# Patient Record
Sex: MALE | Race: WHITE | Employment: OTHER | URBAN - METROPOLITAN AREA
[De-identification: names, ages, dates, MRNs, and addresses within clinical notes are randomized per-mention and may not be internally consistent; named-entity substitution may affect disease eponyms.]

---

## 2017-01-03 ENCOUNTER — TELEPHONE (OUTPATIENT)
Dept: FAMILY MEDICINE CLINIC | Age: 82
End: 2017-01-03

## 2017-01-16 ENCOUNTER — OFFICE VISIT (OUTPATIENT)
Dept: FAMILY MEDICINE CLINIC | Age: 82
End: 2017-01-16

## 2017-01-16 VITALS
HEIGHT: 69 IN | SYSTOLIC BLOOD PRESSURE: 124 MMHG | HEART RATE: 100 BPM | TEMPERATURE: 97.7 F | RESPIRATION RATE: 16 BRPM | DIASTOLIC BLOOD PRESSURE: 80 MMHG | OXYGEN SATURATION: 97 %

## 2017-01-16 DIAGNOSIS — M79.89 SWELLING OF BOTH HANDS: ICD-10-CM

## 2017-01-16 DIAGNOSIS — R60.0 BILATERAL EDEMA OF LOWER EXTREMITY: Primary | ICD-10-CM

## 2017-01-16 PROCEDURE — 99213 OFFICE O/P EST LOW 20 MIN: CPT | Performed by: FAMILY MEDICINE

## 2017-01-16 RX ORDER — POTASSIUM CHLORIDE 750 MG/1
10 CAPSULE, EXTENDED RELEASE ORAL DAILY
Qty: 30 CAPSULE | Refills: 5 | Status: SHIPPED | OUTPATIENT
Start: 2017-01-16 | End: 2017-01-30 | Stop reason: SDUPTHER

## 2017-01-16 RX ORDER — FUROSEMIDE 20 MG/1
20 TABLET ORAL DAILY
Qty: 30 TABLET | Refills: 5 | Status: SHIPPED | OUTPATIENT
Start: 2017-01-16 | End: 2017-01-30 | Stop reason: SDUPTHER

## 2017-01-16 ASSESSMENT — ENCOUNTER SYMPTOMS
BACK PAIN: 0
EYES NEGATIVE: 1
RESPIRATORY NEGATIVE: 1

## 2017-01-16 ASSESSMENT — PATIENT HEALTH QUESTIONNAIRE - PHQ9
SUM OF ALL RESPONSES TO PHQ9 QUESTIONS 1 & 2: 0
1. LITTLE INTEREST OR PLEASURE IN DOING THINGS: 0
2. FEELING DOWN, DEPRESSED OR HOPELESS: 0
SUM OF ALL RESPONSES TO PHQ QUESTIONS 1-9: 0

## 2017-01-17 ENCOUNTER — TELEPHONE (OUTPATIENT)
Dept: FAMILY MEDICINE CLINIC | Age: 82
End: 2017-01-17

## 2017-01-30 ENCOUNTER — OFFICE VISIT (OUTPATIENT)
Dept: FAMILY MEDICINE CLINIC | Age: 82
End: 2017-01-30

## 2017-01-30 VITALS
SYSTOLIC BLOOD PRESSURE: 130 MMHG | OXYGEN SATURATION: 98 % | RESPIRATION RATE: 16 BRPM | HEIGHT: 69 IN | DIASTOLIC BLOOD PRESSURE: 80 MMHG | BODY MASS INDEX: 28.29 KG/M2 | WEIGHT: 191 LBS | HEART RATE: 92 BPM | TEMPERATURE: 98.2 F

## 2017-01-30 DIAGNOSIS — M79.89 SWELLING OF BOTH HANDS: Primary | ICD-10-CM

## 2017-01-30 DIAGNOSIS — R60.0 BILATERAL EDEMA OF LOWER EXTREMITY: ICD-10-CM

## 2017-01-30 LAB
ANION GAP SERPL CALCULATED.3IONS-SCNC: 18 MMOL/L (ref 3–16)
BUN BLDV-MCNC: 15 MG/DL (ref 7–20)
C-REACTIVE PROTEIN: 36.2 MG/L (ref 0–5.1)
CALCIUM SERPL-MCNC: 9 MG/DL (ref 8.3–10.6)
CHLORIDE BLD-SCNC: 100 MMOL/L (ref 99–110)
CO2: 24 MMOL/L (ref 21–32)
CREAT SERPL-MCNC: 0.9 MG/DL (ref 0.8–1.3)
GFR AFRICAN AMERICAN: >60
GFR NON-AFRICAN AMERICAN: >60
GLUCOSE BLD-MCNC: 89 MG/DL (ref 70–99)
POTASSIUM SERPL-SCNC: 4.9 MMOL/L (ref 3.5–5.1)
RHEUMATOID FACTOR: <10 IU/ML
SODIUM BLD-SCNC: 142 MMOL/L (ref 136–145)
URIC ACID, SERUM: 5.8 MG/DL (ref 3.5–7.2)

## 2017-01-30 PROCEDURE — 99213 OFFICE O/P EST LOW 20 MIN: CPT | Performed by: FAMILY MEDICINE

## 2017-01-30 RX ORDER — FUROSEMIDE 20 MG/1
20 TABLET ORAL DAILY
Qty: 30 TABLET | Refills: 5 | Status: SHIPPED | OUTPATIENT
Start: 2017-01-30 | End: 2017-02-09 | Stop reason: SDUPTHER

## 2017-01-30 RX ORDER — POTASSIUM CHLORIDE 750 MG/1
10 CAPSULE, EXTENDED RELEASE ORAL DAILY
Qty: 30 CAPSULE | Refills: 5 | Status: SHIPPED | OUTPATIENT
Start: 2017-01-30 | End: 2017-02-09 | Stop reason: SDUPTHER

## 2017-01-30 ASSESSMENT — ENCOUNTER SYMPTOMS
RESPIRATORY NEGATIVE: 1
BACK PAIN: 0

## 2017-01-31 LAB
ANA INTERPRETATION: NORMAL
ANTI-NUCLEAR ANTIBODY (ANA): NEGATIVE

## 2017-02-01 ENCOUNTER — TELEPHONE (OUTPATIENT)
Dept: FAMILY MEDICINE CLINIC | Age: 82
End: 2017-02-01

## 2017-02-09 ENCOUNTER — OFFICE VISIT (OUTPATIENT)
Dept: FAMILY MEDICINE CLINIC | Age: 82
End: 2017-02-09

## 2017-02-09 VITALS
BODY MASS INDEX: 28.44 KG/M2 | TEMPERATURE: 98 F | OXYGEN SATURATION: 98 % | RESPIRATION RATE: 16 BRPM | WEIGHT: 192 LBS | SYSTOLIC BLOOD PRESSURE: 120 MMHG | DIASTOLIC BLOOD PRESSURE: 80 MMHG | HEIGHT: 69 IN | HEART RATE: 60 BPM

## 2017-02-09 DIAGNOSIS — M19.041 PRIMARY OSTEOARTHRITIS OF BOTH HANDS: ICD-10-CM

## 2017-02-09 DIAGNOSIS — M19.042 PRIMARY OSTEOARTHRITIS OF BOTH HANDS: ICD-10-CM

## 2017-02-09 DIAGNOSIS — M79.89 SWELLING OF BOTH HANDS: ICD-10-CM

## 2017-02-09 DIAGNOSIS — R60.0 BILATERAL LEG EDEMA: Primary | ICD-10-CM

## 2017-02-09 PROCEDURE — 99213 OFFICE O/P EST LOW 20 MIN: CPT | Performed by: FAMILY MEDICINE

## 2017-02-09 RX ORDER — FUROSEMIDE 20 MG/1
20 TABLET ORAL 2 TIMES DAILY
Qty: 60 TABLET | Refills: 2 | Status: SHIPPED | OUTPATIENT
Start: 2017-02-09 | End: 2017-09-21 | Stop reason: SDUPTHER

## 2017-02-09 RX ORDER — POTASSIUM CHLORIDE 750 MG/1
10 TABLET, EXTENDED RELEASE ORAL 2 TIMES DAILY
Qty: 60 TABLET | Refills: 2 | Status: SHIPPED | OUTPATIENT
Start: 2017-02-09 | End: 2017-09-21 | Stop reason: SDUPTHER

## 2017-02-09 ASSESSMENT — ENCOUNTER SYMPTOMS
BACK PAIN: 0
RESPIRATORY NEGATIVE: 1

## 2017-03-18 DIAGNOSIS — T14.8XXA BRUISING: ICD-10-CM

## 2017-03-18 DIAGNOSIS — M15.3 OTHER SECONDARY OSTEOARTHRITIS OF MULTIPLE SITES: ICD-10-CM

## 2017-03-20 RX ORDER — MELOXICAM 15 MG/1
TABLET ORAL
Qty: 30 TABLET | Refills: 5 | Status: SHIPPED | OUTPATIENT
Start: 2017-03-20 | End: 2017-09-21 | Stop reason: SDUPTHER

## 2017-08-16 ENCOUNTER — OFFICE VISIT (OUTPATIENT)
Dept: FAMILY MEDICINE CLINIC | Age: 82
End: 2017-08-16

## 2017-08-16 VITALS
DIASTOLIC BLOOD PRESSURE: 78 MMHG | RESPIRATION RATE: 16 BRPM | BODY MASS INDEX: 29.18 KG/M2 | OXYGEN SATURATION: 99 % | HEART RATE: 84 BPM | HEIGHT: 69 IN | TEMPERATURE: 97.8 F | WEIGHT: 197 LBS | SYSTOLIC BLOOD PRESSURE: 130 MMHG

## 2017-08-16 DIAGNOSIS — Z23 IMMUNIZATION DUE: ICD-10-CM

## 2017-08-16 DIAGNOSIS — R60.0 BILATERAL EDEMA OF LOWER EXTREMITY: ICD-10-CM

## 2017-08-16 DIAGNOSIS — E55.9 VITAMIN D DEFICIENCY: ICD-10-CM

## 2017-08-16 DIAGNOSIS — Z00.00 PE (PHYSICAL EXAM), ANNUAL: Primary | ICD-10-CM

## 2017-08-16 LAB
A/G RATIO: 1.8 (ref 1.1–2.2)
ALBUMIN SERPL-MCNC: 4.3 G/DL (ref 3.4–5)
ALP BLD-CCNC: 102 U/L (ref 40–129)
ALT SERPL-CCNC: 18 U/L (ref 10–40)
ANION GAP SERPL CALCULATED.3IONS-SCNC: 17 MMOL/L (ref 3–16)
AST SERPL-CCNC: 20 U/L (ref 15–37)
BILIRUB SERPL-MCNC: 0.6 MG/DL (ref 0–1)
BILIRUBIN, POC: NORMAL
BLOOD URINE, POC: NORMAL
BUN BLDV-MCNC: 14 MG/DL (ref 7–20)
CALCIUM SERPL-MCNC: 9.3 MG/DL (ref 8.3–10.6)
CHLORIDE BLD-SCNC: 105 MMOL/L (ref 99–110)
CHOLESTEROL, TOTAL: 195 MG/DL (ref 0–199)
CLARITY, POC: CLEAR
CO2: 23 MMOL/L (ref 21–32)
COLOR, POC: YELLOW
CREAT SERPL-MCNC: 0.8 MG/DL (ref 0.8–1.3)
GFR AFRICAN AMERICAN: >60
GFR NON-AFRICAN AMERICAN: >60
GLOBULIN: 2.4 G/DL
GLUCOSE BLD-MCNC: 75 MG/DL (ref 70–99)
GLUCOSE URINE, POC: NORMAL
HCT VFR BLD CALC: 47.3 % (ref 40.5–52.5)
HDLC SERPL-MCNC: 45 MG/DL (ref 40–60)
HEMOGLOBIN: 15.4 G/DL (ref 13.5–17.5)
KETONES, POC: NORMAL
LDL CHOLESTEROL CALCULATED: 126 MG/DL
LEUKOCYTE EST, POC: NORMAL
MCH RBC QN AUTO: 26.7 PG (ref 26–34)
MCHC RBC AUTO-ENTMCNC: 32.5 G/DL (ref 31–36)
MCV RBC AUTO: 82.1 FL (ref 80–100)
NITRITE, POC: NORMAL
PDW BLD-RTO: 16 % (ref 12.4–15.4)
PH, POC: 7
PLATELET # BLD: 276 K/UL (ref 135–450)
PMV BLD AUTO: 8.5 FL (ref 5–10.5)
POTASSIUM SERPL-SCNC: 4.4 MMOL/L (ref 3.5–5.1)
PROSTATE SPECIFIC ANTIGEN: 3.81 NG/ML (ref 0–4)
PROTEIN, POC: NORMAL
RBC # BLD: 5.76 M/UL (ref 4.2–5.9)
SODIUM BLD-SCNC: 145 MMOL/L (ref 136–145)
SPECIFIC GRAVITY, POC: 1.01
T3 FREE: 3 PG/ML (ref 2.3–4.2)
T4 FREE: 0.9 NG/DL (ref 0.9–1.8)
TOTAL PROTEIN: 6.7 G/DL (ref 6.4–8.2)
TRIGL SERPL-MCNC: 122 MG/DL (ref 0–150)
TSH SERPL DL<=0.05 MIU/L-ACNC: 3.24 UIU/ML (ref 0.27–4.2)
UROBILINOGEN, POC: 0.2
VITAMIN D 25-HYDROXY: 31.8 NG/ML
VLDLC SERPL CALC-MCNC: 24 MG/DL
WBC # BLD: 10.9 K/UL (ref 4–11)

## 2017-08-16 PROCEDURE — 36415 COLL VENOUS BLD VENIPUNCTURE: CPT | Performed by: FAMILY MEDICINE

## 2017-08-16 PROCEDURE — 90471 IMMUNIZATION ADMIN: CPT | Performed by: FAMILY MEDICINE

## 2017-08-16 PROCEDURE — 81002 URINALYSIS NONAUTO W/O SCOPE: CPT | Performed by: FAMILY MEDICINE

## 2017-08-16 PROCEDURE — 90670 PCV13 VACCINE IM: CPT | Performed by: FAMILY MEDICINE

## 2017-08-16 PROCEDURE — G0009 ADMIN PNEUMOCOCCAL VACCINE: HCPCS | Performed by: FAMILY MEDICINE

## 2017-08-16 PROCEDURE — 93000 ELECTROCARDIOGRAM COMPLETE: CPT | Performed by: FAMILY MEDICINE

## 2017-08-16 PROCEDURE — 99397 PER PM REEVAL EST PAT 65+ YR: CPT | Performed by: FAMILY MEDICINE

## 2017-08-16 PROCEDURE — 90715 TDAP VACCINE 7 YRS/> IM: CPT | Performed by: FAMILY MEDICINE

## 2017-09-21 ENCOUNTER — OFFICE VISIT (OUTPATIENT)
Dept: FAMILY MEDICINE CLINIC | Age: 82
End: 2017-09-21

## 2017-09-21 VITALS
HEART RATE: 88 BPM | WEIGHT: 198 LBS | BODY MASS INDEX: 30.01 KG/M2 | HEIGHT: 68 IN | DIASTOLIC BLOOD PRESSURE: 68 MMHG | OXYGEN SATURATION: 97 % | SYSTOLIC BLOOD PRESSURE: 124 MMHG

## 2017-09-21 DIAGNOSIS — M19.041 PRIMARY OSTEOARTHRITIS OF BOTH HANDS: ICD-10-CM

## 2017-09-21 DIAGNOSIS — M19.042 PRIMARY OSTEOARTHRITIS OF BOTH HANDS: ICD-10-CM

## 2017-09-21 DIAGNOSIS — E78.5 HYPERLIPIDEMIA, UNSPECIFIED HYPERLIPIDEMIA TYPE: ICD-10-CM

## 2017-09-21 DIAGNOSIS — R60.0 BILATERAL EDEMA OF LOWER EXTREMITY: ICD-10-CM

## 2017-09-21 PROCEDURE — 99213 OFFICE O/P EST LOW 20 MIN: CPT | Performed by: NURSE PRACTITIONER

## 2017-09-21 RX ORDER — POTASSIUM CHLORIDE 750 MG/1
10 TABLET, EXTENDED RELEASE ORAL 2 TIMES DAILY
Qty: 60 TABLET | Refills: 2 | Status: SHIPPED | OUTPATIENT
Start: 2017-09-21 | End: 2018-04-19 | Stop reason: SDUPTHER

## 2017-09-21 RX ORDER — MELOXICAM 15 MG/1
TABLET ORAL
Qty: 30 TABLET | Refills: 5 | Status: ON HOLD | OUTPATIENT
Start: 2017-09-21 | End: 2017-12-31 | Stop reason: HOSPADM

## 2017-09-21 RX ORDER — FUROSEMIDE 20 MG/1
20 TABLET ORAL 2 TIMES DAILY
Qty: 60 TABLET | Refills: 2 | Status: SHIPPED | OUTPATIENT
Start: 2017-09-21 | End: 2018-04-19 | Stop reason: SDUPTHER

## 2017-09-21 ASSESSMENT — ENCOUNTER SYMPTOMS
BLOOD IN STOOL: 0
COUGH: 0
SHORTNESS OF BREATH: 0
ABDOMINAL PAIN: 0
ORTHOPNEA: 0
DIARRHEA: 0
CONSTIPATION: 0

## 2017-12-15 ENCOUNTER — OFFICE VISIT (OUTPATIENT)
Dept: FAMILY MEDICINE CLINIC | Age: 82
End: 2017-12-15

## 2017-12-15 VITALS
OXYGEN SATURATION: 96 % | HEART RATE: 94 BPM | DIASTOLIC BLOOD PRESSURE: 76 MMHG | WEIGHT: 202 LBS | SYSTOLIC BLOOD PRESSURE: 134 MMHG | BODY MASS INDEX: 31.17 KG/M2

## 2017-12-15 DIAGNOSIS — B96.89 ACUTE BACTERIAL SINUSITIS: Primary | ICD-10-CM

## 2017-12-15 DIAGNOSIS — J01.90 ACUTE BACTERIAL SINUSITIS: Primary | ICD-10-CM

## 2017-12-15 PROCEDURE — 99213 OFFICE O/P EST LOW 20 MIN: CPT | Performed by: INTERNAL MEDICINE

## 2017-12-15 RX ORDER — AZITHROMYCIN 250 MG/1
TABLET, FILM COATED ORAL
Qty: 1 PACKET | Refills: 0 | Status: ON HOLD | OUTPATIENT
Start: 2017-12-15 | End: 2017-12-31 | Stop reason: HOSPADM

## 2017-12-15 NOTE — PROGRESS NOTES
Subjective:       Natalia Alva is a [de-identified] y.o. male who presents for evaluation of sinus pain. Symptoms include: congestion, post nasal drip and chest pain with cough with phlegm. Onset of symptoms was 1 week ago. Symptoms have been unchanged since that time. Past history is significant for no risk factors. Patient is a non-smoker. Patient's medications, allergies, past medical, surgical, social and family histories were reviewed and updated as appropriate. Review of Systems  Constitutional: negative for fatigue and fevers  Ears, nose, mouth, throat, and face: negative for earaches and hoarseness  Respiratory: negative for pleurisy/chest pain     Objective:      /76 (Site: Left Arm, Position: Sitting, Cuff Size: Large Adult)   Pulse 94   Wt 202 lb (91.6 kg)   SpO2 96%   BMI 31.17 kg/m²   General appearance: alert, appears stated age and cooperative  Throat: lips, mucosa, and tongue normal; teeth and gums normal  Lungs: clear to auscultation bilaterally  Chest wall: no tenderness  Extremities: extremities normal, atraumatic, no cyanosis or edema      Assessment:      Acute bacterial sinusitis. Plan:      Zithromax per medication orders.

## 2017-12-24 PROBLEM — I47.1 SVT (SUPRAVENTRICULAR TACHYCARDIA) (HCC): Status: ACTIVE | Noted: 2017-12-24

## 2017-12-24 PROBLEM — I47.10 SVT (SUPRAVENTRICULAR TACHYCARDIA): Status: ACTIVE | Noted: 2017-12-24

## 2017-12-31 ENCOUNTER — TELEPHONE (OUTPATIENT)
Dept: PULMONOLOGY | Age: 82
End: 2017-12-31

## 2017-12-31 DIAGNOSIS — R93.89 ABNORMAL CT OF THE CHEST: Primary | ICD-10-CM

## 2018-01-04 NOTE — TELEPHONE ENCOUNTER
Spoke with patient Ct chest w/o [facility] 3/2/18 @ 10:30am with same day f/u with Dr Gramajo. Order pended.   Ct chest w/o

## 2018-01-05 ENCOUNTER — TELEPHONE (OUTPATIENT)
Dept: FAMILY MEDICINE CLINIC | Age: 83
End: 2018-01-05

## 2018-01-05 NOTE — TELEPHONE ENCOUNTER
Pt referred for home care after d/c from hospital.  Seen by home care on 01/04/2018, asking if you will sign home care orders. Has appt on 01/08/2018. BP still a little elevated for nursing yesterday, 160/70 right arm sitting, 160/80 standing, had diltiazem and metoprolol added in hospital.      Asking if you feel any PT would be beneficial for therapy. Nurse says he walks kind of \"funny\", but pt states he has always walked this way.

## 2018-01-08 ENCOUNTER — OFFICE VISIT (OUTPATIENT)
Dept: FAMILY MEDICINE CLINIC | Age: 83
End: 2018-01-08

## 2018-01-08 VITALS
BODY MASS INDEX: 28.12 KG/M2 | SYSTOLIC BLOOD PRESSURE: 136 MMHG | OXYGEN SATURATION: 95 % | HEART RATE: 94 BPM | RESPIRATION RATE: 16 BRPM | WEIGHT: 196 LBS | DIASTOLIC BLOOD PRESSURE: 72 MMHG

## 2018-01-08 DIAGNOSIS — Z87.09 HISTORY OF INFLUENZA: ICD-10-CM

## 2018-01-08 DIAGNOSIS — R53.1 GENERALIZED WEAKNESS: ICD-10-CM

## 2018-01-08 DIAGNOSIS — Z09 HOSPITAL DISCHARGE FOLLOW-UP: ICD-10-CM

## 2018-01-08 DIAGNOSIS — R60.0 BILATERAL LOWER EXTREMITY EDEMA: ICD-10-CM

## 2018-01-08 DIAGNOSIS — J40 BRONCHITIS: ICD-10-CM

## 2018-01-08 DIAGNOSIS — I10 ESSENTIAL HYPERTENSION: ICD-10-CM

## 2018-01-08 PROCEDURE — 99214 OFFICE O/P EST MOD 30 MIN: CPT | Performed by: NURSE PRACTITIONER

## 2018-01-08 RX ORDER — M-VIT,TX,IRON,MINS/CALC/FOLIC 27MG-0.4MG
1 TABLET ORAL DAILY
COMMUNITY
End: 2018-08-26

## 2018-01-10 ASSESSMENT — ENCOUNTER SYMPTOMS
WHEEZING: 0
SHORTNESS OF BREATH: 0
COUGH: 1
SPUTUM PRODUCTION: 0
ABDOMINAL PAIN: 0
ORTHOPNEA: 0
HEMOPTYSIS: 0
NAUSEA: 0
VOMITING: 0
DIARRHEA: 0

## 2018-01-10 NOTE — PROGRESS NOTES
Veena Prom [de-identified] y.o. male    Chief Complaint   Patient presents with    Shortness of Breath     f/u from [facility] 12/24/17 for bronchitis, influenza       HPI     Hospital discharge follow-up for influenza, bronchitis, hypoxic resp failure. Was feeling bad, fell at home, CT scan of head ok. SVT, was on diltiazem drip, cardio was consulted, discharged on metoprolol, diltiazem. Pt restarted furosemide. Cervical LAD and mediastinal LAD, repeat CT scan of chest scheduled in [facility]. ECHO 12/27/17 Summary   Hyperdynamic LV systolic function with an estimated EF > 65%.   Normal left ventricular diastolic filling pressure.   Mild mitral annular calcification.   Trace-mild tricuspid regurgitation.   Systolic pulmonary artery pressure (SPAP) is estimated at 48mmHg consistent   with mild pulmonary hypertension (RA pressure 8mmHg). CTA pulm 12/24/2017:  1. No evidence of pulmonary embolism when accounting for breathing motion  2. Bronchial wall thickening compatible with bronchitis, with multifocal  discoid atelectasis  3. Mildly enlarged bilateral hilar and sub- carinal lymph nodes which are  most likely reactive.  Follow-up chest CT when the patient is clinically  baseline is recommended    Patient is here to day with his wife, has home health, will do PT to regain strength. Completed antibiotics and prednisone. Feeling better, SOB improved, still has mild cough. Edema in legs decreased. Past medical, surgical, family and social history were reviewed and updated with the patient.       Current Outpatient Prescriptions:     vitamin D 1000 units CAPS, Take by mouth, Disp: , Rfl:     Multiple Vitamins-Minerals (THERAPEUTIC MULTIVITAMIN-MINERALS) tablet, Take 1 tablet by mouth daily, Disp: , Rfl:     metoprolol succinate (TOPROL XL) 25 MG extended release tablet, Take 1 tablet by mouth daily, Disp: 30 tablet, Rfl: 0    diltiazem (CARDIZEM CD) 120 MG extended release capsule, Take 1 capsule by mouth daily, Disp: 30 note and vitals reviewed. Vitals:    01/08/18 1437   BP: 136/72   Pulse: 94   Resp: 16   SpO2: 95%       Assessment    1. Bronchitis    2. History of influenza    3. Generalized weakness    4. Essential hypertension    5. Bilateral lower extremity edema    6. Hospital discharge follow-up        Plan    Adolfo Olea was seen today for shortness of breath. Diagnoses and all orders for this visit:    Bronchitis  History of influenza        -     Significantly better, restart mucinex, fluids  Generalized weakness        -     Pt will start home PT   Essential hypertension        -     Controlled, continue current   -     BASIC METABOLIC PANEL; Future  -     CBC Auto Differential; Future  Bilateral lower extremity edema        -     Stable, continue furosemide   -     BASIC METABOLIC PANEL; Future  -     CBC Auto Differential; Future  Hospital discharge follow-up      Return to office for worsening symptoms.   To emergency room for severe symptoms

## 2018-01-16 ENCOUNTER — TELEPHONE (OUTPATIENT)
Dept: FAMILY MEDICINE CLINIC | Age: 83
End: 2018-01-16

## 2018-01-29 RX ORDER — DILTIAZEM HYDROCHLORIDE 120 MG/1
120 CAPSULE, COATED, EXTENDED RELEASE ORAL DAILY
Qty: 30 CAPSULE | Refills: 5 | Status: SHIPPED | OUTPATIENT
Start: 2018-01-29 | End: 2018-05-22 | Stop reason: SDUPTHER

## 2018-01-29 RX ORDER — METOPROLOL SUCCINATE 25 MG/1
25 TABLET, EXTENDED RELEASE ORAL DAILY
Qty: 30 TABLET | Refills: 5 | Status: SHIPPED | OUTPATIENT
Start: 2018-01-29 | End: 2018-05-22 | Stop reason: SDUPTHER

## 2018-02-12 ENCOUNTER — OFFICE VISIT (OUTPATIENT)
Dept: FAMILY MEDICINE CLINIC | Age: 83
End: 2018-02-12

## 2018-02-12 VITALS
SYSTOLIC BLOOD PRESSURE: 125 MMHG | OXYGEN SATURATION: 96 % | HEART RATE: 84 BPM | DIASTOLIC BLOOD PRESSURE: 78 MMHG | HEIGHT: 70 IN | BODY MASS INDEX: 28.35 KG/M2 | WEIGHT: 198 LBS

## 2018-02-12 DIAGNOSIS — R60.0 BILATERAL EDEMA OF LOWER EXTREMITY: ICD-10-CM

## 2018-02-12 DIAGNOSIS — I10 ESSENTIAL HYPERTENSION: ICD-10-CM

## 2018-02-12 PROCEDURE — 99213 OFFICE O/P EST LOW 20 MIN: CPT | Performed by: NURSE PRACTITIONER

## 2018-02-12 ASSESSMENT — PATIENT HEALTH QUESTIONNAIRE - PHQ9
1. LITTLE INTEREST OR PLEASURE IN DOING THINGS: 0
2. FEELING DOWN, DEPRESSED OR HOPELESS: 0
SUM OF ALL RESPONSES TO PHQ9 QUESTIONS 1 & 2: 0
SUM OF ALL RESPONSES TO PHQ QUESTIONS 1-9: 0

## 2018-02-18 ASSESSMENT — ENCOUNTER SYMPTOMS
RESPIRATORY NEGATIVE: 1
ORTHOPNEA: 0
GASTROINTESTINAL NEGATIVE: 1

## 2018-02-18 NOTE — PROGRESS NOTES
[facility] [de-identified] y.o. male    Chief Complaint   Patient presents with    1 Month Follow-Up    Hypertension       HPI     HTN follow-up. Had influenza, bronchitis, hypoxic resp failure- was hospitalized on 12/24/2017, episode of SVT, was on diltiazem drip, cardio was consulted, discharged on metoprolol, diltiazem. Pt restarted furosemide for BLE edema. Pt feels very well today, no edema in legs, taking furosemide 20mg BID, also continues Cardizem, metoprolol. Tolerating well, no dizziness. Lab Results   Component Value Date     01/12/2018    K 4.4 01/12/2018     01/12/2018    CO2 27 01/12/2018    BUN 13 01/12/2018    CREATININE 0.92 01/12/2018    GLUCOSE 88 01/12/2018    CALCIUM 8.9 01/12/2018      Lab Results   Component Value Date    TSH 1.09 12/25/2017       Echo:  12/26/2017   Hyperdynamic LV systolic function with an estimated EF > 65%.   Normal left ventricular diastolic filling pressure.   Mild mitral annular calcification.   Trace-mild tricuspid regurgitation.   Systolic pulmonary artery pressure (SPAP) is estimated at 48mmHg consistent   with mild pulmonary hypertension (RA pressure 8mmHg).    Past medical, surgical, family and social history were reviewed and updated with the patient.       Current Outpatient Prescriptions:     Calcium-Magnesium-Vitamin D (CALCIUM 1200+D3 PO), Take 1,200 mg by mouth daily, Disp: , Rfl:     diltiazem (CARDIZEM CD) 120 MG extended release capsule, Take 1 capsule by mouth daily, Disp: 30 capsule, Rfl: 5    metoprolol succinate (TOPROL XL) 25 MG extended release tablet, Take 1 tablet by mouth daily, Disp: 30 tablet, Rfl: 5    vitamin D 1000 units CAPS, Take by mouth, Disp: , Rfl:     Multiple Vitamins-Minerals (THERAPEUTIC MULTIVITAMIN-MINERALS) tablet, Take 1 tablet by mouth daily, Disp: , Rfl:     furosemide (LASIX) 20 MG tablet, Take 1 tablet by mouth 2 times daily, Disp: 60 tablet, Rfl: 2    potassium chloride (KLOR-CON M) 10 MEQ extended release tablet, Take 1 tablet by mouth 2 times daily (Patient taking differently: Take 10 mEq by mouth daily ), Disp: 60 tablet, Rfl: 2    Elastic Bandages & Supports (MEDICAL COMPRESSION STOCKINGS) MISC, 1 each by Does not apply route daily as needed (BLE), Disp: 1 each, Rfl: 0    Compression Stockings MISC, by Does not apply route, Disp: 1 each, Rfl: 0    Review of Systems   Constitutional: Negative for malaise/fatigue. Respiratory: Negative. Cardiovascular: Negative for chest pain, palpitations, orthopnea, claudication, leg swelling and PND. Gastrointestinal: Negative. Neurological: Negative for dizziness, weakness and headaches. Physical Exam   Constitutional: He is oriented to person, place, and time. He appears well-developed and well-nourished. No distress. Neck: Neck supple. No JVD present. Carotid bruit is not present. No thyromegaly present. Cardiovascular: Normal rate, regular rhythm, normal heart sounds and intact distal pulses. No murmur heard. Trace BLE edema   Pulmonary/Chest: Effort normal and breath sounds normal.   Abdominal: He exhibits no distension. Lymphadenopathy:     He has no cervical adenopathy. Neurological: He is alert and oriented to person, place, and time. Skin: No erythema. Nursing note and vitals reviewed. Vitals:    02/12/18 1115   BP: 125/78   Pulse:    SpO2:        Assessment    1. Essential hypertension    2. Bilateral edema of lower extremity        Ely Mckinney was seen today for 1 month follow-up and hypertension. Diagnoses and all orders for this visit:    Essential hypertension  Bilateral edema of lower extremity    - well controlled, continue current, cut down to 1 tab of furosemide, may take BID if edema worsen.

## 2018-03-02 ENCOUNTER — HOSPITAL ENCOUNTER (OUTPATIENT)
Dept: CT IMAGING | Age: 83
Discharge: HOME | End: 2018-03-02
Attending: INTERNAL MEDICINE | Admitting: INTERNAL MEDICINE

## 2018-03-02 ENCOUNTER — TELEPHONE (OUTPATIENT)
Dept: FAMILY MEDICINE CLINIC | Age: 83
End: 2018-03-02

## 2018-03-02 ENCOUNTER — OFFICE VISIT (OUTPATIENT)
Dept: PULMONOLOGY | Age: 83
End: 2018-03-02

## 2018-03-02 VITALS
HEART RATE: 74 BPM | WEIGHT: 202 LBS | RESPIRATION RATE: 16 BRPM | SYSTOLIC BLOOD PRESSURE: 132 MMHG | BODY MASS INDEX: 28.92 KG/M2 | HEIGHT: 70 IN | TEMPERATURE: 97.4 F | DIASTOLIC BLOOD PRESSURE: 80 MMHG | OXYGEN SATURATION: 97 %

## 2018-03-02 DIAGNOSIS — J10.1 INFLUENZA A: ICD-10-CM

## 2018-03-02 DIAGNOSIS — R59.0 MEDIASTINAL ADENOPATHY: ICD-10-CM

## 2018-03-02 DIAGNOSIS — R93.89 ABNORMAL CT OF THE CHEST: Primary | ICD-10-CM

## 2018-03-02 DIAGNOSIS — R93.89 ABNORMAL FINDINGS ON DIAGNOSTIC IMAGING OF OTHER SPECIFIED BODY STRUCTURES: ICD-10-CM

## 2018-03-02 DIAGNOSIS — R93.89 ABNORMAL CT OF THE CHEST: ICD-10-CM

## 2018-03-02 PROCEDURE — 99214 OFFICE O/P EST MOD 30 MIN: CPT | Performed by: INTERNAL MEDICINE

## 2018-03-06 ENCOUNTER — TELEPHONE (OUTPATIENT)
Dept: FAMILY MEDICINE CLINIC | Age: 83
End: 2018-03-06

## 2018-03-28 ENCOUNTER — OFFICE VISIT (OUTPATIENT)
Dept: FAMILY MEDICINE CLINIC | Age: 83
End: 2018-03-28

## 2018-03-28 VITALS
OXYGEN SATURATION: 96 % | BODY MASS INDEX: 28.55 KG/M2 | HEART RATE: 86 BPM | DIASTOLIC BLOOD PRESSURE: 76 MMHG | WEIGHT: 199 LBS | SYSTOLIC BLOOD PRESSURE: 122 MMHG

## 2018-03-28 DIAGNOSIS — L03.116 CELLULITIS OF BOTH FEET: Primary | ICD-10-CM

## 2018-03-28 DIAGNOSIS — L03.115 CELLULITIS OF BOTH FEET: Primary | ICD-10-CM

## 2018-03-28 PROCEDURE — 99213 OFFICE O/P EST LOW 20 MIN: CPT | Performed by: FAMILY MEDICINE

## 2018-03-28 RX ORDER — SULFAMETHOXAZOLE AND TRIMETHOPRIM 800; 160 MG/1; MG/1
1 TABLET ORAL 2 TIMES DAILY
Qty: 20 TABLET | Refills: 0 | Status: SHIPPED | OUTPATIENT
Start: 2018-03-28 | End: 2018-04-06 | Stop reason: SDUPTHER

## 2018-04-06 ENCOUNTER — OFFICE VISIT (OUTPATIENT)
Dept: FAMILY MEDICINE CLINIC | Age: 83
End: 2018-04-06

## 2018-04-06 VITALS
WEIGHT: 201 LBS | OXYGEN SATURATION: 97 % | DIASTOLIC BLOOD PRESSURE: 78 MMHG | BODY MASS INDEX: 28.84 KG/M2 | HEART RATE: 74 BPM | SYSTOLIC BLOOD PRESSURE: 138 MMHG

## 2018-04-06 DIAGNOSIS — L03.115 CELLULITIS OF BOTH FEET: Primary | ICD-10-CM

## 2018-04-06 DIAGNOSIS — L03.116 CELLULITIS OF BOTH FEET: Primary | ICD-10-CM

## 2018-04-06 PROCEDURE — 99213 OFFICE O/P EST LOW 20 MIN: CPT | Performed by: FAMILY MEDICINE

## 2018-04-06 RX ORDER — SULFAMETHOXAZOLE AND TRIMETHOPRIM 800; 160 MG/1; MG/1
1 TABLET ORAL 2 TIMES DAILY
Qty: 10 TABLET | Refills: 0 | Status: SHIPPED | OUTPATIENT
Start: 2018-04-06 | End: 2018-04-11

## 2018-04-27 ENCOUNTER — NURSE ONLY (OUTPATIENT)
Dept: URGENT CARE | Age: 83
End: 2018-04-27

## 2018-04-27 ENCOUNTER — OFFICE VISIT (OUTPATIENT)
Dept: FAMILY MEDICINE CLINIC | Age: 83
End: 2018-04-27

## 2018-04-27 VITALS
BODY MASS INDEX: 28.55 KG/M2 | WEIGHT: 199 LBS | HEART RATE: 64 BPM | OXYGEN SATURATION: 98 % | RESPIRATION RATE: 16 BRPM | SYSTOLIC BLOOD PRESSURE: 132 MMHG | DIASTOLIC BLOOD PRESSURE: 74 MMHG

## 2018-04-27 DIAGNOSIS — R05.9 COUGH: ICD-10-CM

## 2018-04-27 DIAGNOSIS — Z87.09 HX OF PULMONARY EDEMA: Primary | ICD-10-CM

## 2018-04-27 DIAGNOSIS — R60.0 BILATERAL EDEMA OF LOWER EXTREMITY: ICD-10-CM

## 2018-04-27 DIAGNOSIS — Z87.09 HX OF PULMONARY EDEMA: ICD-10-CM

## 2018-04-27 DIAGNOSIS — R09.89 DECREASED PULSES IN FEET: ICD-10-CM

## 2018-04-27 LAB
ANION GAP SERPL CALCULATED.3IONS-SCNC: 16 MMOL/L (ref 3–16)
BUN BLDV-MCNC: 13 MG/DL (ref 7–20)
CALCIUM SERPL-MCNC: 9.3 MG/DL (ref 8.3–10.6)
CHLORIDE BLD-SCNC: 100 MMOL/L (ref 99–110)
CO2: 27 MMOL/L (ref 21–32)
CREAT SERPL-MCNC: 0.9 MG/DL (ref 0.8–1.3)
GFR AFRICAN AMERICAN: >60
GFR NON-AFRICAN AMERICAN: >60
GLUCOSE BLD-MCNC: 95 MG/DL (ref 70–99)
POTASSIUM SERPL-SCNC: 3.9 MMOL/L (ref 3.5–5.1)
SODIUM BLD-SCNC: 143 MMOL/L (ref 136–145)

## 2018-04-27 PROCEDURE — 99214 OFFICE O/P EST MOD 30 MIN: CPT | Performed by: NURSE PRACTITIONER

## 2018-04-27 RX ORDER — POTASSIUM CHLORIDE 750 MG/1
TABLET, EXTENDED RELEASE ORAL
Qty: 60 TABLET | Refills: 1 | Status: SHIPPED | OUTPATIENT
Start: 2018-04-27 | End: 2018-05-22 | Stop reason: SDUPTHER

## 2018-04-27 RX ORDER — FUROSEMIDE 40 MG/1
TABLET ORAL
Qty: 60 TABLET | Refills: 1 | Status: SHIPPED | OUTPATIENT
Start: 2018-04-27 | End: 2018-05-22 | Stop reason: SDUPTHER

## 2018-05-03 ASSESSMENT — ENCOUNTER SYMPTOMS
HEMOPTYSIS: 0
ABDOMINAL PAIN: 0
SPUTUM PRODUCTION: 0
SHORTNESS OF BREATH: 0
COUGH: 1
ORTHOPNEA: 0
WHEEZING: 0

## 2018-05-04 ENCOUNTER — HOSPITAL ENCOUNTER (OUTPATIENT)
Dept: VASCULAR LAB | Age: 83
Discharge: HOME | End: 2018-05-04
Attending: NURSE PRACTITIONER | Admitting: NURSE PRACTITIONER

## 2018-05-04 DIAGNOSIS — R60.0 LOCALIZED EDEMA: ICD-10-CM

## 2018-05-11 ENCOUNTER — HOSPITAL ENCOUNTER (OUTPATIENT)
Dept: VASCULAR LAB | Age: 83
Discharge: HOME | End: 2018-05-11
Attending: NURSE PRACTITIONER | Admitting: NURSE PRACTITIONER

## 2018-05-11 DIAGNOSIS — R60.0 LOCALIZED EDEMA: ICD-10-CM

## 2018-05-22 ENCOUNTER — OFFICE VISIT (OUTPATIENT)
Dept: FAMILY MEDICINE CLINIC | Age: 83
End: 2018-05-22

## 2018-05-22 VITALS
RESPIRATION RATE: 18 BRPM | SYSTOLIC BLOOD PRESSURE: 134 MMHG | BODY MASS INDEX: 28.41 KG/M2 | WEIGHT: 198 LBS | DIASTOLIC BLOOD PRESSURE: 68 MMHG | HEART RATE: 64 BPM | OXYGEN SATURATION: 96 %

## 2018-05-22 DIAGNOSIS — I87.2 VENOUS INSUFFICIENCY: ICD-10-CM

## 2018-05-22 DIAGNOSIS — I10 ESSENTIAL HYPERTENSION: ICD-10-CM

## 2018-05-22 DIAGNOSIS — R60.0 BILATERAL EDEMA OF LOWER EXTREMITY: ICD-10-CM

## 2018-05-22 PROCEDURE — 99213 OFFICE O/P EST LOW 20 MIN: CPT | Performed by: NURSE PRACTITIONER

## 2018-05-22 RX ORDER — FUROSEMIDE 40 MG/1
TABLET ORAL
Qty: 60 TABLET | Refills: 5 | Status: SHIPPED | OUTPATIENT
Start: 2018-05-22 | End: 2019-02-15

## 2018-05-22 RX ORDER — DILTIAZEM HYDROCHLORIDE 120 MG/1
120 CAPSULE, COATED, EXTENDED RELEASE ORAL DAILY
Qty: 30 CAPSULE | Refills: 5 | Status: SHIPPED | OUTPATIENT
Start: 2018-05-22 | End: 2018-11-20

## 2018-05-22 RX ORDER — POTASSIUM CHLORIDE 750 MG/1
TABLET, EXTENDED RELEASE ORAL
Qty: 60 TABLET | Refills: 5 | Status: SHIPPED | OUTPATIENT
Start: 2018-05-22 | End: 2019-05-13

## 2018-05-22 RX ORDER — METOPROLOL SUCCINATE 25 MG/1
25 TABLET, EXTENDED RELEASE ORAL DAILY
Qty: 30 TABLET | Refills: 5 | Status: SHIPPED | OUTPATIENT
Start: 2018-05-22 | End: 2018-08-30 | Stop reason: CLARIF

## 2018-05-30 ASSESSMENT — ENCOUNTER SYMPTOMS
ORTHOPNEA: 0
ABDOMINAL PAIN: 0
COUGH: 0
SHORTNESS OF BREATH: 0

## 2018-07-16 ENCOUNTER — OFFICE VISIT (OUTPATIENT)
Dept: FAMILY MEDICINE CLINIC | Age: 83
End: 2018-07-16

## 2018-07-16 VITALS
SYSTOLIC BLOOD PRESSURE: 130 MMHG | DIASTOLIC BLOOD PRESSURE: 62 MMHG | HEART RATE: 98 BPM | WEIGHT: 200 LBS | BODY MASS INDEX: 28.63 KG/M2 | HEIGHT: 70 IN | OXYGEN SATURATION: 97 %

## 2018-07-16 DIAGNOSIS — H11.32 SUBCONJUNCTIVAL HEMORRHAGE OF LEFT EYE: Primary | ICD-10-CM

## 2018-07-16 PROCEDURE — 99213 OFFICE O/P EST LOW 20 MIN: CPT | Performed by: NURSE PRACTITIONER

## 2018-07-16 ASSESSMENT — ENCOUNTER SYMPTOMS
WHEEZING: 0
EYE REDNESS: 1
EYE ITCHING: 0
STRIDOR: 0
SHORTNESS OF BREATH: 0
EYE PAIN: 0
PHOTOPHOBIA: 0
COUGH: 0
EYE DISCHARGE: 0

## 2018-07-16 NOTE — PROGRESS NOTES
pain relief for another problem. · Do not take two or more pain medicines at the same time unless the doctor told you to. Many pain medicines have acetaminophen, which is Tylenol. Too much acetaminophen (Tylenol) can be harmful. When should you call for help? Call your doctor now or seek immediate medical care if:    · You have signs of an eye infection, such as:  ¨ Pus or thick discharge coming from the eye. ¨ Redness or swelling around the eye. ¨ A fever.     · You see blood over the black part of your eye (pupil).     · You have any changes or problems in your vision.     · You have any pain in your eye.    Watch closely for changes in your health, and be sure to contact your doctor if:    · You do not get better as expected. Where can you learn more? Go to [email or web address]. org and sign in to your [employer] account. Enter Y020 in the My Best Interest box to learn more about \"Subconjunctival Hemorrhage: Care Instructions. \"     If you do not have an account, please click on the \"Sign Up Now\" link. Current as of: December 3, 2017  Content Version: 11.6  © 3573-3464 [employer]. Care instructions adapted under license by [facility] ([facility]). If you have questions about a medical condition or this instruction, always ask your healthcare professional. [address] any warranty or liability for your use of this information.            Outpatient Encounter Prescriptions as of 7/16/2018   Medication Sig Dispense Refill    diltiazem (CARDIZEM CD) 120 MG extended release capsule Take 1 capsule by mouth daily 30 capsule 5    metoprolol succinate (TOPROL XL) 25 MG extended release tablet Take 1 tablet by mouth daily 30 tablet 5    furosemide (LASIX) 40 MG tablet TAKE ONE TABLET BY MOUTH TWICE A DAY 60 tablet 5    potassium chloride (KLOR-CON M) 10 MEQ extended release tablet TAKE ONE TABLET BY MOUTH TWICE A DAY 60 tablet 5    Calcium-Magnesium-Vitamin D (CALCIUM 1200+D3 PO) Take 1,200 mg by mouth daily      Multiple Vitamins-Minerals (THERAPEUTIC MULTIVITAMIN-MINERALS) tablet Take 1 tablet by mouth daily       No facility-administered encounter medications on file as of 7/16/2018.       Jimmy Nieto, CNP

## 2018-08-26 ENCOUNTER — APPOINTMENT (OUTPATIENT)
Dept: GENERAL RADIOLOGY | Age: 83
End: 2018-08-26
Payer: MEDICARE

## 2018-08-26 ENCOUNTER — HOSPITAL ENCOUNTER (EMERGENCY)
Age: 83
Discharge: HOME | End: 2018-08-26
Attending: EMERGENCY MEDICINE
Payer: MEDICARE

## 2018-08-26 ENCOUNTER — APPOINTMENT (OUTPATIENT)
Dept: CT IMAGING | Age: 83
End: 2018-08-26
Payer: MEDICARE

## 2018-08-26 VITALS
HEIGHT: 69 IN | BODY MASS INDEX: 28.14 KG/M2 | WEIGHT: 190 LBS | OXYGEN SATURATION: 95 % | RESPIRATION RATE: 16 BRPM | DIASTOLIC BLOOD PRESSURE: 71 MMHG | SYSTOLIC BLOOD PRESSURE: 127 MMHG | HEART RATE: 84 BPM | TEMPERATURE: 97.2 F

## 2018-08-26 DIAGNOSIS — M25.512 ACUTE PAIN OF LEFT SHOULDER: ICD-10-CM
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
## 2018-08-26 DIAGNOSIS — S09.90XA CLOSED HEAD INJURY, INITIAL ENCOUNTER: Primary | ICD-10-CM

## 2018-08-26 PROCEDURE — 71046 X-RAY EXAM CHEST 2 VIEWS: CPT

## 2018-08-26 PROCEDURE — 70450 CT HEAD/BRAIN W/O DYE: CPT

## 2018-08-26 PROCEDURE — 93005 ELECTROCARDIOGRAM TRACING: CPT | Performed by: NURSE PRACTITIONER

## 2018-08-26 PROCEDURE — 99284 EMERGENCY DEPT VISIT MOD MDM: CPT

## 2018-08-26 PROCEDURE — 73030 X-RAY EXAM OF SHOULDER: CPT

## 2018-08-26 ASSESSMENT — PAIN SCALES - GENERAL: PAINLEVEL_OUTOF10: 5

## 2018-08-26 ASSESSMENT — ENCOUNTER SYMPTOMS
GASTROINTESTINAL NEGATIVE: 1
COUGH: 0
SHORTNESS OF BREATH: 0
BACK PAIN: 0

## 2018-08-26 ASSESSMENT — PAIN DESCRIPTION - PAIN TYPE: TYPE: ACUTE PAIN

## 2018-08-26 ASSESSMENT — PAIN DESCRIPTION - ORIENTATION: ORIENTATION: RIGHT

## 2018-08-26 ASSESSMENT — PAIN DESCRIPTION - LOCATION: LOCATION: SHOULDER

## 2018-08-27 LAB
EKG ATRIAL RATE: 70 BPM
EKG DIAGNOSIS: NORMAL
EKG P AXIS: 66 DEGREES
EKG P-R INTERVAL: 206 MS
EKG Q-T INTERVAL: 390 MS
EKG QRS DURATION: 88 MS
EKG QTC CALCULATION (BAZETT): 421 MS
EKG R AXIS: 3 DEGREES
EKG T AXIS: 44 DEGREES
EKG VENTRICULAR RATE: 70 BPM

## 2018-08-27 PROCEDURE — 93010 ELECTROCARDIOGRAM REPORT: CPT | Performed by: INTERNAL MEDICINE

## 2018-08-27 NOTE — ED PROVIDER NOTES
[facility] ED  eMERGENCY dEPARTMENT eNCOUnter        Pt Name: Mony Bird  MRN: [id number]  [place] 1935  Date of evaluation: 8/26/2018  Provider: TAHIR Vick CNP  PCP: TAHIR Nguyễn CNP  ED Attending: No att. providers found    [address] [place] [address]       Chief Complaint   Patient presents with    Fall     fall yest, tripped in parking lot over a timber. c/o r shoulder pain, mult abrasions, L knee pain       HISTORY OF PRESENT ILLNESS   (Location/Symptom, Timing/Onset, Context/Setting, Quality, Duration, Modifying Factors, Severity)  Note limiting factors. Mony Bird is a [de-identified] y.o. male  who presents to the emergency department with complaints of a fall. Patient reports that yesterday he was at his grandchild's soccer game and was stepping over a timber in the parking lot and tripped and fell to the ground. He did strike his head but did not have any loss of consciousness. He does not take any blood thinners. He is having no neck pain. He is having no dizziness or blurred vision. No nausea or vomiting. Patient did sustain multiple abrasions. Today she did develop some left shoulder pain that was worse with movement. He denies any pain with ambulation. He denies any numbness or tingling. He denies any shortness of breath. Nursing Notes were all reviewed and agreed with or any disagreements were addressed  in the HPI. REVIEW OF SYSTEMS    (2-9 systems for level 4, 10 or more for level 5)     Review of Systems   Constitutional: Negative for chills, diaphoresis and fever. Respiratory: Negative for cough and shortness of breath. Cardiovascular: Positive for leg swelling. Negative for chest pain. Gastrointestinal: Negative. Genitourinary: Negative. Musculoskeletal: Positive for arthralgias. Negative for back pain and joint swelling. Skin: Positive for wound. Neurological: Negative.   Negative for dizziness, syncope, weakness, asthma, bronchitis or smoking. XR SHOULDER LEFT (MIN 2 VIEWS)   Final Result   No acute osseous abnormality of the left shoulder. AC and glenohumeral   degenerative changes. No results found. PROCEDURES   Unless otherwise noted below, none     Procedures    CRITICAL CARE TIME   N/A    CONSULTS:  None      EMERGENCY DEPARTMENT COURSE and DIFFERENTIAL DIAGNOSIS/MDM:   Vitals:    Vitals:    08/26/18 2109   BP: 127/71   Pulse: 84   Resp: 16   Temp: 97.2 °F (36.2 °C)   SpO2: 95%   Weight: 190 lb (86.2 kg)   Height: 5' 9\" (1.753 m)       Patient was given the following medications:  Medications - No data to display    Patient presented to the emergency department following a fall. Physical exam did reveal some tenderness to his left shoulder as well as scattered abrasions. He did strike his head but had no loss of consciousness. EKG was obtained given left shoulder pain and questionable chest pain. EKG was interpreted by Dr. Daniel Marcelino, see her note for interpretation. CT head showed right posterior superior scalp injury. There is no CT abnormality identified in the brain. After the left shoulder showed no acute osseous abnormality. Chest x-ray showed no acute cardiopulmonary findings. There is no displaced rib fracture or any other acute osseous abnormality. She was instructed to use Tylenol for his pain. He is to return to the emergency department with any worsening symptoms. He is follow-up with his primary care provider in the next 2 days. Discussed treatment plan with patient, patient is agreeable and denies questions at this time. The patient tolerated their visit well. They were seen and evaluated by the attending physician, No att. providers found who agreed with the assessment and plan. The patient and / or the family were informed of the results of any tests, a time was given to answer questions, a plan was proposed and they agreed with plan.         FINAL IMPRESSION      1. Closed head injury, initial encounter    2. Fall, initial encounter    3.  Acute pain of left shoulder          DISPOSITION/PLAN   DISPOSITION Decision To Discharge 08/26/2018 11:05:50 PM      PATIENT REFERRED TO:  TAHIR Stephenson CNP  [address] [facility] [address]  [phone]    Schedule an appointment as soon as possible for a visit in 2 days  For follow up care    [facility]  [address] [facility] [address]  Go to   As needed, If symptoms worsen      DISCHARGE MEDICATIONS:  New Prescriptions    No medications on file       DISCONTINUED MEDICATIONS:  Discontinued Medications    CALCIUM-MAGNESIUM-VITAMIN D (CALCIUM 1200+D3 PO)    Take 1,200 mg by mouth daily    MULTIPLE VITAMINS-MINERALS (THERAPEUTIC MULTIVITAMIN-MINERALS) TABLET    Take 1 tablet by mouth daily              (Please note that portions of this note were completed with a voice recognition program.  Efforts were made to edit the dictations but occasionally words are mis-transcribed.)    TAHIR Nolasco CNP (electronically signed)           TAHIR Nolasco CNP  08/26/18 7058

## 2018-08-30 ENCOUNTER — NURSE ONLY (OUTPATIENT)
Dept: URGENT CARE | Age: 83
End: 2018-08-30

## 2018-08-30 ENCOUNTER — OFFICE VISIT (OUTPATIENT)
Dept: FAMILY MEDICINE CLINIC | Age: 83
End: 2018-08-30

## 2018-08-30 VITALS
DIASTOLIC BLOOD PRESSURE: 74 MMHG | SYSTOLIC BLOOD PRESSURE: 124 MMHG | BODY MASS INDEX: 29.92 KG/M2 | OXYGEN SATURATION: 98 % | HEIGHT: 69 IN | HEART RATE: 78 BPM | RESPIRATION RATE: 16 BRPM | WEIGHT: 202 LBS

## 2018-08-30 DIAGNOSIS — R07.89 CHEST WALL PAIN: ICD-10-CM

## 2018-08-30 DIAGNOSIS — M79.675 PAIN AND SWELLING OF TOE OF LEFT FOOT: ICD-10-CM

## 2018-08-30 DIAGNOSIS — I10 ESSENTIAL HYPERTENSION: ICD-10-CM

## 2018-08-30 DIAGNOSIS — T14.8XXA MULTIPLE SKIN TEARS: ICD-10-CM

## 2018-08-30 DIAGNOSIS — M79.89 PAIN AND SWELLING OF TOE OF LEFT FOOT: ICD-10-CM

## 2018-08-30 DIAGNOSIS — S09.90XA CLOSED HEAD INJURY, INITIAL ENCOUNTER: ICD-10-CM

## 2018-08-30 DIAGNOSIS — S05.12XA EYE CONTUSION, LEFT, INITIAL ENCOUNTER: ICD-10-CM

## 2018-08-30 DIAGNOSIS — R60.0 BILATERAL LOWER EXTREMITY EDEMA: ICD-10-CM

## 2018-08-30 PROCEDURE — 99214 OFFICE O/P EST MOD 30 MIN: CPT | Performed by: NURSE PRACTITIONER

## 2018-08-30 NOTE — PATIENT INSTRUCTIONS
Please take tylenol 500mg 1 to 2 tabs 2 times a day  Cut down metoprolol in half for 1 week and stop, check in 2 weeks blood pressure, call me if BP>140/90 and/or Heart rate >100

## 2018-08-30 NOTE — PROGRESS NOTES
[place]jah [place] [de-identified] y.o. male    Chief Complaint   Patient presents with    Hypertension    Chest Pain     f/u from [facility] 08/26/18- had a fall the day before       HPI     Tripped in parking lot on gravel during some sports event, hit head, left arm, no syncope- went to ED. CT scan of head ok, Xray of shoulder - no acute findings. CXR without acute findings. Feeling better, headache improving, today when getting ready, felt sharp pain anterior part of chest, tender to touch. HTN, BLE edema- controlled with current medications. Wife concerned that Sheela Archibald has been sleeping more and wondering if can change/decrease his metoprolol. C/o left toe redness and swelling, pt is poor historian how long it has been red or painful. Some time ago, horse hit his toe, cannot remember when. Past medical, surgical, family and social history were reviewed and updated with the patient. Current Outpatient Prescriptions:     diltiazem (CARDIZEM CD) 120 MG extended release capsule, Take 1 capsule by mouth daily, Disp: 30 capsule, Rfl: 5    metoprolol succinate (TOPROL XL) 25 MG extended release tablet, Take 1 tablet by mouth daily, Disp: 30 tablet, Rfl: 5    furosemide (LASIX) 40 MG tablet, TAKE ONE TABLET BY MOUTH TWICE A DAY, Disp: 60 tablet, Rfl: 5    potassium chloride (KLOR-CON M) 10 MEQ extended release tablet, TAKE ONE TABLET BY MOUTH TWICE A DAY, Disp: 60 tablet, Rfl: 5    Review of Systems   Constitutional: Negative for chills and fever. Eyes: Negative for blurred vision. Respiratory: Negative for cough and shortness of breath. Cardiovascular: Positive for leg swelling. Negative for chest pain, palpitations, orthopnea, claudication and PND. Gastrointestinal: Negative for abdominal pain. Musculoskeletal: Positive for joint pain. Neurological: Positive for headaches. Negative for dizziness. Physical Exam   Constitutional: He is oriented to person, place, and time.  He appears well-developed and

## 2018-08-31 LAB
ANION GAP SERPL CALCULATED.3IONS-SCNC: 14 MMOL/L (ref 3–16)
BUN BLDV-MCNC: 15 MG/DL (ref 7–20)
CALCIUM SERPL-MCNC: 9.2 MG/DL (ref 8.3–10.6)
CHLORIDE BLD-SCNC: 100 MMOL/L (ref 99–110)
CO2: 26 MMOL/L (ref 21–32)
CREAT SERPL-MCNC: 1 MG/DL (ref 0.8–1.3)
GFR AFRICAN AMERICAN: >60
GFR NON-AFRICAN AMERICAN: >60
GLUCOSE BLD-MCNC: 96 MG/DL (ref 70–99)
POTASSIUM SERPL-SCNC: 4.1 MMOL/L (ref 3.5–5.1)
SEDIMENTATION RATE, ERYTHROCYTE: 8 MM/HR (ref 0–20)
SODIUM BLD-SCNC: 140 MMOL/L (ref 136–145)
URIC ACID, SERUM: 8.2 MG/DL (ref 3.5–7.2)

## 2018-09-05 ASSESSMENT — ENCOUNTER SYMPTOMS
COUGH: 0
ORTHOPNEA: 0
SHORTNESS OF BREATH: 0
ABDOMINAL PAIN: 0
BLURRED VISION: 0

## 2018-09-11 ENCOUNTER — OFFICE VISIT (OUTPATIENT)
Dept: FAMILY MEDICINE CLINIC | Age: 83
End: 2018-09-11

## 2018-09-11 ENCOUNTER — HOSPITAL ENCOUNTER (OUTPATIENT)
Age: 83
Discharge: HOME | End: 2018-09-11
Payer: MEDICARE

## 2018-09-11 ENCOUNTER — HOSPITAL ENCOUNTER (OUTPATIENT)
Dept: GENERAL RADIOLOGY | Age: 83
Discharge: HOME | End: 2018-09-11
Payer: MEDICARE

## 2018-09-11 VITALS
DIASTOLIC BLOOD PRESSURE: 84 MMHG | WEIGHT: 197 LBS | TEMPERATURE: 97.9 F | HEART RATE: 74 BPM | BODY MASS INDEX: 29.09 KG/M2 | SYSTOLIC BLOOD PRESSURE: 130 MMHG | OXYGEN SATURATION: 96 %

## 2018-09-11 DIAGNOSIS — R09.89 RHONCHI AT RIGHT LUNG BASE: ICD-10-CM

## 2018-09-11 DIAGNOSIS — J20.9 ACUTE BRONCHITIS, UNSPECIFIED ORGANISM: Primary | ICD-10-CM

## 2018-09-11 PROCEDURE — 71046 X-RAY EXAM CHEST 2 VIEWS: CPT

## 2018-09-11 PROCEDURE — 99214 OFFICE O/P EST MOD 30 MIN: CPT | Performed by: PHYSICIAN ASSISTANT

## 2018-09-11 RX ORDER — DOXYCYCLINE HYCLATE 100 MG/1
100 CAPSULE ORAL 2 TIMES DAILY
Qty: 14 CAPSULE | Refills: 0 | Status: SHIPPED | OUTPATIENT
Start: 2018-09-11 | End: 2018-09-18

## 2018-09-11 ASSESSMENT — ENCOUNTER SYMPTOMS
SORE THROAT: 0
WHEEZING: 1
COUGH: 0
EYE DISCHARGE: 0
CHEST TIGHTNESS: 1
EYE ITCHING: 0
VOICE CHANGE: 0
RHINORRHEA: 1
SHORTNESS OF BREATH: 0
TROUBLE SWALLOWING: 0

## 2018-09-11 NOTE — PROGRESS NOTES
2018  Venida Barlow (: 1935)  [de-identified] y.o. HPI  The patient is here for evaluation of congestion and cough. Symptoms started 2-3 days ago. Cough is productive, with associated wheeze. Notices pressure in his ears, nasal drainage and some chest tightness. Denies shortness of breath, chest pain, nausea, diarrhea, headache or sinus pressure. Has not tried anything to help with the symptoms. Hx of acute bronchitis. Review of Systems   Constitutional: Negative for chills and fever. HENT: Positive for congestion, postnasal drip and rhinorrhea. Negative for sore throat, trouble swallowing and voice change. Eyes: Negative for discharge and itching. Respiratory: Positive for chest tightness and wheezing. Negative for cough and shortness of breath. Cardiovascular: Negative for chest pain. Skin: Negative for rash. Hematological: Negative for adenopathy. Past Medical History:   Diagnosis Date    Arthritis     Hyperlipidemia     Influenza A 2017    Right-sided low back pain with right-sided sciatica      Past Surgical History:   Procedure Laterality Date    CATARACT REMOVAL      INGUINAL HERNIA REPAIR Bilateral 11    MuaRazkti-ZPRXM-UnlgsHussain Deleon MD    TONSILLECTOMY      TOOTH EXTRACTION       No family history on file.   Social History     Social History    Marital status:      Spouse name: Stefany Tsai Number of children: 3    Years of education: 15     Occupational History    retired      Social History Main Topics    Smoking status: Never Smoker    Smokeless tobacco: Never Used    Alcohol use No    Drug use: No    Sexual activity: Yes     Partners: Female     Other Topics Concern    Not on file     Social History Narrative    No narrative on file     Allergies   Allergen Reactions    Zocor [Simvastatin]      GI       Current Outpatient Prescriptions   Medication Sig Dispense Refill    doxycycline hyclate (VIBRAMYCIN) 100 MG capsule Take 1 capsule by mouth 2 times daily for 7 days 14 capsule 0    diltiazem (CARDIZEM CD) 120 MG extended release capsule Take 1 capsule by mouth daily 30 capsule 5    furosemide (LASIX) 40 MG tablet TAKE ONE TABLET BY MOUTH TWICE A DAY 60 tablet 5    potassium chloride (KLOR-CON M) 10 MEQ extended release tablet TAKE ONE TABLET BY MOUTH TWICE A DAY 60 tablet 5     No current facility-administered medications for this visit. Vitals:    09/11/18 1932   BP: 130/84   Site: Left Upper Arm   Position: Sitting   Cuff Size: Large Adult   Pulse: 74   Temp: 97.9 °F (36.6 °C)   TempSrc: Oral   SpO2: 96%   Weight: 197 lb (89.4 kg)     Estimated body mass index is 29.09 kg/m² as calculated from the following:    Height as of 8/30/18: 5' 9\" (1.753 m). Weight as of this encounter: 197 lb (89.4 kg). Physical Exam   Constitutional: He appears well-developed and well-nourished. HENT:   Head: Normocephalic and atraumatic. Right Ear: Tympanic membrane, external ear and ear canal normal.   Left Ear: Tympanic membrane, external ear and ear canal normal.   Nose: Rhinorrhea present. Right sinus exhibits no maxillary sinus tenderness and no frontal sinus tenderness. Left sinus exhibits no maxillary sinus tenderness and no frontal sinus tenderness. Mouth/Throat: Uvula is midline and mucous membranes are normal. No oropharyngeal exudate, posterior oropharyngeal edema or posterior oropharyngeal erythema. Eyes: Pupils are equal, round, and reactive to light. Conjunctivae and EOM are normal.   Cardiovascular: Normal rate, regular rhythm and normal heart sounds. Pulmonary/Chest: Effort normal. He has wheezes in the right upper field and the left upper field. He has rhonchi in the right lower field. He has no rales. Abdominal: Soft. Bowel sounds are normal.   Skin: Skin is warm and dry. Vitals reviewed. ASSESSMENT and PLAN:  Cliff Rosario was seen today for cough, pharyngitis and chest congestion.     Diagnoses and all orders for this

## 2018-09-12 ENCOUNTER — TELEPHONE (OUTPATIENT)
Dept: FAMILY MEDICINE CLINIC | Age: 83
End: 2018-09-12

## 2018-09-12 DIAGNOSIS — J20.9 ACUTE BRONCHITIS, UNSPECIFIED ORGANISM: Primary | ICD-10-CM

## 2018-09-12 RX ORDER — PREDNISONE 20 MG/1
40 TABLET ORAL DAILY
Qty: 10 TABLET | Refills: 0 | Status: SHIPPED | OUTPATIENT
Start: 2018-09-12 | End: 2018-09-17

## 2018-09-12 RX ORDER — METHYLPREDNISOLONE 4 MG/1
TABLET ORAL
Qty: 1 KIT | Refills: 0 | Status: SHIPPED | OUTPATIENT
Start: 2018-09-12 | End: 2018-09-18

## 2018-09-12 RX ORDER — AZITHROMYCIN 250 MG/1
TABLET, FILM COATED ORAL
Qty: 1 PACKET | Refills: 0 | Status: SHIPPED | OUTPATIENT
Start: 2018-09-12 | End: 2018-09-16

## 2018-09-12 NOTE — TELEPHONE ENCOUNTER
Okay for pt to take z-pack instead of doxycycline if cheaper. The bronchitis is likely caused by viral illness, not allergies, although allergies certainly could make symptoms appear worse.

## 2018-09-12 NOTE — TELEPHONE ENCOUNTER
Patient's wife asking if his exposure to hay and animals could have caused the allergies that lead to him possibly having bronchitis. X-rays done, waiting for results. Said they picked up the antibiotic but it was a little pricey. Asking if needs more if there is something less expensive that will work just as well.

## 2018-09-12 NOTE — TELEPHONE ENCOUNTER
Wife is calling to see if there is any other steroids that are cheaper than a Medrol Dose Pack.   She says Medrol Dose Pack is $17.00

## 2018-10-01 ENCOUNTER — NURSE ONLY (OUTPATIENT)
Dept: FAMILY MEDICINE CLINIC | Age: 83
End: 2018-10-01
Payer: MEDICARE

## 2018-10-01 VITALS — SYSTOLIC BLOOD PRESSURE: 128 MMHG | DIASTOLIC BLOOD PRESSURE: 58 MMHG

## 2018-10-01 DIAGNOSIS — Z23 NEED FOR INFLUENZA VACCINATION: Primary | ICD-10-CM

## 2018-10-01 PROCEDURE — G0008 ADMIN INFLUENZA VIRUS VAC: HCPCS | Performed by: NURSE PRACTITIONER

## 2018-10-01 PROCEDURE — 90662 IIV NO PRSV INCREASED AG IM: CPT | Performed by: NURSE PRACTITIONER

## 2018-11-20 ENCOUNTER — OFFICE VISIT (OUTPATIENT)
Dept: FAMILY MEDICINE CLINIC | Age: 83
End: 2018-11-20
Payer: MEDICARE

## 2018-11-20 ENCOUNTER — TELEPHONE (OUTPATIENT)
Dept: FAMILY MEDICINE CLINIC | Age: 83
End: 2018-11-20

## 2018-11-20 VITALS
WEIGHT: 194 LBS | DIASTOLIC BLOOD PRESSURE: 74 MMHG | OXYGEN SATURATION: 97 % | RESPIRATION RATE: 18 BRPM | BODY MASS INDEX: 28.65 KG/M2 | HEART RATE: 84 BPM | SYSTOLIC BLOOD PRESSURE: 134 MMHG | TEMPERATURE: 97.5 F

## 2018-11-20 DIAGNOSIS — J06.9 VIRAL URI WITH COUGH: ICD-10-CM

## 2018-11-20 DIAGNOSIS — I10 ESSENTIAL HYPERTENSION: ICD-10-CM

## 2018-11-20 DIAGNOSIS — R60.0 BILATERAL EDEMA OF LOWER EXTREMITY: ICD-10-CM

## 2018-11-20 PROCEDURE — 99213 OFFICE O/P EST LOW 20 MIN: CPT | Performed by: NURSE PRACTITIONER

## 2018-11-20 RX ORDER — M-VIT,TX,IRON,MINS/CALC/FOLIC 27MG-0.4MG
1 TABLET ORAL DAILY
COMMUNITY

## 2018-11-20 RX ORDER — DILTIAZEM HYDROCHLORIDE 120 MG/1
120 CAPSULE, COATED, EXTENDED RELEASE ORAL DAILY
Qty: 30 CAPSULE | Refills: 5 | Status: SHIPPED | OUTPATIENT
Start: 2018-11-20 | End: 2019-03-29 | Stop reason: SDUPTHER

## 2018-11-20 ASSESSMENT — ENCOUNTER SYMPTOMS
WHEEZING: 0
SHORTNESS OF BREATH: 0
SORE THROAT: 0
STRIDOR: 0
COUGH: 1
CHEST TIGHTNESS: 0

## 2019-01-21 ENCOUNTER — TELEPHONE (OUTPATIENT)
Dept: FAMILY MEDICINE CLINIC | Age: 84
End: 2019-01-21

## 2019-01-22 DIAGNOSIS — M25.50 ARTHRALGIA, UNSPECIFIED JOINT: Primary | ICD-10-CM

## 2019-02-14 DIAGNOSIS — R60.0 BILATERAL EDEMA OF LOWER EXTREMITY: ICD-10-CM

## 2019-02-15 RX ORDER — FUROSEMIDE 40 MG/1
TABLET ORAL
Qty: 60 TABLET | Refills: 5 | Status: SHIPPED | OUTPATIENT
Start: 2019-02-15 | End: 2019-09-10 | Stop reason: SDUPTHER

## 2019-03-29 RX ORDER — DILTIAZEM HYDROCHLORIDE 120 MG/1
120 CAPSULE, COATED, EXTENDED RELEASE ORAL DAILY
Qty: 30 CAPSULE | Refills: 5 | Status: SHIPPED | OUTPATIENT
Start: 2019-03-29 | End: 2019-04-18 | Stop reason: SDUPTHER

## 2019-04-18 RX ORDER — DILTIAZEM HYDROCHLORIDE 120 MG/1
120 CAPSULE, COATED, EXTENDED RELEASE ORAL DAILY
Qty: 30 CAPSULE | Refills: 5 | Status: CANCELLED | OUTPATIENT
Start: 2019-04-18

## 2019-04-18 RX ORDER — DILTIAZEM HYDROCHLORIDE 120 MG/1
120 CAPSULE, COATED, EXTENDED RELEASE ORAL DAILY
Qty: 30 CAPSULE | Refills: 5 | Status: SHIPPED | OUTPATIENT
Start: 2019-04-18 | End: 2019-05-09

## 2019-04-18 NOTE — TELEPHONE ENCOUNTER
Pharm asking for an RX for diltiazem that does not have GUNJAN written on it. Insurance won't cover the [address] [place] [address].

## 2019-05-06 ENCOUNTER — TELEPHONE (OUTPATIENT)
Dept: FAMILY MEDICINE CLINIC | Age: 84
End: 2019-05-06

## 2019-05-06 NOTE — TELEPHONE ENCOUNTER
If -120 at home, ok not to give metoprolol. Restart furosemide and see me this week.  Due for blood work

## 2019-05-06 NOTE — TELEPHONE ENCOUNTER
Pt's wife called, stating he wasn't sleeping well, so he cut his furosemide to one a day. After about 3 weeks she started noticing his feet were swollen more than normal. Took to urgent care due to us moving. Urgent care doctor said BP was up so put him metoprolole 25mg. Already taking Diltiazem (Cartia). BP was 160/? at visit. Advised he get an echo due to swelling. His wife wants to know if that is necessary since they were swollen due to him not taking furosemide correctly. BP at home is normal 118to 120/? Not very active now. Not sure if it is the medication. Patient's wife is off today, Thurs and Fri if have any questions. Would like to speak with Belinda Fontanez.  Home [phone] cell [phone]

## 2019-05-08 NOTE — TELEPHONE ENCOUNTER
[phone] (home)   I show pt has an appointment tomorrow, but I left a message because I wanted to make sure they received the information from [facility]

## 2019-05-09 ENCOUNTER — OFFICE VISIT (OUTPATIENT)
Dept: FAMILY MEDICINE CLINIC | Age: 84
End: 2019-05-09
Payer: MEDICARE

## 2019-05-09 VITALS
HEART RATE: 70 BPM | DIASTOLIC BLOOD PRESSURE: 76 MMHG | BODY MASS INDEX: 29.53 KG/M2 | WEIGHT: 200 LBS | OXYGEN SATURATION: 97 % | SYSTOLIC BLOOD PRESSURE: 110 MMHG | RESPIRATION RATE: 18 BRPM

## 2019-05-09 DIAGNOSIS — I10 ESSENTIAL HYPERTENSION: ICD-10-CM

## 2019-05-09 DIAGNOSIS — R60.0 BILATERAL EDEMA OF LOWER EXTREMITY: ICD-10-CM

## 2019-05-09 LAB
A/G RATIO: 1.4 (ref 1.1–2.2)
ALBUMIN SERPL-MCNC: 4.3 G/DL (ref 3.4–5)
ALP BLD-CCNC: 110 U/L (ref 40–129)
ALT SERPL-CCNC: 24 U/L (ref 10–40)
ANION GAP SERPL CALCULATED.3IONS-SCNC: 11 MMOL/L (ref 3–16)
AST SERPL-CCNC: 21 U/L (ref 15–37)
BASOPHILS ABSOLUTE: 0.1 K/UL (ref 0–0.2)
BASOPHILS RELATIVE PERCENT: 0.6 %
BILIRUB SERPL-MCNC: 0.6 MG/DL (ref 0–1)
BUN BLDV-MCNC: 14 MG/DL (ref 7–20)
CALCIUM SERPL-MCNC: 9.5 MG/DL (ref 8.3–10.6)
CHLORIDE BLD-SCNC: 103 MMOL/L (ref 99–110)
CO2: 29 MMOL/L (ref 21–32)
CREAT SERPL-MCNC: 1.1 MG/DL (ref 0.8–1.3)
EOSINOPHILS ABSOLUTE: 0.2 K/UL (ref 0–0.6)
EOSINOPHILS RELATIVE PERCENT: 2.1 %
GFR AFRICAN AMERICAN: >60
GFR NON-AFRICAN AMERICAN: >60
GLOBULIN: 3 G/DL
GLUCOSE BLD-MCNC: 100 MG/DL (ref 70–99)
HCT VFR BLD CALC: 50.1 % (ref 40.5–52.5)
HEMOGLOBIN: 16.7 G/DL (ref 13.5–17.5)
LYMPHOCYTES ABSOLUTE: 1.7 K/UL (ref 1–5.1)
LYMPHOCYTES RELATIVE PERCENT: 18.6 %
MCH RBC QN AUTO: 28.9 PG (ref 26–34)
MCHC RBC AUTO-ENTMCNC: 33.4 G/DL (ref 31–36)
MCV RBC AUTO: 86.5 FL (ref 80–100)
MONOCYTES ABSOLUTE: 1.2 K/UL (ref 0–1.3)
MONOCYTES RELATIVE PERCENT: 12.7 %
NEUTROPHILS ABSOLUTE: 6 K/UL (ref 1.7–7.7)
NEUTROPHILS RELATIVE PERCENT: 66 %
PDW BLD-RTO: 14.5 % (ref 12.4–15.4)
PLATELET # BLD: 275 K/UL (ref 135–450)
PMV BLD AUTO: 8.7 FL (ref 5–10.5)
POTASSIUM SERPL-SCNC: 4.3 MMOL/L (ref 3.5–5.1)
PRO-BNP: 35 PG/ML (ref 0–449)
RBC # BLD: 5.79 M/UL (ref 4.2–5.9)
SODIUM BLD-SCNC: 143 MMOL/L (ref 136–145)
TOTAL PROTEIN: 7.3 G/DL (ref 6.4–8.2)
TSH SERPL DL<=0.05 MIU/L-ACNC: 3.2 UIU/ML (ref 0.27–4.2)
WBC # BLD: 9.1 K/UL (ref 4–11)

## 2019-05-09 PROCEDURE — 99214 OFFICE O/P EST MOD 30 MIN: CPT | Performed by: NURSE PRACTITIONER

## 2019-05-09 RX ORDER — METOPROLOL SUCCINATE 25 MG/1
25 TABLET, EXTENDED RELEASE ORAL DAILY
COMMUNITY
End: 2019-06-04

## 2019-05-09 ASSESSMENT — PATIENT HEALTH QUESTIONNAIRE - PHQ9
SUM OF ALL RESPONSES TO PHQ QUESTIONS 1-9: 0
2. FEELING DOWN, DEPRESSED OR HOPELESS: 0
SUM OF ALL RESPONSES TO PHQ QUESTIONS 1-9: 0
SUM OF ALL RESPONSES TO PHQ9 QUESTIONS 1 & 2: 0
1. LITTLE INTEREST OR PLEASURE IN DOING THINGS: 0

## 2019-05-09 NOTE — PROGRESS NOTES
Annie Davies 82 y.o. male    Chief Complaint   Patient presents with    Hypertension    Edema     f/u from urgent care       HPI     HTN, BLE edema follow-up. No hx of CHF. Had cut down furosemide to one tab for a few weeks, edema worsened, went to Urgent care. Denies SOB, orthopnea/pnd, weight about the same. Pt restarted furosemide BID, edema in legs down. Had echo 12/26/2017, venous dopplers and LUCIA in 2018- reviewed. Pastmedical, surgical, family and social history were reviewed and updated with the patient. Current Outpatient Medications:     diltiazem (CARDIZEM CD) 120 MG extended release capsule, Take 1 capsule by mouth daily, Disp: 30 capsule, Rfl: 5    furosemide (LASIX) 40 MG tablet, TAKE ONE TABLET BY MOUTH TWICE A DAY, Disp: 60 tablet, Rfl: 5    UNABLE TO FIND, Medical massage up to 12 visits, Disp: 12 each, Rfl: 0    Calcium Carbonate-Vit D-Min (CALCIUM 1200 PO), Take by mouth, Disp: , Rfl:     Multiple Vitamins-Minerals (THERAPEUTIC MULTIVITAMIN-MINERALS) tablet, Take 1 tablet by mouth daily, Disp: , Rfl:     potassium chloride (KLOR-CON M) 10 MEQ extended release tablet, TAKE ONE TABLET BY MOUTH TWICE A DAY, Disp: 60 tablet, Rfl: 5    Review of Systems   Constitutional: Negative for appetite change, chills, fever and unexpected weight change. Respiratory: Negative for cough, chest tightness, shortness of breath and wheezing. Cardiovascular: Positive for leg swelling. Negative for chest pain and palpitations. Gastrointestinal: Negative. Skin: Negative for color change and rash. Neurological: Negative for dizziness and headaches. Physical Exam   Constitutional: He is oriented to person, place, and time. He appears well-developed and well-nourished. Eyes: No scleral icterus. Neck: Neck supple. No JVD present. No thyromegaly present. Cardiovascular: Normal rate, regular rhythm, normal heart sounds and intact distal pulses.  Exam reveals no gallop and no friction rub. No murmur heard. BLE edema 2+   Pulmonary/Chest: Effort normal and breath sounds normal.   Abdominal: He exhibits no distension. Lymphadenopathy:     He has no cervical adenopathy. Neurological: He is alert and oriented to person, place, and time. Skin: No erythema. Nursing note and vitals reviewed. Vitals:    05/09/19 1002   BP: 110/76   Pulse: 70   Resp:    SpO2:        Assessment    1. Bilateral edema of lower extremity    2. Essential hypertension        Plan    Ren Sanchez was seen today for hypertension and edema. Diagnoses and all orders for this visit:    Bilateral edema of lower extremity  -     COMPREHENSIVE METABOLIC PANEL  -     TSH without Reflex  -     Brain Natriuretic Peptide    Essential hypertension  -     COMPREHENSIVE METABOLIC PANEL  -     CBC Auto Differential  -     TSH without Reflex    Continue furosemide twice day, potassium. Stop cardizem, restart metoprolol 25mg XR  Monitor BP and hear rate.   Office visit in 3 weeks

## 2019-05-09 NOTE — PATIENT INSTRUCTIONS
Demetris Moshe was seen today for hypertension and edema. Diagnoses and all orders for this visit:    Bilateral edema of lower extremity  Essential hypertension  -     COMPREHENSIVE METABOLIC PANEL  -     CBC Auto Differential  -     TSH without Reflex        -     Pro-BNP    Continue furosemide, potassium.   Stop cardizem, restart metoprolol   Monitor BP and hear rate (normal )  Office visit in 3 weeks

## 2019-05-13 DIAGNOSIS — R60.0 BILATERAL EDEMA OF LOWER EXTREMITY: ICD-10-CM

## 2019-05-13 RX ORDER — POTASSIUM CHLORIDE 750 MG/1
TABLET, EXTENDED RELEASE ORAL
Qty: 60 TABLET | Refills: 4 | Status: SHIPPED | OUTPATIENT
Start: 2019-05-13 | End: 2019-10-09 | Stop reason: SDUPTHER

## 2019-05-13 NOTE — TELEPHONE ENCOUNTER
.  Last office visit 9/11/2018     Last written 5-22-18 60 with 5      Next office visit scheduled 5-20-19    Requested Prescriptions     Pending Prescriptions Disp Refills    potassium chloride (KLOR-CON M) 10 MEQ extended release tablet [Pharmacy Med Name: POTASSIUM CHLORIDE ER M-10 MEQ TAB] 60 tablet 4     Sig: TAKE ONE TABLET BY MOUTH TWICE A DAY

## 2019-05-16 ASSESSMENT — ENCOUNTER SYMPTOMS
CHEST TIGHTNESS: 0
SHORTNESS OF BREATH: 0
COUGH: 0
COLOR CHANGE: 0
WHEEZING: 0
GASTROINTESTINAL NEGATIVE: 1

## 2019-05-24 ENCOUNTER — TELEPHONE (OUTPATIENT)
Dept: FAMILY MEDICINE CLINIC | Age: 84
End: 2019-05-24

## 2019-05-24 NOTE — TELEPHONE ENCOUNTER
Patient's wife called to schedule a follow up appt. Missed physical. I believe they thought it had been cancelled since the blood work had been done at the last visit. Physical listed HLD which was not checked with recent labs. Rescheduled him for 5/31 for physical/follow up.

## 2019-05-31 ENCOUNTER — OFFICE VISIT (OUTPATIENT)
Dept: FAMILY MEDICINE CLINIC | Age: 84
End: 2019-05-31
Payer: MEDICARE

## 2019-05-31 VITALS
HEIGHT: 69 IN | OXYGEN SATURATION: 98 % | BODY MASS INDEX: 29.62 KG/M2 | SYSTOLIC BLOOD PRESSURE: 128 MMHG | HEART RATE: 87 BPM | DIASTOLIC BLOOD PRESSURE: 78 MMHG | WEIGHT: 200 LBS | TEMPERATURE: 97.6 F

## 2019-05-31 DIAGNOSIS — I10 ESSENTIAL HYPERTENSION: ICD-10-CM

## 2019-05-31 DIAGNOSIS — R60.0 LOWER EXTREMITY EDEMA: ICD-10-CM

## 2019-05-31 DIAGNOSIS — M48.061 SPINAL STENOSIS OF LUMBAR REGION, UNSPECIFIED WHETHER NEUROGENIC CLAUDICATION PRESENT: ICD-10-CM

## 2019-05-31 PROCEDURE — 99213 OFFICE O/P EST LOW 20 MIN: CPT | Performed by: NURSE PRACTITIONER

## 2019-05-31 NOTE — PROGRESS NOTES
Tl Diallo 82 y.o. male    Chief Complaint   Patient presents with    Swelling     bilateral foot swelling       HPI     Follow-up on BLE edema. Last visit cardizem was stopped, metoprolol and furosemide continued. Swelling in legs improved, wife checked BP and HR at home- reports normal.     Wife worried his back worsening. Has spinal stenosis, and at times  feels affecting left leg, difficult to walk. Denies back pain now, no knee pains, no numbness or loss of bowel or bladder control. MRI lumbar 10/22/2015:  Scattered levels of degenerative disease and facet arthropathy as  above, greatest at L3-L4, L4-L5, and L5-S1   At L3-L4 there is mild central canal stenosis, bilateral foraminal  narrowing, and abutment of the exiting L3 nerve roots.    At L4-L5 there is moderate to severe bilateral facet arthropathy,  and right-sided foraminal narrowing. There is abutment of the  exiting right L4 nerve root.   At L5-S1 there is bilateral facet arthropathy. There is abutment  of the exiting right L5 nerve root    Pastmedical, surgical, family and social history were reviewed and updated with the patient. Current Outpatient Medications:     potassium chloride (KLOR-CON M) 10 MEQ extended release tablet, TAKE ONE TABLET BY MOUTH TWICE A DAY, Disp: 60 tablet, Rfl: 4    metoprolol succinate (TOPROL XL) 25 MG extended release tablet, Take 25 mg by mouth daily, Disp: , Rfl:     furosemide (LASIX) 40 MG tablet, TAKE ONE TABLET BY MOUTH TWICE A DAY, Disp: 60 tablet, Rfl: 5    UNABLE TO FIND, Medical massage up to 12 visits, Disp: 12 each, Rfl: 0    Calcium Carbonate-Vit D-Min (CALCIUM 1200 PO), Take by mouth, Disp: , Rfl:     Multiple Vitamins-Minerals (THERAPEUTIC MULTIVITAMIN-MINERALS) tablet, Take 1 tablet by mouth daily, Disp: , Rfl:     Review of Systems   Constitutional: Negative. Respiratory: Negative for cough and shortness of breath. Cardiovascular: Positive for leg swelling.  Negative for

## 2019-06-04 DIAGNOSIS — I10 ESSENTIAL HYPERTENSION: ICD-10-CM

## 2019-06-04 RX ORDER — METOPROLOL SUCCINATE 25 MG/1
25 TABLET, EXTENDED RELEASE ORAL DAILY
Qty: 30 TABLET | Refills: 5 | Status: SHIPPED | OUTPATIENT
Start: 2019-06-04 | End: 2019-11-27 | Stop reason: SDUPTHER

## 2019-06-09 ASSESSMENT — ENCOUNTER SYMPTOMS
BACK PAIN: 1
COUGH: 0
GASTROINTESTINAL NEGATIVE: 1
SHORTNESS OF BREATH: 0

## 2019-06-30 ENCOUNTER — APPOINTMENT (OUTPATIENT)
Dept: GENERAL RADIOLOGY | Age: 84
DRG: 871 | End: 2019-06-30
Payer: MEDICARE

## 2019-06-30 ENCOUNTER — HOSPITAL ENCOUNTER (INPATIENT)
Age: 84
LOS: 3 days | Discharge: HOME | DRG: 871 | End: 2019-07-03
Attending: EMERGENCY MEDICINE | Admitting: INTERNAL MEDICINE
Payer: MEDICARE

## 2019-06-30 ENCOUNTER — APPOINTMENT (OUTPATIENT)
Dept: CT IMAGING | Age: 84
DRG: 871 | End: 2019-06-30
Payer: MEDICARE

## 2019-06-30 DIAGNOSIS — R41.0 DELIRIUM: Primary | ICD-10-CM

## 2019-06-30 DIAGNOSIS — J18.9 PNEUMONIA DUE TO ORGANISM: ICD-10-CM

## 2019-06-30 DIAGNOSIS — A41.9 SEPTICEMIA (HCC): ICD-10-CM

## 2019-06-30 PROBLEM — R65.20 SEVERE SEPSIS (HCC): Status: ACTIVE | Noted: 2019-06-30

## 2019-06-30 LAB
A/G RATIO: 1.3 (ref 1.1–2.2)
ALBUMIN SERPL-MCNC: 4.4 G/DL (ref 3.4–5)
ALP BLD-CCNC: 105 U/L (ref 40–129)
ALT SERPL-CCNC: 19 U/L (ref 10–40)
ANION GAP SERPL CALCULATED.3IONS-SCNC: 15 MMOL/L (ref 3–16)
AST SERPL-CCNC: 24 U/L (ref 15–37)
BASOPHILS ABSOLUTE: 0.1 K/UL (ref 0–0.2)
BASOPHILS RELATIVE PERCENT: 0.6 %
BILIRUB SERPL-MCNC: 1.3 MG/DL (ref 0–1)
BILIRUBIN URINE: ABNORMAL
BLOOD, URINE: ABNORMAL
BUN BLDV-MCNC: 18 MG/DL (ref 7–20)
CALCIUM SERPL-MCNC: 9.1 MG/DL (ref 8.3–10.6)
CHLORIDE BLD-SCNC: 98 MMOL/L (ref 99–110)
CLARITY: CLEAR
CO2: 25 MMOL/L (ref 21–32)
COLOR: YELLOW
CREAT SERPL-MCNC: 1.2 MG/DL (ref 0.8–1.3)
EOSINOPHILS ABSOLUTE: 0 K/UL (ref 0–0.6)
EOSINOPHILS RELATIVE PERCENT: 0.1 %
EPITHELIAL CELLS, UA: ABNORMAL /HPF
GFR AFRICAN AMERICAN: >60
GFR NON-AFRICAN AMERICAN: 58
GLOBULIN: 3.4 G/DL
GLUCOSE BLD-MCNC: 115 MG/DL (ref 70–99)
GLUCOSE URINE: NEGATIVE MG/DL
HCT VFR BLD CALC: 48.9 % (ref 40.5–52.5)
HEMOGLOBIN: 16.6 G/DL (ref 13.5–17.5)
KETONES, URINE: 15 MG/DL
LACTIC ACID: 1.5 MMOL/L (ref 0.4–2)
LEUKOCYTE ESTERASE, URINE: NEGATIVE
LYMPHOCYTES ABSOLUTE: 0.5 K/UL (ref 1–5.1)
LYMPHOCYTES RELATIVE PERCENT: 4.2 %
MCH RBC QN AUTO: 28.8 PG (ref 26–34)
MCHC RBC AUTO-ENTMCNC: 33.9 G/DL (ref 31–36)
MCV RBC AUTO: 84.8 FL (ref 80–100)
MICROSCOPIC EXAMINATION: YES
MONOCYTES ABSOLUTE: 1.2 K/UL (ref 0–1.3)
MONOCYTES RELATIVE PERCENT: 10.9 %
MUCUS: ABNORMAL /LPF
NEUTROPHILS ABSOLUTE: 9.2 K/UL (ref 1.7–7.7)
NEUTROPHILS RELATIVE PERCENT: 84.2 %
NITRITE, URINE: NEGATIVE
PDW BLD-RTO: 14.6 % (ref 12.4–15.4)
PH UA: 5 (ref 5–8)
PLATELET # BLD: 214 K/UL (ref 135–450)
PMV BLD AUTO: 7.1 FL (ref 5–10.5)
POTASSIUM REFLEX MAGNESIUM: 4.3 MMOL/L (ref 3.5–5.1)
PROTEIN UA: 100 MG/DL
RBC # BLD: 5.76 M/UL (ref 4.2–5.9)
RBC UA: ABNORMAL /HPF (ref 0–2)
SODIUM BLD-SCNC: 138 MMOL/L (ref 136–145)
SPECIFIC GRAVITY UA: 1.02 (ref 1–1.03)
TOTAL PROTEIN: 7.8 G/DL (ref 6.4–8.2)
TROPONIN: <0.01 NG/ML
URINE REFLEX TO CULTURE: ABNORMAL
URINE TYPE: ABNORMAL
UROBILINOGEN, URINE: 1 E.U./DL
WBC # BLD: 10.9 K/UL (ref 4–11)
WBC UA: ABNORMAL /HPF (ref 0–5)

## 2019-06-30 PROCEDURE — 70450 CT HEAD/BRAIN W/O DYE: CPT

## 2019-06-30 PROCEDURE — 87040 BLOOD CULTURE FOR BACTERIA: CPT

## 2019-06-30 PROCEDURE — 6370000000 HC RX 637 (ALT 250 FOR IP): Performed by: EMERGENCY MEDICINE

## 2019-06-30 PROCEDURE — 84484 ASSAY OF TROPONIN QUANT: CPT

## 2019-06-30 PROCEDURE — 99285 EMERGENCY DEPT VISIT HI MDM: CPT

## 2019-06-30 PROCEDURE — 83605 ASSAY OF LACTIC ACID: CPT

## 2019-06-30 PROCEDURE — 6360000002 HC RX W HCPCS: Performed by: EMERGENCY MEDICINE

## 2019-06-30 PROCEDURE — 96360 HYDRATION IV INFUSION INIT: CPT

## 2019-06-30 PROCEDURE — 80053 COMPREHEN METABOLIC PANEL: CPT

## 2019-06-30 PROCEDURE — 74176 CT ABD & PELVIS W/O CONTRAST: CPT

## 2019-06-30 PROCEDURE — 85025 COMPLETE CBC W/AUTO DIFF WBC: CPT

## 2019-06-30 PROCEDURE — 84145 PROCALCITONIN (PCT): CPT

## 2019-06-30 PROCEDURE — 71045 X-RAY EXAM CHEST 1 VIEW: CPT

## 2019-06-30 PROCEDURE — 93005 ELECTROCARDIOGRAM TRACING: CPT | Performed by: EMERGENCY MEDICINE

## 2019-06-30 PROCEDURE — 2580000003 HC RX 258: Performed by: EMERGENCY MEDICINE

## 2019-06-30 PROCEDURE — 81001 URINALYSIS AUTO W/SCOPE: CPT

## 2019-06-30 PROCEDURE — 2060000000 HC ICU INTERMEDIATE R&B

## 2019-06-30 RX ORDER — 0.9 % SODIUM CHLORIDE 0.9 %
1000 INTRAVENOUS SOLUTION INTRAVENOUS ONCE
Status: COMPLETED | OUTPATIENT
Start: 2019-06-30 | End: 2019-07-01

## 2019-06-30 RX ORDER — GUAIFENESIN 1200 MG/1
TABLET, EXTENDED RELEASE ORAL DAILY PRN
COMMUNITY
End: 2020-08-11 | Stop reason: ALTCHOICE

## 2019-06-30 RX ORDER — ECHINACEA PURPUREA EXTRACT 125 MG
1 TABLET ORAL PRN
COMMUNITY
End: 2020-08-11 | Stop reason: ALTCHOICE

## 2019-06-30 RX ORDER — ACETAMINOPHEN 500 MG
1000 TABLET ORAL ONCE
Status: COMPLETED | OUTPATIENT
Start: 2019-06-30 | End: 2019-06-30

## 2019-06-30 RX ORDER — LORATADINE 10 MG/1
10 TABLET ORAL DAILY PRN
COMMUNITY
End: 2020-08-11 | Stop reason: ALTCHOICE

## 2019-06-30 RX ADMIN — CEFTRIAXONE SODIUM 1 G: 1 INJECTION, POWDER, FOR SOLUTION INTRAMUSCULAR; INTRAVENOUS at 23:48

## 2019-06-30 RX ADMIN — ACETAMINOPHEN 1000 MG: 500 TABLET ORAL at 23:58

## 2019-06-30 RX ADMIN — AZITHROMYCIN DIHYDRATE 500 MG: 500 INJECTION, POWDER, LYOPHILIZED, FOR SOLUTION INTRAVENOUS at 23:43

## 2019-06-30 RX ADMIN — SODIUM CHLORIDE 1000 ML: 9 INJECTION, SOLUTION INTRAVENOUS at 22:29

## 2019-06-30 ASSESSMENT — PAIN SCALES - GENERAL: PAINLEVEL_OUTOF10: 0

## 2019-07-01 PROBLEM — A41.9 SEVERE SEPSIS (HCC): Status: RESOLVED | Noted: 2019-06-30 | Resolved: 2019-07-01

## 2019-07-01 PROBLEM — R65.20 SEVERE SEPSIS (HCC): Status: RESOLVED | Noted: 2019-06-30 | Resolved: 2019-07-01

## 2019-07-01 LAB
A/G RATIO: 1 (ref 1.1–2.2)
ALBUMIN SERPL-MCNC: 3.5 G/DL (ref 3.4–5)
ALP BLD-CCNC: 97 U/L (ref 40–129)
ALT SERPL-CCNC: 18 U/L (ref 10–40)
ANION GAP SERPL CALCULATED.3IONS-SCNC: 11 MMOL/L (ref 3–16)
AST SERPL-CCNC: 42 U/L (ref 15–37)
BASOPHILS ABSOLUTE: 0.1 K/UL (ref 0–0.2)
BASOPHILS RELATIVE PERCENT: 0.5 %
BILIRUB SERPL-MCNC: 1 MG/DL (ref 0–1)
BUN BLDV-MCNC: 16 MG/DL (ref 7–20)
CALCIUM SERPL-MCNC: 8.5 MG/DL (ref 8.3–10.6)
CHLORIDE BLD-SCNC: 104 MMOL/L (ref 99–110)
CO2: 19 MMOL/L (ref 21–32)
CREAT SERPL-MCNC: 0.9 MG/DL (ref 0.8–1.3)
EKG ATRIAL RATE: 112 BPM
EKG ATRIAL RATE: 116 BPM
EKG DIAGNOSIS: NORMAL
EKG DIAGNOSIS: NORMAL
EKG P AXIS: 69 DEGREES
EKG P AXIS: 73 DEGREES
EKG P-R INTERVAL: 204 MS
EKG P-R INTERVAL: 222 MS
EKG Q-T INTERVAL: 300 MS
EKG Q-T INTERVAL: 302 MS
EKG QRS DURATION: 84 MS
EKG QRS DURATION: 86 MS
EKG QTC CALCULATION (BAZETT): 409 MS
EKG QTC CALCULATION (BAZETT): 419 MS
EKG R AXIS: 32 DEGREES
EKG R AXIS: 58 DEGREES
EKG T AXIS: 78 DEGREES
EKG T AXIS: 92 DEGREES
EKG VENTRICULAR RATE: 112 BPM
EKG VENTRICULAR RATE: 116 BPM
EOSINOPHILS ABSOLUTE: 0 K/UL (ref 0–0.6)
EOSINOPHILS RELATIVE PERCENT: 0.3 %
GFR AFRICAN AMERICAN: >60
GFR NON-AFRICAN AMERICAN: >60
GLOBULIN: 3.5 G/DL
GLUCOSE BLD-MCNC: 117 MG/DL (ref 70–99)
HCT VFR BLD CALC: 49.3 % (ref 40.5–52.5)
HEMOGLOBIN: 16.3 G/DL (ref 13.5–17.5)
LYMPHOCYTES ABSOLUTE: 0.9 K/UL (ref 1–5.1)
LYMPHOCYTES RELATIVE PERCENT: 7.6 %
MCH RBC QN AUTO: 29 PG (ref 26–34)
MCHC RBC AUTO-ENTMCNC: 33.1 G/DL (ref 31–36)
MCV RBC AUTO: 87.5 FL (ref 80–100)
MONOCYTES ABSOLUTE: 1.4 K/UL (ref 0–1.3)
MONOCYTES RELATIVE PERCENT: 12.5 %
NEUTROPHILS ABSOLUTE: 8.8 K/UL (ref 1.7–7.7)
NEUTROPHILS RELATIVE PERCENT: 79.1 %
PDW BLD-RTO: 14.9 % (ref 12.4–15.4)
PLATELET # BLD: 192 K/UL (ref 135–450)
PMV BLD AUTO: 7.3 FL (ref 5–10.5)
POTASSIUM REFLEX MAGNESIUM: 4.8 MMOL/L (ref 3.5–5.1)
PROCALCITONIN: 0.29 NG/ML (ref 0–0.15)
RBC # BLD: 5.63 M/UL (ref 4.2–5.9)
SODIUM BLD-SCNC: 134 MMOL/L (ref 136–145)
TOTAL PROTEIN: 7 G/DL (ref 6.4–8.2)
TROPONIN: <0.01 NG/ML
TROPONIN: <0.01 NG/ML
WBC # BLD: 11.2 K/UL (ref 4–11)

## 2019-07-01 PROCEDURE — 6370000000 HC RX 637 (ALT 250 FOR IP): Performed by: INTERNAL MEDICINE

## 2019-07-01 PROCEDURE — 94761 N-INVAS EAR/PLS OXIMETRY MLT: CPT

## 2019-07-01 PROCEDURE — 2580000003 HC RX 258: Performed by: INTERNAL MEDICINE

## 2019-07-01 PROCEDURE — 2700000000 HC OXYGEN THERAPY PER DAY

## 2019-07-01 PROCEDURE — 99233 SBSQ HOSP IP/OBS HIGH 50: CPT | Performed by: INTERNAL MEDICINE

## 2019-07-01 PROCEDURE — 36415 COLL VENOUS BLD VENIPUNCTURE: CPT

## 2019-07-01 PROCEDURE — 94669 MECHANICAL CHEST WALL OSCILL: CPT

## 2019-07-01 PROCEDURE — 2060000000 HC ICU INTERMEDIATE R&B

## 2019-07-01 PROCEDURE — 99223 1ST HOSP IP/OBS HIGH 75: CPT | Performed by: INTERNAL MEDICINE

## 2019-07-01 PROCEDURE — 93010 ELECTROCARDIOGRAM REPORT: CPT | Performed by: INTERNAL MEDICINE

## 2019-07-01 PROCEDURE — 85025 COMPLETE CBC W/AUTO DIFF WBC: CPT

## 2019-07-01 PROCEDURE — 80053 COMPREHEN METABOLIC PANEL: CPT

## 2019-07-01 PROCEDURE — 84484 ASSAY OF TROPONIN QUANT: CPT

## 2019-07-01 PROCEDURE — 6360000002 HC RX W HCPCS: Performed by: INTERNAL MEDICINE

## 2019-07-01 PROCEDURE — 92610 EVALUATE SWALLOWING FUNCTION: CPT

## 2019-07-01 PROCEDURE — 94640 AIRWAY INHALATION TREATMENT: CPT

## 2019-07-01 PROCEDURE — 93005 ELECTROCARDIOGRAM TRACING: CPT | Performed by: INTERNAL MEDICINE

## 2019-07-01 RX ORDER — ACETAMINOPHEN 325 MG/1
650 TABLET ORAL EVERY 4 HOURS PRN
Status: DISCONTINUED | OUTPATIENT
Start: 2019-07-01 | End: 2019-07-03 | Stop reason: HOSPADM

## 2019-07-01 RX ORDER — MORPHINE SULFATE 4 MG/ML
2 INJECTION, SOLUTION INTRAMUSCULAR; INTRAVENOUS
Status: DISCONTINUED | OUTPATIENT
Start: 2019-07-01 | End: 2019-07-03 | Stop reason: HOSPADM

## 2019-07-01 RX ORDER — SODIUM CHLORIDE 0.9 % (FLUSH) 0.9 %
10 SYRINGE (ML) INJECTION EVERY 12 HOURS SCHEDULED
Status: DISCONTINUED | OUTPATIENT
Start: 2019-07-01 | End: 2019-07-03 | Stop reason: HOSPADM

## 2019-07-01 RX ORDER — SODIUM CHLORIDE 0.9 % (FLUSH) 0.9 %
10 SYRINGE (ML) INJECTION PRN
Status: DISCONTINUED | OUTPATIENT
Start: 2019-07-01 | End: 2019-07-03 | Stop reason: HOSPADM

## 2019-07-01 RX ORDER — IPRATROPIUM BROMIDE AND ALBUTEROL SULFATE 2.5; .5 MG/3ML; MG/3ML
1 SOLUTION RESPIRATORY (INHALATION)
Status: DISCONTINUED | OUTPATIENT
Start: 2019-07-01 | End: 2019-07-03 | Stop reason: HOSPADM

## 2019-07-01 RX ORDER — SODIUM CHLORIDE 9 MG/ML
INJECTION, SOLUTION INTRAVENOUS CONTINUOUS
Status: DISCONTINUED | OUTPATIENT
Start: 2019-07-01 | End: 2019-07-02

## 2019-07-01 RX ORDER — GUAIFENESIN/DEXTROMETHORPHAN 100-10MG/5
5 SYRUP ORAL EVERY 4 HOURS PRN
Status: DISCONTINUED | OUTPATIENT
Start: 2019-07-01 | End: 2019-07-03 | Stop reason: HOSPADM

## 2019-07-01 RX ORDER — METOPROLOL SUCCINATE 25 MG/1
25 TABLET, EXTENDED RELEASE ORAL DAILY
Status: DISCONTINUED | OUTPATIENT
Start: 2019-07-01 | End: 2019-07-03 | Stop reason: HOSPADM

## 2019-07-01 RX ORDER — GUAIFENESIN 600 MG/1
600 TABLET, EXTENDED RELEASE ORAL 2 TIMES DAILY
Status: DISCONTINUED | OUTPATIENT
Start: 2019-07-01 | End: 2019-07-03 | Stop reason: HOSPADM

## 2019-07-01 RX ORDER — ALBUTEROL SULFATE 2.5 MG/3ML
2.5 SOLUTION RESPIRATORY (INHALATION)
Status: DISCONTINUED | OUTPATIENT
Start: 2019-07-01 | End: 2019-07-03 | Stop reason: HOSPADM

## 2019-07-01 RX ORDER — LABETALOL HYDROCHLORIDE 5 MG/ML
10 INJECTION, SOLUTION INTRAVENOUS EVERY 6 HOURS PRN
Status: DISCONTINUED | OUTPATIENT
Start: 2019-07-01 | End: 2019-07-03 | Stop reason: HOSPADM

## 2019-07-01 RX ADMIN — IPRATROPIUM BROMIDE AND ALBUTEROL SULFATE 1 AMPULE: .5; 3 SOLUTION RESPIRATORY (INHALATION) at 23:32

## 2019-07-01 RX ADMIN — GUAIFENESIN 600 MG: 600 TABLET, EXTENDED RELEASE ORAL at 13:32

## 2019-07-01 RX ADMIN — IPRATROPIUM BROMIDE AND ALBUTEROL SULFATE 1 AMPULE: .5; 3 SOLUTION RESPIRATORY (INHALATION) at 14:52

## 2019-07-01 RX ADMIN — SODIUM CHLORIDE: 9 INJECTION, SOLUTION INTRAVENOUS at 02:19

## 2019-07-01 RX ADMIN — SODIUM CHLORIDE: 9 INJECTION, SOLUTION INTRAVENOUS at 13:27

## 2019-07-01 RX ADMIN — CEFTRIAXONE SODIUM 1 G: 1 INJECTION, POWDER, FOR SOLUTION INTRAMUSCULAR; INTRAVENOUS at 21:57

## 2019-07-01 RX ADMIN — IPRATROPIUM BROMIDE AND ALBUTEROL SULFATE 1 AMPULE: .5; 3 SOLUTION RESPIRATORY (INHALATION) at 07:03

## 2019-07-01 RX ADMIN — Medication 10 ML: at 21:57

## 2019-07-01 RX ADMIN — AZITHROMYCIN 250 MG: 500 INJECTION, POWDER, LYOPHILIZED, FOR SOLUTION INTRAVENOUS at 23:08

## 2019-07-01 RX ADMIN — IPRATROPIUM BROMIDE AND ALBUTEROL SULFATE 1 AMPULE: .5; 3 SOLUTION RESPIRATORY (INHALATION) at 19:38

## 2019-07-01 RX ADMIN — ENOXAPARIN SODIUM 40 MG: 40 INJECTION SUBCUTANEOUS at 09:13

## 2019-07-01 RX ADMIN — GUAIFENESIN 600 MG: 600 TABLET, EXTENDED RELEASE ORAL at 21:57

## 2019-07-01 RX ADMIN — IPRATROPIUM BROMIDE AND ALBUTEROL SULFATE 1 AMPULE: .5; 3 SOLUTION RESPIRATORY (INHALATION) at 11:06

## 2019-07-01 RX ADMIN — METOPROLOL SUCCINATE 25 MG: 25 TABLET, FILM COATED, EXTENDED RELEASE ORAL at 02:18

## 2019-07-01 ASSESSMENT — PAIN SCALES - GENERAL
PAINLEVEL_OUTOF10: 0
PAINLEVEL_OUTOF10: 0

## 2019-07-01 NOTE — H&P
[facility] ECGWhen compared with ECG of 26-AUG-2018 22:39,Vent. rate has increased BY  42 BPMNonspecific T wave abnormality now evident in Lateral leads         Transthoracic Echocardiography Report (TTE)     Demographics      Patient Name     Latricia Mueller      Date of Study     12/26/2017          Gender              Male      Patient Number    [id number]          Date of Birth       1935      Visit Number      [id number]         Age                 [de-identified] year(s)      Accession Number  [id number]           Room Number         3011      Corporate ID      [id number]            Son[facility]         [facility]      Ordering         Sulma Cammal      Interpreting        [facility]   Physician         Hilda Hartley MD           Physician           Mariano Maldonado MD, [facility]     Procedure    Type of Study      TTE procedure:ECHOCARDIOGRAM COMPLETE 2D W DOPPLER W COLOR.     Procedure Date  Date: 12/26/2017 Start: 03:12 PM    Study Location: [address] [place] [address] - Echo Lab  Technical Quality: Adequate visualization    Additional Indications:SVT. Patient Status: Routine    Height: 70 inches Weight: 198 pounds BSA: 2.08 m2 BMI: 28.41 kg/m2    BP: 196/102 mmHg     Conclusions      Summary   Hyperdynamic LV systolic function with an estimated EF > 65%.   Normal left ventricular diastolic filling pressure.   Mild mitral annular calcification.   Trace-mild tricuspid regurgitation.   Systolic pulmonary artery pressure (SPAP) is estimated at 48mmHg consistent   with mild pulmonary hypertension (RA pressure 8mmHg).       Signature      ------------------------------------------------------------------   Electronically signed by Mariano Maldonado MD, [facility]   (Interpreting physician) on 12/27/2017 at 08:44 AM    CBC:  Recent Labs     06/30/19  2155   WBC 10.9   HGB 16.6   HCT 48.9 DALTON, APRN - CNP for the opportunity to be involved in this patient's care. If you have any questions or concerns please feel free to contact me via the [employer] at [phone]. This chart was generated using the [address] [place] [address] [address] dictation system. I created this record but it may contain dictation errors given the limitations of this technology.

## 2019-07-01 NOTE — ED PROVIDER NOTES
# 0.1 [WL]      ED Course User Index  [WL] Judie Dobson DO       Old records were reviewed when applicable.  The ED course and plan were reviewed and results discussed with the vision, hospitalist    CLINICAL IMPRESSION and DISPOSITION  Cyndie Rosario was stable and diagnosed with sepsis, pneumonia, altered mental status    Patient was treated with his with Romycin, Rocephin, Tylenol, IV fluids      CRITICAL CARE TIME:   N/A                      Judie Dobson DO  06/30/19 0502

## 2019-07-01 NOTE — CONSULTS
Patient is being seen at the request of Keshav Dela Cruz MD  for a consultation for respiratory failure and community acquired pneumonia    HISTORY OF PRESENT ILLNESS:   82years old with history of arthritis presented with shortness of breath for the past couple of days. Dyspnea worse with exertion and better with resting. Moderate in severity. Associated with chest congestion. Cough with yellow sputum. No hemoptysis. Associated with cough and fever. Altered mental status. Fever 102. 2. Hypoxemic requiring 3 L to keep sat above 80%. No home O2. Former smoker, quit 20 years ago, smoked 1 pack/day for 17 years        PAST MEDICAL HISTORY:  Past Medical History:   Diagnosis Date    Arthritis     Hyperlipidemia     Influenza A 12/25/2017    Right-sided low back pain with right-sided sciatica      PAST SURGICAL HISTORY:  Past Surgical History:   Procedure Laterality Date    CATARACT REMOVAL      INGUINAL HERNIA REPAIR Bilateral 2-21-11    HtoHtxsdy-LHPCV-NqxbeHussain Deleon MD    TONSILLECTOMY      TOOTH EXTRACTION         FAMILY HISTORY:  No lung cancer       SOCIAL HISTORY:   reports that he has never smoked. He has never used smokeless tobacco.    Scheduled Meds:   metoprolol succinate  25 mg Oral Daily    sodium chloride flush  10 mL Intravenous 2 times per day    enoxaparin  40 mg Subcutaneous Daily    ipratropium-albuterol  1 ampule Inhalation Q4H WA    azithromycin  250 mg Intravenous Q24H    And    cefTRIAXone (ROCEPHIN) IV  1 g Intravenous Q24H     Continuous Infusions:   sodium chloride 100 mL/hr at 07/01/19 0219     PRN Meds:  labetalol, guaiFENesin-dextromethorphan, sodium chloride, sodium chloride flush, albuterol, morphine, acetaminophen    ALLERGIES:  Patient is allergic to contrast [iodides] and zocor [simvastatin].     REVIEW OF SYSTEMS:  Constitutional: Negative for fever  HENT: Negative for sore throat  Eyes: Negative for redness   Respiratory: + dyspnea, cough  Cardiovascular: Negative for chest pain  Gastrointestinal: Negative for vomiting, diarrhea   Genitourinary: Negative for hematuria   Musculoskeletal: + arthralgias   Skin: Negative for rash  Neurological: Negative for syncope  Hematological: Negative for adenopathy  Psychiatric/Behavorial: Negative for anxiety    PHYSICAL EXAM:  Blood pressure (!) 146/80, pulse 100, temperature 98 °F (36.7 °C), temperature source Oral, resp. rate 20, height 5' 10\" (1.778 m), weight 198 lb (89.8 kg), SpO2 90 %.' on 3LPM   Gen: + Distress. Eyes: PERRL. No sclera icterus. No conjunctival injection. ENT: No discharge. Pharynx clear. Neck: Trachea midline. No obvious mass. Resp: + accessory muscle use. No crackles. Bilateral wheezes. + rhonchi. No dullness on percussion. CV: Tachycardia. Regular rhythm. No murmur or rub. No edema. Peripheral pulses are 2+. Capillary refill is less than 3 seconds. GI: Non-tender. Non-distended. No hernia. Skin: Warm and dry. No nodule on exposed extremities. Lymph: No cervical LAD. No supraclavicular LAD. M/S: No cyanosis. No joint deformity. No clubbing. Neuro: Awake. Alert. Moves all four extremities. Psych: Oriented x 1. No anxiety. LABS:  CBC:   Recent Labs     06/30/19 2155 07/01/19  0503   WBC 10.9 11.2*   HGB 16.6 16.3   HCT 48.9 49.3   MCV 84.8 87.5    192     BMP:   Recent Labs     06/30/19 2155 07/01/19  0504    134*   K 4.3 4.8   CL 98* 104   CO2 25 19*   BUN 18 16   CREATININE 1.2 0.9     LIVER PROFILE:   Recent Labs     06/30/19 2155 07/01/19  0504   AST 24 42*   ALT 19 18   BILITOT 1.3* 1.0   ALKPHOS 105 97     PT/INR: No results for input(s): PROTIME, INR in the last 72 hours. APTT: No results for input(s): APTT in the last 72 hours.   UA:  Recent Labs     06/30/19  2216   COLORU Yellow   PHUR 5.0   WBCUA 0-2   RBCUA 3-5*   MUCUS 1+*   CLARITYU Clear   SPECGRAV 1.025   LEUKOCYTESUR Negative   UROBILINOGEN 1.0   BILIRUBINUR SMALL*   BLOODU SMALL*   GLUCOSEU Negative No results for input(s): PHART, AMP3VUB, PO2ART in the last 72 hours.     Chest x-ray 6/30 imaging was reviewed by me and showed possible right middle lobe airspace disease    ASSESSMENT:  · Acute hypoxemic respiratory failure  · Acute tracheobronchitis and possible pneumonia  · Acute encephalopathy  · Fever  · Bronchospasm    PLAN:  Supplemental oxygen to maintain SaO2 >92%; wean as tolerated  Intensive inhaled bronchodilator therapy  IV antibiotics to include ceftriaxone/Zithromax  Blood culture and Sputum GS&C  Inhaled bronchodilators  Discussed with internal medicine

## 2019-07-02 ENCOUNTER — APPOINTMENT (OUTPATIENT)
Dept: GENERAL RADIOLOGY | Age: 84
DRG: 871 | End: 2019-07-02
Payer: MEDICARE

## 2019-07-02 LAB
ANION GAP SERPL CALCULATED.3IONS-SCNC: 10 MMOL/L (ref 3–16)
BASOPHILS ABSOLUTE: 0 K/UL (ref 0–0.2)
BASOPHILS RELATIVE PERCENT: 0.4 %
BUN BLDV-MCNC: 14 MG/DL (ref 7–20)
CALCIUM SERPL-MCNC: 8.4 MG/DL (ref 8.3–10.6)
CHLORIDE BLD-SCNC: 104 MMOL/L (ref 99–110)
CO2: 25 MMOL/L (ref 21–32)
CREAT SERPL-MCNC: 0.8 MG/DL (ref 0.8–1.3)
EOSINOPHILS ABSOLUTE: 0 K/UL (ref 0–0.6)
EOSINOPHILS RELATIVE PERCENT: 0.2 %
GFR AFRICAN AMERICAN: >60
GFR NON-AFRICAN AMERICAN: >60
GLUCOSE BLD-MCNC: 129 MG/DL (ref 70–99)
GLUCOSE BLD-MCNC: 166 MG/DL (ref 70–99)
HCT VFR BLD CALC: 44 % (ref 40.5–52.5)
HEMOGLOBIN: 14.7 G/DL (ref 13.5–17.5)
LYMPHOCYTES ABSOLUTE: 1.2 K/UL (ref 1–5.1)
LYMPHOCYTES RELATIVE PERCENT: 11.7 %
MAGNESIUM: 2.4 MG/DL (ref 1.8–2.4)
MCH RBC QN AUTO: 28.9 PG (ref 26–34)
MCHC RBC AUTO-ENTMCNC: 33.4 G/DL (ref 31–36)
MCV RBC AUTO: 86.5 FL (ref 80–100)
MONOCYTES ABSOLUTE: 1.5 K/UL (ref 0–1.3)
MONOCYTES RELATIVE PERCENT: 14.6 %
NEUTROPHILS ABSOLUTE: 7.7 K/UL (ref 1.7–7.7)
NEUTROPHILS RELATIVE PERCENT: 73.1 %
PDW BLD-RTO: 14.9 % (ref 12.4–15.4)
PERFORMED ON: ABNORMAL
PHOSPHORUS: 2.6 MG/DL (ref 2.5–4.9)
PLATELET # BLD: 188 K/UL (ref 135–450)
PMV BLD AUTO: 6.9 FL (ref 5–10.5)
POTASSIUM SERPL-SCNC: 4 MMOL/L (ref 3.5–5.1)
RBC # BLD: 5.09 M/UL (ref 4.2–5.9)
SODIUM BLD-SCNC: 139 MMOL/L (ref 136–145)
WBC # BLD: 10.5 K/UL (ref 4–11)

## 2019-07-02 PROCEDURE — 92526 ORAL FUNCTION THERAPY: CPT

## 2019-07-02 PROCEDURE — 87070 CULTURE OTHR SPECIMN AEROBIC: CPT

## 2019-07-02 PROCEDURE — 6360000002 HC RX W HCPCS: Performed by: INTERNAL MEDICINE

## 2019-07-02 PROCEDURE — 87486 CHLMYD PNEUM DNA AMP PROBE: CPT

## 2019-07-02 PROCEDURE — 2700000000 HC OXYGEN THERAPY PER DAY

## 2019-07-02 PROCEDURE — 94761 N-INVAS EAR/PLS OXIMETRY MLT: CPT

## 2019-07-02 PROCEDURE — 2060000000 HC ICU INTERMEDIATE R&B

## 2019-07-02 PROCEDURE — 94640 AIRWAY INHALATION TREATMENT: CPT

## 2019-07-02 PROCEDURE — 87798 DETECT AGENT NOS DNA AMP: CPT

## 2019-07-02 PROCEDURE — 87449 NOS EACH ORGANISM AG IA: CPT

## 2019-07-02 PROCEDURE — 87633 RESP VIRUS 12-25 TARGETS: CPT

## 2019-07-02 PROCEDURE — 71045 X-RAY EXAM CHEST 1 VIEW: CPT

## 2019-07-02 PROCEDURE — 99233 SBSQ HOSP IP/OBS HIGH 50: CPT | Performed by: INTERNAL MEDICINE

## 2019-07-02 PROCEDURE — 85025 COMPLETE CBC W/AUTO DIFF WBC: CPT

## 2019-07-02 PROCEDURE — 2580000003 HC RX 258: Performed by: INTERNAL MEDICINE

## 2019-07-02 PROCEDURE — 6370000000 HC RX 637 (ALT 250 FOR IP): Performed by: INTERNAL MEDICINE

## 2019-07-02 PROCEDURE — 84100 ASSAY OF PHOSPHORUS: CPT

## 2019-07-02 PROCEDURE — 80048 BASIC METABOLIC PNL TOTAL CA: CPT

## 2019-07-02 PROCEDURE — 83735 ASSAY OF MAGNESIUM: CPT

## 2019-07-02 PROCEDURE — 87581 M.PNEUMON DNA AMP PROBE: CPT

## 2019-07-02 PROCEDURE — 36415 COLL VENOUS BLD VENIPUNCTURE: CPT

## 2019-07-02 PROCEDURE — 87205 SMEAR GRAM STAIN: CPT

## 2019-07-02 PROCEDURE — 99232 SBSQ HOSP IP/OBS MODERATE 35: CPT | Performed by: INTERNAL MEDICINE

## 2019-07-02 PROCEDURE — 94669 MECHANICAL CHEST WALL OSCILL: CPT

## 2019-07-02 RX ORDER — FUROSEMIDE 10 MG/ML
40 INJECTION INTRAMUSCULAR; INTRAVENOUS ONCE
Status: COMPLETED | OUTPATIENT
Start: 2019-07-02 | End: 2019-07-02

## 2019-07-02 RX ORDER — METHYLPREDNISOLONE SODIUM SUCCINATE 40 MG/ML
40 INJECTION, POWDER, LYOPHILIZED, FOR SOLUTION INTRAMUSCULAR; INTRAVENOUS EVERY 12 HOURS
Status: DISCONTINUED | OUTPATIENT
Start: 2019-07-02 | End: 2019-07-03 | Stop reason: HOSPADM

## 2019-07-02 RX ORDER — AZITHROMYCIN 250 MG/1
250 TABLET, FILM COATED ORAL DAILY
Status: DISCONTINUED | OUTPATIENT
Start: 2019-07-02 | End: 2019-07-03 | Stop reason: HOSPADM

## 2019-07-02 RX ADMIN — Medication 10 ML: at 08:52

## 2019-07-02 RX ADMIN — SODIUM CHLORIDE: 9 INJECTION, SOLUTION INTRAVENOUS at 05:28

## 2019-07-02 RX ADMIN — METHYLPREDNISOLONE SODIUM SUCCINATE 40 MG: 40 INJECTION, POWDER, FOR SOLUTION INTRAMUSCULAR; INTRAVENOUS at 10:26

## 2019-07-02 RX ADMIN — SODIUM CHLORIDE: 9 INJECTION, SOLUTION INTRAVENOUS at 00:04

## 2019-07-02 RX ADMIN — AZITHROMYCIN MONOHYDRATE 250 MG: 250 TABLET ORAL at 15:46

## 2019-07-02 RX ADMIN — IPRATROPIUM BROMIDE AND ALBUTEROL SULFATE 1 AMPULE: .5; 3 SOLUTION RESPIRATORY (INHALATION) at 19:11

## 2019-07-02 RX ADMIN — METOPROLOL SUCCINATE 25 MG: 25 TABLET, FILM COATED, EXTENDED RELEASE ORAL at 08:51

## 2019-07-02 RX ADMIN — CEFTRIAXONE SODIUM 1 G: 1 INJECTION, POWDER, FOR SOLUTION INTRAMUSCULAR; INTRAVENOUS at 21:54

## 2019-07-02 RX ADMIN — ENOXAPARIN SODIUM 40 MG: 40 INJECTION SUBCUTANEOUS at 08:52

## 2019-07-02 RX ADMIN — METHYLPREDNISOLONE SODIUM SUCCINATE 40 MG: 40 INJECTION, POWDER, FOR SOLUTION INTRAMUSCULAR; INTRAVENOUS at 21:54

## 2019-07-02 RX ADMIN — FUROSEMIDE 40 MG: 10 INJECTION, SOLUTION INTRAMUSCULAR; INTRAVENOUS at 10:26

## 2019-07-02 RX ADMIN — GUAIFENESIN 600 MG: 600 TABLET, EXTENDED RELEASE ORAL at 08:51

## 2019-07-02 RX ADMIN — GUAIFENESIN 600 MG: 600 TABLET, EXTENDED RELEASE ORAL at 22:09

## 2019-07-02 RX ADMIN — IPRATROPIUM BROMIDE AND ALBUTEROL SULFATE 1 AMPULE: .5; 3 SOLUTION RESPIRATORY (INHALATION) at 06:56

## 2019-07-02 RX ADMIN — IPRATROPIUM BROMIDE AND ALBUTEROL SULFATE 1 AMPULE: .5; 3 SOLUTION RESPIRATORY (INHALATION) at 10:42

## 2019-07-02 RX ADMIN — IPRATROPIUM BROMIDE AND ALBUTEROL SULFATE 1 AMPULE: .5; 3 SOLUTION RESPIRATORY (INHALATION) at 22:58

## 2019-07-02 RX ADMIN — IPRATROPIUM BROMIDE AND ALBUTEROL SULFATE 1 AMPULE: .5; 3 SOLUTION RESPIRATORY (INHALATION) at 15:06

## 2019-07-02 RX ADMIN — Medication 10 ML: at 21:54

## 2019-07-02 ASSESSMENT — PAIN SCALES - GENERAL
PAINLEVEL_OUTOF10: 0
PAINLEVEL_OUTOF10: 0

## 2019-07-03 ENCOUNTER — TELEPHONE (OUTPATIENT)
Dept: PULMONOLOGY | Age: 84
End: 2019-07-03

## 2019-07-03 VITALS
HEART RATE: 111 BPM | WEIGHT: 198.1 LBS | OXYGEN SATURATION: 91 % | HEIGHT: 70 IN | SYSTOLIC BLOOD PRESSURE: 138 MMHG | BODY MASS INDEX: 28.36 KG/M2 | RESPIRATION RATE: 18 BRPM | TEMPERATURE: 97.7 F | DIASTOLIC BLOOD PRESSURE: 71 MMHG

## 2019-07-03 DIAGNOSIS — J96.01 ACUTE RESPIRATORY FAILURE WITH HYPOXIA (HCC): Primary | ICD-10-CM

## 2019-07-03 DIAGNOSIS — J40 TRACHEOBRONCHITIS: ICD-10-CM

## 2019-07-03 LAB
ANION GAP SERPL CALCULATED.3IONS-SCNC: 13 MMOL/L (ref 3–16)
BASOPHILS ABSOLUTE: 0 K/UL (ref 0–0.2)
BASOPHILS RELATIVE PERCENT: 0.2 %
BUN BLDV-MCNC: 14 MG/DL (ref 7–20)
CALCIUM SERPL-MCNC: 9.1 MG/DL (ref 8.3–10.6)
CHLORIDE BLD-SCNC: 104 MMOL/L (ref 99–110)
CO2: 24 MMOL/L (ref 21–32)
CREAT SERPL-MCNC: 0.8 MG/DL (ref 0.8–1.3)
EOSINOPHILS ABSOLUTE: 0 K/UL (ref 0–0.6)
EOSINOPHILS RELATIVE PERCENT: 0 %
GFR AFRICAN AMERICAN: >60
GFR NON-AFRICAN AMERICAN: >60
GLUCOSE BLD-MCNC: 154 MG/DL (ref 70–99)
HCT VFR BLD CALC: 45.9 % (ref 40.5–52.5)
HEMOGLOBIN: 15.5 G/DL (ref 13.5–17.5)
L. PNEUMOPHILA SEROGP 1 UR AG: NORMAL
LYMPHOCYTES ABSOLUTE: 0.8 K/UL (ref 1–5.1)
LYMPHOCYTES RELATIVE PERCENT: 7.2 %
MAGNESIUM: 2.6 MG/DL (ref 1.8–2.4)
MCH RBC QN AUTO: 28.9 PG (ref 26–34)
MCHC RBC AUTO-ENTMCNC: 33.7 G/DL (ref 31–36)
MCV RBC AUTO: 85.8 FL (ref 80–100)
MONOCYTES ABSOLUTE: 0.4 K/UL (ref 0–1.3)
MONOCYTES RELATIVE PERCENT: 3.9 %
NEUTROPHILS ABSOLUTE: 9.7 K/UL (ref 1.7–7.7)
NEUTROPHILS RELATIVE PERCENT: 88.7 %
ORGANISM: ABNORMAL
PDW BLD-RTO: 14.6 % (ref 12.4–15.4)
PHOSPHORUS: 2.6 MG/DL (ref 2.5–4.9)
PLATELET # BLD: 220 K/UL (ref 135–450)
PMV BLD AUTO: 7.4 FL (ref 5–10.5)
POTASSIUM SERPL-SCNC: 3.7 MMOL/L (ref 3.5–5.1)
RBC # BLD: 5.35 M/UL (ref 4.2–5.9)
REPORT: NORMAL
RESPIRATORY PANEL PCR: ABNORMAL
RESPIRATORY PANEL PCR: ABNORMAL
SODIUM BLD-SCNC: 141 MMOL/L (ref 136–145)
STREP PNEUMONIAE ANTIGEN, URINE: NORMAL
WBC # BLD: 11 K/UL (ref 4–11)

## 2019-07-03 PROCEDURE — 80048 BASIC METABOLIC PNL TOTAL CA: CPT

## 2019-07-03 PROCEDURE — 6370000000 HC RX 637 (ALT 250 FOR IP): Performed by: INTERNAL MEDICINE

## 2019-07-03 PROCEDURE — 6360000002 HC RX W HCPCS: Performed by: INTERNAL MEDICINE

## 2019-07-03 PROCEDURE — 94640 AIRWAY INHALATION TREATMENT: CPT

## 2019-07-03 PROCEDURE — 2700000000 HC OXYGEN THERAPY PER DAY

## 2019-07-03 PROCEDURE — 99238 HOSP IP/OBS DSCHRG MGMT 30/<: CPT | Performed by: INTERNAL MEDICINE

## 2019-07-03 PROCEDURE — 84100 ASSAY OF PHOSPHORUS: CPT

## 2019-07-03 PROCEDURE — 2580000003 HC RX 258: Performed by: INTERNAL MEDICINE

## 2019-07-03 PROCEDURE — 85025 COMPLETE CBC W/AUTO DIFF WBC: CPT

## 2019-07-03 PROCEDURE — 36415 COLL VENOUS BLD VENIPUNCTURE: CPT

## 2019-07-03 PROCEDURE — 83735 ASSAY OF MAGNESIUM: CPT

## 2019-07-03 PROCEDURE — 94669 MECHANICAL CHEST WALL OSCILL: CPT

## 2019-07-03 PROCEDURE — 99233 SBSQ HOSP IP/OBS HIGH 50: CPT | Performed by: INTERNAL MEDICINE

## 2019-07-03 PROCEDURE — 92526 ORAL FUNCTION THERAPY: CPT

## 2019-07-03 PROCEDURE — 94761 N-INVAS EAR/PLS OXIMETRY MLT: CPT

## 2019-07-03 RX ORDER — PREDNISONE 10 MG/1
TABLET ORAL
Qty: 30 TABLET | Refills: 0 | Status: SHIPPED | OUTPATIENT
Start: 2019-07-03 | End: 2019-12-06

## 2019-07-03 RX ORDER — LEVOFLOXACIN 500 MG/1
500 TABLET, FILM COATED ORAL DAILY
Qty: 5 TABLET | Refills: 0 | Status: SHIPPED | OUTPATIENT
Start: 2019-07-03 | End: 2019-07-08

## 2019-07-03 RX ORDER — ALBUTEROL SULFATE 90 UG/1
2 AEROSOL, METERED RESPIRATORY (INHALATION) EVERY 6 HOURS PRN
Qty: 1 INHALER | Refills: 0 | Status: SHIPPED | OUTPATIENT
Start: 2019-07-03 | End: 2019-12-06

## 2019-07-03 RX ADMIN — IPRATROPIUM BROMIDE AND ALBUTEROL SULFATE 1 AMPULE: .5; 3 SOLUTION RESPIRATORY (INHALATION) at 07:26

## 2019-07-03 RX ADMIN — METOPROLOL SUCCINATE 25 MG: 25 TABLET, FILM COATED, EXTENDED RELEASE ORAL at 09:14

## 2019-07-03 RX ADMIN — ENOXAPARIN SODIUM 40 MG: 40 INJECTION SUBCUTANEOUS at 09:15

## 2019-07-03 RX ADMIN — METHYLPREDNISOLONE SODIUM SUCCINATE 40 MG: 40 INJECTION, POWDER, FOR SOLUTION INTRAMUSCULAR; INTRAVENOUS at 09:14

## 2019-07-03 RX ADMIN — Medication 10 ML: at 09:14

## 2019-07-03 RX ADMIN — GUAIFENESIN 600 MG: 600 TABLET, EXTENDED RELEASE ORAL at 09:15

## 2019-07-03 RX ADMIN — IPRATROPIUM BROMIDE AND ALBUTEROL SULFATE 1 AMPULE: .5; 3 SOLUTION RESPIRATORY (INHALATION) at 10:57

## 2019-07-03 RX ADMIN — IPRATROPIUM BROMIDE AND ALBUTEROL SULFATE 1 AMPULE: .5; 3 SOLUTION RESPIRATORY (INHALATION) at 14:52

## 2019-07-03 RX ADMIN — AZITHROMYCIN MONOHYDRATE 250 MG: 250 TABLET ORAL at 09:15

## 2019-07-03 ASSESSMENT — PAIN SCALES - GENERAL
PAINLEVEL_OUTOF10: 0
PAINLEVEL_OUTOF10: 0

## 2019-07-03 NOTE — DISCHARGE SUMMARY
conjunctivae/corneas clear. PERRL, EOM's intact. Neck: no adenopathy, no carotid bruit, no JVD, supple, symmetrical, trachea midline and thyroid not enlarged, symmetric, no tenderness/mass/nodules  Lungs: no wheezes both lungs, no rhonchi. no accessory muscle use  Heart: increased rate and regular rhythm, S1, S2 normal, no murmur, click, rub or gallop  Abdomen: soft, non-tender; bowel sounds normal; no masses,  no organomegaly  Extremities: extremities normal, atraumatic, no cyanosis or edema  Pulses: 2+ and symmetric  Skin: Skin color, texture, turgor normal. No rashes or lesions  Neurologic: Grossly normal    CBC:   Recent Labs     07/01/19  0503 07/02/19  0911 07/03/19  0448   WBC 11.2* 10.5 11.0   HGB 16.3 14.7 15.5   HCT 49.3 44.0 45.9   MCV 87.5 86.5 85.8    188 220     BMP:   Recent Labs     07/01/19  0504 07/02/19  0911 07/03/19  0448   * 139 141   K 4.8 4.0 3.7    104 104   CO2 19* 25 24   PHOS  --  2.6 2.6   BUN 16 14 14   CREATININE 0.9 0.8 0.8     LIVER PROFILE:   Recent Labs     06/30/19  2155 07/01/19  0504   AST 24 42*   ALT 19 18   BILITOT 1.3* 1.0   ALKPHOS 105 97     UA:  Recent Labs     06/30/19  2216   COLORU Yellow   PHUR 5.0   WBCUA 0-2   RBCUA 3-5*   MUCUS 1+*   CLARITYU Clear   SPECGRAV 1.025   LEUKOCYTESUR Negative   UROBILINOGEN 1.0   BILIRUBINUR SMALL*   BLOODU SMALL*   GLUCOSEU Negative       CULTURES  Resp panel: Parainfluenza 3 Virus     RADIOLOGY  XR CHEST PORTABLE   Final Result   Increased airspace disease at the lung bases, either atelectasis or pneumonia         CT ABDOMEN PELVIS WO CONTRAST Additional Contrast? None   Final Result   1. No acute abnormality within the abdomen and pelvis. 2.  1.5 cm incidental left adrenal nodule. Consider CT scan of the abdomen   in 1 year without and with contrast.         CT Head WO Contrast   Final Result   No acute intracranial abnormality.       Diffuse atrophic changes with findings suggesting chronic microvascular ischemia         XR CHEST PORTABLE   Final Result   Linear opacities left lung base could represent subsegmental atelectasis,   scarring or pneumonia               Discharge Medications     Medication List      START taking these medications    albuterol sulfate  (90 Base) MCG/ACT inhaler  Commonly known as:  VENTOLIN HFA  Inhale 2 puffs into the lungs every 6 hours as needed for Wheezing     levofloxacin 500 MG tablet  Commonly known as:  LEVAQUIN  Take 1 tablet by mouth daily for 5 days     predniSONE 10 MG tablet  Commonly known as:  DELTASONE  4 tabs for 3 days 3 tabs for 3 days 2 tabs for 3 days 1 tabs for 3 days        CONTINUE taking these medications    CALCIUM 1200 PO     furosemide 40 MG tablet  Commonly known as:  LASIX  TAKE ONE TABLET BY MOUTH TWICE A DAY     guaiFENesin 1200 MG Tb12     loratadine 10 MG tablet  Commonly known as:  CLARITIN     metoprolol succinate 25 MG extended release tablet  Commonly known as:  TOPROL XL  Take 1 tablet by mouth daily     OCEAN NASAL SPRAY 0.65 % nasal spray  Generic drug:  sodium chloride     potassium chloride 10 MEQ extended release tablet  Commonly known as:  KLOR-CON M  TAKE ONE TABLET BY MOUTH TWICE A DAY     therapeutic multivitamin-minerals tablet        STOP taking these medications    UNABLE TO FIND           Where to Get Your Medications      These medications were sent to [facility] [address], [address] [place] [address],[address], [facility]    Phone:  [phone]   · albuterol sulfate  (90 Base) MCG/ACT inhaler  · levofloxacin 500 MG tablet  · predniSONE 10 MG tablet           Discharged in stable condition to Home     Follow Up:   Follow up with PCP in 1 week       Laurie Bui 7/5/2019 2:06 PM

## 2019-07-03 NOTE — PROGRESS NOTES
Patient admission questions complete with assist from family, patient forgetful, disoriented to place and situation. Call light given and explained, bed alarm on, patient has been in here in the past, was very confused at that time, getting up with out assistance, was hitting at staff, and family reports was in restraints. Patient calm and cooperative at this time, follows direction. Bed alarm on.
Patient ambulated on room air. Patient tolerated well. SpO2- 92% throughout.
Shift assessment completed. Patient resting quietly in bed with no complaints voiced. Vital signs stable.
area needed. He does not want any dietary consistency modification. Staff should maintain good oral hygiene for this patient and monitor his temperature, lung, diet, and weight status. Hold PO intake for worsening respiratory status and/or if signs of aspiration develop. Improved respiratory status compared to wheezing yesterday. MBS would be needed to correlate clinical findings. Recommendations:  Reg and thin liquid per MD orders      Patient/Family/Caregiver Education:  Explained scope of practice    Compensatory Strategies:  See assessment    Plan:    Discontinue Dysphagia treatment and re-consult prn     Discharge Recommendations: If pt discharges from hospital prior to Speech/Swallowing discharge, this note serves as tx and discharge summary. Total Treatment Time:   swallow tx  [id number]    THIS IS A LATE ENTRY, PATIENT WAS SEEN EARLIER TODAY    Ming Person CCC-SLP. D [employer]  7-3-19

## 2019-07-03 NOTE — CARE COORDINATION
DISCHARGE ORDER  Date/Time 7/3/2019 3:15 PM  Completed by: Charlotte Cantor, Case Management    Patient Name: Deidra Quintana    : 1935  Admitting Diagnosis: Severe sepsis ([address] [A41.9, R65.20]  Admit Date/Time: 2019  9:25 PM    Noted discharge order. Confirmed discharge plan with patient: Yes   Discharge Plan: Order for dc noted. Spoke with pt who cont plan for home. Discussed HHC and pt declines. Chart reviewed and no dc needs identified.

## 2019-07-04 LAB
CULTURE, RESPIRATORY: NORMAL
GRAM STAIN RESULT: NORMAL

## 2019-07-05 LAB
BLOOD CULTURE, ROUTINE: NORMAL
CULTURE, BLOOD 2: NORMAL

## 2019-07-09 ENCOUNTER — OFFICE VISIT (OUTPATIENT)
Dept: FAMILY MEDICINE CLINIC | Age: 84
End: 2019-07-09
Payer: MEDICARE

## 2019-07-09 VITALS
HEART RATE: 85 BPM | BODY MASS INDEX: 30.13 KG/M2 | OXYGEN SATURATION: 96 % | SYSTOLIC BLOOD PRESSURE: 122 MMHG | WEIGHT: 192 LBS | DIASTOLIC BLOOD PRESSURE: 64 MMHG | HEIGHT: 67 IN

## 2019-07-09 DIAGNOSIS — J18.9 PNEUMONIA DUE TO INFECTIOUS ORGANISM, UNSPECIFIED LATERALITY, UNSPECIFIED PART OF LUNG: Primary | ICD-10-CM

## 2019-07-09 DIAGNOSIS — A41.9 SEPSIS, DUE TO UNSPECIFIED ORGANISM: ICD-10-CM

## 2019-07-09 PROCEDURE — 99214 OFFICE O/P EST MOD 30 MIN: CPT | Performed by: PHYSICIAN ASSISTANT

## 2019-07-09 NOTE — PROGRESS NOTES
discharge: Yes    Chief Complaint   Patient presents with   [address] from Hospital       History of Present illness - Follow up of Hospital diagnosis(es): Delirium, Septicemia, and Pneumonia    Inpatient course: Discharge summary reviewed- see chart. Interval history/Current status: Pt is here today with his wife. He reports having a significant improvement in his breathing since the hospitalization. Oxygen saturation is up from 91 to 96%. He has finished his course of Levaquin and currently is on a prednisone taper. He has not been using the albuterol inhaler. He has noticed a slight increase in swelling in both of his legs. They are treating this by wearing compression stockings and keeping his legs elevated. His wife reports that all confusion has cleared. Residual symptoms include: mild cough. He denies any chest tightness, shortness of breath, wheezing or fever. Labs were reviewed. + parainfluenza. Blood cultures negative. Kidneys and liver look good. A comprehensive review of systems was negative except for what was noted in the HPI. Vitals:    07/09/19 1650   BP: 122/64   Site: Right Upper Arm   Position: Sitting   Cuff Size: Medium Adult   Pulse: 85   SpO2: 96%   Weight: 192 lb (87.1 kg)   Height: 5' 7\" (1.702 m)     Body mass index is 30.07 kg/m².    Wt Readings from Last 3 Encounters:   07/09/19 192 lb (87.1 kg)   07/03/19 198 lb 1.6 oz (89.9 kg)   05/31/19 200 lb (90.7 kg)     BP Readings from Last 3 Encounters:   07/09/19 122/64   07/03/19 138/71   05/31/19 128/78        Physical Exam:  General Appearance: alert and oriented to person, place and time, well-developed and well-nourished, in no acute distress  Pulmonary/Chest: clear to auscultation bilaterally- no wheezes, rales or rhonchi, normal air movement, no respiratory distress  Cardiovascular: normal rate, normal S1 and S2 and no gallops  Abdomen: soft, non-tender, non-distended, normal bowel sounds, no masses or

## 2019-08-09 ENCOUNTER — HOSPITAL ENCOUNTER (OUTPATIENT)
Dept: GENERAL RADIOLOGY | Age: 84
Discharge: HOME | End: 2019-08-09
Payer: MEDICARE

## 2019-08-09 ENCOUNTER — HOSPITAL ENCOUNTER (OUTPATIENT)
Age: 84
Discharge: HOME | End: 2019-08-09
Payer: MEDICARE

## 2019-08-09 DIAGNOSIS — J18.9 PNEUMONIA DUE TO INFECTIOUS ORGANISM, UNSPECIFIED LATERALITY, UNSPECIFIED PART OF LUNG: ICD-10-CM

## 2019-08-09 PROCEDURE — 71046 X-RAY EXAM CHEST 2 VIEWS: CPT

## 2019-09-10 DIAGNOSIS — R60.0 BILATERAL EDEMA OF LOWER EXTREMITY: ICD-10-CM

## 2019-09-10 RX ORDER — FUROSEMIDE 40 MG/1
TABLET ORAL
Qty: 60 TABLET | Refills: 4 | Status: SHIPPED | OUTPATIENT
Start: 2019-09-10 | End: 2020-02-06 | Stop reason: SDUPTHER

## 2019-10-09 DIAGNOSIS — R60.0 BILATERAL EDEMA OF LOWER EXTREMITY: ICD-10-CM

## 2019-10-09 RX ORDER — POTASSIUM CHLORIDE 750 MG/1
TABLET, EXTENDED RELEASE ORAL
Qty: 60 TABLET | Refills: 3 | Status: SHIPPED | OUTPATIENT
Start: 2019-10-09 | End: 2020-02-04 | Stop reason: SDUPTHER

## 2019-10-10 ENCOUNTER — NURSE ONLY (OUTPATIENT)
Dept: FAMILY MEDICINE CLINIC | Age: 84
End: 2019-10-10
Payer: MEDICARE

## 2019-10-10 DIAGNOSIS — Z23 FLU VACCINE NEED: Primary | ICD-10-CM

## 2019-10-10 PROCEDURE — 90653 IIV ADJUVANT VACCINE IM: CPT | Performed by: NURSE PRACTITIONER

## 2019-10-10 PROCEDURE — G0008 ADMIN INFLUENZA VIRUS VAC: HCPCS | Performed by: NURSE PRACTITIONER

## 2019-12-06 ENCOUNTER — OFFICE VISIT (OUTPATIENT)
Dept: FAMILY MEDICINE CLINIC | Age: 84
End: 2019-12-06
Payer: MEDICARE

## 2019-12-06 VITALS
RESPIRATION RATE: 16 BRPM | HEART RATE: 98 BPM | OXYGEN SATURATION: 98 % | BODY MASS INDEX: 32.11 KG/M2 | SYSTOLIC BLOOD PRESSURE: 132 MMHG | WEIGHT: 205 LBS | DIASTOLIC BLOOD PRESSURE: 78 MMHG

## 2019-12-06 DIAGNOSIS — E78.5 HYPERLIPIDEMIA, UNSPECIFIED HYPERLIPIDEMIA TYPE: ICD-10-CM

## 2019-12-06 DIAGNOSIS — I10 ESSENTIAL HYPERTENSION: ICD-10-CM

## 2019-12-06 DIAGNOSIS — R60.0 BILATERAL EDEMA OF LOWER EXTREMITY: ICD-10-CM

## 2019-12-06 DIAGNOSIS — R73.09 ELEVATED GLUCOSE: ICD-10-CM

## 2019-12-06 LAB
A/G RATIO: 1.8 (ref 1.1–2.2)
ALBUMIN SERPL-MCNC: 4.4 G/DL (ref 3.4–5)
ALP BLD-CCNC: 95 U/L (ref 40–129)
ALT SERPL-CCNC: 44 U/L (ref 10–40)
ANION GAP SERPL CALCULATED.3IONS-SCNC: 14 MMOL/L (ref 3–16)
AST SERPL-CCNC: 31 U/L (ref 15–37)
BILIRUB SERPL-MCNC: 0.5 MG/DL (ref 0–1)
BUN BLDV-MCNC: 13 MG/DL (ref 7–20)
CALCIUM SERPL-MCNC: 9.2 MG/DL (ref 8.3–10.6)
CHLORIDE BLD-SCNC: 102 MMOL/L (ref 99–110)
CHOLESTEROL, TOTAL: 241 MG/DL (ref 0–199)
CO2: 27 MMOL/L (ref 21–32)
CREAT SERPL-MCNC: 1 MG/DL (ref 0.8–1.3)
GFR AFRICAN AMERICAN: >60
GFR NON-AFRICAN AMERICAN: >60
GLOBULIN: 2.4 G/DL
GLUCOSE BLD-MCNC: 93 MG/DL (ref 70–99)
HDLC SERPL-MCNC: 40 MG/DL (ref 40–60)
LDL CHOLESTEROL CALCULATED: 158 MG/DL
POTASSIUM SERPL-SCNC: 4.2 MMOL/L (ref 3.5–5.1)
SODIUM BLD-SCNC: 143 MMOL/L (ref 136–145)
TOTAL PROTEIN: 6.8 G/DL (ref 6.4–8.2)
TRIGL SERPL-MCNC: 215 MG/DL (ref 0–150)
VLDLC SERPL CALC-MCNC: 43 MG/DL

## 2019-12-06 PROCEDURE — 99213 OFFICE O/P EST LOW 20 MIN: CPT | Performed by: NURSE PRACTITIONER

## 2019-12-07 LAB
ESTIMATED AVERAGE GLUCOSE: 102.5 MG/DL
HBA1C MFR BLD: 5.2 %

## 2019-12-12 ENCOUNTER — TELEPHONE (OUTPATIENT)
Dept: FAMILY MEDICINE CLINIC | Age: 84
End: 2019-12-12

## 2019-12-13 DIAGNOSIS — E78.5 HYPERLIPIDEMIA, UNSPECIFIED HYPERLIPIDEMIA TYPE: Primary | ICD-10-CM

## 2019-12-13 RX ORDER — PRAVASTATIN SODIUM 20 MG
20 TABLET ORAL DAILY
Qty: 90 TABLET | Refills: 0 | Status: SHIPPED | OUTPATIENT
Start: 2019-12-13 | End: 2020-03-02 | Stop reason: SDUPTHER

## 2019-12-16 ENCOUNTER — TELEPHONE (OUTPATIENT)
Dept: FAMILY MEDICINE CLINIC | Age: 84
End: 2019-12-16

## 2019-12-17 DIAGNOSIS — M25.50 ARTHRALGIA, UNSPECIFIED JOINT: Primary | ICD-10-CM

## 2019-12-20 ASSESSMENT — ENCOUNTER SYMPTOMS
ABDOMINAL PAIN: 0
CONSTIPATION: 0
COUGH: 0
CHEST TIGHTNESS: 0
BLOOD IN STOOL: 0
SHORTNESS OF BREATH: 0
COLOR CHANGE: 0

## 2020-01-02 ENCOUNTER — TELEPHONE (OUTPATIENT)
Dept: FAMILY MEDICINE CLINIC | Age: 85
End: 2020-01-02

## 2020-02-04 RX ORDER — POTASSIUM CHLORIDE 750 MG/1
TABLET, EXTENDED RELEASE ORAL
Qty: 60 TABLET | Refills: 0 | Status: SHIPPED | OUTPATIENT
Start: 2020-02-04 | End: 2020-03-05

## 2020-02-06 RX ORDER — FUROSEMIDE 40 MG/1
TABLET ORAL
Qty: 60 TABLET | Refills: 1 | Status: SHIPPED | OUTPATIENT
Start: 2020-02-06 | End: 2020-04-06

## 2020-02-24 RX ORDER — METOPROLOL SUCCINATE 25 MG/1
TABLET, EXTENDED RELEASE ORAL
Qty: 90 TABLET | Refills: 0 | Status: SHIPPED | OUTPATIENT
Start: 2020-02-24 | End: 2020-03-02 | Stop reason: SDUPTHER

## 2020-03-02 ENCOUNTER — OFFICE VISIT (OUTPATIENT)
Dept: FAMILY MEDICINE CLINIC | Age: 85
End: 2020-03-02
Payer: MEDICARE

## 2020-03-02 VITALS
BODY MASS INDEX: 31.79 KG/M2 | OXYGEN SATURATION: 97 % | WEIGHT: 203 LBS | SYSTOLIC BLOOD PRESSURE: 130 MMHG | DIASTOLIC BLOOD PRESSURE: 82 MMHG | HEART RATE: 84 BPM

## 2020-03-02 LAB
A/G RATIO: 1.8 (ref 1.1–2.2)
ALBUMIN SERPL-MCNC: 4.6 G/DL (ref 3.4–5)
ALP BLD-CCNC: 103 U/L (ref 40–129)
ALT SERPL-CCNC: 24 U/L (ref 10–40)
ANION GAP SERPL CALCULATED.3IONS-SCNC: 16 MMOL/L (ref 3–16)
AST SERPL-CCNC: 23 U/L (ref 15–37)
BASOPHILS ABSOLUTE: 0.1 K/UL (ref 0–0.2)
BASOPHILS RELATIVE PERCENT: 0.6 %
BILIRUB SERPL-MCNC: 0.6 MG/DL (ref 0–1)
BUN BLDV-MCNC: 21 MG/DL (ref 7–20)
CALCIUM SERPL-MCNC: 9.4 MG/DL (ref 8.3–10.6)
CHLORIDE BLD-SCNC: 100 MMOL/L (ref 99–110)
CHOLESTEROL, TOTAL: 186 MG/DL (ref 0–199)
CO2: 28 MMOL/L (ref 21–32)
CREAT SERPL-MCNC: 1.1 MG/DL (ref 0.8–1.3)
EOSINOPHILS ABSOLUTE: 0.3 K/UL (ref 0–0.6)
EOSINOPHILS RELATIVE PERCENT: 2.8 %
GFR AFRICAN AMERICAN: >60
GFR NON-AFRICAN AMERICAN: >60
GLOBULIN: 2.5 G/DL
GLUCOSE BLD-MCNC: 99 MG/DL (ref 70–99)
HCT VFR BLD CALC: 50 % (ref 40.5–52.5)
HDLC SERPL-MCNC: 46 MG/DL (ref 40–60)
HEMOGLOBIN: 16.7 G/DL (ref 13.5–17.5)
LDL CHOLESTEROL CALCULATED: 122 MG/DL
LYMPHOCYTES ABSOLUTE: 1.8 K/UL (ref 1–5.1)
LYMPHOCYTES RELATIVE PERCENT: 18.9 %
MCH RBC QN AUTO: 28.2 PG (ref 26–34)
MCHC RBC AUTO-ENTMCNC: 33.3 G/DL (ref 31–36)
MCV RBC AUTO: 84.8 FL (ref 80–100)
MONOCYTES ABSOLUTE: 1 K/UL (ref 0–1.3)
MONOCYTES RELATIVE PERCENT: 10.3 %
NEUTROPHILS ABSOLUTE: 6.2 K/UL (ref 1.7–7.7)
NEUTROPHILS RELATIVE PERCENT: 67.4 %
PDW BLD-RTO: 14.6 % (ref 12.4–15.4)
PLATELET # BLD: 284 K/UL (ref 135–450)
PMV BLD AUTO: 8.7 FL (ref 5–10.5)
POTASSIUM SERPL-SCNC: 4 MMOL/L (ref 3.5–5.1)
RBC # BLD: 5.9 M/UL (ref 4.2–5.9)
SODIUM BLD-SCNC: 144 MMOL/L (ref 136–145)
TOTAL PROTEIN: 7.1 G/DL (ref 6.4–8.2)
TRIGL SERPL-MCNC: 88 MG/DL (ref 0–150)
VLDLC SERPL CALC-MCNC: 18 MG/DL
WBC # BLD: 9.3 K/UL (ref 4–11)

## 2020-03-02 PROCEDURE — 99214 OFFICE O/P EST MOD 30 MIN: CPT | Performed by: FAMILY MEDICINE

## 2020-03-02 RX ORDER — PRAVASTATIN SODIUM 20 MG
20 TABLET ORAL DAILY
Qty: 90 TABLET | Refills: 0 | Status: SHIPPED | OUTPATIENT
Start: 2020-03-02 | End: 2020-06-15

## 2020-03-02 RX ORDER — METOPROLOL SUCCINATE 25 MG/1
TABLET, EXTENDED RELEASE ORAL
Qty: 90 TABLET | Refills: 0 | Status: SHIPPED | OUTPATIENT
Start: 2020-03-02 | End: 2020-09-03

## 2020-03-02 ASSESSMENT — PATIENT HEALTH QUESTIONNAIRE - PHQ9
SUM OF ALL RESPONSES TO PHQ QUESTIONS 1-9: 0
1. LITTLE INTEREST OR PLEASURE IN DOING THINGS: 0
SUM OF ALL RESPONSES TO PHQ QUESTIONS 1-9: 0
2. FEELING DOWN, DEPRESSED OR HOPELESS: 0
SUM OF ALL RESPONSES TO PHQ9 QUESTIONS 1 & 2: 0

## 2020-03-02 NOTE — PROGRESS NOTES
Scotty Bailey is a 82 y.o. male    Chief Complaint   Patient presents with    New Patient    Hypertension    Hyperlipidemia    Discuss Medications       HPI:    Hypertension   This is a chronic problem. The current episode started more than 1 year ago. The problem is unchanged. The problem is controlled. Past treatments include diuretics and beta blockers. The current treatment provides significant improvement. There are no compliance problems. There is no history of CAD/MI or CVA. Hyperlipidemia   This is a chronic problem. The current episode started more than 1 year ago. The problem is uncontrolled. Recent lipid tests were reviewed and are low. He has no history of diabetes. Associated symptoms include myalgias. Current antihyperlipidemic treatment includes statins. The current treatment provides moderate improvement of lipids. There are no compliance problems. Risk factors for coronary artery disease include male sex. Lower extremity edema. This is a chronic condition. The patient takes Lasix twice a day. He takes potassium along with it. Recent potassium was normal.  No heart failure. His echo did not show any heart failure in 2017. No history of DVT. ROS:    Review of Systems   Cardiovascular: Positive for leg swelling. Musculoskeletal: Positive for myalgias. /82   Pulse 84   Wt 203 lb (92.1 kg)   SpO2 97%   BMI 31.79 kg/m²     Physical Exam:    Physical Exam  Constitutional:       General: He is not in acute distress. Appearance: He is well-developed and normal weight. He is not toxic-appearing or diaphoretic. HENT:      Head: Normocephalic. Nose: Nose normal. No congestion or rhinorrhea. Mouth/Throat:      Mouth: Mucous membranes are moist.      Pharynx: Oropharynx is clear. No oropharyngeal exudate or posterior oropharyngeal erythema. Neck:      Musculoskeletal: Normal range of motion. No neck rigidity.    Cardiovascular:      Rate and Rhythm: Normal

## 2020-03-05 RX ORDER — POTASSIUM CHLORIDE 750 MG/1
TABLET, EXTENDED RELEASE ORAL
Qty: 60 TABLET | Refills: 0 | Status: SHIPPED | OUTPATIENT
Start: 2020-03-05 | End: 2020-04-06

## 2020-03-09 ENCOUNTER — TELEPHONE (OUTPATIENT)
Dept: FAMILY MEDICINE CLINIC | Age: 85
End: 2020-03-09

## 2020-03-09 ENCOUNTER — OFFICE VISIT (OUTPATIENT)
Dept: FAMILY MEDICINE CLINIC | Age: 85
End: 2020-03-09
Payer: MEDICARE

## 2020-03-09 VITALS
HEART RATE: 93 BPM | BODY MASS INDEX: 32.73 KG/M2 | RESPIRATION RATE: 16 BRPM | WEIGHT: 209 LBS | DIASTOLIC BLOOD PRESSURE: 70 MMHG | TEMPERATURE: 98.1 F | SYSTOLIC BLOOD PRESSURE: 126 MMHG | OXYGEN SATURATION: 97 %

## 2020-03-09 PROCEDURE — 99213 OFFICE O/P EST LOW 20 MIN: CPT | Performed by: NURSE PRACTITIONER

## 2020-03-09 RX ORDER — LEVOFLOXACIN 500 MG/1
500 TABLET, FILM COATED ORAL DAILY
Qty: 7 TABLET | Refills: 0 | Status: SHIPPED | OUTPATIENT
Start: 2020-03-09 | End: 2020-08-11 | Stop reason: ALTCHOICE

## 2020-03-09 ASSESSMENT — ENCOUNTER SYMPTOMS
SORE THROAT: 1
NAUSEA: 0
CHEST TIGHTNESS: 0
BACK PAIN: 0
DIARRHEA: 0
COUGH: 1

## 2020-03-09 NOTE — PROGRESS NOTES
Subjective:      Patient ID: Carmelita Neff is a 82 y.o. male. HPI     Friday started with sore throat. Progressed to cough, productive of yellow. Denies fevers. Taking mucinex. Wife drank some herbal tea today with relief. Left great toe is downward in orientation. Thinks was stepped on by horse years ago. Wife wondering to see if should see podiatrist. Review or EMR indicates LUCIA normal 2018,     Review of Systems   Constitutional: Positive for fatigue. Negative for chills, fever and unexpected weight change. HENT: Positive for sore throat. Respiratory: Positive for cough. Negative for chest tightness. Cardiovascular: Negative for chest pain and palpitations. Gastrointestinal: Negative for diarrhea and nausea. Musculoskeletal: Positive for gait problem. Negative for arthralgias and back pain. Neurological: Negative for dizziness and headaches. Psychiatric/Behavioral: Negative. Objective:   Physical Exam  Constitutional:       General: He is not in acute distress. Appearance: Normal appearance. He is well-developed. HENT:      Nose: No congestion or rhinorrhea. Mouth/Throat:      Pharynx: No oropharyngeal exudate or posterior oropharyngeal erythema. Cardiovascular:      Rate and Rhythm: Normal rate and regular rhythm. Heart sounds: Normal heart sounds. Pulmonary:      Effort: Pulmonary effort is normal.      Breath sounds: Wheezing present. Comments: Occ bronchial cough. Abdominal:      General: Bowel sounds are normal.      Palpations: Abdomen is soft. Musculoskeletal: Normal range of motion. General: Swelling present. Comments: 2+ edema bilateral lower legs feet. Left foot with rosalia in color. Unable to palpate pulses. Large edema top of foot. Skin:     General: Skin is warm and dry. Neurological:      Mental Status: He is alert and oriented to person, place, and time.           Current Outpatient Medications   Medication Sig Dispense

## 2020-03-09 NOTE — TELEPHONE ENCOUNTER
Patient asking if he can get a handicapped placard for two cars; for him and his spouse Ly Rodriguez.

## 2020-03-09 NOTE — TELEPHONE ENCOUNTER
Wife of pt states that pt has been coughing very badly, since Thursday morning.  She is very concerned about him being weak, did give cough medicine  Please advise

## 2020-03-10 ENCOUNTER — TELEPHONE (OUTPATIENT)
Dept: FAMILY MEDICINE CLINIC | Age: 85
End: 2020-03-10

## 2020-03-17 ENCOUNTER — TELEPHONE (OUTPATIENT)
Dept: FAMILY MEDICINE CLINIC | Age: 85
End: 2020-03-17

## 2020-04-06 RX ORDER — POTASSIUM CHLORIDE 750 MG/1
TABLET, EXTENDED RELEASE ORAL
Qty: 60 TABLET | Refills: 0 | Status: SHIPPED | OUTPATIENT
Start: 2020-04-06 | End: 2021-04-19

## 2020-04-06 RX ORDER — FUROSEMIDE 40 MG/1
TABLET ORAL
Qty: 60 TABLET | Refills: 0 | Status: SHIPPED | OUTPATIENT
Start: 2020-04-06 | End: 2021-04-19 | Stop reason: SDUPTHER

## 2020-04-06 NOTE — TELEPHONE ENCOUNTER
.  Last office visit 3/9/2020     Last written 3/5/20 #60 no refills    Next office visit scheduled 6/1/2020    Requested Prescriptions     Pending Prescriptions Disp Refills    potassium chloride (KLOR-CON M) 10 MEQ extended release tablet [Pharmacy Med Name: POTASSIUM CHLORIDE ER M-10 MEQ TAB] 60 tablet 0     Sig: TAKE ONE TABLET BY MOUTH TWICE A DAY

## 2020-06-01 ENCOUNTER — TELEPHONE (OUTPATIENT)
Dept: FAMILY MEDICINE CLINIC | Age: 85
End: 2020-06-01

## 2020-06-02 ENCOUNTER — TELEPHONE (OUTPATIENT)
Dept: FAMILY MEDICINE CLINIC | Age: 85
End: 2020-06-02

## 2020-06-02 ENCOUNTER — NURSE TRIAGE (OUTPATIENT)
Dept: OTHER | Facility: CLINIC | Age: 85
End: 2020-06-02

## 2020-06-15 RX ORDER — PRAVASTATIN SODIUM 20 MG
TABLET ORAL
Qty: 90 TABLET | Refills: 0 | Status: SHIPPED | OUTPATIENT
Start: 2020-06-15 | End: 2020-11-06 | Stop reason: SDUPTHER

## 2020-06-15 NOTE — TELEPHONE ENCOUNTER
.  Last office visit 3/9/2020     Last written 3/2/20 #90 no refills    Next office visit scheduled none    Requested Prescriptions     Pending Prescriptions Disp Refills    pravastatin (PRAVACHOL) 20 MG tablet [Pharmacy Med Name: PRAVASTATIN SODIUM 20 MG TAB] 90 tablet 0     Sig: TAKE ONE TABLET BY MOUTH DAILY

## 2020-07-29 ENCOUNTER — TELEPHONE (OUTPATIENT)
Dept: FAMILY MEDICINE CLINIC | Age: 85
End: 2020-07-29

## 2020-07-29 NOTE — TELEPHONE ENCOUNTER
----- Message from [facility] [place] [facility] sent at 7/29/2020  3:14 PM EDT -----  Subject: Message to Provider    QUESTIONS  Information for Provider? Requesting a Pre authorization for patient to   have a walker   ---------------------------------------------------------------------------  --------------  CALL BACK INFO  What is the best way for the office to contact you? OK to leave message on   voicemail  Preferred Call Back Phone Number? [phone]  ---------------------------------------------------------------------------  --------------  SCRIPT ANSWERS  Relationship to Patient? Other  Representative Name? Arsenio GOLDMAN   Is the Representative on the appropriate HIPAA document in Epic?  Yes

## 2020-07-29 NOTE — TELEPHONE ENCOUNTER
Pt needs face to face visit to document need for walker .  Please schedule appt w/ Dr Francisco Campbell

## 2020-08-11 ENCOUNTER — OFFICE VISIT (OUTPATIENT)
Dept: FAMILY MEDICINE CLINIC | Age: 85
End: 2020-08-11
Payer: MEDICARE

## 2020-08-11 VITALS
BODY MASS INDEX: 31.32 KG/M2 | HEART RATE: 83 BPM | SYSTOLIC BLOOD PRESSURE: 138 MMHG | OXYGEN SATURATION: 95 % | DIASTOLIC BLOOD PRESSURE: 82 MMHG | TEMPERATURE: 98.7 F | WEIGHT: 200 LBS

## 2020-08-11 PROCEDURE — 99213 OFFICE O/P EST LOW 20 MIN: CPT | Performed by: FAMILY MEDICINE

## 2020-08-12 ENCOUNTER — TELEPHONE (OUTPATIENT)
Dept: FAMILY MEDICINE CLINIC | Age: 85
End: 2020-08-12

## 2020-08-12 NOTE — TELEPHONE ENCOUNTER
Correct this was faxed yesterday and scanned in the chart to [facility]. This is where the wife had asked us to fax it.     [phone] (home)    for Mrs Mark Marx to call back

## 2020-08-12 NOTE — TELEPHONE ENCOUNTER
----- Message from Elan Rodriguez sent at 8/12/2020 10:52 AM EDT -----  Subject: Message to Provider    QUESTIONS  Information for Provider? pt wife called stating that she needs a script   for a walker  ---------------------------------------------------------------------------  --------------  [address]  What is the best way for the office to contact you? OK to leave message on   voicemail  Preferred Call Back Phone Number? [phone]  ---------------------------------------------------------------------------  --------------  SCRIPT ANSWERS  Relationship to Patient?  Self

## 2020-08-14 ENCOUNTER — TELEPHONE (OUTPATIENT)
Dept: FAMILY MEDICINE CLINIC | Age: 85
End: 2020-08-14

## 2020-08-14 DIAGNOSIS — R26.2 IMPAIRED AMBULATION: ICD-10-CM

## 2020-08-19 ENCOUNTER — TELEPHONE (OUTPATIENT)
Dept: FAMILY MEDICINE CLINIC | Age: 85
End: 2020-08-19

## 2020-08-19 NOTE — TELEPHONE ENCOUNTER
----- Message from [facility] sent at 8/19/2020  1:43 PM EDT -----  Subject: Message to Provider    QUESTIONS  Information for Provider? patients wife said she was sent a prescription   for walker but the wrong one was prescribed   she is requesting one with 4 wheels and a seat.   ---------------------------------------------------------------------------  --------------  [address]  What is the best way for the office to contact you? OK to leave message on   voicemail  Preferred Call Back Phone Number? [phone]  ---------------------------------------------------------------------------  --------------  SCRIPT ANSWERS  Relationship to Patient? Other  Representative Name? wife - Brandon Monet  Is the Representative on the appropriate HIPAA document in Epic?  Yes

## 2020-09-03 RX ORDER — METOPROLOL SUCCINATE 25 MG/1
TABLET, EXTENDED RELEASE ORAL
Qty: 90 TABLET | Refills: 2 | Status: SHIPPED | OUTPATIENT
Start: 2020-09-03 | End: 2020-11-06 | Stop reason: SDUPTHER

## 2020-09-03 NOTE — TELEPHONE ENCOUNTER
Not in the office today. Does someone know how to put that order in Epic? Otherwise, can we do a verbal order to their pharmacy or medical supply store? Use as directed.

## 2020-09-04 RX ORDER — CALCIUM CARBONATE 160(400)MG
TABLET,CHEWABLE ORAL
Qty: 1 EACH | Refills: 0 | Status: SHIPPED | OUTPATIENT
Start: 2020-09-04 | End: 2020-10-26 | Stop reason: SDUPTHER

## 2020-10-06 ENCOUNTER — NURSE ONLY (OUTPATIENT)
Dept: FAMILY MEDICINE CLINIC | Age: 85
End: 2020-10-06
Payer: MEDICARE

## 2020-10-06 VITALS — TEMPERATURE: 97.7 F

## 2020-10-06 PROCEDURE — G0008 ADMIN INFLUENZA VIRUS VAC: HCPCS | Performed by: FAMILY MEDICINE

## 2020-10-06 PROCEDURE — 90694 VACC AIIV4 NO PRSRV 0.5ML IM: CPT | Performed by: FAMILY MEDICINE

## 2020-10-06 NOTE — PROGRESS NOTES
Vaccine Information Sheet, \"Influenza - Inactivated\"  given to Mili Fraser, or parent/legal guardian of  Mili Fraser and verbalized understanding. Patient responses:    Have you ever had a reaction to a flu vaccine? No  Are you able to eat eggs without adverse effects? Yes  Do you have any current illness? No  Have you ever had Guillian Schaumburg Syndrome? No    Flu vaccine given per order. Please see immunization tab.

## 2020-10-26 ENCOUNTER — TELEPHONE (OUTPATIENT)
Dept: FAMILY MEDICINE CLINIC | Age: 85
End: 2020-10-26

## 2020-10-26 NOTE — TELEPHONE ENCOUNTER
----- Message from Juliano Perry sent at 10/26/2020 10:36 AM EDT -----  Subject: Message to Provider    QUESTIONS  Information for Provider? PT's wife   Myrtle Hopkins   states the walker for PT was ordered and sent to the wrong address. She   wants to have it resent to the correct address at [facility] ([address]) or she can pick it up at the office. ---------------------------------------------------------------------------  --------------  Anne-Marie VALENCIA  What is the best way for the office to contact you? OK to leave message on   voicemail  Preferred Call Back Phone Number? [phone]  ---------------------------------------------------------------------------  --------------  SCRIPT ANSWERS  Relationship to Patient? Other  Representative Name? Wife   Myrtle Hopkins  Is the Representative on the appropriate HIPAA document in Epic?  Yes

## 2020-10-27 RX ORDER — CALCIUM CARBONATE 160(400)MG
TABLET,CHEWABLE ORAL
Qty: 1 EACH | Refills: 0 | Status: SHIPPED | OUTPATIENT
Start: 2020-10-27

## 2020-10-28 NOTE — TELEPHONE ENCOUNTER
Left message to pt to let him know that we have the Rx for the rolling walker and if hey would like to pick it up or have it mailed to him. Advised pt to call back to inform us.

## 2020-11-06 ENCOUNTER — OFFICE VISIT (OUTPATIENT)
Dept: FAMILY MEDICINE CLINIC | Age: 85
End: 2020-11-06
Payer: MEDICARE

## 2020-11-06 VITALS
SYSTOLIC BLOOD PRESSURE: 138 MMHG | OXYGEN SATURATION: 98 % | HEART RATE: 102 BPM | DIASTOLIC BLOOD PRESSURE: 60 MMHG | BODY MASS INDEX: 30.31 KG/M2 | WEIGHT: 200 LBS | TEMPERATURE: 98.1 F | HEIGHT: 68 IN

## 2020-11-06 LAB
A/G RATIO: 2 (ref 1.1–2.2)
ALBUMIN SERPL-MCNC: 4.5 G/DL (ref 3.4–5)
ALP BLD-CCNC: 90 U/L (ref 40–129)
ALT SERPL-CCNC: 22 U/L (ref 10–40)
ANION GAP SERPL CALCULATED.3IONS-SCNC: 10 MMOL/L (ref 3–16)
AST SERPL-CCNC: 22 U/L (ref 15–37)
BASOPHILS ABSOLUTE: 0.1 K/UL (ref 0–0.2)
BASOPHILS RELATIVE PERCENT: 0.7 %
BILIRUB SERPL-MCNC: 0.7 MG/DL (ref 0–1)
BUN BLDV-MCNC: 16 MG/DL (ref 7–20)
CALCIUM SERPL-MCNC: 9.2 MG/DL (ref 8.3–10.6)
CHLORIDE BLD-SCNC: 102 MMOL/L (ref 99–110)
CHOLESTEROL, TOTAL: 194 MG/DL (ref 0–199)
CO2: 28 MMOL/L (ref 21–32)
CREAT SERPL-MCNC: 0.9 MG/DL (ref 0.8–1.3)
EOSINOPHILS ABSOLUTE: 0.2 K/UL (ref 0–0.6)
EOSINOPHILS RELATIVE PERCENT: 2.2 %
GFR AFRICAN AMERICAN: >60
GFR NON-AFRICAN AMERICAN: >60
GLOBULIN: 2.3 G/DL
GLUCOSE BLD-MCNC: 91 MG/DL (ref 70–99)
HCT VFR BLD CALC: 50.2 % (ref 40.5–52.5)
HDLC SERPL-MCNC: 50 MG/DL (ref 40–60)
HEMOGLOBIN: 17.3 G/DL (ref 13.5–17.5)
LDL CHOLESTEROL CALCULATED: 120 MG/DL
LYMPHOCYTES ABSOLUTE: 1.5 K/UL (ref 1–5.1)
LYMPHOCYTES RELATIVE PERCENT: 16.8 %
MCH RBC QN AUTO: 29.2 PG (ref 26–34)
MCHC RBC AUTO-ENTMCNC: 34.4 G/DL (ref 31–36)
MCV RBC AUTO: 84.9 FL (ref 80–100)
MONOCYTES ABSOLUTE: 0.9 K/UL (ref 0–1.3)
MONOCYTES RELATIVE PERCENT: 10.3 %
NEUTROPHILS ABSOLUTE: 6.3 K/UL (ref 1.7–7.7)
NEUTROPHILS RELATIVE PERCENT: 70 %
PDW BLD-RTO: 14.6 % (ref 12.4–15.4)
PLATELET # BLD: 248 K/UL (ref 135–450)
PMV BLD AUTO: 8.4 FL (ref 5–10.5)
POTASSIUM SERPL-SCNC: 4.6 MMOL/L (ref 3.5–5.1)
RBC # BLD: 5.91 M/UL (ref 4.2–5.9)
SODIUM BLD-SCNC: 140 MMOL/L (ref 136–145)
TOTAL PROTEIN: 6.8 G/DL (ref 6.4–8.2)
TRIGL SERPL-MCNC: 121 MG/DL (ref 0–150)
VLDLC SERPL CALC-MCNC: 24 MG/DL
WBC # BLD: 9 K/UL (ref 4–11)

## 2020-11-06 PROCEDURE — 99214 OFFICE O/P EST MOD 30 MIN: CPT | Performed by: FAMILY MEDICINE

## 2020-11-06 RX ORDER — PRAVASTATIN SODIUM 20 MG
20 TABLET ORAL EVERY OTHER DAY
Qty: 90 TABLET | Refills: 1 | Status: SHIPPED | OUTPATIENT
Start: 2020-11-06 | End: 2021-05-14 | Stop reason: SDUPTHER

## 2020-11-06 RX ORDER — METOPROLOL SUCCINATE 25 MG/1
TABLET, EXTENDED RELEASE ORAL
Qty: 90 TABLET | Refills: 2 | Status: SHIPPED | OUTPATIENT
Start: 2020-11-06 | End: 2021-04-12

## 2020-11-06 ASSESSMENT — LIFESTYLE VARIABLES
HOW OFTEN DO YOU HAVE A DRINK CONTAINING ALCOHOL: 0
HOW OFTEN DO YOU HAVE A DRINK CONTAINING ALCOHOL: 0

## 2020-11-06 ASSESSMENT — PATIENT HEALTH QUESTIONNAIRE - PHQ9
SUM OF ALL RESPONSES TO PHQ QUESTIONS 1-9: 0
2. FEELING DOWN, DEPRESSED OR HOPELESS: 0
SUM OF ALL RESPONSES TO PHQ9 QUESTIONS 1 & 2: 0
2. FEELING DOWN, DEPRESSED OR HOPELESS: 0
SUM OF ALL RESPONSES TO PHQ QUESTIONS 1-9: 0
SUM OF ALL RESPONSES TO PHQ9 QUESTIONS 1 & 2: 0
1. LITTLE INTEREST OR PLEASURE IN DOING THINGS: 0
1. LITTLE INTEREST OR PLEASURE IN DOING THINGS: 0
SUM OF ALL RESPONSES TO PHQ QUESTIONS 1-9: 0
SUM OF ALL RESPONSES TO PHQ QUESTIONS 1-9: 0

## 2020-11-06 NOTE — PROGRESS NOTES
Dagmar Driscoll is a 82 y.o. male    Chief Complaint   Patient presents with    Hypertension    Hyperlipidemia    Leg Swelling       HPI:    Hypertension   This is a chronic problem. The current episode started more than 1 year ago. The problem is unchanged. The problem is controlled. Past treatments include diuretics and beta blockers. The current treatment provides significant improvement. There are no compliance problems. There is no history of CAD/MI or CVA. Hyperlipidemia   This is a chronic problem. The current episode started more than 1 year ago. The problem is uncontrolled. Recent lipid tests were reviewed and are low. He has no history of diabetes. Associated symptoms include myalgias. Current antihyperlipidemic treatment includes statins. The current treatment provides moderate improvement of lipids. There are no compliance problems. Risk factors for coronary artery disease include male sex. Lower extremity edema. This is a chronic condition. The patient takes Lasix twice a day. He takes potassium along with it. Recent potassium was normal.  No heart failure. His echo did not show any heart failure in 2017. No history of DVT. Trouble with ambulating and lower extremity weakness. Desires wheeled walker. ROS:    Review of Systems   Cardiovascular: Positive for leg swelling. Musculoskeletal: Positive for myalgias. /60 (Site: Left Upper Arm, Position: Sitting, Cuff Size: Medium Adult)   Pulse 102   Temp 98.1 °F (36.7 °C) (Temporal)   Ht 5' 8.4\" (1.737 m)   Wt 200 lb (90.7 kg)   SpO2 98%   BMI 30.06 kg/m²     Physical Exam:    Physical Exam  Constitutional:       General: He is not in acute distress. Appearance: He is well-developed and normal weight. He is not toxic-appearing or diaphoretic. HENT:      Head: Normocephalic. Nose: Nose normal. No congestion or rhinorrhea.       Mouth/Throat:      Mouth: Mucous membranes are moist.      Pharynx: Oropharynx is clear. No oropharyngeal exudate or posterior oropharyngeal erythema. Neck:      Musculoskeletal: Normal range of motion. No neck rigidity. Cardiovascular:      Rate and Rhythm: Normal rate and regular rhythm. Pulses: Normal pulses. Heart sounds: No murmur. Pulmonary:      Effort: Pulmonary effort is normal. No respiratory distress. Breath sounds: Normal breath sounds. No wheezing. Musculoskeletal:      Right lower leg: Edema present. Left lower leg: Edema present. Comments: No calf TTP   Lymphadenopathy:      Cervical: No cervical adenopathy. Skin:     General: Skin is warm and dry. Neurological:      Mental Status: He is alert. Psychiatric:         Mood and Affect: Mood normal.         Behavior: Behavior normal.         Thought Content: Thought content normal.         Current Outpatient Medications   Medication Sig Dispense Refill    Misc. Devices KIT Walker with 4 wheels and a seat. Use as directed. 1 kit 0    pravastatin (PRAVACHOL) 20 MG tablet Take 1 tablet by mouth every other day 90 tablet 1    metoprolol succinate (TOPROL XL) 25 MG extended release tablet TAKE ONE TABLET BY MOUTH DAILY 90 tablet 2    Misc. Devices (ROLLATOR ULTRA-LIGHT) MISC Walker with 4 wheels and a seat. Use as directed. 1 each 0    Misc. Devices (WALKER) MISC 1 each by Does not apply route daily Wheeled walker. 1 each 0    furosemide (LASIX) 40 MG tablet TAKE ONE TABLET BY MOUTH TWICE A DAY (Patient taking differently: TAKE ONE TABLET BY MOUTH TWICE A DAY PRN) 60 tablet 0    potassium chloride (KLOR-CON M) 10 MEQ extended release tablet TAKE ONE TABLET BY MOUTH TWICE A DAY 60 tablet 0    Calcium Carbonate-Vit D-Min (CALCIUM 1200 PO) Take by mouth      Multiple Vitamins-Minerals (THERAPEUTIC MULTIVITAMIN-MINERALS) tablet Take 1 tablet by mouth daily       No current facility-administered medications for this visit. Assessment:    1.  Routine general medical examination at a [facility] facility    2. Essential hypertension    3. Mixed hyperlipidemia    4. Bilateral edema of lower extremity    5. Impaired ambulation        Plan:    1. Essential hypertension  Stable. I encouraged him to take the metoprolol at night to avoid some dizziness he has when he rises from the seated position.  - CBC Auto Differential  - Comprehensive Metabolic Panel  - metoprolol succinate (TOPROL XL) 25 MG extended release tablet; TAKE ONE TABLET BY MOUTH DAILY  Dispense: 90 tablet; Refill: 0    2. Mixed hyperlipidemia  Recheck today. Continue pravastatin. If causing myalgias, he could consider taking it every other night.  - CBC Auto Differential  - Comprehensive Metabolic Panel  - Lipid Panel    3. Bilateral edema of lower extremity  Take the Lasix as needed. Discussed salt avoidance and leg elevation. 4. Impaired ambulation  Try to get walker done. Return in about 6 months (around 5/6/2021) for Hypertension, Hyperlipidemia. Mili Fraser was evaluated today and a DME order was entered for a wheeled walker with seat because he requires this to successfully complete daily living tasks of ambulating. A wheeled walker with seat is necessary due to the patient's unsteady gait, upper body weakness, inability to  and ambulation device, ambulating only short distances by pushing a walker, and the need to sit for a short time before resuming ambulation. These tasks cannot be completed with a lesser ambulation device such as a cane, crutch, or standard walker. The need for this equipment was discussed with the patient and he understands and is in agreement.

## 2020-11-09 ENCOUNTER — TELEPHONE (OUTPATIENT)
Dept: FAMILY MEDICINE CLINIC | Age: 85
End: 2020-11-09

## 2020-11-09 NOTE — TELEPHONE ENCOUNTER
Pt's wife calls and reports that she saw a physician on television - (Sunday morning at [address] [place] [address]  on channel 12) that talks about vascular issues for swollen ankles. Advised wife re other sxs related to vascular issues. She reports he does not have claudication sxs or skin discolorations. We discussed PCP recommendations of elevation, low salt and furosemide.     Wife would like to know if pt needs referral to vascular specialist.

## 2020-11-09 NOTE — TELEPHONE ENCOUNTER
Not at this time. A vascular surgeon would unlikely do any surgery in his age group and recommend conservative care with leg elevation, salt restriction and compression stockings and prn Lasix use.

## 2020-12-21 ENCOUNTER — TELEPHONE (OUTPATIENT)
Dept: FAMILY MEDICINE CLINIC | Age: 85
End: 2020-12-21

## 2020-12-21 NOTE — TELEPHONE ENCOUNTER
Please put him in my same day at 10 tomorrow or he can see another provider. Would be better to have appt and check urine then.

## 2020-12-22 ENCOUNTER — OFFICE VISIT (OUTPATIENT)
Dept: FAMILY MEDICINE CLINIC | Age: 85
End: 2020-12-22
Payer: MEDICARE

## 2020-12-22 VITALS
SYSTOLIC BLOOD PRESSURE: 138 MMHG | TEMPERATURE: 98.4 F | OXYGEN SATURATION: 98 % | HEIGHT: 68 IN | RESPIRATION RATE: 16 BRPM | BODY MASS INDEX: 30.4 KG/M2 | WEIGHT: 200.6 LBS | DIASTOLIC BLOOD PRESSURE: 80 MMHG | HEART RATE: 102 BPM

## 2020-12-22 LAB
BILIRUBIN, POC: NEGATIVE
BLOOD URINE, POC: NEGATIVE
CLARITY, POC: CLEAR
COLOR, POC: YELLOW
GLUCOSE URINE, POC: NEGATIVE
KETONES, POC: NEGATIVE
LEUKOCYTE EST, POC: NEGATIVE
NITRITE, POC: NEGATIVE
PH, POC: 5
PROTEIN, POC: NORMAL
SPECIFIC GRAVITY, POC: 1.02
UROBILINOGEN, POC: 0.2

## 2020-12-22 PROCEDURE — 99213 OFFICE O/P EST LOW 20 MIN: CPT | Performed by: FAMILY MEDICINE

## 2020-12-22 PROCEDURE — 81002 URINALYSIS NONAUTO W/O SCOPE: CPT | Performed by: FAMILY MEDICINE

## 2020-12-22 ASSESSMENT — ENCOUNTER SYMPTOMS: BACK PAIN: 0

## 2020-12-22 NOTE — PROGRESS NOTES
Deedee Valdivia is a 82 y.o. male    Chief Complaint   Patient presents with    Urinary Tract Infection     Poss UTI -Confussion x 3 days . Started on sat has happened since sat        HPI:    HPI    Confusion. This is a new problem. Has been going on for 3 days. It is better today. Wife was concerned there was a UTI. No dysuria. No blood in the urine. Back pain has now resolved. No headache or blurry vision. No numbness or tingling on one side of the body. ROS:    Review of Systems   Constitutional: Negative for chills and fever. Musculoskeletal: Negative for back pain. Neurological: Negative for weakness, numbness and headaches. /80   Pulse 102   Temp 98.4 °F (36.9 °C)   Resp 16   Ht 5' 8.4\" (1.737 m)   Wt 200 lb 9.6 oz (91 kg)   SpO2 98%   BMI 30.15 kg/m²     Physical Exam:    Physical Exam  Constitutional:       General: He is not in acute distress. Appearance: Normal appearance. He is not ill-appearing or toxic-appearing. HENT:      Head: Normocephalic. Skin:     General: Skin is warm and dry. Neurological:      Mental Status: He is alert and oriented to person, place, and time. Coordination: Coordination normal.   Psychiatric:         Mood and Affect: Mood normal.         Behavior: Behavior normal.         Thought Content: Thought content normal.         Current Outpatient Medications   Medication Sig Dispense Refill    Misc. Devices KIT Walker with 4 wheels and a seat. Use as directed. 1 kit 0    pravastatin (PRAVACHOL) 20 MG tablet Take 1 tablet by mouth every other day 90 tablet 1    metoprolol succinate (TOPROL XL) 25 MG extended release tablet TAKE ONE TABLET BY MOUTH DAILY 90 tablet 2    Misc. Devices (ROLLATOR ULTRA-LIGHT) MISC Walker with 4 wheels and a seat. Use as directed. 1 each 0    Misc. Devices (WALKER) MISC 1 each by Does not apply route daily Wheeled walker.  1 each 0

## 2020-12-23 LAB — URINE CULTURE, ROUTINE: NORMAL

## 2021-01-26 ENCOUNTER — TELEPHONE (OUTPATIENT)
Dept: FAMILY MEDICINE CLINIC | Age: 86
End: 2021-01-26

## 2021-02-05 ENCOUNTER — TELEPHONE (OUTPATIENT)
Dept: FAMILY MEDICINE CLINIC | Age: 86
End: 2021-02-05

## 2021-02-05 DIAGNOSIS — R26.2 IMPAIRED AMBULATION: Primary | ICD-10-CM

## 2021-02-22 ENCOUNTER — TELEPHONE (OUTPATIENT)
Dept: FAMILY MEDICINE CLINIC | Age: 86
End: 2021-02-22

## 2021-02-22 DIAGNOSIS — M79.673 PAIN OF FOOT, UNSPECIFIED LATERALITY: Primary | ICD-10-CM

## 2021-02-22 NOTE — TELEPHONE ENCOUNTER
Pt has been having pain in his heels and pt is wondering if he can have a referral to a podiatrist. Please Advise

## 2021-04-10 DIAGNOSIS — I10 ESSENTIAL HYPERTENSION: ICD-10-CM

## 2021-04-12 RX ORDER — METOPROLOL SUCCINATE 25 MG/1
TABLET, EXTENDED RELEASE ORAL
Qty: 90 TABLET | Refills: 1 | Status: SHIPPED | OUTPATIENT
Start: 2021-04-12 | End: 2021-05-14 | Stop reason: SDUPTHER

## 2021-04-12 NOTE — TELEPHONE ENCOUNTER
Refill Request     Last Seen: 12/22/2020    Last Written: 11/6/2020    Next Appointment:   Future Appointments   Date Time Provider Marylu Lujan   5/14/2021  1:00 PM DO BEE [facility]       Future appointment scheduled      Requested Prescriptions     Pending Prescriptions Disp Refills    metoprolol succinate (TOPROL XL) 25 MG extended release tablet [Pharmacy Med Name: METOPROLOL SUCC ER 25 MG TAB] 90 tablet 1     Sig: TAKE ONE TABLET BY MOUTH DAILY

## 2021-04-13 ENCOUNTER — TELEPHONE (OUTPATIENT)
Dept: FAMILY MEDICINE CLINIC | Age: 86
End: 2021-04-13

## 2021-04-13 NOTE — TELEPHONE ENCOUNTER
Pt's wife, Rima Trammell (on hipaa) called stating pt has bilateral ankle swelling, but left is worse x ongoing. States he takes Furosemide for this as needed, but he has never seen a cardiologist, wants to know if Dr. Elizabeth Fraser feels there is a need for him to see one? Please advise.

## 2021-04-13 NOTE — TELEPHONE ENCOUNTER
It does sound reasonable to see cardiology if symptoms are worsening. Does he have a cardiologist that he knows or does he want a referral?  Don't forget conservative care with leg elevation, minimal salt intake and compression stockings.

## 2021-04-14 NOTE — TELEPHONE ENCOUNTER
Spoke with [place] (on HIP[employer]) relayed Dr. Jamie Suarez message. Per Georgia, pt wore Compression stockings yesterday & this morning, showing major improvement. Dayton Rather wants to know if a cardiologist is still needed? Please advise.  Thank you

## 2021-04-16 NOTE — TELEPHONE ENCOUNTER
Mariann states that the swelling has gone down and pt is walking a bit more. He's also wearing compression stockings. She wants to know if Dr. García Reasons wants to see pt to talk about what next steps should be taken.

## 2021-04-16 NOTE — TELEPHONE ENCOUNTER
Pt has two more visit with the PT, therapist stated to Pt to walk more to release some of that fluid built up, [address] [facility] states fluid is not completely gone from pt legs. Georgia stopped giving the water pill to pt, because she thought that was the cause of fluid retention. Georgia states starting today she will give pt his water pill again, and see how he does. They do not have a cardiologist to go to, could a referral put in for one, in the case of swelling builds back up in pt legs.

## 2021-04-19 DIAGNOSIS — R60.0 BILATERAL EDEMA OF LOWER EXTREMITY: ICD-10-CM

## 2021-04-19 RX ORDER — FUROSEMIDE 40 MG/1
TABLET ORAL
Qty: 60 TABLET | Refills: 1 | Status: SHIPPED | OUTPATIENT
Start: 2021-04-19 | End: 2021-10-06

## 2021-04-19 RX ORDER — POTASSIUM CHLORIDE 750 MG/1
TABLET, EXTENDED RELEASE ORAL
Qty: 60 TABLET | Refills: 0 | Status: SHIPPED | OUTPATIENT
Start: 2021-04-19 | End: 2021-05-14 | Stop reason: SDUPTHER

## 2021-04-19 NOTE — TELEPHONE ENCOUNTER
Refill Request     Last Seen: 12/22/2020    Last Written: 4/6/2020    Next Appointment:   Future Appointments   Date Time Provider Marylu Lujan   5/14/2021  1:00 PM DO [facility]EE [facility]       Future appointment scheduled      Requested Prescriptions     Pending Prescriptions Disp Refills    furosemide (LASIX) 40 MG tablet 60 tablet 0     Sig: TAKE ONE TABLET BY MOUTH TWICE A DAY PRN

## 2021-04-19 NOTE — TELEPHONE ENCOUNTER
Pt still has swollen legs, wife Georgia has given him his medication, which still still slightly swells his legs. Georgia has expressed the Furosemide they need a refill, I have pend this medication.

## 2021-05-14 ENCOUNTER — OFFICE VISIT (OUTPATIENT)
Dept: FAMILY MEDICINE CLINIC | Age: 86
End: 2021-05-14
Payer: MEDICARE

## 2021-05-14 VITALS
HEART RATE: 82 BPM | WEIGHT: 200 LBS | DIASTOLIC BLOOD PRESSURE: 72 MMHG | OXYGEN SATURATION: 97 % | BODY MASS INDEX: 30.06 KG/M2 | SYSTOLIC BLOOD PRESSURE: 120 MMHG

## 2021-05-14 DIAGNOSIS — I10 ESSENTIAL HYPERTENSION: ICD-10-CM

## 2021-05-14 DIAGNOSIS — R60.0 BILATERAL EDEMA OF LOWER EXTREMITY: ICD-10-CM

## 2021-05-14 DIAGNOSIS — Z00.00 ROUTINE GENERAL MEDICAL EXAMINATION AT A HEALTH CARE FACILITY: Primary | ICD-10-CM

## 2021-05-14 DIAGNOSIS — E78.2 MIXED HYPERLIPIDEMIA: ICD-10-CM

## 2021-05-14 LAB
BASOPHILS ABSOLUTE: 0.1 K/UL (ref 0–0.2)
BASOPHILS RELATIVE PERCENT: 0.8 %
EOSINOPHILS ABSOLUTE: 0.2 K/UL (ref 0–0.6)
EOSINOPHILS RELATIVE PERCENT: 2.2 %
HCT VFR BLD CALC: 49.6 % (ref 40.5–52.5)
HEMOGLOBIN: 16.6 G/DL (ref 13.5–17.5)
LYMPHOCYTES ABSOLUTE: 1.8 K/UL (ref 1–5.1)
LYMPHOCYTES RELATIVE PERCENT: 20.7 %
MCH RBC QN AUTO: 29.1 PG (ref 26–34)
MCHC RBC AUTO-ENTMCNC: 33.6 G/DL (ref 31–36)
MCV RBC AUTO: 86.6 FL (ref 80–100)
MONOCYTES ABSOLUTE: 0.8 K/UL (ref 0–1.3)
MONOCYTES RELATIVE PERCENT: 9.7 %
NEUTROPHILS ABSOLUTE: 5.7 K/UL (ref 1.7–7.7)
NEUTROPHILS RELATIVE PERCENT: 66.6 %
PDW BLD-RTO: 14.1 % (ref 12.4–15.4)
PLATELET # BLD: 250 K/UL (ref 135–450)
PMV BLD AUTO: 8.6 FL (ref 5–10.5)
RBC # BLD: 5.73 M/UL (ref 4.2–5.9)
WBC # BLD: 8.6 K/UL (ref 4–11)

## 2021-05-14 PROCEDURE — G0438 PPPS, INITIAL VISIT: HCPCS | Performed by: FAMILY MEDICINE

## 2021-05-14 PROCEDURE — 99214 OFFICE O/P EST MOD 30 MIN: CPT | Performed by: FAMILY MEDICINE

## 2021-05-14 RX ORDER — METOPROLOL SUCCINATE 25 MG/1
TABLET, EXTENDED RELEASE ORAL
Qty: 90 TABLET | Refills: 1 | Status: SHIPPED | OUTPATIENT
Start: 2021-05-14 | End: 2022-02-25 | Stop reason: SDUPTHER

## 2021-05-14 RX ORDER — PRAVASTATIN SODIUM 20 MG
20 TABLET ORAL EVERY OTHER DAY
Qty: 90 TABLET | Refills: 1 | Status: SHIPPED | OUTPATIENT
Start: 2021-05-14 | End: 2022-02-25 | Stop reason: SDUPTHER

## 2021-05-14 RX ORDER — POTASSIUM CHLORIDE 750 MG/1
10 TABLET, EXTENDED RELEASE ORAL DAILY
Qty: 90 TABLET | Refills: 0 | Status: SHIPPED | OUTPATIENT
Start: 2021-05-14 | End: 2021-05-17

## 2021-05-14 SDOH — ECONOMIC STABILITY: FOOD INSECURITY: WITHIN THE PAST 12 MONTHS, THE FOOD YOU BOUGHT JUST DIDN'T LAST AND YOU DIDN'T HAVE MONEY TO GET MORE.: NEVER TRUE

## 2021-05-14 SDOH — ECONOMIC STABILITY: TRANSPORTATION INSECURITY
IN THE PAST 12 MONTHS, HAS LACK OF TRANSPORTATION KEPT YOU FROM MEETINGS, WORK, OR FROM GETTING THINGS NEEDED FOR DAILY LIVING?: NO

## 2021-05-14 SDOH — ECONOMIC STABILITY: FOOD INSECURITY: WITHIN THE PAST 12 MONTHS, YOU WORRIED THAT YOUR FOOD WOULD RUN OUT BEFORE YOU GOT MONEY TO BUY MORE.: NEVER TRUE

## 2021-05-14 ASSESSMENT — LIFESTYLE VARIABLES
HOW OFTEN DO YOU HAVE A DRINK CONTAINING ALCOHOL: NEVER
HOW OFTEN DO YOU HAVE A DRINK CONTAINING ALCOHOL: 0
AUDIT-C TOTAL SCORE: INCOMPLETE
AUDIT TOTAL SCORE: INCOMPLETE

## 2021-05-14 ASSESSMENT — PATIENT HEALTH QUESTIONNAIRE - PHQ9
2. FEELING DOWN, DEPRESSED OR HOPELESS: 0
SUM OF ALL RESPONSES TO PHQ QUESTIONS 1-9: 0

## 2021-05-14 NOTE — PROGRESS NOTES
Medicare Annual Wellness Visit  Name: Guerry Goltz Date: 2021   MRN: <[id number]> Sex: Male   Age: 82 y.o. Ethnicity: Non-/Non    : 1935 Race: Morena Webb is here for Medicare AWV, Hypertension, and Nail Problem (SWELLING FEET)    Screenings for behavioral, psychosocial and functional/safety risks, and cognitive dysfunction are all negative except as indicated below. These results, as well as other patient data from the [address] form, are documented in Flowsheets linked to this Encounter. Allergies   Allergen Reactions    Contrast [Iodides] Other (See Comments)     Pt went into SVT.  Zocor [Simvastatin]      GI       Prior to Visit Medications    Medication Sig Taking? Authorizing Provider   furosemide (LASIX) 40 MG tablet TAKE ONE TABLET BY MOUTH TWICE A DAY PRN  Yesenia Fatima DO   potassium chloride (KLOR-CON M) 10 MEQ extended release tablet TAKE ONE TABLET BY MOUTH TWICE A DAY  Yesenia Fatima DO   metoprolol succinate (TOPROL XL) 25 MG extended release tablet TAKE ONE TABLET BY MOUTH DAILY  Yesenia Fatima DO   Handicap Placard MISC by Does not apply route Length: 5 years  [address], DO   Misc. Devices KIT Walker with 4 wheels and a seat. Use as directed. Yesenia Fatima DO   pravastatin (PRAVACHOL) 20 MG tablet Take 1 tablet by mouth every other day  137 Famil[address], DO   Misc. Devices (ROLLATOR ULTRA-LIGHT) MISC Walker with 4 wheels and a seat. Use as directed. 137 Familink, DO   Misc. Devices (WALKER) MISC 1 each by Does not apply route daily Wheeled walker.   Yesenia Fatima DO   Calcium Carbonate-Vit D-Min (CALCIUM 1200 PO) Take by mouth  Historical Provider, MD   Multiple Vitamins-Minerals (THERAPEUTIC MULTIVITAMIN-MINERALS) tablet Take 1 tablet by mouth daily  Historical Provider, MD       Past Medical History:   Diagnosis Date    Arthritis     Hyperlipidemia     Influenza A 2017    Right-sided low back pain with ACP-Advance Directive ACP-Power of     Not on File Not on File Not on File Not on File      General Health Risk Interventions:  · n/a    Health Habits/Nutrition:  Health Habits/Nutrition  Do you exercise for at least 20 minutes 2-3 times per week?: (!) No  Have you lost any weight without trying in the past 3 months?: No  Do you eat only one meal per day?: (!) Yes  Have you seen the dentist within the past year?: Yes     Health Habits/Nutrition Interventions:  · N/A he eats 3 meals per day    Hearing/Vision:  No exam data present  Hearing/Vision  Do you or your family notice any trouble with your hearing that hasn't been managed with hearing aids?: (!) Yes  Do you have difficulty driving, watching TV, or doing any of your daily activities because of your eyesight?: No  Have you had an eye exam within the past year?: (!) No  Hearing/Vision Interventions:  · Hearing concerns:  patient declines any further evaluation/treatment for hearing issues  · Vision concerns:  patient encouraged to make appointment with his/her eye specialist    Safety:  Safety  Do you have working smoke detectors?: Yes  Have all throw rugs been removed or fastened?: (!) No  Do you have non-slip mats or surfaces in all bathtubs/showers?: Yes  Do all of your stairways have a railing or banister?: (!) No  Are your doorways, halls and stairs free of clutter?: Yes  Do you always fasten your seatbelt when you are in a car?: Yes  Safety Interventions:  · Home safety tips provided    ADL:  ADLs  In the past 7 days, did you need help from others to perform any of the following everyday activities? Eating, dressing, grooming, bathing, toileting, or walking/balance?: (!) Walking/Balance  In the past 7 days, did you need help from others to take care of any of the following?  Laundry, housekeeping, banking/finances, shopping, telephone use, food preparation, transportation, or taking medications?: None  ADL Interventions:  · going to PT    Personalized Preventive Plan   Current Health Maintenance Status  Immunization History   Administered Date(s) Administered    Influenza, High Dose (Fluzone 65 yrs and older) 10/01/2018    Influenza, Quadv, IM, PF (6 mo and older Fluzone, Flulaval, Fluarix, and 3 yrs and older Afluria) 12/29/2017    Influenza, Quadv, adjuvanted, 65 yrs +, IM, PF (Fluad) 10/06/2020    Influenza, Triv, inactivated, subunit, adjuvanted, IM (Fluad 65 yrs and older) 10/10/2019    Pneumococcal Conjugate 13-valent ([id number]) 08/16/2017    Pneumococcal Polysaccharide ([id number]) 09/28/2012    Td, unspecified formulation 11/23/2010    Tdap (Boostrix, Adacel) 08/16/2017        Health Maintenance   Topic Date Due    Shingles Vaccine (1 of 2) Never done   [employer] Visit (AWV)  Never done    Lipid screen  11/06/2021    Potassium monitoring  11/06/2021    Creatinine monitoring  11/06/2021    DTaP/Tdap/Td vaccine (2 - Td) 08/16/2027    Flu vaccine  Completed    Pneumococcal 65+ years Vaccine  Completed    COVID-19 Vaccine  Completed    Hepatitis A vaccine  Aged Out    Hepatitis B vaccine  Aged Out    Hib vaccine  Aged Out    Meningococcal (ACWY) vaccine  Aged Out     Recommendations for [employer] Due: see orders and patient instructions/AVS.  . Recommended screening schedule for the next 5-10 years is provided to the patient in written form: see Patient Instructions/AVS.    There are no diagnoses linked to this encounter.

## 2021-05-14 NOTE — PROGRESS NOTES
Starla Clark is a 82 y.o. male    Chief Complaint   Patient presents with    Medicare AWV    Hypertension    Hyperlipidemia    Leg Swelling       HPI:    Hypertension  This is a chronic problem. The current episode started more than 1 year ago. The problem is unchanged. The problem is controlled. Past treatments include diuretics and beta blockers. The current treatment provides significant improvement. There are no compliance problems. There is no history of CAD/MI or CVA. Hyperlipidemia  This is a chronic problem. The current episode started more than 1 year ago. The problem is uncontrolled. Recent lipid tests were reviewed and are low. He has no history of diabetes. Pertinent negatives include no myalgias. Current antihyperlipidemic treatment includes statins. The current treatment provides moderate improvement of lipids. There are no compliance problems. Risk factors for coronary artery disease include male sex. Lower extremity edema. This is a chronic condition. The patient takes Lasix twice a day which does help but causes frequent urination. He takes potassium along with it. Recent potassium was normal.  No heart failure. His echo did not show any heart failure in 2017. No history of DVT. ROS:    Review of Systems   Cardiovascular: Positive for leg swelling. Musculoskeletal: Negative for myalgias. /72 (Site: Right Upper Arm, Position: Sitting, Cuff Size: Large Adult)   Pulse 82   Wt 200 lb (90.7 kg)   SpO2 97%   BMI 30.06 kg/m²     Physical Exam:    Physical Exam  Constitutional:       General: He is not in acute distress. Appearance: He is well-developed. He is obese. He is not toxic-appearing or diaphoretic. HENT:      Head: Normocephalic. Musculoskeletal:      Right lower leg: Edema present. Left lower leg: Edema present. Neurological:      Mental Status: He is alert.    Psychiatric:         Mood and Affect: Mood normal.         Behavior: Behavior normal.         Thought Content: Thought content normal.         Current Outpatient Medications   Medication Sig Dispense Refill    metoprolol succinate (TOPROL XL) 25 MG extended release tablet TAKE ONE TABLET BY MOUTH DAILY 90 tablet 1    potassium chloride (KLOR-CON M) 10 MEQ extended release tablet Take 1 tablet by mouth daily 90 tablet 0    pravastatin (PRAVACHOL) 20 MG tablet Take 1 tablet by mouth every other day 90 tablet 1    furosemide (LASIX) 40 MG tablet TAKE ONE TABLET BY MOUTH TWICE A DAY PRN 60 tablet 1    Handicap Placard MISC by Does not apply route Length: 5 years 1 each 0    Misc. Devices KIT Walker with 4 wheels and a seat. Use as directed. 1 kit 0    Misc. Devices (ROLLATOR ULTRA-LIGHT) MISC Walker with 4 wheels and a seat. Use as directed. 1 each 0    Misc. Devices (WALKER) MISC 1 each by Does not apply route daily Wheeled walker. 1 each 0    Calcium Carbonate-Vit D-Min (CALCIUM 1200 PO) Take by mouth      Multiple Vitamins-Minerals (THERAPEUTIC MULTIVITAMIN-MINERALS) tablet Take 1 tablet by mouth daily       No current facility-administered medications for this visit. Assessment:    1. Routine general medical examination at a health care facility    2. Essential hypertension    3. Mixed hyperlipidemia    4. Bilateral edema of lower extremity        Plan:    1. Essential hypertension  Stable. I encouraged him to take the metoprolol at night to avoid some dizziness he has when he rises from the seated position.  - CBC Auto Differential  - Comprehensive Metabolic Panel  - metoprolol succinate (TOPROL XL) 25 MG extended release tablet; TAKE ONE TABLET BY MOUTH DAILY  Dispense: 90 tablet; Refill: 0    2. Mixed hyperlipidemia   Continue pravastatin. If causing myalgias, he could consider taking it every other night.  - CBC Auto Differential  - Comprehensive Metabolic Panel  - Lipid Panel    3. Bilateral edema of lower extremity  Take the Lasix as needed.  Can do doses 20 mg to 80 mg daily along with potassium. Discussed salt avoidance and leg elevation. Cardiology referral desired. Return in 6 months (on 11/14/2021) for Hypertension, Hyperlipidemia, leg swelling.

## 2021-05-14 NOTE — PATIENT INSTRUCTIONS
Personalized Preventive Plan for Karla Romeo - 5/14/2021  Medicare offers a range of preventive health benefits. Some of the tests and screenings are paid in full while other may be subject to a deductible, co-insurance, and/or copay. Some of these benefits include a comprehensive review of your medical history including lifestyle, illnesses that may run in your family, and various assessments and screenings as appropriate. After reviewing your medical record and screening and assessments performed today your provider may have ordered immunizations, labs, imaging, and/or referrals for you. A list of these orders (if applicable) as well as your Preventive Care list are included within your After Visit Summary for your review. Other Preventive Recommendations:    · A preventive eye exam performed by an eye specialist is recommended every 1-2 years to screen for glaucoma; cataracts, macular degeneration, and other eye disorders. · A preventive dental visit is recommended every 6 months. · Try to get at least 150 minutes of exercise per week or 10,000 steps per day on a pedometer . · Order or download the FREE \"Exercise & Physical Activity: Your Everyday Guide\" from [employer]. Call 3-497.819.7619 or search [employer] online. · You need 5420-4291 mg of calcium and 7012-2489 IU of vitamin D per day. It is possible to meet your calcium requirement with diet alone, but a vitamin D supplement is usually necessary to meet this goal.  · When exposed to the sun, use a sunscreen that protects against both UVA and UVB radiation with an SPF of 30 or greater. Reapply every 2 to 3 hours or after sweating, drying off with a towel, or swimming. · Always wear a seat belt when traveling in a car. Always wear a helmet when riding a bicycle or motorcycle.

## 2021-05-15 DIAGNOSIS — R60.0 BILATERAL EDEMA OF LOWER EXTREMITY: ICD-10-CM

## 2021-05-15 LAB
A/G RATIO: 1.9 (ref 1.1–2.2)
ALBUMIN SERPL-MCNC: 4.4 G/DL (ref 3.4–5)
ALP BLD-CCNC: 80 U/L (ref 40–129)
ALT SERPL-CCNC: 27 U/L (ref 10–40)
ANION GAP SERPL CALCULATED.3IONS-SCNC: 15 MMOL/L (ref 3–16)
AST SERPL-CCNC: 27 U/L (ref 15–37)
BILIRUB SERPL-MCNC: 0.5 MG/DL (ref 0–1)
BUN BLDV-MCNC: 13 MG/DL (ref 7–20)
CALCIUM SERPL-MCNC: 9 MG/DL (ref 8.3–10.6)
CHLORIDE BLD-SCNC: 102 MMOL/L (ref 99–110)
CO2: 26 MMOL/L (ref 21–32)
CREAT SERPL-MCNC: 0.9 MG/DL (ref 0.8–1.3)
GFR AFRICAN AMERICAN: >60
GFR NON-AFRICAN AMERICAN: >60
GLOBULIN: 2.3 G/DL
GLUCOSE BLD-MCNC: 89 MG/DL (ref 70–99)
POTASSIUM SERPL-SCNC: 4 MMOL/L (ref 3.5–5.1)
SODIUM BLD-SCNC: 143 MMOL/L (ref 136–145)
TOTAL PROTEIN: 6.7 G/DL (ref 6.4–8.2)

## 2021-05-17 RX ORDER — POTASSIUM CHLORIDE 750 MG/1
TABLET, EXTENDED RELEASE ORAL
Qty: 90 TABLET | Refills: 1 | Status: SHIPPED | OUTPATIENT
Start: 2021-05-17 | End: 2022-08-25 | Stop reason: SDUPTHER

## 2021-05-17 NOTE — TELEPHONE ENCOUNTER
Refill Request     Last Seen: Last Seen Department: 5/14/2021  Last Seen by PCP: 5/14/2021    Last Written: 5/14/2021    Next Appointment:   Future Appointments   Date Time Provider Marylu Lujan   5/20/2021  9:00 AM Megan Snellen, MD [facility] [place] [facility]   11/18/2021  1:00 PM DO [facility]       Future appointment scheduled      Requested Prescriptions     Pending Prescriptions Disp Refills    potassium chloride (KLOR-CON M) 10 MEQ extended release tablet [Pharmacy Med Name: POTASSIUM CHLORIDE ER M-10 MEQ TAB] 90 tablet 1     Sig: TAKE ONE TABLET BY MOUTH TWICE A DAY

## 2021-05-19 NOTE — PROGRESS NOTES
CARDIOLOGY CONSULTATION        Patient Name: Jose Enrique Avitia  Primary Care physician: Michaela Maldonado DO    Reason for Referral/Chief Complaint: Jose Enrique Avitia is a 82 y.o. patient who is referred to cardiology clinic today for evaluation and treatment of chronic lower extremity edema and shortness of breath. History of Present Illness:   Mr. Emmanuelle Campo is a pleasant 42-year-old man with a prior history significant for hypertension and hyperlipidemia. He is being referred by his primary care Dr. Paula Morales for chronic lower extremity edema. Most recent echo 12/24/2017 showed an EF of >65% and mild mitral annular calcification. Today, he reports that he has had bilateral lower extremity edema for a very long time. He has tried compression stockings but does not wear them daily. He reports that they are painful. Denies paroxysmal nocturnal dyspnea, orthopnea, bendopnea, or weight gain. He has not been taking his Lasix regularly due to urinary frequency. He was instructed to take on a as needed basis but does not like what it does to his bladder. He has been getting therapy for history of falls. Wife reports that he does not move much-very sedentary. He does not elevate legs while sitting. Does not utilize compression stockings. Deny high salt diet. The patient denies chest pain, shortness of breath at rest.  Denies palpitations, near-syncope or carloz syncope. The patient endorses highest level of activity as walking to the mailbox that is about 50 feet occasionally. He does not do this often. Prior smoker. Past Medical History:   has a past medical history of Arthritis, Hyperlipidemia, Influenza A, and Right-sided low back pain with right-sided sciatica. Surgical History:   has a past surgical history that includes Tonsillectomy; Tooth Extraction; Inguinal hernia repair (Bilateral, 2-21-11); and Cataract removal.     Social History:   reports that he has quit smoking.  He has a 15.00 pack-year smoking history. He has never used smokeless tobacco. He reports that he does not drink alcohol and does not use drugs. Family History:  Reports no history of early onset coronary artery disease, myocardial infarction, cerebrovascular accident or sudden cardiac death among primary relatives. Home Medications:  Were reviewed and are listed in nursing record and/or below  Prior to Admission medications    Medication Sig Start Date End Date Taking? Authorizing Provider   potassium chloride (KLOR-CON M) 10 MEQ extended release tablet TAKE ONE TABLET BY MOUTH TWICE A DAY 5/17/21  Yes Yesenia Fatima DO   metoprolol succinate (TOPROL XL) 25 MG extended release tablet TAKE ONE TABLET BY MOUTH DAILY 5/14/21  Yes Chetan Corado DO   pravastatin (PRAVACHOL) 20 MG tablet Take 1 tablet by mouth every other day 5/14/21  Yes Yesenia Fatima DO   furosemide (LASIX) 40 MG tablet TAKE ONE TABLET BY MOUTH TWICE A DAY PRN  Patient taking differently: 20 mg TAKE ONE TABLET BY MOUTH TWICE A DAY PRN    Takes 20 mg daily 5/20/2021 4/19/21  Yes Yesenia Fatima DO   Handicap Placard MISC by Does not apply route Length: 5 years 2/5/21  Yes Chetan Corado, DO   Misc. Devices KIT Walker with 4 wheels and a seat. Use as directed. 11/6/20  Yes Chetan Corado DO   Misc. Devices (ROLLATOR ULTRA-LIGHT) MISC Walker with 4 wheels and a seat. Use as directed. 10/27/20  Yes DO Kylie Abrahamc. Devices (WALKER) MISC 1 each by Does not apply route daily Wheeled walker. 8/14/20  Yes Yesenia Fatima DO   Calcium Carbonate-Vit D-Min (CALCIUM 1200 PO) Take by mouth   Yes Historical Provider, MD   Multiple Vitamins-Minerals (THERAPEUTIC MULTIVITAMIN-MINERALS) tablet Take 1 tablet by mouth daily   Yes Historical Provider, MD        CURRENT Medications:  No current facility-administered medications for this visit.       Allergies:  Contrast [iodides] and Zocor [simvastatin]     Review of Systems:   A 14 point review of symptoms 05/14/2021    BUN 13 05/14/2021    CREATININE 0.9 05/14/2021    GFRAA >60 05/14/2021    AGRATIO 1.9 05/14/2021    LABGLOM >60 05/14/2021    GLUCOSE 89 05/14/2021    PROT 6.7 05/14/2021    CALCIUM 9.0 05/14/2021    BILITOT 0.5 05/14/2021    ALKPHOS 80 05/14/2021    AST 27 05/14/2021    ALT 27 05/14/2021     PT/INR:  No results found for: PTINR  HgBA1c:  Lab Results   Component Value Date    LABA1C 5.2 12/06/2019     Lab Results   Component Value Date    TROPONINI <0.01 07/01/2019     Lab Results   Component Value Date    CHOL 194 11/06/2020    CHOL 186 03/02/2020    CHOL 241 (H) 12/06/2019     Lab Results   Component Value Date    TRIG 121 11/06/2020    TRIG 88 03/02/2020    TRIG 215 (H) 12/06/2019     Lab Results   Component Value Date    HDL 50 11/06/2020    HDL 46 03/02/2020    HDL 40 12/06/2019     Lab Results   Component Value Date    LDLCALC 120 (H) 11/06/2020    LDLCALC 122 (H) 03/02/2020    LDLCALC 158 (H) 12/06/2019     Lab Results   Component Value Date    LABVLDL 24 11/06/2020    LABVLDL 18 03/02/2020    LABVLDL 43 12/06/2019     No results found for: CHOLHDLRATIO      Cardiac Data:     EKG: today personally reviewed, notable for normal sinus rhythm with heart rate of 98 bpm, first-degree AV block, no ST-T wave changes. Echo: 12/24/2017  Summary  Hyperdynamic LV systolic function with an estimated EF > 65%. Normal left ventricular diastolic filling pressure. Mild mitral annular calcification. Trace-mild tricuspid regurgitation. Systolic pulmonary artery pressure (SPAP) is estimated at 48mmHg consistent  with mild pulmonary hypertension (RA pressure 8mmHg). Stress Test: None Prior    Cardiac catheterization: None Prior    Additional studies:   BLE VL 5/11/2018  Impressions   Right Impression   1. The right ankle/brachial index is 1.04.   2. Pulse volume recording at the ankle is within normal limits.    3. Continuous wave Doppler of the dorsalis pedis and posterior tibial arteries   demonstrate wound/lymphedema clinic for further management of suspected lymphedema  4. Will obtain transthoracic echocardiogram for evaluation of underlying cardiac structure and function. 5.  Obtain Pro-BNP today. 6.  I did encourage him to increase his activity and engage with physical therapy as planned. Modest weight loss was advised with eventual goal of normalization of BMI. Follow up in 3 months. This note was scribed in the presence of Di Keller MD by Marcos Bhatia RN. The scribes documentation has been prepared under my direction and personally reviewed by me in its entirety. I confirm that the note above accurately reflects all work, treatment, procedures, and medical decision making performed by me. Luis Armando Jones MD, personally performed the services described in this documentation as scribed by Marcos Bhatia RN in my presence, and it is both accurate and complete to the best of our ability. I will address the patient's cardiac risk factors and adjusted pharmacologic treatment as needed. In addition, I have reinforced the need for patient directed risk factor modification. All questions and concerns were addressed to the patient/family. Alternatives to my treatment were discussed. Thank you for allowing us to participate in the care of Eric Matt. Please call me with any questions [phone].     Jose A Whitt MD, [address] [facility]  Cardiovascular Disease  [facility] [address]  [phone] [facility]  [phone] [address] [place] [address]  5/20/2021 9:31 AM

## 2021-05-20 ENCOUNTER — HOSPITAL ENCOUNTER (OUTPATIENT)
Age: 86
Discharge: HOME | End: 2021-05-20
Payer: MEDICARE

## 2021-05-20 ENCOUNTER — OFFICE VISIT (OUTPATIENT)
Dept: CARDIOLOGY CLINIC | Age: 86
End: 2021-05-20
Payer: MEDICARE

## 2021-05-20 VITALS
SYSTOLIC BLOOD PRESSURE: 124 MMHG | WEIGHT: 200 LBS | HEART RATE: 103 BPM | DIASTOLIC BLOOD PRESSURE: 74 MMHG | BODY MASS INDEX: 30.31 KG/M2 | OXYGEN SATURATION: 95 % | HEIGHT: 68 IN

## 2021-05-20 DIAGNOSIS — I89.0 LYMPHEDEMA: Primary | ICD-10-CM

## 2021-05-20 DIAGNOSIS — R60.9 EDEMA, UNSPECIFIED TYPE: ICD-10-CM

## 2021-05-20 DIAGNOSIS — R06.02 SOB (SHORTNESS OF BREATH): ICD-10-CM

## 2021-05-20 DIAGNOSIS — I10 BENIGN ESSENTIAL HYPERTENSION: ICD-10-CM

## 2021-05-20 DIAGNOSIS — E78.2 MIXED HYPERLIPIDEMIA: ICD-10-CM

## 2021-05-20 DIAGNOSIS — R60.0 BILATERAL EDEMA OF LOWER EXTREMITY: ICD-10-CM

## 2021-05-20 LAB — PRO-BNP: 68 PG/ML (ref 0–449)

## 2021-05-20 PROCEDURE — 83880 ASSAY OF NATRIURETIC PEPTIDE: CPT

## 2021-05-20 PROCEDURE — 99204 OFFICE O/P NEW MOD 45 MIN: CPT | Performed by: INTERNAL MEDICINE

## 2021-05-20 PROCEDURE — 93000 ELECTROCARDIOGRAM COMPLETE: CPT | Performed by: INTERNAL MEDICINE

## 2021-05-20 PROCEDURE — 36415 COLL VENOUS BLD VENIPUNCTURE: CPT

## 2021-05-20 NOTE — LETTER
15.00 pack-year smoking history. He has never used smokeless tobacco. He reports that he does not drink alcohol and does not use drugs. Family History:  Reports no history of early onset coronary artery disease, myocardial infarction, cerebrovascular accident or sudden cardiac death among primary relatives. Home Medications:  Were reviewed and are listed in nursing record and/or below  Prior to Admission medications    Medication Sig Start Date End Date Taking? Authorizing Provider   potassium chloride (KLOR-CON M) 10 MEQ extended release tablet TAKE ONE TABLET BY MOUTH TWICE A DAY 5/17/21  Yes Yesenia Fatima DO   metoprolol succinate (TOPROL XL) 25 MG extended release tablet TAKE ONE TABLET BY MOUTH DAILY 5/14/21  Yes Yessi Aguilar DO   pravastatin (PRAVACHOL) 20 MG tablet Take 1 tablet by mouth every other day 5/14/21  Yes Yesenia Fatima DO   furosemide (LASIX) 40 MG tablet TAKE ONE TABLET BY MOUTH TWICE A DAY PRN  Patient taking differently: 20 mg TAKE ONE TABLET BY MOUTH TWICE A DAY PRN    Takes 20 mg daily 5/20/2021 4/19/21  Yes Yesenia Fatima DO   Handicap Placard MISC by Does not apply route Length: 5 years 2/5/21  Yes Yessi Aguilar, DO   Misc. Devices KIT Walker with 4 wheels and a seat. Use as directed. 11/6/20  Yes Yessi Aguilar DO   Kyliec. Devices (ROLLATOR ULTRA-LIGHT) MISC Walker with 4 wheels and a seat. Use as directed. 10/27/20  Yes Yessi Aguilar DO   Misc. Devices (WALKER) MISC 1 each by Does not apply route daily Wheeled walker. 8/14/20  Yes Yesenia Fatima DO   Calcium Carbonate-Vit D-Min (CALCIUM 1200 PO) Take by mouth   Yes Historical Provider, MD   Multiple Vitamins-Minerals (THERAPEUTIC MULTIVITAMIN-MINERALS) tablet Take 1 tablet by mouth daily   Yes Historical Provider, MD        CURRENT Medications:  No current facility-administered medications for this visit.       Allergies:  Contrast [iodides] and Zocor [simvastatin]     Review of Systems:   A 14 point review of symptoms completed. Pertinent positives identified in the HPI, all other review of symptoms negative as below. Objective:     Vitals:    05/20/21 0911   BP: 124/74   Pulse: 103   SpO2: 95%    Weight: 200 lb (90.7 kg)       PHYSICAL EXAM:    General:  Alert, cooperative, no distress, appears stated age   Head:  Normocephalic, atraumatic   Eyes:  Conjunctiva/corneas clear, anicteric sclerae    Nose: Nares normal, no drainage or sinus tenderness   Throat: No abnormalities of the lips, oral mucosa or tongue. Neck: Trachea midline. Neck supple with no lymphadenopathy, thyroid not enlarged, symmetric, no tenderness/mass/nodules, flat neck veins. Lungs:   Clear to auscultation bilaterally, no wheezes, no rales, no respiratory distress   Chest Wall:  No deformity or tenderness to palpation   Heart:   Regular rhythm, tachycardic, normal S1, normal S2, no murmur, no rub, no S3/S4, PMI non-displaced. Abdomen:   Soft, non-tender, with normoactive bowel sounds. No masses, no hepatosplenomegaly   Extremities: No cyanosis, clubbing or 2+ pitting edema up to knee with edema of the dorsum of the feet left greater than right. .    Vascular: 2+ radial, diminished dorsalis pedis and posterior tibial pulses bilaterally. Brisk carotid upstrokes without carotid bruit. Skin: Skin color, texture, turgor are normal with no rashes or ulceration. Varicose veins BLE. Erythematous skin of the shins bilaterally. Pysch: Euthymic mood, appropriate affect   Neurologic: Oriented to person, place and time. No slurred speech or facial asymmetry. No motor or sensory deficits on gross examination.          Labs:   CBC:   Lab Results   Component Value Date    WBC 8.6 05/14/2021    RBC 5.73 05/14/2021    HGB 16.6 05/14/2021    HCT 49.6 05/14/2021    MCV 86.6 05/14/2021    RDW 14.1 05/14/2021     05/14/2021     CMP:  Lab Results   Component Value Date     05/14/2021    K 4.0 05/14/2021    K 4.8 07/01/2019     05/14/2021    CO2 26 normal multiphasic flow. 4. PPG's of the toes indicate good pulsatility and amplitude. 5. The toe/brachial index was performed and is abnormal measuring 0.54 (normal   value >0.64). Left Impression   1. The left ankle/brachial index is 1.02.   2. Pulse volume recording at the ankle is within normal limits. 3. Continuous wave Doppler of the dorsalis pedis and posterior tibial arteries   demonstrate normal multiphasic flow. 4. PPG's of the toes indicate good pulsatility and amplitude. 5. The toe/brachial index was performed and is normal measuring 0.71 (normal   value >0.64).     Conclusions        Summary        1. There is no arterial insufficiency at rest in the lower extremities    bilaterally.    2. There is some evidence of infra malleolar vessel disease of the right    lower extremity. Impression and Plan:      1. Bilateral lower extremity pitting edema-multifactorial with likely venous insufficiency and lymphedema contributing. No evidence of volume overload or congestive heart failure by today's exam.  2.  Hyperlipidemia  3. Hypertension-at goal today  4. Obesity  5. Sedentary lifestyle  6. First-degree AV block    Patient Active Problem List   Diagnosis    Dermatitis    HLD (hyperlipidemia)    Vitamin D deficiency    Right-sided low back pain with right-sided sciatica    Bilateral edema of lower extremity    [facility]    Benign essential hypertension    ARDS (adult respiratory distress syndrome) ([facility]    Acute respiratory failure with hypoxia (HCC)    Subconjunctival hemorrhage of left eye    Sepsis ([facility] [address]    Acute hypoxemic respiratory failure ([facility])    Tracheobronchitis    Acute encephalopathy    Bronchospasm       PLAN:  1. Recommended he continue Lasix 20 mg daily each day scheduled   2. Advised that he maintain compliance with compression stockings when active and to keep feet elevated when sitting  3.   We will place referral for wound/lymphedema clinic for further management of suspected lymphedema  4. Will obtain transthoracic echocardiogram for evaluation of underlying cardiac structure and function. 5.  Obtain Pro-BNP today. 6.  I did encourage him to increase his activity and engage with physical therapy as planned. Modest weight loss was advised with eventual goal of normalization of BMI. Follow up in 3 months. This note was scribed in the presence of Leidy Alfaro MD by Alberto Guzman RN. The scribes documentation has been prepared under my direction and personally reviewed by me in its entirety. I confirm that the note above accurately reflects all work, treatment, procedures, and medical decision making performed by me. Cait Mccann MD, personally performed the services described in this documentation as scribed by Alberto Guzman RN in my presence, and it is both accurate and complete to the best of our ability. I will address the patient's cardiac risk factors and adjusted pharmacologic treatment as needed. In addition, I have reinforced the need for patient directed risk factor modification. All questions and concerns were addressed to the patient/family. Alternatives to my treatment were discussed. Thank you for allowing us to participate in the care of Mimi Page. Please call me with any questions [phone].     Natalee Lewis MD, [facility]  Cardiovascular Disease  [facility]  [phone] [facility]  [phone] [address] [place] [address]  5/20/2021 9:31 AM

## 2021-05-20 NOTE — PATIENT INSTRUCTIONS
1. Wear compression stockings. Keep feet elevated when sitting  2. Wound referral for lymphedema for leg wrapping  3. Call [phone] to schedule echocardiogram  4. Encourage activity  5. Take lasix 20 mg daily (everyday)  6. Get labs today for BNP    Follow up 3 months.

## 2021-05-21 ENCOUNTER — TELEPHONE (OUTPATIENT)
Dept: CARDIOLOGY CLINIC | Age: 86
End: 2021-05-21

## 2021-05-21 NOTE — TELEPHONE ENCOUNTER
----- Message from Socorro Guillermo MD sent at 5/20/2021  4:50 PM EDT -----  Please inform the patient that labs did not show excess water in the lungs. This goes along with exam.  I feel his swelling is due to both vein insufficiency as well as lymphedema as we discussed. We will reevaluate in 3 months. Plan as stands and discussed in clinic today.

## 2021-06-01 ENCOUNTER — HOSPITAL ENCOUNTER (OUTPATIENT)
Dept: CARDIOLOGY | Age: 86
Discharge: HOME | End: 2021-06-01
Payer: MEDICARE

## 2021-06-01 DIAGNOSIS — R06.02 SOB (SHORTNESS OF BREATH): ICD-10-CM

## 2021-06-01 DIAGNOSIS — R60.0 BILATERAL EDEMA OF LOWER EXTREMITY: ICD-10-CM

## 2021-06-01 DIAGNOSIS — R60.9 EDEMA, UNSPECIFIED TYPE: ICD-10-CM

## 2021-06-01 LAB
LV EF: 55 %
LVEF MODALITY: NORMAL

## 2021-06-01 PROCEDURE — 93306 TTE W/DOPPLER COMPLETE: CPT

## 2021-06-03 ENCOUNTER — TELEPHONE (OUTPATIENT)
Dept: CARDIOLOGY CLINIC | Age: 86
End: 2021-06-03

## 2021-06-03 NOTE — TELEPHONE ENCOUNTER
Pt wife called, wanted to know if results of the echo were ready. Told her we would send a message and someone will call her with those.

## 2021-06-07 NOTE — TELEPHONE ENCOUNTER
LM for pt and his wife to return call to office to relay ECHO results. Primitivo Kincaid MD   6/6/2021 10:07 PM EDT Back to Top      Pls let pt know: Normal heart strength, mild leakiness of mitral and tricuspid hear valves. Non-severe. Keep plan of follow up in 3 months and continue measures discuss in clinic for edema management. Call if questions.  Thank you

## 2021-06-30 ENCOUNTER — TELEPHONE (OUTPATIENT)
Dept: FAMILY MEDICINE CLINIC | Age: 86
End: 2021-06-30

## 2021-06-30 NOTE — TELEPHONE ENCOUNTER
Pt. Wife calling stating Jeremy Sorto has two different prescriptions for Potassium. They need clarification as to what dose he should be taking. Also, patient was seen by cardiology and she would like you to review his notes and be aware of Lasix changes.

## 2021-06-30 NOTE — TELEPHONE ENCOUNTER
Per Cardiology note 5/20/21: PLAN:  1. Recommended he continue Lasix 20 mg daily each day scheduled   2. Advised that he maintain compliance with compression stockings when active and to keep feet elevated when sitting  3. We will place referral for wound/lymphedema clinic for further management of suspected lymphedema  4. Will obtain transthoracic echocardiogram for evaluation of underlying cardiac structure and function. 5.  Obtain Pro-BNP today. 6.  I did encourage him to increase his activity and engage with physical therapy as planned. Modest weight loss was advised with eventual goal of normalization of BMI.     Follow up in 3 months.     This note was scribed in the presence of Marie Guajardo MD   As Far as Potassium:  Dr. Sushma Paniagua last note 5/14/21 Changed from Potassium one a day to patient taking Potassium 10 MEQ extended release take one tablet by mouth two times daily.

## 2021-07-01 ENCOUNTER — VIRTUAL VISIT (OUTPATIENT)
Dept: FAMILY MEDICINE CLINIC | Age: 86
End: 2021-07-01
Payer: MEDICARE

## 2021-07-01 DIAGNOSIS — R60.0 BILATERAL EDEMA OF LOWER EXTREMITY: ICD-10-CM

## 2021-07-01 DIAGNOSIS — I10 ESSENTIAL HYPERTENSION: Primary | ICD-10-CM

## 2021-07-01 DIAGNOSIS — E78.2 MIXED HYPERLIPIDEMIA: ICD-10-CM

## 2021-07-01 PROCEDURE — 99442 PR PHYS/QHP TELEPHONE EVALUATION 11-20 MIN: CPT | Performed by: FAMILY MEDICINE

## 2021-07-01 NOTE — TELEPHONE ENCOUNTER
LVM for patient stating that \"Dr. Joni Bai last note 5/14/21 Changed from Potassium one a day to patient taking Potassium 10 MEQ extended release take one tablet by mouth two times daily. \"    Asked for call back if needing further clarification, also informed that the message with caridiology notes was fwd to Shannon.

## 2021-07-01 NOTE — PROGRESS NOTES
differently: 20 mg TAKE ONE TABLET BY MOUTH TWICE A DAY PRN    Takes 20 mg daily 5/20/2021) 60 tablet 1    Handicap Placard MISC by Does not apply route Length: 5 years 1 each 0    Misc. Devices KIT Walker with 4 wheels and a seat. Use as directed. 1 kit 0    Misc. Devices (ROLLATOR ULTRA-LIGHT) MISC Walker with 4 wheels and a seat. Use as directed. 1 each 0    Misc. Devices (WALKER) MISC 1 each by Does not apply route daily Wheeled walker. 1 each 0    Calcium Carbonate-Vit D-Min (CALCIUM 1200 PO) Take by mouth      Multiple Vitamins-Minerals (THERAPEUTIC MULTIVITAMIN-MINERALS) tablet Take 1 tablet by mouth daily       No current facility-administered medications for this visit. Assessment:    1. Essential hypertension    2. Mixed hyperlipidemia    3. Bilateral edema of lower extremity        Plan:    1. Essential hypertension  Stable. Continue current medications. 2. Mixed hyperlipidemia  Stable. Continue current medications. 3. Bilateral edema of lower extremity  Doing well with 20 mg per day. Stay with the current dose of potassium. Patient to return to clinic if symptoms worsen or fail to improve. Jeramie Merritt is a 82 y.o. male evaluated via telephone on 7/1/2021. Consent:  He and/or health care decision maker is aware that that he may receive a bill for this telephone service, depending on his insurance coverage, and has provided verbal consent to proceed: Yes      Documentation:  I communicated with the patient and/or health care decision maker about discussed above. Details of this discussion including any medical advice provided: Discussed above      I affirm this is a Patient Initiated Episode with a Patient who has not had a related appointment within my department in the past 7 days or scheduled within the next 24 hours.     Patient identification was verified at the start of the visit: Yes    Total Time: minutes: 11-20 minutes    The visit was conducted pursuant to the emergency declaration under the [facility], [address] [place] [address] authority and the [employer] and [employer] Act. Patient identification was verified, and a caregiver was present when appropriate. The patient was located in a state where the provider was credentialed to provide care.     Note: not billable if this call serves to triage the patient into an appointment for the relevant concern      Melinda Eden DO

## 2021-10-01 ENCOUNTER — OFFICE VISIT (OUTPATIENT)
Dept: CARDIOLOGY CLINIC | Age: 86
End: 2021-10-01
Payer: MEDICARE

## 2021-10-01 ENCOUNTER — HOSPITAL ENCOUNTER (OUTPATIENT)
Age: 86
Discharge: HOME | End: 2021-10-01
Payer: MEDICARE

## 2021-10-01 VITALS
HEIGHT: 68 IN | SYSTOLIC BLOOD PRESSURE: 116 MMHG | BODY MASS INDEX: 30.16 KG/M2 | WEIGHT: 199 LBS | DIASTOLIC BLOOD PRESSURE: 72 MMHG | OXYGEN SATURATION: 95 % | HEART RATE: 83 BPM

## 2021-10-01 DIAGNOSIS — E78.2 MIXED HYPERLIPIDEMIA: Primary | ICD-10-CM

## 2021-10-01 DIAGNOSIS — Z79.899 MEDICATION MANAGEMENT: ICD-10-CM

## 2021-10-01 DIAGNOSIS — R79.89 D-DIMER, ELEVATED: ICD-10-CM

## 2021-10-01 DIAGNOSIS — K76.9 LIVER PROBLEM: ICD-10-CM

## 2021-10-01 DIAGNOSIS — I89.0 LYMPHEDEMA: ICD-10-CM

## 2021-10-01 DIAGNOSIS — R60.0 BILATERAL EDEMA OF LOWER EXTREMITY: ICD-10-CM

## 2021-10-01 DIAGNOSIS — I10 BENIGN ESSENTIAL HYPERTENSION: ICD-10-CM

## 2021-10-01 LAB
ALBUMIN SERPL-MCNC: 4.2 G/DL (ref 3.4–5)
ALP BLD-CCNC: 92 U/L (ref 40–129)
ALT SERPL-CCNC: 22 U/L (ref 10–40)
ANION GAP SERPL CALCULATED.3IONS-SCNC: 14 MMOL/L (ref 3–16)
AST SERPL-CCNC: 25 U/L (ref 15–37)
BILIRUB SERPL-MCNC: 0.5 MG/DL (ref 0–1)
BILIRUBIN DIRECT: <0.2 MG/DL (ref 0–0.3)
BILIRUBIN URINE: NEGATIVE
BILIRUBIN, INDIRECT: NORMAL MG/DL (ref 0–1)
BLOOD, URINE: NEGATIVE
BUN BLDV-MCNC: 13 MG/DL (ref 7–20)
CALCIUM SERPL-MCNC: 9.1 MG/DL (ref 8.3–10.6)
CHLORIDE BLD-SCNC: 103 MMOL/L (ref 99–110)
CLARITY: CLEAR
CO2: 25 MMOL/L (ref 21–32)
COLOR: YELLOW
CREAT SERPL-MCNC: 0.9 MG/DL (ref 0.8–1.3)
GFR AFRICAN AMERICAN: >60
GFR NON-AFRICAN AMERICAN: >60
GLUCOSE BLD-MCNC: 72 MG/DL (ref 70–99)
GLUCOSE URINE: NEGATIVE MG/DL
INR BLD: 1.04 (ref 0.88–1.12)
KETONES, URINE: NEGATIVE MG/DL
LEUKOCYTE ESTERASE, URINE: NEGATIVE
MICROSCOPIC EXAMINATION: NORMAL
NITRITE, URINE: NEGATIVE
PH UA: 6 (ref 5–8)
POTASSIUM SERPL-SCNC: 4.3 MMOL/L (ref 3.5–5.1)
PROTEIN UA: NEGATIVE MG/DL
PROTHROMBIN TIME: 11.8 SEC (ref 9.9–12.7)
SODIUM BLD-SCNC: 142 MMOL/L (ref 136–145)
SPECIFIC GRAVITY UA: 1.02 (ref 1–1.03)
TOTAL PROTEIN: 6.5 G/DL (ref 6.4–8.2)
TSH REFLEX: 2.84 UIU/ML (ref 0.27–4.2)
URINE TYPE: NORMAL
UROBILINOGEN, URINE: 0.2 E.U./DL

## 2021-10-01 PROCEDURE — 99214 OFFICE O/P EST MOD 30 MIN: CPT | Performed by: INTERNAL MEDICINE

## 2021-10-01 PROCEDURE — 84443 ASSAY THYROID STIM HORMONE: CPT

## 2021-10-01 PROCEDURE — 36415 COLL VENOUS BLD VENIPUNCTURE: CPT

## 2021-10-01 PROCEDURE — 80048 BASIC METABOLIC PNL TOTAL CA: CPT

## 2021-10-01 PROCEDURE — 81003 URINALYSIS AUTO W/O SCOPE: CPT

## 2021-10-01 PROCEDURE — 80076 HEPATIC FUNCTION PANEL: CPT

## 2021-10-01 PROCEDURE — 85610 PROTHROMBIN TIME: CPT

## 2021-10-01 NOTE — PATIENT INSTRUCTIONS
PLAN:  1. Encourage taking furosemide (LASIX) 40 mg tablet daily ~ swelling to bilateral lower legs  2. Lab Work ~ BMP, LFT, Urinalysis, and PT-INR, TSH  ~ assess kidney/electrolytes, examination for protein in urine, and thickness of blood. 3. Discussion of wound clinic referral for Lymphedema specialist   ~ deferred at this time   ~ medication management    4.  Follow up based on lab work results will forward to [email or web address], DO

## 2021-10-01 NOTE — PROGRESS NOTES
CARDIOLOGY FOLLOW UP        Patient Name: Cassandra Kessler  Primary Care physician: [address] [place] [address],     Reason for Referral/Chief Complaint: Cassandra Kessler is a 82 y.o. patient who is referred to cardiology clinic today for evaluation and treatment of chronic lower extremity edema and shortness of breath. History of Present Illness:   Mr. Anuja Francois is a pleasant 82-year-old man with a prior history significant for hypertension and hyperlipidemia who was evaluated previously in this office for lower extremity edema. There was concern for congestive heart failure. Last office visit 05/2021, he reported that he has had bilateral lower extremity edema. Noted he was noncompliant with Lasix due to disruption and caused in his day. Noncompliant with compression stockings as well due to discomfort. Since his last office visit he had an Echocardiogram completed that showed ejection fraction of 55%, mild calcification of anterior leaflets tip of mitral valve, and mild to moderate tricuspid regurgitation. Study showed normal left atrial size, normal pulmonary pressures, no strict evidence of diastolic heart failure. proBNP was within normal limits 68 on last lab work. Today, he states he cannot tolerate compression stockings. They were simply too tight on his legs were causing discomfort. He has stopped using them. He presents with his wife who states he was taking 20 mg once daily Lasix since her last visit. Previously he was prescribed 40 mg tablets. They have been cutting them in half. Notes this does not give him high-volume urination within a couple hours of taking it. Has noted no change in his edema. He does state that he does elevate his legs when resting in his chair. He is not greatly active, relatively sedentary. Patient currently denies any rapid weight gain with review of the EMR showing stable weight around 200 pounds.   Denies paroxysmal nocturnal dyspnea, orthopnea, bendopnea. He has no limiting dyspnea at rest.  Denies palpitations, dizziness, and syncope. No chest pain with exertion that he is performing. Prior smoker. Past Medical History:   has a past medical history of Arthritis, Hyperlipidemia, Influenza A, and Right-sided low back pain with right-sided sciatica. Surgical History:   has a past surgical history that includes Tonsillectomy; Tooth Extraction; Inguinal hernia repair (Bilateral, 2-21-11); and Cataract removal.     Social History:   reports that he has quit smoking. He has a 15.00 pack-year smoking history. He has never used smokeless tobacco. He reports that he does not drink alcohol and does not use drugs. Family History:  Reports no history of early onset coronary artery disease, myocardial infarction, cerebrovascular accident or sudden cardiac death among primary relatives. Home Medications:  Were reviewed and are listed in nursing record and/or below  Prior to Admission medications    Medication Sig Start Date End Date Taking? Authorizing Provider   potassium chloride (KLOR-CON M) 10 MEQ extended release tablet TAKE ONE TABLET BY MOUTH TWICE A DAY  Patient taking differently: 10 mEq daily  5/17/21  Yes Yesenia Fatima DO   metoprolol succinate (TOPROL XL) 25 MG extended release tablet TAKE ONE TABLET BY MOUTH DAILY 5/14/21  Yes Crys Bain, DO   pravastatin (PRAVACHOL) 20 MG tablet Take 1 tablet by mouth every other day 5/14/21  Yes Yesenia Fatima DO   furosemide (LASIX) 40 MG tablet TAKE ONE TABLET BY MOUTH TWICE A DAY PRN  Patient taking differently: 40 mg daily TAKE ONE TABLET BY  A DAY PRN 4/19/21  Yes Yesenia Fatima DO   Handicap Placard MISC by Does not apply route Length: 5 years 2/5/21  Yes Crys Bain, DO   Misc. Devices (ROLLATOR ULTRA-LIGHT) MISC Walker with 4 wheels and a seat. Use as directed.  10/27/20  Yes Yesenia Fatima DO   Multiple Vitamins-Minerals (THERAPEUTIC MULTIVITAMIN-MINERALS) tablet Take 1 tablet by mouth daily   Yes Historical Provider, MD [place]. Devices KIT Walker with 4 wheels and a seat. Use as directed. Patient not taking: Reported on 10/1/2021 11/6/20   DO Kylie Mejiac. Devices (WALKER) MISC 1 each by Does not apply route daily Wheeled walker. Patient not taking: Reported on 10/1/2021 8/14/20   Yesenia Fatima DO   Calcium Carbonate-Vit D-Min (CALCIUM 1200 PO) Take by mouth  Patient not taking: Reported on 10/1/2021    Historical Provider, MD        CURRENT Medications:  No current facility-administered medications for this visit. Allergies:  Contrast [iodides] and Zocor [simvastatin]     Review of Systems:   A 14 point review of symptoms completed. Pertinent positives identified in the HPI, all other review of symptoms negative as below. Objective:     Vitals:    10/01/21 1011   BP: 116/72   Pulse: 83   SpO2: 95%    Weight: 199 lb (90.3 kg)       PHYSICAL EXAM:    General:  Alert, cooperative, no distress, appears stated age   Head:  Normocephalic, atraumatic   Eyes:  Conjunctiva/corneas clear, anicteric sclerae    Nose: Nares normal, no drainage or sinus tenderness   Throat: No abnormalities of the lips, oral mucosa or tongue. Neck: Trachea midline. Neck supple with no lymphadenopathy, thyroid not enlarged, symmetric, no tenderness/mass/nodules, flat neck veins. Lungs:   Clear to auscultation bilaterally, no wheezes, no rales, no respiratory distress   Chest Wall:  No deformity or tenderness to palpation   Heart:   Regular rhythm, regular rate, normal S1, normal S2, no murmur, no rub, no S3/S4, PMI non-displaced. No heave. Abdomen:    Obese abdomen, soft, non-tender, with normoactive bowel sounds. No masses, no hepatosplenomegaly   Extremities: No cyanosis, clubbing or 2+ pitting edema of the ankles bilaterally, 1+ to the mid shins, edematous dorsum of the feet left greater than right. .    Vascular: 2+ radial, diminished dorsalis pedis and posterior tibial pulses bilaterally in the setting of significant edema. Brisk carotid upstrokes without carotid bruit. Skin: Skin color, texture, turgor are normal with no rashes or ulceration. Varicose veins BLE. Pysch: Euthymic mood, appropriate affect   Neurologic: Oriented to person, place and time. No slurred speech or facial asymmetry. No motor or sensory deficits on gross examination.          Labs:   CBC:   Lab Results   Component Value Date    WBC 8.6 05/14/2021    RBC 5.73 05/14/2021    HGB 16.6 05/14/2021    HCT 49.6 05/14/2021    MCV 86.6 05/14/2021    RDW 14.1 05/14/2021     05/14/2021     CMP:  Lab Results   Component Value Date     05/14/2021    K 4.0 05/14/2021    K 4.8 07/01/2019     05/14/2021    CO2 26 05/14/2021    BUN 13 05/14/2021    CREATININE 0.9 05/14/2021    GFRAA >60 05/14/2021    AGRATIO 1.9 05/14/2021    LABGLOM >60 05/14/2021    GLUCOSE 89 05/14/2021    PROT 6.7 05/14/2021    CALCIUM 9.0 05/14/2021    BILITOT 0.5 05/14/2021    ALKPHOS 80 05/14/2021    AST 27 05/14/2021    ALT 27 05/14/2021     PT/INR:  No results found for: PTINR  HgBA1c:  Lab Results   Component Value Date    LABA1C 5.2 12/06/2019     Lab Results   Component Value Date    TROPONINI <0.01 07/01/2019     Lab Results   Component Value Date    CHOL 194 11/06/2020    CHOL 186 03/02/2020    CHOL 241 (H) 12/06/2019     Lab Results   Component Value Date    TRIG 121 11/06/2020    TRIG 88 03/02/2020    TRIG 215 (H) 12/06/2019     Lab Results   Component Value Date    HDL 50 11/06/2020    HDL 46 03/02/2020    HDL 40 12/06/2019     Lab Results   Component Value Date    LDLCALC 120 (H) 11/06/2020    LDLCALC 122 (H) 03/02/2020    LDLCALC 158 (H) 12/06/2019     Lab Results   Component Value Date    LABVLDL 24 11/06/2020    LABVLDL 18 03/02/2020    LABVLDL 43 12/06/2019     No results found for: CHOLHDLRATIO      Cardiac Data:     Last EKG: Normal sinus rhythm with heart rate of 98 bpm, first-degree AV block, no ST-T wave changes. Echo: 06/01/21   Summary   Normal left ventricle systolic function with an estimated ejection fraction   of 55%. No regional wall motion abnormalities are seen. Normal left ventricular diastolic filling pressure. Mild calcification of the anterior leaflet tip of the mitral valve. Mild mitral and pulmonic regurgitation. Mild to moderate tricuspid regurgitation. Systolic pulmonary artery pressure (SPAP) is normal and estimated at 25 mmHg   (right atrial pressure 3 mmHg). Echo: 12/24/2017  Summary  Hyperdynamic LV systolic function with an estimated EF > 65%. Normal left ventricular diastolic filling pressure. Mild mitral annular calcification. Trace-mild tricuspid regurgitation. Systolic pulmonary artery pressure (SPAP) is estimated at 48mmHg consistent  with mild pulmonary hypertension (RA pressure 8mmHg). Additional studies:   BLE VL 5/11/2018  Impressions   Right Impression   1. The right ankle/brachial index is 1.04.   2. Pulse volume recording at the ankle is within normal limits. 3. Continuous wave Doppler of the dorsalis pedis and posterior tibial arteries   demonstrate normal multiphasic flow. 4. PPG's of the toes indicate good pulsatility and amplitude. 5. The toe/brachial index was performed and is abnormal measuring 0.54 (normal   value >0.64). Left Impression   1. The left ankle/brachial index is 1.02.   2. Pulse volume recording at the ankle is within normal limits. 3. Continuous wave Doppler of the dorsalis pedis and posterior tibial arteries   demonstrate normal multiphasic flow. 4. PPG's of the toes indicate good pulsatility and amplitude. 5. The toe/brachial index was performed and is normal measuring 0.71 (normal   value >0.64).     Conclusions        Summary        1. There is no arterial insufficiency at rest in the lower extremities    bilaterally.    2. There is some evidence of infra malleolar vessel disease of the right    lower extremity. Impression and Plan:      1. Bilateral lower extremity pitting edema-multifactorial with likely venous insufficiency and lymphedema contributing. No evidence of volume overload or congestive heart failure by history and echo does not support this diagnosis either. 2.  Hyperlipidemia  3. Hypertension-at goal    4. Obesity  5. Sedentary lifestyle  6. First-degree AV block    Patient Active Problem List   Diagnosis    Dermatitis    HLD (hyperlipidemia)    Vitamin D deficiency    Right-sided low back pain with right-sided sciatica    Bilateral edema of lower extremity    SVT (supraventricular tachycardia) (HCC)    Benign essential hypertension    ARDS (adult respiratory distress syndrome) (HCC)    Acute respiratory failure with hypoxia (HCC)    Subconjunctival hemorrhage of left eye    Sepsis ([facility] [address]    Acute hypoxemic respiratory failure (HCC)    Tracheobronchitis    Acute encephalopathy    Bronchospasm       PLAN:  1. Recommended to go back to Lasix 40 mg tablets, take once daily and assess tolerance. Recommended conservative measures of leg elevation while resting, low-salt diet. 2.  He is unable to tolerate compression stockings. As suspect element of lymphedema will refer to lymphedema clinic for wraps and assess his tolerance of this. 3.  To complete work-up we must assess for liver and kidney dysfunction, particularly nephrotic syndrome. We will obtain lab work today including basic metabolic profile, LFTs, urinalysis to assess for protein, INR to check synthetic liver function and thyroid studies. No medications is taking that could contribute to edema on my review. 4.  Should his urinalysis show proteinuria would encourage further work-up through PCP to exclude nephrotic syndrome - 24-hour urine collection vs protein/creat ratio    I will follow up results of lab work that he is obtaining today. We will plan to forward to [address] [place] [address],  for review as well.   No evidence of congestive heart failure by history or echo. Follow-up with our clinic as needed. Scribe's attestation: This note was scribed in the presence of Jen Walker by Mohamud Christine RN     The scribclarisa documentation has been prepared under my direction and personally reviewed by me in its entirety. I confirm that the note above accurately reflects all work, treatment, procedures, and medical decision making performed by me. Bossman Maldonado MD, personally performed the services described in this documentation as scribed by Mohamud Christine RN  in my presence, and it is both accurate and complete to the best of our ability. I will address the patient's cardiac risk factors and adjusted pharmacologic treatment as needed. In addition, I have reinforced the need for patient directed risk factor modification. All questions and concerns were addressed to the patient/family. Alternatives to my treatment were discussed. Thank you for allowing us to participate in the care of Maura Smith. Please call me with any questions [phone].     Tasha Steward MD, [facility]  Cardiovascular Disease  [facility]  [phone] [facility]  [phone] [facility]  10/1/2021 10:44 AM

## 2021-10-01 NOTE — LETTER
CARDIOLOGY FOLLOW UP        Patient Name: Lauren Bean  Primary Care physician: Maria Isabel Martins DO    Reason for Referral/Chief Complaint: Lauren Bean is a 82 y.o. patient who is referred to cardiology clinic today for evaluation and treatment of chronic lower extremity edema and shortness of breath. History of Present Illness:   Mr. Conner Carbajal is a pleasant 81-year-old man with a prior history significant for hypertension and hyperlipidemia who was evaluated previously in this office for lower extremity edema. There was concern for congestive heart failure. Last office visit 05/2021, he reported that he has had bilateral lower extremity edema. Noted he was noncompliant with Lasix due to disruption and caused in his day. Noncompliant with compression stockings as well due to discomfort. Since his last office visit he had an Echocardiogram completed that showed ejection fraction of 55%, mild calcification of anterior leaflets tip of mitral valve, and mild to moderate tricuspid regurgitation. Study showed normal left atrial size, normal pulmonary pressures, no strict evidence of diastolic heart failure. proBNP was within normal limits 68 on last lab work. Today, he states he cannot tolerate compression stockings. They were simply too tight on his legs were causing discomfort. He has stopped using them. He presents with his wife who states he was taking 20 mg once daily Lasix since her last visit. Previously he was prescribed 40 mg tablets. They have been cutting them in half. Notes this does not give him high-volume urination within a couple hours of taking it. Has noted no change in his edema. He does state that he does elevate his legs when resting in his chair. He is not greatly active, relatively sedentary. Patient currently denies any rapid weight gain with review of the EMR showing stable weight around 200 pounds.   Denies paroxysmal nocturnal dyspnea, orthopnea, bendopnea. He has no limiting dyspnea at rest.  Denies palpitations, dizziness, and syncope. No chest pain with exertion that he is performing. Prior smoker. Past Medical History:   has a past medical history of Arthritis, Hyperlipidemia, Influenza A, and Right-sided low back pain with right-sided sciatica. Surgical History:   has a past surgical history that includes Tonsillectomy; Tooth Extraction; Inguinal hernia repair (Bilateral, 2-21-11); and Cataract removal.     Social History:   reports that he has quit smoking. He has a 15.00 pack-year smoking history. He has never used smokeless tobacco. He reports that he does not drink alcohol and does not use drugs. Family History:  Reports no history of early onset coronary artery disease, myocardial infarction, cerebrovascular accident or sudden cardiac death among primary relatives. Home Medications:  Were reviewed and are listed in nursing record and/or below  Prior to Admission medications    Medication Sig Start Date End Date Taking? Authorizing Provider   potassium chloride (KLOR-CON M) 10 MEQ extended release tablet TAKE ONE TABLET BY MOUTH TWICE A DAY  Patient taking differently: 10 mEq daily  5/17/21  Yes Yesenia Fatima DO   metoprolol succinate (TOPROL XL) 25 MG extended release tablet TAKE ONE TABLET BY MOUTH DAILY 5/14/21  Yes Miriam Lopez, DO   pravastatin (PRAVACHOL) 20 MG tablet Take 1 tablet by mouth every other day 5/14/21  Yes Yesenia Fatima DO   furosemide (LASIX) 40 MG tablet TAKE ONE TABLET BY MOUTH TWICE A DAY PRN  Patient taking differently: 40 mg daily TAKE ONE TABLET BY  A DAY PRN 4/19/21  Yes Yesenia Fatima DO   Handicap Placard MISC by Does not apply route Length: 5 years 2/5/21  Yes Miriam Lopez, DO   Misc. Devices (ROLLATOR ULTRA-LIGHT) MISC Walker with 4 wheels and a seat. Use as directed.  10/27/20  Yes Yesenia Fatima DO   Multiple Vitamins-Minerals (THERAPEUTIC MULTIVITAMIN-MINERALS) tablet Take 1 tablet by mouth daily   Yes Historical Provider, MD [place]. Devices KIT Walker with 4 wheels and a seat. Use as directed. Patient not taking: Reported on 10/1/2021 11/6/20   [address] Bright, DO   Misc. Devices (WALKER) MISC 1 each by Does not apply route daily Wheeled walker. Patient not taking: Reported on 10/1/2021 8/14/20   Divineradha Lovehaider, DO   Calcium Carbonate-Vit D-Min (CALCIUM 1200 PO) Take by mouth  Patient not taking: Reported on 10/1/2021    Historical Provider, MD        CURRENT Medications:  No current facility-administered medications for this visit. Allergies:  Contrast [iodides] and Zocor [simvastatin]     Review of Systems:   A 14 point review of symptoms completed. Pertinent positives identified in the HPI, all other review of symptoms negative as below. Objective:     Vitals:    10/01/21 1011   BP: 116/72   Pulse: 83   SpO2: 95%    Weight: 199 lb (90.3 kg)       PHYSICAL EXAM:    General:  Alert, cooperative, no distress, appears stated age   Head:  Normocephalic, atraumatic   Eyes:  Conjunctiva/corneas clear, anicteric sclerae    Nose: Nares normal, no drainage or sinus tenderness   Throat: No abnormalities of the lips, oral mucosa or tongue. Neck: Trachea midline. Neck supple with no lymphadenopathy, thyroid not enlarged, symmetric, no tenderness/mass/nodules, flat neck veins. Lungs:   Clear to auscultation bilaterally, no wheezes, no rales, no respiratory distress   Chest Wall:  No deformity or tenderness to palpation   Heart:   Regular rhythm, regular rate, normal S1, normal S2, no murmur, no rub, no S3/S4, PMI non-displaced. No heave. Abdomen:    Obese abdomen, soft, non-tender, with normoactive bowel sounds. No masses, no hepatosplenomegaly   Extremities: No cyanosis, clubbing or 2+ pitting edema of the ankles bilaterally, 1+ to the mid shins, edematous dorsum of the feet left greater than right. .    Vascular: 2+ radial, diminished dorsalis pedis and posterior tibial pulses bilaterally in the setting of significant edema. Brisk carotid upstrokes without carotid bruit. Skin: Skin color, texture, turgor are normal with no rashes or ulceration. Varicose veins BLE. Pysch: Euthymic mood, appropriate affect   Neurologic: Oriented to person, place and time. No slurred speech or facial asymmetry. No motor or sensory deficits on gross examination.          Labs:   CBC:   Lab Results   Component Value Date    WBC 8.6 05/14/2021    RBC 5.73 05/14/2021    HGB 16.6 05/14/2021    HCT 49.6 05/14/2021    MCV 86.6 05/14/2021    RDW 14.1 05/14/2021     05/14/2021     CMP:  Lab Results   Component Value Date     05/14/2021    K 4.0 05/14/2021    K 4.8 07/01/2019     05/14/2021    CO2 26 05/14/2021    BUN 13 05/14/2021    CREATININE 0.9 05/14/2021    GFRAA >60 05/14/2021    AGRATIO 1.9 05/14/2021    LABGLOM >60 05/14/2021    GLUCOSE 89 05/14/2021    PROT 6.7 05/14/2021    CALCIUM 9.0 05/14/2021    BILITOT 0.5 05/14/2021    ALKPHOS 80 05/14/2021    AST 27 05/14/2021    ALT 27 05/14/2021     PT/INR:  No results found for: PTINR  HgBA1c:  Lab Results   Component Value Date    LABA1C 5.2 12/06/2019     Lab Results   Component Value Date    TROPONINI <0.01 07/01/2019     Lab Results   Component Value Date    CHOL 194 11/06/2020    CHOL 186 03/02/2020    CHOL 241 (H) 12/06/2019     Lab Results   Component Value Date    TRIG 121 11/06/2020    TRIG 88 03/02/2020    TRIG 215 (H) 12/06/2019     Lab Results   Component Value Date    HDL 50 11/06/2020    HDL 46 03/02/2020    HDL 40 12/06/2019     Lab Results   Component Value Date    LDLCALC 120 (H) 11/06/2020    LDLCALC 122 (H) 03/02/2020    LDLCALC 158 (H) 12/06/2019     Lab Results   Component Value Date    LABVLDL 24 11/06/2020    LABVLDL 18 03/02/2020    LABVLDL 43 12/06/2019     No results found for: CHOLHDLRATIO      Cardiac Data:     Last EKG: Normal sinus rhythm with heart rate of 98 bpm, first-degree AV block, no ST-T wave changes. Echo: 06/01/21   Summary   Normal left ventricle systolic function with an estimated ejection fraction   of 55%. No regional wall motion abnormalities are seen. Normal left ventricular diastolic filling pressure. Mild calcification of the anterior leaflet tip of the mitral valve. Mild mitral and pulmonic regurgitation. Mild to moderate tricuspid regurgitation. Systolic pulmonary artery pressure (SPAP) is normal and estimated at 25 mmHg   (right atrial pressure 3 mmHg). Echo: 12/24/2017  Summary  Hyperdynamic LV systolic function with an estimated EF > 65%. Normal left ventricular diastolic filling pressure. Mild mitral annular calcification. Trace-mild tricuspid regurgitation. Systolic pulmonary artery pressure (SPAP) is estimated at 48mmHg consistent  with mild pulmonary hypertension (RA pressure 8mmHg). Additional studies:   BLE VL 5/11/2018  Impressions   Right Impression   1. The right ankle/brachial index is 1.04.   2. Pulse volume recording at the ankle is within normal limits. 3. Continuous wave Doppler of the dorsalis pedis and posterior tibial arteries   demonstrate normal multiphasic flow. 4. PPG's of the toes indicate good pulsatility and amplitude. 5. The toe/brachial index was performed and is abnormal measuring 0.54 (normal   value >0.64). Left Impression   1. The left ankle/brachial index is 1.02.   2. Pulse volume recording at the ankle is within normal limits. 3. Continuous wave Doppler of the dorsalis pedis and posterior tibial arteries   demonstrate normal multiphasic flow. 4. PPG's of the toes indicate good pulsatility and amplitude. 5. The toe/brachial index was performed and is normal measuring 0.71 (normal   value >0.64).     Conclusions        Summary        1. There is no arterial insufficiency at rest in the lower extremities    bilaterally.    2. There is some evidence of infra malleolar vessel disease of the right    lower extremity. Impression and Plan:      1. Bilateral lower extremity pitting edema-multifactorial with likely venous insufficiency and lymphedema contributing. No evidence of volume overload or congestive heart failure by history and echo does not support this diagnosis either. 2.  Hyperlipidemia  3. Hypertension-at goal    4. Obesity  5. Sedentary lifestyle  6. First-degree AV block    Patient Active Problem List   Diagnosis    Dermatitis    HLD (hyperlipidemia)    Vitamin D deficiency    Right-sided low back pain with right-sided sciatica    Bilateral edema of lower extremity    SVT (supraventricular tachycardia) (HCC)    Benign essential hypertension    ARDS (adult respiratory distress syndrome) (HCC)    Acute respiratory failure with hypoxia (HCC)    Subconjunctival hemorrhage of left eye    Sepsis ([facility] [address]    Acute hypoxemic respiratory failure (HCC)    Tracheobronchitis    Acute encephalopathy    Bronchospasm       PLAN:  1. Recommended to go back to Lasix 40 mg tablets, take once daily and assess tolerance. Recommended conservative measures of leg elevation while resting, low-salt diet. 2.  He is unable to tolerate compression stockings. As suspect element of lymphedema will refer to lymphedema clinic for wraps and assess his tolerance of this. 3.  To complete work-up we must assess for liver and kidney dysfunction, particularly nephrotic syndrome. We will obtain lab work today including basic metabolic profile, LFTs, urinalysis to assess for protein, INR to check synthetic liver function and thyroid studies. No medications is taking that could contribute to edema on my review. 4.  Should his urinalysis show proteinuria would encourage further work-up through PCP to exclude nephrotic syndrome - 24-hour urine collection vs protein/creat ratio    I will follow up results of lab work that he is obtaining today. We will plan to forward to [address] [place] [address],  for review as well.   No evidence of congestive heart failure by history or echo. Follow-up with our clinic as needed. Scribe's attestation: This note was scribed in the presence of Real Thompson by Raul Arvizu RN     The scribes documentation has been prepared under my direction and personally reviewed by me in its entirety. I confirm that the note above accurately reflects all work, treatment, procedures, and medical decision making performed by me. Stacey Taveras MD, personally performed the services described in this documentation as scribed by Raul Arvizu RN  in my presence, and it is both accurate and complete to the best of our ability. I will address the patient's cardiac risk factors and adjusted pharmacologic treatment as needed. In addition, I have reinforced the need for patient directed risk factor modification. All questions and concerns were addressed to the patient/family. Alternatives to my treatment were discussed. Thank you for allowing us to participate in the care of Priya Leong. Please call me with any questions [phone].     Hellen English MD, [facility]  Cardiovascular Disease  [address]  [phone] [facility]  [phone] [address] [place] [address]  10/1/2021 10:44 AM

## 2021-10-04 ENCOUNTER — TELEPHONE (OUTPATIENT)
Dept: CARDIOLOGY CLINIC | Age: 86
End: 2021-10-04

## 2021-10-04 NOTE — TELEPHONE ENCOUNTER
Attempted to call pt regarding results, left vm per HIPPA form containing results, also included thyroid results in vm

## 2021-10-04 NOTE — TELEPHONE ENCOUNTER
----- Message from Randall Pace MD sent at 10/1/2021  4:52 PM EDT -----  Please let the patient know his labs are normal.  Normal electrolytes, kidney function, liver function and urinalysis shows no protein. Nothing to explain his swelling. Thyroid testing is still pending and will update them when it returns.

## 2021-10-06 DIAGNOSIS — R60.0 BILATERAL EDEMA OF LOWER EXTREMITY: ICD-10-CM

## 2021-10-06 RX ORDER — FUROSEMIDE 40 MG/1
TABLET ORAL
Qty: 60 TABLET | Refills: 1 | Status: SHIPPED | OUTPATIENT
Start: 2021-10-06 | End: 2021-10-27 | Stop reason: SDUPTHER

## 2021-10-06 NOTE — TELEPHONE ENCOUNTER
Refill Request     Last Seen: Last Seen Department: 7/1/2021  Last Seen by PCP: 7/1/2021    Last Written: 4/19/2021    Next Appointment:   Future Appointments   Date Time Provider Marylu Lujan   11/18/2021  1:00 PM DO BEE Stacy [place] - [facility]       Future appointment scheduled      Requested Prescriptions     Pending Prescriptions Disp Refills    furosemide (LASIX) 40 MG tablet [Pharmacy Med Name: FUROSEMIDE 40 MG TABLET] 60 tablet 1     Sig: TAKE ONE TABLET BY MOUTH TWICE A DAY AS NEEDED

## 2021-10-27 DIAGNOSIS — R60.0 BILATERAL EDEMA OF LOWER EXTREMITY: ICD-10-CM

## 2021-10-27 RX ORDER — FUROSEMIDE 40 MG/1
TABLET ORAL
Qty: 180 TABLET | Refills: 0 | Status: SHIPPED | OUTPATIENT
Start: 2021-10-27 | End: 2022-08-25 | Stop reason: SDUPTHER

## 2022-02-25 ENCOUNTER — OFFICE VISIT (OUTPATIENT)
Dept: FAMILY MEDICINE CLINIC | Age: 87
End: 2022-02-25
Payer: MEDICARE

## 2022-02-25 VITALS
DIASTOLIC BLOOD PRESSURE: 70 MMHG | WEIGHT: 190 LBS | BODY MASS INDEX: 28.89 KG/M2 | HEART RATE: 88 BPM | SYSTOLIC BLOOD PRESSURE: 132 MMHG | OXYGEN SATURATION: 98 %

## 2022-02-25 DIAGNOSIS — E78.2 MIXED HYPERLIPIDEMIA: ICD-10-CM

## 2022-02-25 DIAGNOSIS — I10 ESSENTIAL HYPERTENSION: Primary | ICD-10-CM

## 2022-02-25 DIAGNOSIS — R60.0 BILATERAL EDEMA OF LOWER EXTREMITY: ICD-10-CM

## 2022-02-25 DIAGNOSIS — Z91.81 AT HIGH RISK FOR FALLS: ICD-10-CM

## 2022-02-25 DIAGNOSIS — E55.9 VITAMIN D DEFICIENCY: ICD-10-CM

## 2022-02-25 LAB
A/G RATIO: 1.8 (ref 1.1–2.2)
ALBUMIN SERPL-MCNC: 4.6 G/DL (ref 3.4–5)
ALP BLD-CCNC: 93 U/L (ref 40–129)
ALT SERPL-CCNC: 28 U/L (ref 10–40)
ANION GAP SERPL CALCULATED.3IONS-SCNC: 13 MMOL/L (ref 3–16)
AST SERPL-CCNC: 28 U/L (ref 15–37)
BASOPHILS ABSOLUTE: 0.1 K/UL (ref 0–0.2)
BASOPHILS RELATIVE PERCENT: 0.9 %
BILIRUB SERPL-MCNC: 0.7 MG/DL (ref 0–1)
BUN BLDV-MCNC: 13 MG/DL (ref 7–20)
CALCIUM SERPL-MCNC: 9.5 MG/DL (ref 8.3–10.6)
CHLORIDE BLD-SCNC: 100 MMOL/L (ref 99–110)
CHOLESTEROL, TOTAL: 231 MG/DL (ref 0–199)
CO2: 27 MMOL/L (ref 21–32)
CREAT SERPL-MCNC: 0.9 MG/DL (ref 0.8–1.3)
EOSINOPHILS ABSOLUTE: 0.2 K/UL (ref 0–0.6)
EOSINOPHILS RELATIVE PERCENT: 1.9 %
GFR AFRICAN AMERICAN: >60
GFR NON-AFRICAN AMERICAN: >60
GLUCOSE BLD-MCNC: 92 MG/DL (ref 70–99)
HCT VFR BLD CALC: 52.4 % (ref 40.5–52.5)
HDLC SERPL-MCNC: 46 MG/DL (ref 40–60)
HEMOGLOBIN: 17.4 G/DL (ref 13.5–17.5)
LDL CHOLESTEROL CALCULATED: 149 MG/DL
LYMPHOCYTES ABSOLUTE: 1.4 K/UL (ref 1–5.1)
LYMPHOCYTES RELATIVE PERCENT: 16.6 %
MCH RBC QN AUTO: 28.2 PG (ref 26–34)
MCHC RBC AUTO-ENTMCNC: 33.2 G/DL (ref 31–36)
MCV RBC AUTO: 84.8 FL (ref 80–100)
MONOCYTES ABSOLUTE: 0.7 K/UL (ref 0–1.3)
MONOCYTES RELATIVE PERCENT: 8.8 %
NEUTROPHILS ABSOLUTE: 5.9 K/UL (ref 1.7–7.7)
NEUTROPHILS RELATIVE PERCENT: 71.8 %
PDW BLD-RTO: 14.3 % (ref 12.4–15.4)
PLATELET # BLD: 242 K/UL (ref 135–450)
PMV BLD AUTO: 8.4 FL (ref 5–10.5)
POTASSIUM SERPL-SCNC: 4.3 MMOL/L (ref 3.5–5.1)
RBC # BLD: 6.18 M/UL (ref 4.2–5.9)
SODIUM BLD-SCNC: 140 MMOL/L (ref 136–145)
TOTAL PROTEIN: 7.2 G/DL (ref 6.4–8.2)
TRIGL SERPL-MCNC: 179 MG/DL (ref 0–150)
VLDLC SERPL CALC-MCNC: 36 MG/DL
WBC # BLD: 8.2 K/UL (ref 4–11)

## 2022-02-25 PROCEDURE — 99214 OFFICE O/P EST MOD 30 MIN: CPT | Performed by: FAMILY MEDICINE

## 2022-02-25 RX ORDER — METOPROLOL SUCCINATE 25 MG/1
TABLET, EXTENDED RELEASE ORAL
Qty: 90 TABLET | Refills: 1 | Status: SHIPPED | OUTPATIENT
Start: 2022-02-25 | End: 2022-08-25 | Stop reason: SDUPTHER

## 2022-02-25 RX ORDER — PRAVASTATIN SODIUM 20 MG
20 TABLET ORAL EVERY OTHER DAY
Qty: 90 TABLET | Refills: 1 | Status: SHIPPED | OUTPATIENT
Start: 2022-02-25 | End: 2022-08-25 | Stop reason: SDUPTHER

## 2022-02-25 NOTE — PROGRESS NOTES
Kasia Sanchez is a 82 y.o. male    Chief Complaint   Patient presents with    Hypertension    Hyperlipidemia    Leg Swelling       HPI:    HPI    Hypertension  This is a chronic problem. The current episode started more than 1 year ago. The problem is unchanged. The problem is controlled. Past treatments include diuretics and beta blockers. The current treatment provides significant improvement. There are no compliance problems. There is no history of CAD/MI or CVA. Hyperlipidemia  This is a chronic problem. The current episode started more than 1 year ago. The problem is uncontrolled. Recent lipid tests were reviewed and are low. He has no history of diabetes. Pertinent negatives include no myalgias. Current antihyperlipidemic treatment includes statins. The current treatment provides moderate improvement of lipids. There are no compliance problems. Risk factors for coronary artery disease include male sex. Lower extremity edema. This is a chronic condition. The patient takes Lasix 20 mg a day which does help but causes frequent urination. He takes potassium along with it. Recent potassium was normal.  No heart failure. His echo did not show any heart failure in 2021. No history of DVT    ROS:    Review of Systems   Cardiovascular: Positive for leg swelling. Musculoskeletal: Negative for myalgias. /70   Pulse 88   Wt 190 lb (86.2 kg)   SpO2 98%   BMI 28.89 kg/m²     Physical Exam:    Physical Exam  Constitutional:       General: He is not in acute distress. Appearance: Normal appearance. He is normal weight. He is not ill-appearing or toxic-appearing. HENT:      Head: Normocephalic. Neurological:      Mental Status: He is alert. Psychiatric:         Mood and Affect: Mood normal.         Speech: Speech normal.         Behavior: Behavior normal.         Thought Content:  Thought content normal.         Current Outpatient Medications   Medication Sig Dispense Refill    metoprolol succinate (TOPROL XL) 25 MG extended release tablet TAKE ONE TABLET BY MOUTH DAILY 90 tablet 1    pravastatin (PRAVACHOL) 20 MG tablet Take 1 tablet by mouth every other day 90 tablet 1    furosemide (LASIX) 40 MG tablet TAKE ONE TABLET BY MOUTH TWICE A DAY AS NEEDED 180 tablet 0    potassium chloride (KLOR-CON M) 10 MEQ extended release tablet TAKE ONE TABLET BY MOUTH TWICE A DAY (Patient taking differently: 10 mEq daily ) 90 tablet 1    Handicap Placard MISC by Does not apply route Length: 5 years 1 each 0    Misc. Devices (ROLLATOR ULTRA-LIGHT) MISC Walker with 4 wheels and a seat. Use as directed. 1 each 0    Multiple Vitamins-Minerals (THERAPEUTIC MULTIVITAMIN-MINERALS) tablet Take 1 tablet by mouth daily       No current facility-administered medications for this visit. Assessment:    1. Essential hypertension    2. Mixed hyperlipidemia    3. Bilateral edema of lower extremity    4. Vitamin D deficiency    5. At high risk for falls        Plan:    1. Essential hypertension  Stable. Continue current medications. 2. Mixed hyperlipidemia  Stable. Continue current medications. 3. Bilateral edema of lower extremity  Doing well with prn Lasix 20 mg per day. Stay with the current dose of potassium. 4. Vitamin D deficiency   Recheck levels    Follow up in 6 months for htn, hld, edema    On the basis of positive falls risk screening, assessment and plan is as follows: patient declines any further evaluation/treatment for increased falls risk.

## 2022-02-26 LAB
FOLATE: >20 NG/ML (ref 4.78–24.2)
VITAMIN B-12: 1151 PG/ML (ref 211–911)
VITAMIN D 25-HYDROXY: 28.4 NG/ML

## 2022-04-15 ENCOUNTER — APPOINTMENT (OUTPATIENT)
Dept: CT IMAGING | Age: 87
End: 2022-04-15
Payer: MEDICARE

## 2022-04-15 ENCOUNTER — APPOINTMENT (OUTPATIENT)
Dept: GENERAL RADIOLOGY | Age: 87
End: 2022-04-15
Payer: MEDICARE

## 2022-04-15 ENCOUNTER — HOSPITAL ENCOUNTER (EMERGENCY)
Age: 87
Discharge: HOME | End: 2022-04-16
Attending: EMERGENCY MEDICINE
Payer: MEDICARE
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
## 2022-04-15 DIAGNOSIS — S09.90XA CLOSED HEAD INJURY, INITIAL ENCOUNTER: Primary | ICD-10-CM

## 2022-04-15 DIAGNOSIS — S50.311A ABRASION OF RIGHT ELBOW, INITIAL ENCOUNTER: ICD-10-CM

## 2022-04-15 DIAGNOSIS — R03.0 ELEVATED BLOOD PRESSURE READING: ICD-10-CM

## 2022-04-15 DIAGNOSIS — S00.83XA CONTUSION OF FOREHEAD, INITIAL ENCOUNTER: ICD-10-CM

## 2022-04-15 LAB
A/G RATIO: 2 (ref 1.1–2.2)
ALBUMIN SERPL-MCNC: 4.5 G/DL (ref 3.4–5)
ALP BLD-CCNC: 94 U/L (ref 40–129)
ALT SERPL-CCNC: 29 U/L (ref 10–40)
ANION GAP SERPL CALCULATED.3IONS-SCNC: 11 MMOL/L (ref 3–16)
AST SERPL-CCNC: 25 U/L (ref 15–37)
BASOPHILS ABSOLUTE: 0.1 K/UL (ref 0–0.2)
BASOPHILS RELATIVE PERCENT: 0.5 %
BILIRUB SERPL-MCNC: 0.5 MG/DL (ref 0–1)
BILIRUBIN URINE: NEGATIVE
BLOOD, URINE: ABNORMAL
BUN BLDV-MCNC: 11 MG/DL (ref 7–20)
CALCIUM SERPL-MCNC: 9.8 MG/DL (ref 8.3–10.6)
CHLORIDE BLD-SCNC: 103 MMOL/L (ref 99–110)
CLARITY: CLEAR
CO2: 28 MMOL/L (ref 21–32)
COLOR: YELLOW
CREAT SERPL-MCNC: 0.8 MG/DL (ref 0.8–1.3)
EOSINOPHILS ABSOLUTE: 0.1 K/UL (ref 0–0.6)
EOSINOPHILS RELATIVE PERCENT: 0.6 %
GFR AFRICAN AMERICAN: >60
GFR NON-AFRICAN AMERICAN: >60
GLUCOSE BLD-MCNC: 97 MG/DL (ref 70–99)
GLUCOSE URINE: NEGATIVE MG/DL
HCT VFR BLD CALC: 50.1 % (ref 40.5–52.5)
HEMOGLOBIN: 16.9 G/DL (ref 13.5–17.5)
KETONES, URINE: NEGATIVE MG/DL
LACTIC ACID, SEPSIS: 1.4 MMOL/L (ref 0.4–1.9)
LEUKOCYTE ESTERASE, URINE: NEGATIVE
LYMPHOCYTES ABSOLUTE: 1.6 K/UL (ref 1–5.1)
LYMPHOCYTES RELATIVE PERCENT: 11 %
MCH RBC QN AUTO: 28.2 PG (ref 26–34)
MCHC RBC AUTO-ENTMCNC: 33.8 G/DL (ref 31–36)
MCV RBC AUTO: 83.5 FL (ref 80–100)
MICROSCOPIC EXAMINATION: YES
MONOCYTES ABSOLUTE: 1.1 K/UL (ref 0–1.3)
MONOCYTES RELATIVE PERCENT: 7.7 %
NEUTROPHILS ABSOLUTE: 11.3 K/UL (ref 1.7–7.7)
NEUTROPHILS RELATIVE PERCENT: 80.2 %
NITRITE, URINE: NEGATIVE
PDW BLD-RTO: 14.5 % (ref 12.4–15.4)
PH UA: 6 (ref 5–8)
PLATELET # BLD: 315 K/UL (ref 135–450)
PMV BLD AUTO: 6.7 FL (ref 5–10.5)
POTASSIUM REFLEX MAGNESIUM: 3.9 MMOL/L (ref 3.5–5.1)
PROTEIN UA: NEGATIVE MG/DL
RBC # BLD: 5.99 M/UL (ref 4.2–5.9)
RBC UA: NORMAL /HPF (ref 0–4)
SODIUM BLD-SCNC: 142 MMOL/L (ref 136–145)
SPECIFIC GRAVITY UA: <=1.005 (ref 1–1.03)
TOTAL PROTEIN: 6.8 G/DL (ref 6.4–8.2)
TROPONIN: <0.01 NG/ML
URINE REFLEX TO CULTURE: ABNORMAL
URINE TYPE: ABNORMAL
UROBILINOGEN, URINE: 0.2 E.U./DL
WBC # BLD: 14.1 K/UL (ref 4–11)
WBC UA: NORMAL /HPF (ref 0–5)

## 2022-04-15 PROCEDURE — 6360000002 HC RX W HCPCS: Performed by: EMERGENCY MEDICINE

## 2022-04-15 PROCEDURE — 90471 IMMUNIZATION ADMIN: CPT | Performed by: EMERGENCY MEDICINE

## 2022-04-15 PROCEDURE — 81001 URINALYSIS AUTO W/SCOPE: CPT

## 2022-04-15 PROCEDURE — 80053 COMPREHEN METABOLIC PANEL: CPT

## 2022-04-15 PROCEDURE — 85025 COMPLETE CBC W/AUTO DIFF WBC: CPT

## 2022-04-15 PROCEDURE — 84484 ASSAY OF TROPONIN QUANT: CPT

## 2022-04-15 PROCEDURE — 99284 EMERGENCY DEPT VISIT MOD MDM: CPT

## 2022-04-15 PROCEDURE — 83605 ASSAY OF LACTIC ACID: CPT

## 2022-04-15 PROCEDURE — 72125 CT NECK SPINE W/O DYE: CPT

## 2022-04-15 PROCEDURE — 90715 TDAP VACCINE 7 YRS/> IM: CPT | Performed by: EMERGENCY MEDICINE

## 2022-04-15 PROCEDURE — 36415 COLL VENOUS BLD VENIPUNCTURE: CPT

## 2022-04-15 PROCEDURE — 93005 ELECTROCARDIOGRAM TRACING: CPT | Performed by: EMERGENCY MEDICINE

## 2022-04-15 PROCEDURE — 6370000000 HC RX 637 (ALT 250 FOR IP): Performed by: EMERGENCY MEDICINE

## 2022-04-15 PROCEDURE — 70450 CT HEAD/BRAIN W/O DYE: CPT

## 2022-04-15 PROCEDURE — 71045 X-RAY EXAM CHEST 1 VIEW: CPT

## 2022-04-15 RX ORDER — METOPROLOL SUCCINATE 25 MG/1
25 TABLET, EXTENDED RELEASE ORAL ONCE
Status: COMPLETED | OUTPATIENT
Start: 2022-04-15 | End: 2022-04-15

## 2022-04-15 RX ORDER — ACETAMINOPHEN 325 MG/1
650 TABLET ORAL ONCE
Status: COMPLETED | OUTPATIENT
Start: 2022-04-15 | End: 2022-04-15

## 2022-04-15 RX ORDER — MULTIVIT-MIN/IRON/FOLIC ACID/K 18-600-40
50 CAPSULE ORAL DAILY
COMMUNITY

## 2022-04-15 RX ADMIN — TETANUS TOXOID, REDUCED DIPHTHERIA TOXOID AND ACELLULAR PERTUSSIS VACCINE, ADSORBED 0.5 ML: 5; 2.5; 8; 8; 2.5 SUSPENSION INTRAMUSCULAR at 23:22

## 2022-04-15 RX ADMIN — METOPROLOL SUCCINATE 25 MG: 25 TABLET, EXTENDED RELEASE ORAL at 23:22

## 2022-04-15 RX ADMIN — ACETAMINOPHEN 650 MG: 325 TABLET ORAL at 23:22

## 2022-04-15 ASSESSMENT — PAIN DESCRIPTION - ORIENTATION: ORIENTATION: RIGHT

## 2022-04-15 ASSESSMENT — PAIN DESCRIPTION - PAIN TYPE: TYPE: ACUTE PAIN

## 2022-04-15 ASSESSMENT — PAIN DESCRIPTION - LOCATION: LOCATION: HEAD

## 2022-04-15 ASSESSMENT — PAIN - FUNCTIONAL ASSESSMENT: PAIN_FUNCTIONAL_ASSESSMENT: 0-10

## 2022-04-15 ASSESSMENT — PAIN SCALES - GENERAL
PAINLEVEL_OUTOF10: 0
PAINLEVEL_OUTOF10: 7

## 2022-04-16 VITALS
BODY MASS INDEX: 27.2 KG/M2 | HEIGHT: 70 IN | OXYGEN SATURATION: 100 % | DIASTOLIC BLOOD PRESSURE: 107 MMHG | TEMPERATURE: 97.9 F | WEIGHT: 190 LBS | RESPIRATION RATE: 18 BRPM | HEART RATE: 99 BPM | SYSTOLIC BLOOD PRESSURE: 171 MMHG

## 2022-04-16 LAB
EKG ATRIAL RATE: 106 BPM
EKG DIAGNOSIS: NORMAL
EKG P AXIS: 68 DEGREES
EKG P-R INTERVAL: 202 MS
EKG Q-T INTERVAL: 342 MS
EKG QRS DURATION: 74 MS
EKG QTC CALCULATION (BAZETT): 454 MS
EKG R AXIS: -11 DEGREES
EKG T AXIS: 58 DEGREES
EKG VENTRICULAR RATE: 106 BPM

## 2022-04-16 PROCEDURE — 93010 ELECTROCARDIOGRAM REPORT: CPT | Performed by: INTERNAL MEDICINE

## 2022-04-16 NOTE — PROGRESS NOTES
Dressing placed on head and right elbow wound at this time. Discharge instructions reviewed with patient and his wife. Voices understanding.    Electronically signed by Shantel Osorio RN on 4/16/2022 at 12:39 AM

## 2022-04-16 NOTE — ED NOTES
Head wound, and right elbow wound cleansed with chlorhexidine and saline at this time.        Brain ASHLEY Macdonald  04/15/22 0230

## 2022-04-17 NOTE — ED PROVIDER NOTES
[address] ED      CHIEF COMPLAINT  Fall (patient arrived to ED with wife after falling out of chair today around 1930. Pt lost balance when trying to stand up and hit his head on baseboard. No LOC. Large contusion to forehead. Skin tear right elbow. Pt not on blood thinners. )       HISTORY OF PRESENT ILLNESS  Pravin Evangelista is a 82 y.o. male  who presents to the ED complaining of head injury after a fall. Patient states that his from a swivel chair, when he lost his balance and hit his head on a wooden baseboard. He is not on blood thinners. He did not have LOC. He states that he transiently lost his balance but denies any dizziness, chest pain or palpitations prior to the fall. He states he felt fine today, is not had any recent illness such as fevers or abdominal pain. He denies any chest pain or shortness of breath. He is not up-to-date on tetanus. No other complaints, modifying factors or associated symptoms. I have reviewed the following from the nursing documentation. Past Medical History:   Diagnosis Date    Arthritis     Hyperlipidemia     Influenza A 12/25/2017    Right-sided low back pain with right-sided sciatica      Past Surgical History:   Procedure Laterality Date    CATARACT REMOVAL      INGUINAL HERNIA REPAIR Bilateral 2-21-11    [id number] Deleon MD    TONSILLECTOMY      TOOTH EXTRACTION       History reviewed. No pertinent family history.   Social History     Socioeconomic History    Marital status:      Spouse name: Marely Kraft Number of children: 3    Years of education: 12    Highest education level: Not on file   Occupational History    Occupation: retired   Tobacco Use    Smoking status: Former Smoker     Packs/day: 1.00     Years: 15.00     Pack years: 15.00    Smokeless tobacco: Never Used   Vaping Use    Vaping Use: Never used   Substance and Sexual Activity    Alcohol use: No     Alcohol/week: 0.0 standard drinks    Drug use: No    Sexual activity: Yes     Partners: Female   Other Topics Concern    Not on file   Social History Narrative    Not on file     Social Determinants of Health     Financial Resource Strain: Low Risk     Difficulty of Paying Living Expenses: Not hard at all   Food Insecurity: No Food Insecurity    Worried About Running Out of Food in the Last Year: Never true    [address] in the Last Year: Never true   Transportation Needs: No Transportation Needs    Lack of Transportation (Medical): No    Lack of Transportation (Non-Medical): No   Physical Activity:     Days of Exercise per Week: Not on file    Minutes of Exercise per Session: Not on file   Stress:     Feeling of Stress : Not on file   Social Connections:     Frequency of Communication with Friends and Family: Not on file    Frequency of Social Gatherings with Friends and Family: Not on file    Attends Spiritism Services: Not on file    Active Member of [address] [place] [address] or Organizations: Not on file    Attends Club or Organization Meetings: Not on file    Marital Status: Not on file   Intimate Partner Violence:     Fear of Current or Ex-Partner: Not on file    Emotionally Abused: Not on file    Physically Abused: Not on file    Sexually Abused: Not on file   Housing Stability:     Unable to Pay for Housing in the Last Year: Not on file    Number of [place] in the Last Year: Not on file    Unstable Housing in the Last Year: Not on file     No current facility-administered medications for this encounter.      Current Outpatient Medications   Medication Sig Dispense Refill    Cholecalciferol (VITAMIN D) 50 MCG (2000 UT) CAPS capsule Take 50 Units by mouth daily      metoprolol succinate (TOPROL XL) 25 MG extended release tablet TAKE ONE TABLET BY MOUTH DAILY 90 tablet 1    pravastatin (PRAVACHOL) 20 MG tablet Take 1 tablet by mouth every other day 90 tablet 1    furosemide (LASIX) 40 MG tablet TAKE ONE TABLET BY MOUTH TWICE A DAY AS NEEDED 180 tablet 0    potassium chloride (KLOR-CON M) 10 MEQ extended release tablet TAKE ONE TABLET BY MOUTH TWICE A DAY (Patient taking differently: 10 mEq daily ) 90 tablet 1    Handicap Placard MISC by Does not apply route Length: 5 years 1 each 0    Misc. Devices (ROLLATOR ULTRA-LIGHT) MISC Walker with 4 wheels and a seat. Use as directed. 1 each 0    Multiple Vitamins-Minerals (THERAPEUTIC MULTIVITAMIN-MINERALS) tablet Take 1 tablet by mouth daily       Allergies   Allergen Reactions    Contrast [Iodides] Other (See Comments)     Pt went into SVT.  Zocor [Simvastatin] Nausea Only     GI       REVIEW OF SYSTEMS  10 systems reviewed, pertinent positives per HPI otherwise noted to be negative. PHYSICAL EXAM  BP (!) 171/107   Pulse 99   Temp 97.9 °F (36.6 °C) (Oral)   Resp 18   Ht 5' 10\" (1.778 m)   Wt 190 lb (86.2 kg)   SpO2 100%   BMI 27.26 kg/m²    Physical exam:  General appearance: awake and cooperative. no distress. Non toxic appearing. Skin: Warm and dry. No rashes or lesions. HENT: Normocephalic. Contusion to right forehead with bleeding abrasion. No hemotympanum. Negative flaherty's sign. No raccoon eyes. No nasal septal hematoma. No oropharyngeal bleeding. Mucus membranes are moist. Midface stable. Neck: supple. No C spine tenderness midline. Eyes: DEVYN. EOM intact. Heart: RRR. No murmurs. Lungs: Respirations unlabored. CTAB. No wheezes, rales, or rhonchi. Good air exchange  Abdomen: No tenderness. Soft. Non distended. No peritoneal signs. Musculoskeletal: Skin tear present to right elbow without bony tenderness or decreased range of motion. No T/L spine tenderness midline. No step offs. Extremities atraumatic. No extremity edema. Compartments soft. No deformity. No tenderness in the extremities. All extremities neurovascularly intact. Radial, Dp, and PT pulses +2/4 bilaterally  Neurological: Alert and oriented. Strength 5/5 in UE and LE bilaterally.  Sensation intact x 4. No aphasia or dysarthria. CN 2-12 intact. No facial droop. No limb or gait ataxia. Psychiatric: Normal mood and affect. LABS  I have reviewed all labs for this visit.    Results for orders placed or performed during the hospital encounter of 04/15/22   CBC with Auto Differential   Result Value Ref Range    WBC 14.1 (H) 4.0 - 11.0 K/uL    RBC 5.99 (H) 4.20 - 5.90 M/uL    Hemoglobin 16.9 13.5 - 17.5 g/dL    Hematocrit 50.1 40.5 - 52.5 %    MCV 83.5 80.0 - 100.0 fL    MCH 28.2 26.0 - 34.0 pg    MCHC 33.8 31.0 - 36.0 g/dL    RDW 14.5 12.4 - 15.4 %    Platelets 472 699 - 094 K/uL    MPV 6.7 5.0 - 10.5 fL    Neutrophils % 80.2 %    Lymphocytes % 11.0 %    Monocytes % 7.7 %    Eosinophils % 0.6 %    Basophils % 0.5 %    Neutrophils Absolute 11.3 (H) 1.7 - 7.7 K/uL    Lymphocytes Absolute 1.6 1.0 - 5.1 K/uL    Monocytes Absolute 1.1 0.0 - 1.3 K/uL    Eosinophils Absolute 0.1 0.0 - 0.6 K/uL    Basophils Absolute 0.1 0.0 - 0.2 K/uL   Comprehensive Metabolic Panel w/ Reflex to MG   Result Value Ref Range    Sodium 142 136 - 145 mmol/L    Potassium reflex Magnesium 3.9 3.5 - 5.1 mmol/L    Chloride 103 99 - 110 mmol/L    CO2 28 21 - 32 mmol/L    Anion Gap 11 3 - 16    Glucose 97 70 - 99 mg/dL    BUN 11 7 - 20 mg/dL    CREATININE 0.8 0.8 - 1.3 mg/dL    GFR Non-African American >60 >60    GFR African American >60 >60    Calcium 9.8 8.3 - 10.6 mg/dL    Total Protein 6.8 6.4 - 8.2 g/dL    Albumin 4.5 3.4 - 5.0 g/dL    Albumin/Globulin Ratio 2.0 1.1 - 2.2    Total Bilirubin 0.5 0.0 - 1.0 mg/dL    Alkaline Phosphatase 94 40 - 129 U/L    ALT 29 10 - 40 U/L    AST 25 15 - 37 U/L   Troponin   Result Value Ref Range    Troponin <0.01 <0.01 ng/mL   Urinalysis with Reflex to Culture    Specimen: Urine   Result Value Ref Range    Color, UA Yellow Straw/Yellow    Clarity, UA Clear Clear    Glucose, Ur Negative Negative mg/dL    Bilirubin Urine Negative Negative    Ketones, Urine Negative Negative mg/dL    Specific Anderson, UA <=1.005 1.005 - 1.030    Blood, Urine TRACE-INTACT (A) Negative    pH, UA 6.0 5.0 - 8.0    Protein, UA Negative Negative mg/dL    Urobilinogen, Urine 0.2 <2.0 E.U./dL    Nitrite, Urine Negative Negative    Leukocyte Esterase, Urine Negative Negative    Microscopic Examination YES     Urine Type NotGiven     Urine Reflex to Culture Not Indicated    Microscopic Urinalysis   Result Value Ref Range    WBC, UA 0-2 0 - 5 /HPF    RBC, UA 0-2 0 - 4 /HPF   Lactate, Sepsis   Result Value Ref Range    Lactic Acid, Sepsis 1.4 0.4 - 1.9 mmol/L   EKG 12 Lead   Result Value Ref Range    Ventricular Rate 106 BPM    Atrial Rate 106 BPM    P-R Interval 202 ms    QRS Duration 74 ms    Q-T Interval 342 ms    QTc Calculation (Bazett) 454 ms    P Axis 68 degrees    R Axis -11 degrees    T Axis 58 degrees    Diagnosis       Baseline artifactSinus tachycardiaOtherwise normal ECGConfirmed by Radha Newton MD, Viviana Bartholomew ([id number]) on 4/16/2022 3:17:26 PM       ECG  The Ekg interpreted by me shows  sinus tachycardia, [id number]   Axis is   Normal  QTc is  within an acceptable range  Intervals and Durations are unremarkable. ST Segments: normal      RADIOLOGY  CT Head WO Contrast    Result Date: 4/15/2022  EXAMINATION: CT OF THE HEAD WITHOUT CONTRAST; CT OF THE CERVICAL SPINE WITHOUT CONTRAST 4/15/2022 9:35 pm TECHNIQUE: CT of the head was performed without the administration of intravenous contrast. Dose modulation, iterative reconstruction, and/or weight based adjustment of the mA/kV was utilized to reduce the radiation dose to as low as reasonably achievable.; CT of the cervical spine was performed without the administration of intravenous contrast. Multiplanar reformatted images are provided for review. Dose modulation, iterative reconstruction, and/or weight based adjustment of the mA/kV was utilized to reduce the radiation dose to as low as reasonably achievable. COMPARISON: CT head without contrast, 06/30/2019.  HISTORY: ORDERING SYSTEM PROVIDED HISTORY: head injury TECHNOLOGIST PROVIDED HISTORY: Reason for exam:->head injury Has a \"code stroke\" or \"stroke alert\" been called? ->No Decision Support Exception - unselect if not a suspected or confirmed emergency medical condition->Emergency Medical Condition (MA) Reason for Exam: fall, head inj, lump/lac to right side, unknown LOC FINDINGS: CT OF THE BRAIN: BRAIN/VENTRICLES: No mass effect, edema or hemorrhage is seen. Moderate volume loss is seen in the cerebrum with moderate chronic microvascular ischemic changes. No hydrocephalus or extra-axial fluid is seen. ORBITS: The visualized portion of the orbits demonstrate no acute abnormality. SINUSES: The visualized paranasal sinuses and mastoid air cells demonstrate no acute abnormality. SOFT TISSUES/SKULL: The calvarium is intact without fracture or focal lesion. Right anterior scalp hematoma. CT CERVICAL SPINE: BONES/ALIGNMENT: There is no acute fracture or traumatic malalignment. DEGENERATIVE CHANGES: Severe loss of disc heights at C3-4, C5-6 and C6-7. Moderate loss of disc heights at other levels. Disc osteophyte complexes result in at least mild central canal stenoses at C4-5, C5-6 and C6-7. Moderate and severe neural foraminal stenoses noted at multiple levels, worse on the left. SOFT TISSUES: There is no prevertebral soft tissue swelling. CT HEAD WITHOUT CONTRAST: 1. No skull fracture or acute intracranial abnormality. 2. Right anterior scalp hematoma. CT CERVICAL SPINE WITHOUT CONTRAST: 1. No fracture or joint dislocation is seen. 2. Degenerative changes, as described.  RECOMMENDATIONS: Unavailable     CT Cervical Spine WO Contrast    Result Date: 4/15/2022  EXAMINATION: CT OF THE HEAD WITHOUT CONTRAST; CT OF THE CERVICAL SPINE WITHOUT CONTRAST 4/15/2022 9:35 pm TECHNIQUE: CT of the head was performed without the administration of intravenous contrast. Dose modulation, iterative reconstruction, and/or weight based adjustment of the mA/kV CONTRAST: 1. No fracture or joint dislocation is seen. 2. Degenerative changes, as described. RECOMMENDATIONS: Unavailable     XR CHEST PORTABLE    Result Date: 4/15/2022  EXAMINATION: ONE XRAY VIEW OF THE CHEST 4/15/2022 10:53 pm COMPARISON: 08/09/2019 HISTORY: ORDERING SYSTEM PROVIDED HISTORY: fall TECHNOLOGIST PROVIDED HISTORY: Reason for exam:->fall Reason for Exam: fall FINDINGS: The heart is normal.  The pulmonary vessels are normal.  The lungs are mildly hyperinflated with mild subsegmental linear densities scattered along the lung bases which is more prominent. No effusion or consolidation is seen. Mild subsegmental linear atelectasis vs scarring along the lung bases which is more prominent. ED COURSE/MDM  Patient seen and evaluated. Old records reviewed. Labs and imaging reviewed and results discussed with patient. This is an 21-year-old male presenting for evaluation of head injury after a fall. He had transient loss of balance when standing on a moving swivel chair. He was little tachycardic 134 on arrival, heart rate did improve his stay. He is also hypertensive, denies chest pain or headache. He is nontoxic on exam.  He states he feels well denies any symptoms at time of evaluation. Head CT and cervical spine CT negative for acute traumatic process he does have the forehead contusion and abrasions cleaned, no occasion for sutures or intervention as they are associated laceration. His tetanus is updated. Given the fact he was tachycardic. Obtain blood work. Did have a leukocytosis of 14.1, the etiology of this is not entirely clear because there was no source of infection found. The remainder of his work-up is reassuring he does not have a lactic acidosis no evidence of an ACS or dehydration. The patient states he feels well, his tachycardia resolved he was given his nightly Toprol XL dose. He states he is ready to go home, is ambulating without difficulty.   I spoke with he and his wife regarding strict return precautions back to the ED and they voiced understanding. During the patient's ED course, the patient was given:  Medications   Tetanus-Diphth-Acell Pertussis (BOOSTRIX) injection 0.5 mL (0.5 mLs IntraMUSCular Given 4/15/22 2322)   acetaminophen (TYLENOL) tablet 650 mg (650 mg Oral Given 4/15/22 2322)   metoprolol succinate (TOPROL XL) extended release tablet 25 mg (25 mg Oral Given 4/15/22 2322)        CLINICAL IMPRESSION  1. Closed head injury, initial encounter    2. Contusion of forehead, initial encounter    3. Elevated blood pressure reading    4. Fall, initial encounter    5. Abrasion of right elbow, initial encounter        Blood pressure (!) 171/107, pulse 99, temperature 97.9 °F (36.6 °C), temperature source Oral, resp. rate 18, height 5' 10\" (1.778 m), weight 190 lb (86.2 kg), SpO2 100 %. Patient was given scripts for the following medications. I counseled patient how to take these medications. Discharge Medication List as of 4/16/2022 12:51 AM          Follow-up with:  [address] [facility], [place] [id number] [phone]    Call in 2 days        DISCLAIMER: This chart was created using Dragon dictation software. Efforts were made by me to ensure accuracy, however some errors may be present due to limitations of this technology and occasionally words are not transcribed correctly.      [facility], [address] [place] [address]  04/17/22 2816

## 2022-04-21 ENCOUNTER — TELEPHONE (OUTPATIENT)
Dept: FAMILY MEDICINE CLINIC | Age: 87
End: 2022-04-21

## 2022-04-21 NOTE — TELEPHONE ENCOUNTER
Please advise if can use a provider fill to be seen since ED encounter was a week ago? Or is it okay to wait for next available ED/Hospital follow up slot next Friday 4/29?

## 2022-04-21 NOTE — TELEPHONE ENCOUNTER
----- Message from Tavon Cotton sent at 4/20/2022  3:12 PM EDT -----  Subject: Appointment Request    Reason for Call: Routine ED Follow Up Visit    QUESTIONS  Type of Appointment? Established Patient  Reason for appointment request? No appointments available during search  Additional Information for Provider? Patient was seen at [facility] ER   for a fall on 4/16. He needs a follow up appt.  ---------------------------------------------------------------------------  --------------  CALL BACK INFO  What is the best way for the office to contact you? OK to leave message on   voicemail  Preferred Call Back Phone Number? [phone]  ---------------------------------------------------------------------------  --------------  SCRIPT ANSWERS  Relationship to Patient? Other  Representative Name? Edeltraud  Additional information verified (besides Name and Date of Birth)? Phone   Number  (Patient requests to see provider urgently. )? No  Do you have any questions for your primary care provider that need to be   answered prior to your appointment? No  Have you been diagnosed with, awaiting test results for, or told that you   are suspected of having COVID-19 (Coronavirus)? (If patient has tested   negative or was tested as a requirement for work, school, or travel and   not based on symptoms, answer no)? No  Within the past 10 days have you developed any of the following symptoms   (answer no if symptoms have been present longer than 10 days or began   more than 10 days ago)? Fever or Chills, Cough, Shortness of breath or   difficulty breathing, Loss of taste or smell, Sore throat, Nasal   congestion, Sneezing or runny nose, Fatigue or generalized body aches   (answer no if pain is specific to a body part e.g. back pain), Diarrhea,   Headache? No  Have you had close contact with someone with COVID-19 in the last 7 days? No  (Service Expert  click yes below to proceed with [employer] As Usual   Scheduling)?  Yes

## 2022-04-28 ENCOUNTER — TELEPHONE (OUTPATIENT)
Dept: FAMILY MEDICINE CLINIC | Age: 87
End: 2022-04-28

## 2022-04-28 NOTE — TELEPHONE ENCOUNTER
Patient wife Waqas Later called to let Dr. Willie Castellanos know that Krzysztof Mas is home from the hospital and is doing good no concerns at this time. She would like to know if you still need to see him for a hospital follow up? If so is there a slot in your schedule you would like to put him in? There is no hospital follow ups available with you for a while.

## 2022-04-29 NOTE — TELEPHONE ENCOUNTER
If he is feeling fine, then no follow-up visit is needed. I reviewed his CTs and they looked unremarkable.

## 2022-05-02 NOTE — TELEPHONE ENCOUNTER
Spoke with wife and relayed message, she is requesting his current appointment that's scheduled be cancelled.

## 2022-08-25 ENCOUNTER — OFFICE VISIT (OUTPATIENT)
Dept: FAMILY MEDICINE CLINIC | Age: 87
End: 2022-08-25
Payer: MEDICARE

## 2022-08-25 VITALS
DIASTOLIC BLOOD PRESSURE: 68 MMHG | HEART RATE: 80 BPM | HEIGHT: 70 IN | WEIGHT: 194.8 LBS | OXYGEN SATURATION: 96 % | SYSTOLIC BLOOD PRESSURE: 122 MMHG | BODY MASS INDEX: 27.89 KG/M2

## 2022-08-25 VITALS
HEART RATE: 80 BPM | DIASTOLIC BLOOD PRESSURE: 68 MMHG | HEIGHT: 70 IN | BODY MASS INDEX: 27.77 KG/M2 | WEIGHT: 194 LBS | OXYGEN SATURATION: 96 % | SYSTOLIC BLOOD PRESSURE: 122 MMHG

## 2022-08-25 DIAGNOSIS — R60.0 BILATERAL EDEMA OF LOWER EXTREMITY: ICD-10-CM

## 2022-08-25 DIAGNOSIS — I10 ESSENTIAL HYPERTENSION: Primary | ICD-10-CM

## 2022-08-25 DIAGNOSIS — E78.2 MIXED HYPERLIPIDEMIA: ICD-10-CM

## 2022-08-25 DIAGNOSIS — E55.9 VITAMIN D DEFICIENCY: ICD-10-CM

## 2022-08-25 DIAGNOSIS — Z00.00 MEDICARE ANNUAL WELLNESS VISIT, SUBSEQUENT: Primary | ICD-10-CM

## 2022-08-25 LAB
BASOPHILS ABSOLUTE: 0.1 K/UL (ref 0–0.2)
BASOPHILS RELATIVE PERCENT: 0.7 %
EOSINOPHILS ABSOLUTE: 0.2 K/UL (ref 0–0.6)
EOSINOPHILS RELATIVE PERCENT: 2 %
HCT VFR BLD CALC: 51.3 % (ref 40.5–52.5)
HEMOGLOBIN: 17.1 G/DL (ref 13.5–17.5)
LYMPHOCYTES ABSOLUTE: 1.4 K/UL (ref 1–5.1)
LYMPHOCYTES RELATIVE PERCENT: 16.5 %
MCH RBC QN AUTO: 28.6 PG (ref 26–34)
MCHC RBC AUTO-ENTMCNC: 33.3 G/DL (ref 31–36)
MCV RBC AUTO: 85.8 FL (ref 80–100)
MONOCYTES ABSOLUTE: 0.8 K/UL (ref 0–1.3)
MONOCYTES RELATIVE PERCENT: 9.2 %
NEUTROPHILS ABSOLUTE: 6.2 K/UL (ref 1.7–7.7)
NEUTROPHILS RELATIVE PERCENT: 71.6 %
PDW BLD-RTO: 14.7 % (ref 12.4–15.4)
PLATELET # BLD: 255 K/UL (ref 135–450)
PMV BLD AUTO: 8.3 FL (ref 5–10.5)
RBC # BLD: 5.98 M/UL (ref 4.2–5.9)
WBC # BLD: 8.7 K/UL (ref 4–11)

## 2022-08-25 PROCEDURE — 1123F ACP DISCUSS/DSCN MKR DOCD: CPT | Performed by: FAMILY MEDICINE

## 2022-08-25 PROCEDURE — 99214 OFFICE O/P EST MOD 30 MIN: CPT | Performed by: FAMILY MEDICINE

## 2022-08-25 PROCEDURE — G0439 PPPS, SUBSEQ VISIT: HCPCS | Performed by: FAMILY MEDICINE

## 2022-08-25 RX ORDER — PRAVASTATIN SODIUM 20 MG
20 TABLET ORAL EVERY OTHER DAY
Qty: 90 TABLET | Refills: 1 | Status: SHIPPED | OUTPATIENT
Start: 2022-08-25

## 2022-08-25 RX ORDER — METOPROLOL SUCCINATE 25 MG/1
TABLET, EXTENDED RELEASE ORAL
Qty: 90 TABLET | Refills: 1 | Status: SHIPPED | OUTPATIENT
Start: 2022-08-25

## 2022-08-25 RX ORDER — FUROSEMIDE 40 MG/1
TABLET ORAL
Qty: 180 TABLET | Refills: 0 | Status: SHIPPED | OUTPATIENT
Start: 2022-08-25

## 2022-08-25 RX ORDER — POTASSIUM CHLORIDE 750 MG/1
10 TABLET, EXTENDED RELEASE ORAL 2 TIMES DAILY
Qty: 90 TABLET | Refills: 1 | Status: SHIPPED | OUTPATIENT
Start: 2022-08-25

## 2022-08-25 ASSESSMENT — LIFESTYLE VARIABLES
HOW OFTEN DO YOU HAVE A DRINK CONTAINING ALCOHOL: NEVER
HOW MANY STANDARD DRINKS CONTAINING ALCOHOL DO YOU HAVE ON A TYPICAL DAY: PATIENT DOES NOT DRINK

## 2022-08-25 ASSESSMENT — PATIENT HEALTH QUESTIONNAIRE - PHQ9
SUM OF ALL RESPONSES TO PHQ QUESTIONS 1-9: 0
SUM OF ALL RESPONSES TO PHQ QUESTIONS 1-9: 0
2. FEELING DOWN, DEPRESSED OR HOPELESS: 0
1. LITTLE INTEREST OR PLEASURE IN DOING THINGS: 0
SUM OF ALL RESPONSES TO PHQ QUESTIONS 1-9: 0
SUM OF ALL RESPONSES TO PHQ QUESTIONS 1-9: 0
SUM OF ALL RESPONSES TO PHQ9 QUESTIONS 1 & 2: 0

## 2022-08-25 NOTE — PROGRESS NOTES
Clifton Lara is a 82 y.o. male    Chief Complaint   Patient presents with    Hypertension    Hyperlipidemia    Leg Swelling         HPI:    HPI    Hypertension  This is a chronic problem. The current episode started more than 1 year ago. The problem is unchanged. The problem is controlled. Past treatments include diuretics and beta blockers. The current treatment provides significant improvement. There are no compliance problems. There is no history of CAD/MI or CVA. Hyperlipidemia  This is a chronic problem. The current episode started more than 1 year ago. The problem is uncontrolled. Recent lipid tests were reviewed and are low. He has no history of diabetes. Pertinent negatives include no myalgias. Current antihyperlipidemic treatment includes statins. The current treatment provides moderate improvement of lipids. There are no compliance problems. Risk factors for coronary artery disease include male sex. Lower extremity edema. This is a chronic condition. The patient takes Lasix 20 mg a day which does help but causes frequent urination. He takes potassium along with it. Recent potassium was normal.  No heart failure. His echo did not show any heart failure in 2021. No history of DVT. Vitamin D deficiency. This is a chronic issue. He is taking over 2000 IU daily. His muscle aches have improved. ROS:    Review of Systems   Cardiovascular:  Positive for leg swelling. Musculoskeletal:  Negative for myalgias. /68   Pulse 80   Ht 5' 10\" (1.778 m)   Wt 194 lb (88 kg)   SpO2 96%   BMI 27.84 kg/m²     Physical Exam:    Physical Exam  Constitutional:       General: He is not in acute distress. Appearance: Normal appearance. He is well-developed and normal weight. He is not ill-appearing, toxic-appearing or diaphoretic. HENT:      Head: Normocephalic. Cardiovascular:      Rate and Rhythm: Normal rate and regular rhythm. Pulses: Normal pulses. Heart sounds:  No murmur heard. Pulmonary:      Effort: Pulmonary effort is normal. No respiratory distress. Breath sounds: Normal breath sounds. No wheezing. Musculoskeletal:      Cervical back: Normal range of motion. No rigidity. Right lower leg: Edema present. Left lower leg: Edema present. Lymphadenopathy:      Cervical: No cervical adenopathy. Skin:     General: Skin is warm and dry. Neurological:      Mental Status: He is alert. Psychiatric:         Mood and Affect: Mood normal.         Speech: Speech normal.         Behavior: Behavior normal.         Thought Content: Thought content normal.       Current Outpatient Medications   Medication Sig Dispense Refill    metoprolol succinate (TOPROL XL) 25 MG extended release tablet TAKE ONE TABLET BY MOUTH DAILYTAKE ONE TABLET BY MOUTH DAILY 90 tablet 1    pravastatin (PRAVACHOL) 20 MG tablet Take 1 tablet by mouth every other day 90 tablet 1    furosemide (LASIX) 40 MG tablet TAKE ONE TABLET BY MOUTH TWICE A DAY AS NEEDED 180 tablet 0    potassium chloride (KLOR-CON M) 10 MEQ extended release tablet Take 1 tablet by mouth 2 times daily With Lasix 90 tablet 1    Cholecalciferol (VITAMIN D) 50 MCG (2000 UT) CAPS capsule Take 50 Units by mouth daily      Handicap Placard MISC by Does not apply route Length: 5 years 1 each 0    Misc. Devices (ROLLATOR ULTRA-LIGHT) MISC Walker with 4 wheels and a seat. Use as directed. 1 each 0    Multiple Vitamins-Minerals (THERAPEUTIC MULTIVITAMIN-MINERALS) tablet Take 1 tablet by mouth daily       No current facility-administered medications for this visit. Assessment:    1. Essential hypertension    2. Mixed hyperlipidemia    3. Bilateral edema of lower extremity    4. Vitamin D deficiency          Plan:    1. Essential hypertension  Stable. Continue current medications. 2. Mixed hyperlipidemia  Stable. Continue current medications. 3. Bilateral edema of lower extremity  Doing well with prn Lasix 20 mg per day. Stay with the current dose of potassium. 4. Vitamin D deficiency   Recheck levels today. Continue supplementation.      Follow up in 6 months for htn, hld, edema

## 2022-08-25 NOTE — PROGRESS NOTES
Medicare Annual Wellness Visit    Angélica Manzano is here for Medicare AWV    Assessment & Plan   Medicare annual wellness visit, subsequent    Recommendations for Preventive Services Due: see orders and patient instructions/AVS.  Recommended screening schedule for the next 5-10 years is provided to the patient in written form: see Patient Instructions/AVS.     Return for Medicare Annual Wellness Visit in 1 year. Subjective       Patient's complete Health Risk Assessment and screening values have been reviewed and are found in Flowsheets. The following problems were reviewed today and where indicated follow up appointments were made and/or referrals ordered. Positive Risk Factor Screenings with Interventions:    Fall Risk:  Do you feel unsteady or are you worried about falling? : (!) yes  2 or more falls in past year?: no  Fall with injury in past year?: (!) yes   Fall Risk Interventions:    Home safety tips provided                General Health and ACP:  General  In general, how would you say your health is?: Good  In the past 7 days, have you experienced any of the following: New or Increased Pain, New or Increased Fatigue, Loneliness, Social Isolation, Stress or Anger?: No  Do you get the social and emotional support that you need?: Yes  Do you have a Living Will?: Yes    Advance Directives       Power of  Living Will ACP-Advance Directive ACP-Power of     Not on File Not on File Not on File Not on File          General Health Risk Interventions:  No Living Will: Patient declines ACP discussion/assistance     Hearing/Vision:  Do you or your family notice any trouble with your hearing that hasn't been managed with hearing aids?: (!) Yes (has hearing aid - does not wear)  Do you have difficulty driving, watching TV, or doing any of your daily activities because of your eyesight?: No  Have you had an eye exam within the past year?: (!) No  No results found.   Hearing/Vision

## 2022-08-25 NOTE — PATIENT INSTRUCTIONS
Personalized Preventive Plan for Cassandra Kessler - 8/25/2022  Medicare offers a range of preventive health benefits. Some of the tests and screenings are paid in full while other may be subject to a deductible, co-insurance, and/or copay. Some of these benefits include a comprehensive review of your medical history including lifestyle, illnesses that may run in your family, and various assessments and screenings as appropriate. After reviewing your medical record and screening and assessments performed today your provider may have ordered immunizations, labs, imaging, and/or referrals for you. A list of these orders (if applicable) as well as your Preventive Care list are included within your After Visit Summary for your review. Other Preventive Recommendations:    A preventive eye exam performed by an eye specialist is recommended every 1-2 years to screen for glaucoma; cataracts, macular degeneration, and other eye disorders. A preventive dental visit is recommended every 6 months. Try to get at least 150 minutes of exercise per week or 10,000 steps per day on a pedometer . Order or download the FREE \"Exercise & Physical Activity: Your Everyday Guide\" from [employer]. Call 0-146.192.6487 or search [employer] online. You need 1231-1716 mg of calcium and 7116-1832 IU of vitamin D per day. It is possible to meet your calcium requirement with diet alone, but a vitamin D supplement is usually necessary to meet this goal.  When exposed to the sun, use a sunscreen that protects against both UVA and UVB radiation with an SPF of 30 or greater. Reapply every 2 to 3 hours or after sweating, drying off with a towel, or swimming. Always wear a seat belt when traveling in a car. Always wear a helmet when riding a bicycle or motorcycle. fall    Personalized Preventive Plan for Cassandra Kessler - 8/25/2022  Medicare offers a range of preventive health benefits.  Some of the tests and screenings are paid in full while other may be subject to a deductible, co-insurance, and/or copay. Some of these benefits include a comprehensive review of your medical history including lifestyle, illnesses that may run in your family, and various assessments and screenings as appropriate. After reviewing your medical record and screening and assessments performed today your provider may have ordered immunizations, labs, imaging, and/or referrals for you. A list of these orders (if applicable) as well as your Preventive Care list are included within your After Visit Summary for your review. Other Preventive Recommendations:    A preventive eye exam performed by an eye specialist is recommended every 1-2 years to screen for glaucoma; cataracts, macular degeneration, and other eye disorders. A preventive dental visit is recommended every 6 months. Try to get at least 150 minutes of exercise per week or 10,000 steps per day on a pedometer . Order or download the FREE \"Exercise & Physical Activity: Your Everyday Guide\" from [employer]. Call 3-880.261.8170 or search [employer] online. You need 5794-9309 mg of calcium and 8123-7787 IU of vitamin D per day. It is possible to meet your calcium requirement with diet alone, but a vitamin D supplement is usually necessary to meet this goal.  When exposed to the sun, use a sunscreen that protects against both UVA and UVB radiation with an SPF of 30 or greater. Reapply every 2 to 3 hours or after sweating, drying off with a towel, or swimming. Always wear a seat belt when traveling in a car. Always wear a helmet when riding a bicycle or motorcycle.

## 2022-08-26 LAB
A/G RATIO: 1.9 (ref 1.1–2.2)
ALBUMIN SERPL-MCNC: 4.4 G/DL (ref 3.4–5)
ALP BLD-CCNC: 91 U/L (ref 40–129)
ALT SERPL-CCNC: 21 U/L (ref 10–40)
ANION GAP SERPL CALCULATED.3IONS-SCNC: 16 MMOL/L (ref 3–16)
AST SERPL-CCNC: 25 U/L (ref 15–37)
BILIRUB SERPL-MCNC: 0.8 MG/DL (ref 0–1)
BUN BLDV-MCNC: 14 MG/DL (ref 7–20)
CALCIUM SERPL-MCNC: 9.2 MG/DL (ref 8.3–10.6)
CHLORIDE BLD-SCNC: 101 MMOL/L (ref 99–110)
CHOLESTEROL, TOTAL: 203 MG/DL (ref 0–199)
CO2: 26 MMOL/L (ref 21–32)
CREAT SERPL-MCNC: 0.9 MG/DL (ref 0.8–1.3)
GFR AFRICAN AMERICAN: >60
GFR NON-AFRICAN AMERICAN: >60
GLUCOSE BLD-MCNC: 84 MG/DL (ref 70–99)
HDLC SERPL-MCNC: 42 MG/DL (ref 40–60)
LDL CHOLESTEROL CALCULATED: 131 MG/DL
POTASSIUM SERPL-SCNC: 4.6 MMOL/L (ref 3.5–5.1)
SODIUM BLD-SCNC: 143 MMOL/L (ref 136–145)
TOTAL PROTEIN: 6.7 G/DL (ref 6.4–8.2)
TRIGL SERPL-MCNC: 152 MG/DL (ref 0–150)
VITAMIN D 25-HYDROXY: 42 NG/ML
VLDLC SERPL CALC-MCNC: 30 MG/DL

## 2022-09-01 DIAGNOSIS — I10 ESSENTIAL HYPERTENSION: ICD-10-CM

## 2022-09-01 RX ORDER — METOPROLOL SUCCINATE 25 MG/1
TABLET, EXTENDED RELEASE ORAL
Qty: 90 TABLET | Refills: 1 | OUTPATIENT
Start: 2022-09-01

## 2022-10-20 ENCOUNTER — NURSE ONLY (OUTPATIENT)
Dept: FAMILY MEDICINE CLINIC | Age: 87
End: 2022-10-20
Payer: MEDICARE

## 2022-10-20 DIAGNOSIS — Z23 NEED FOR INFLUENZA VACCINATION: Primary | ICD-10-CM

## 2022-10-20 PROCEDURE — G0008 ADMIN INFLUENZA VIRUS VAC: HCPCS | Performed by: FAMILY MEDICINE

## 2022-10-20 PROCEDURE — 90694 VACC AIIV4 NO PRSRV 0.5ML IM: CPT | Performed by: FAMILY MEDICINE

## 2022-11-18 ENCOUNTER — TELEPHONE (OUTPATIENT)
Dept: FAMILY MEDICINE CLINIC | Age: 87
End: 2022-11-18

## 2022-11-18 NOTE — TELEPHONE ENCOUNTER
Telephone Call regarding Medication Reaction - Side Effects - Dose missed/extra dose - Out of medication and having symptoms    Issue - Medication Side Effects   Medication & Dose:  pravastatin 20 mg   Medication Start Date:  8/25/2022  Last dose taken - Date & Time:     Symptoms: Other - not sleeping for a night, thinking people are in the house    - Consult with Licensed Nurse or Provider - Keep patient on the phone. Date of Onset of symptoms:   11/16/2022  Actions taken / Relieving factors: Other - didn't take it last night and slept through the whole night.      Notified licensed personnel: no

## 2022-11-28 DIAGNOSIS — R60.0 BILATERAL EDEMA OF LOWER EXTREMITY: ICD-10-CM

## 2022-11-28 RX ORDER — FUROSEMIDE 40 MG/1
TABLET ORAL
Qty: 180 TABLET | Refills: 0 | Status: SHIPPED | OUTPATIENT
Start: 2022-11-28

## 2022-11-28 NOTE — TELEPHONE ENCOUNTER
Refill Request     CONFIRM preferrred pharmacy with the patient. If Mail Order Rx - Pend for 90 day refill. Last Seen: Last Seen Department: 8/25/2022  Last Seen by PCP: 8/25/2022    Last Written: 8/25/2022    If no future appointment scheduled, route STAFF MESSAGE with patient name to the [facility] for scheduling. Next Appointment:   Future Appointments   Date Time Provider Marylu Lujan   2/27/2023 10:30 AM DO BEE Stacy [place] - DYD       Message sent to [address] [place] [address] to schedule appt with patient?   NO      Requested Prescriptions     Pending Prescriptions Disp Refills    furosemide (LASIX) 40 MG tablet [Pharmacy Med Name: FUROSEMIDE 40 MG TABLET] 180 tablet 0     Sig: TAKE ONE TABLET BY MOUTH TWICE A DAY AS NEEDED

## 2022-12-08 ENCOUNTER — TELEPHONE (OUTPATIENT)
Dept: FAMILY MEDICINE CLINIC | Age: 87
End: 2022-12-08

## 2022-12-08 NOTE — TELEPHONE ENCOUNTER
Patients wife called again as she is concerned about the  confusion and dizziness patient has been experiencing. She did call a couple weeks ago when these symptoms became more noticeable but did not get a response back. Wife is wondering if it is do to changing how he takes his pravastatin. He was taking one tablet every other day and now he is taking one tablet everyday. Please give the wife a call back on home phone to advise what should be done. Telephone Call regarding Medication Reaction - Side Effects - Dose missed/extra dose - Out of medication and having symptoms     Issue - Medication Side Effects   Medication & Dose:  pravastatin 20 mg   Medication Start Date:  8/25/2022  Last dose taken - Date & Time:  12/7/2022 late at night      Symptoms: Other - not sleeping through night, dizzy and a little confused when he gets up. Not happening every night but more nights than not over the last couple of weeks.      Date of Onset of symptoms:   11/16/2022  Actions taken / Relieving factors: n/a   Notified licensed personnel: no

## 2023-01-30 ENCOUNTER — TELEPHONE (OUTPATIENT)
Dept: FAMILY MEDICINE CLINIC | Age: 88
End: 2023-01-30

## 2023-01-30 DIAGNOSIS — H93.8X3 EAR FULLNESS, BILATERAL: Primary | ICD-10-CM

## 2023-01-30 NOTE — TELEPHONE ENCOUNTER
We can try to remove here in the office and if we can't, we could then refer to ENT. Please schedule him with any provider.

## 2023-01-30 NOTE — TELEPHONE ENCOUNTER
----- Message from Mike Lu sent at 1/30/2023 10:03 AM EST -----  Subject: Message to Provider    QUESTIONS  Information for Provider? Pt's wife calling because they need to get ear   waxed removed. They do not know whether they will need a referral to the   ENT or if we can just remove it in office. Please advise.  ---------------------------------------------------------------------------  --------------  Phoebe VALENCIA  [phone]; OK to leave message on voicemail  ---------------------------------------------------------------------------  --------------  SCRIPT ANSWERS  Relationship to Patient? Other  Representative Name? [place]  Is the Representative on the appropriate HIPAA document in Epic?  Yes

## 2023-02-02 NOTE — TELEPHONE ENCOUNTER
Patient cancelled appointment that was supposed to have been with Melanie Sandhu. Would you just want to place a referral then?

## 2023-02-02 NOTE — TELEPHONE ENCOUNTER
Yes, that is fine. I placed referral to ENT.      [facility] - Anita Bachelor, DO  [address]  [place], [address]  Ph: [phone]

## 2023-02-21 ENCOUNTER — OFFICE VISIT (OUTPATIENT)
Dept: ENT CLINIC | Age: 88
End: 2023-02-21
Payer: MEDICARE

## 2023-02-21 VITALS
DIASTOLIC BLOOD PRESSURE: 68 MMHG | WEIGHT: 194 LBS | HEART RATE: 80 BPM | SYSTOLIC BLOOD PRESSURE: 122 MMHG | BODY MASS INDEX: 27.77 KG/M2 | HEIGHT: 70 IN | TEMPERATURE: 97.9 F | OXYGEN SATURATION: 96 %

## 2023-02-21 DIAGNOSIS — H61.22 IMPACTED CERUMEN OF LEFT EAR: Primary | ICD-10-CM

## 2023-02-21 DIAGNOSIS — H91.93 BILATERAL HEARING LOSS, UNSPECIFIED HEARING LOSS TYPE: ICD-10-CM

## 2023-02-21 PROCEDURE — G8427 DOCREV CUR MEDS BY ELIG CLIN: HCPCS | Performed by: OTOLARYNGOLOGY

## 2023-02-21 PROCEDURE — 99203 OFFICE O/P NEW LOW 30 MIN: CPT | Performed by: OTOLARYNGOLOGY

## 2023-02-21 PROCEDURE — 69210 REMOVE IMPACTED EAR WAX UNI: CPT | Performed by: OTOLARYNGOLOGY

## 2023-02-21 PROCEDURE — G8417 CALC BMI ABV UP PARAM F/U: HCPCS | Performed by: OTOLARYNGOLOGY

## 2023-02-21 PROCEDURE — 1123F ACP DISCUSS/DSCN MKR DOCD: CPT | Performed by: OTOLARYNGOLOGY

## 2023-02-21 PROCEDURE — 1036F TOBACCO NON-USER: CPT | Performed by: OTOLARYNGOLOGY

## 2023-02-21 PROCEDURE — 3074F SYST BP LT 130 MM HG: CPT | Performed by: OTOLARYNGOLOGY

## 2023-02-21 PROCEDURE — G8484 FLU IMMUNIZE NO ADMIN: HCPCS | Performed by: OTOLARYNGOLOGY

## 2023-02-21 PROCEDURE — 3078F DIAST BP <80 MM HG: CPT | Performed by: OTOLARYNGOLOGY

## 2023-02-21 ASSESSMENT — ENCOUNTER SYMPTOMS
WHEEZING: 0
SHORTNESS OF BREATH: 0
ABDOMINAL PAIN: 0

## 2023-02-21 NOTE — PROGRESS NOTES
[facility], [address], [address], [facility] Darrin Guajardo  P: [phone]       Patient     Vincent Fulling  1935    Chief Concern     Chief Complaint   Patient presents with    Cerumen Impaction     Patient is here today for an ear issue. The audiologist could not complete hearing exam due to wax in both ears. Ear Problem     Patient is here today for ear fullness. Patient states he has had fullness in his ear for  while. Patient denies any ear pain, ringing, or drainage. No history of ear issues. Patient states he gets his ears cleaned once in a while. He states he cleans his ears pretty good himself. Assessment and Plan      Diagnosis Orders   1. Impacted cerumen of left ear          Hearing loss, subjective, worsening; left ear cerumen impaction noted on examination with improvement in hearing on debridement. We will plan for postprocedure audiogram, and we will schedule the patient to get this done in the next couple weeks. No follow-ups on file. History of Present Illness     81-year-old male with concern for decreased hearing over the past few months. Right ear is the better hearing ear, denies otalgia, otorrhea, tinnitus, vertigo. No history of significant noise exposure, prior history of  service ([employer]). No history of chronic middle ear disease, tympanostomy tubes or ear surgery    Past Medical History     Past Medical History:   Diagnosis Date    Arthritis     Hyperlipidemia     Influenza A 12/25/2017    Right-sided low back pain with right-sided sciatica        Past Surgical History     Past Surgical History:   Procedure Laterality Date    CATARACT REMOVAL      INGUINAL HERNIA REPAIR Bilateral 2-21-11    EosIemcwe-UNJVO-ZmcquHussain Deleon MD    TONSILLECTOMY      TOOTH EXTRACTION         Family History     No family history on file.     Social History     Social History     Tobacco Use    Smoking status: Former     Packs/day: 1.00     Years: 15.00 Pack years: 15.00     Types: Cigarettes    Smokeless tobacco: Never   Vaping Use    Vaping Use: Never used   Substance Use Topics    Alcohol use: No     Alcohol/week: 0.0 standard drinks    Drug use: No        Allergies     Allergies   Allergen Reactions    Contrast [Iodides] Other (See Comments)     Pt went into SVT. Zocor [Simvastatin] Nausea Only     GI       Medications     Current Outpatient Medications   Medication Sig Dispense Refill    furosemide (LASIX) 40 MG tablet TAKE ONE TABLET BY MOUTH TWICE A DAY AS NEEDED 180 tablet 0    metoprolol succinate (TOPROL XL) 25 MG extended release tablet TAKE ONE TABLET BY MOUTH DAILYTAKE ONE TABLET BY MOUTH DAILY 90 tablet 1    pravastatin (PRAVACHOL) 20 MG tablet Take 1 tablet by mouth every other day 90 tablet 1    potassium chloride (KLOR-CON M) 10 MEQ extended release tablet Take 1 tablet by mouth 2 times daily With Lasix 90 tablet 1    Cholecalciferol (VITAMIN D) 50 MCG (2000 UT) CAPS capsule Take 50 Units by mouth daily      Handicap Placard MISC by Does not apply route Length: 5 years 1 each 0    Misc. Devices (ROLLATOR ULTRA-LIGHT) MISC Walker with 4 wheels and a seat. Use as directed. 1 each 0    Multiple Vitamins-Minerals (THERAPEUTIC MULTIVITAMIN-MINERALS) tablet Take 1 tablet by mouth daily       No current facility-administered medications for this visit. Review of Systems     Review of Systems   Constitutional:  Negative for activity change, chills, diaphoresis, fatigue and fever. HENT:  Positive for hearing loss. Negative for congestion. Eyes:  Negative for visual disturbance. Respiratory:  Negative for shortness of breath and wheezing. Cardiovascular:  Negative for chest pain. Gastrointestinal:  Negative for abdominal pain. Musculoskeletal:  Negative for neck pain and neck stiffness. Skin:  Negative for rash. Neurological:  Negative for dizziness, light-headedness and headaches. Hematological:  Negative for adenopathy. PhysicalExam     Vitals:    02/21/23 1416   BP: 122/68   Pulse: 80   Temp: 97.9 °F (36.6 °C)   TempSrc: Infrared   SpO2: 96%   Weight: 194 lb (88 kg)   Height: 5' 10\" (1.778 m)       Physical Exam  Vitals reviewed. Constitutional:       Appearance: Normal appearance. HENT:      Head: Normocephalic and atraumatic. Right Ear: Tympanic membrane, ear canal and external ear normal. There is no impacted cerumen. Left Ear: Tympanic membrane, ear canal and external ear normal. There is impacted cerumen. Ears:      Wyatt exam findings: Does not lateralize. Right Rinne: AC > BC. Left Rinne: AC > BC. Comments: Left ear impacted cerumen, removed with suction and wax curette under binocular otomicroscopy     Nose: No congestion or rhinorrhea. Mouth/Throat:      Lips: Pink. No lesions. Mouth: Mucous membranes are moist. No oral lesions. Tongue: No lesions. Tongue does not deviate from midline. Palate: No mass and lesions. Pharynx: Oropharynx is clear. Uvula midline. No oropharyngeal exudate or posterior oropharyngeal erythema. Eyes:      Extraocular Movements: Extraocular movements intact. Pupils: Pupils are equal, round, and reactive to light. Musculoskeletal:      Cervical back: Normal range of motion and neck supple. Lymphadenopathy:      Cervical: No cervical adenopathy. Neurological:      Mental Status: He is alert. Cranial Nerves: No cranial nerve deficit. Data Review        Procedure     Cerumen removal as above    Socorro Urena MD  2/21/23    Portions of this note were dictated using Dragon.  There may be linguistic errors secondary to the use of this program.

## 2023-02-27 ENCOUNTER — OFFICE VISIT (OUTPATIENT)
Dept: FAMILY MEDICINE CLINIC | Age: 88
End: 2023-02-27
Payer: MEDICARE

## 2023-02-27 VITALS
WEIGHT: 190 LBS | DIASTOLIC BLOOD PRESSURE: 76 MMHG | BODY MASS INDEX: 27.26 KG/M2 | SYSTOLIC BLOOD PRESSURE: 138 MMHG | OXYGEN SATURATION: 98 %

## 2023-02-27 DIAGNOSIS — E78.2 MIXED HYPERLIPIDEMIA: ICD-10-CM

## 2023-02-27 DIAGNOSIS — E55.9 VITAMIN D DEFICIENCY: ICD-10-CM

## 2023-02-27 DIAGNOSIS — R60.0 BILATERAL EDEMA OF LOWER EXTREMITY: ICD-10-CM

## 2023-02-27 DIAGNOSIS — I10 ESSENTIAL HYPERTENSION: Primary | ICD-10-CM

## 2023-02-27 PROCEDURE — G8417 CALC BMI ABV UP PARAM F/U: HCPCS | Performed by: FAMILY MEDICINE

## 2023-02-27 PROCEDURE — 1123F ACP DISCUSS/DSCN MKR DOCD: CPT | Performed by: FAMILY MEDICINE

## 2023-02-27 PROCEDURE — 1036F TOBACCO NON-USER: CPT | Performed by: FAMILY MEDICINE

## 2023-02-27 PROCEDURE — 99214 OFFICE O/P EST MOD 30 MIN: CPT | Performed by: FAMILY MEDICINE

## 2023-02-27 PROCEDURE — 3075F SYST BP GE 130 - 139MM HG: CPT | Performed by: FAMILY MEDICINE

## 2023-02-27 PROCEDURE — G8427 DOCREV CUR MEDS BY ELIG CLIN: HCPCS | Performed by: FAMILY MEDICINE

## 2023-02-27 PROCEDURE — G8484 FLU IMMUNIZE NO ADMIN: HCPCS | Performed by: FAMILY MEDICINE

## 2023-02-27 PROCEDURE — 3078F DIAST BP <80 MM HG: CPT | Performed by: FAMILY MEDICINE

## 2023-02-27 RX ORDER — PRAVASTATIN SODIUM 20 MG
20 TABLET ORAL EVERY OTHER DAY
Qty: 90 TABLET | Refills: 1 | Status: SHIPPED | OUTPATIENT
Start: 2023-02-27

## 2023-02-27 RX ORDER — METOPROLOL SUCCINATE 25 MG/1
TABLET, EXTENDED RELEASE ORAL
Qty: 90 TABLET | Refills: 1 | Status: SHIPPED | OUTPATIENT
Start: 2023-02-27

## 2023-02-27 NOTE — PROGRESS NOTES
Erum Arreaga is a 82 y.o. male    Chief Complaint   Patient presents with    Leg Swelling     Left leg- swollen- [facility]\  Cortisone shot.     6 Month Follow-Up    Hyperlipidemia    Hypertension         HPI:    Hypertension  This is a chronic problem. The current episode started more than 1 year ago. The problem is unchanged. The problem is controlled. Past treatments include diuretics and beta blockers. The current treatment provides significant improvement. There are no compliance problems. There is no history of CAD/MI or CVA. Hyperlipidemia  This is a chronic problem. The current episode started more than 1 year ago. The problem is uncontrolled. Recent lipid tests were reviewed and are low. He has no history of diabetes. Pertinent negatives include no myalgias. Current antihyperlipidemic treatment includes statins. The current treatment provides moderate improvement of lipids. There are no compliance problems. Risk factors for coronary artery disease include male sex. Lower extremity edema. This is a chronic condition. The patient takes Lasix 20 mg a day which does help but causes frequent urination. He takes potassium along with it. Recent potassium was normal.  No heart failure. His echo did not show any heart failure in 2021. No history of DVT. Vitamin D deficiency. This is a chronic issue. He is taking over 2000 IU daily. His muscle aches have improved. ROS:    Review of Systems   Cardiovascular:  Positive for leg swelling. Musculoskeletal:  Negative for myalgias. /76   Wt 190 lb (86.2 kg)   SpO2 98%   BMI 27.26 kg/m²     Physical Exam:    Physical Exam  Constitutional:       General: He is not in acute distress. Appearance: Normal appearance. He is well-developed and normal weight. He is not ill-appearing, toxic-appearing or diaphoretic. HENT:      Head: Normocephalic. Neurological:      Mental Status: He is alert.    Psychiatric: Mood and Affect: Mood normal.         Speech: Speech normal.         Behavior: Behavior normal.         Thought Content: Thought content normal.       Current Outpatient Medications   Medication Sig Dispense Refill    metoprolol succinate (TOPROL XL) 25 MG extended release tablet TAKE ONE TABLET BY MOUTH DAILYTAKE ONE TABLET BY MOUTH DAILY 90 tablet 1    pravastatin (PRAVACHOL) 20 MG tablet Take 1 tablet by mouth every other day 90 tablet 1    furosemide (LASIX) 40 MG tablet TAKE ONE TABLET BY MOUTH TWICE A DAY AS NEEDED 180 tablet 0    potassium chloride (KLOR-CON M) 10 MEQ extended release tablet Take 1 tablet by mouth 2 times daily With Lasix 90 tablet 1    Cholecalciferol (VITAMIN D) 50 MCG (2000 UT) CAPS capsule Take 50 Units by mouth daily      Handicap Placard MISC by Does not apply route Length: 5 years 1 each 0    Multiple Vitamins-Minerals (THERAPEUTIC MULTIVITAMIN-MINERALS) tablet Take 1 tablet by mouth daily       No current facility-administered medications for this visit. Assessment:    1. Essential hypertension    2. Mixed hyperlipidemia    3. Bilateral edema of lower extremity    4. Vitamin D deficiency          Plan:    1. Essential hypertension  Stable. Continue current medications. 2. Mixed hyperlipidemia  Stable. Continue current medications. 3. Bilateral edema of lower extremity  Doing well with prn Lasix 20 mg per day. Stay with the current dose of potassium. 4. Vitamin D deficiency   Recheck levels today. Continue supplementation.      Follow up in 6 months for htn, hld, edema

## 2023-02-27 NOTE — PATIENT INSTRUCTIONS
Take the Pravastatin every night. If having joint aches or muscle aches, take every other night. Take the Metoprolol every night.

## 2023-03-27 DIAGNOSIS — R60.0 BILATERAL EDEMA OF LOWER EXTREMITY: ICD-10-CM

## 2023-03-27 RX ORDER — FUROSEMIDE 40 MG/1
TABLET ORAL
Qty: 180 TABLET | Refills: 0 | Status: SHIPPED | OUTPATIENT
Start: 2023-03-27

## 2023-03-27 NOTE — TELEPHONE ENCOUNTER
Refill Request     CONFIRM preferred pharmacy with the patient. If Mail Order Rx - Pend for 90 day refill. Last Seen: Last Seen Department: 2/27/2023  Last Seen by PCP: 2/27/2023    Last Written: 11/28/2022, #180, Chelsy Sanford    If no future appointment scheduled, route STAFF MESSAGE with patient name to the [facility] for scheduling. Next Appointment:   Future Appointments   Date Time Provider Marylu Lujan   8/28/2023 10:30 AM DO BEE Stacy - DYD       Message sent to [address] [place] [address] to schedule appt with patient?   NO      Requested Prescriptions     Pending Prescriptions Disp Refills    furosemide (LASIX) 40 MG tablet [Pharmacy Med Name: FUROSEMIDE 40 MG TABLET] 180 tablet 0     Sig: TAKE ONE TABLET BY MOUTH TWICE A DAY AS NEEDED

## 2023-08-16 ENCOUNTER — CLINICAL DOCUMENTATION (OUTPATIENT)
Dept: SPIRITUAL SERVICES | Age: 88
End: 2023-08-16

## 2023-08-16 NOTE — ACP (ADVANCE CARE PLANNING)
Advance Care Planning   Advance Care Planning Note  Ambulatory Spiritual Care Services    Date:  8/16/2023    Received request from family. Consultation conversation participants:   Spouse     Goals of ACP Conversation:  Discuss advance care planning documents  Facilitate a discussion related to patient's goals of care as they align with the patient's values and beliefs. Health Care Decision Makers:      Primary Decision Maker: Mariano Ahn - Spouse - [phone]    Secondary Decision Maker: Brandon Leiva - Child - [phone]    Secondary Decision Maker: Ronda Stanton - Child - [phone]    Secondary Decision Maker: Royal Miller Child - [phone]   Summary:  Verified DwayneOrientdelmy    Advance Care Planning Documents (Patient Wishes)  Currently on file:   Living Will/Advance Directive        Electronically signed by Chaplain Dirk on 8/16/2023 at 3:02 PM.     Thank you for consulting [address] [place] [address]    If you would like a 's presence for emotional, spiritual, grief or comfort care,   please dial \"[phone]\" and ask for the [employer] on-call to be paged.     For help with [id number]  for Healthcare or Living Will forms, you may also call us directly:    [phone] ([phone] Soliz  [phone] ([phone]) Eliud Call  [phone] ([phone]) Outpatient    -[address] [place] [address]

## 2023-08-30 DIAGNOSIS — R60.0 BILATERAL EDEMA OF LOWER EXTREMITY: ICD-10-CM

## 2023-08-30 NOTE — TELEPHONE ENCOUNTER
.Refill Request     CONFIRM preferred pharmacy with the patient. If Mail Order Rx - Pend for 90 day refill. Last Seen: Last Seen Department: 2/27/2023  Last Seen by PCP: 2/27/2023    Last Written: 8-25-22 90 with 1    If no future appointment scheduled:  Review the last OV with PCP and review information for follow-up visit,  Route STAFF MESSAGE with patient name to the [facility] for scheduling with the following information:            -  Timing of next visit           -  Visit type ie Physical, OV, etc           -  Diagnoses/Reason ie. COPD, HTN - Do not use MEDICATION, Follow-up or CHECK UP - Give reason for visit      Next Appointment:   Future Appointments   Date Time Provider [address] [place] [address]   8/31/2023  2:00  [facility]       Message sent to [employer] to schedule appt with patient?   N/A      Requested Prescriptions     Pending Prescriptions Disp Refills    potassium chloride (KLOR-CON M) 10 MEQ extended release tablet [Pharmacy Med Name: POTASSIUM CHLORIDE ER M-10 MEQ TAB] 180 tablet 1     Sig: TAKE ONE TABLET BY MOUTH TWICE A DAY WITH LASIX

## 2023-08-31 ENCOUNTER — OFFICE VISIT (OUTPATIENT)
Dept: FAMILY MEDICINE CLINIC | Age: 88
End: 2023-08-31
Payer: MEDICARE

## 2023-08-31 VITALS
WEIGHT: 194 LBS | HEART RATE: 71 BPM | BODY MASS INDEX: 27.84 KG/M2 | SYSTOLIC BLOOD PRESSURE: 142 MMHG | OXYGEN SATURATION: 97 % | DIASTOLIC BLOOD PRESSURE: 76 MMHG

## 2023-08-31 VITALS
HEART RATE: 71 BPM | HEIGHT: 70 IN | DIASTOLIC BLOOD PRESSURE: 76 MMHG | RESPIRATION RATE: 16 BRPM | WEIGHT: 194 LBS | BODY MASS INDEX: 27.77 KG/M2 | SYSTOLIC BLOOD PRESSURE: 142 MMHG

## 2023-08-31 DIAGNOSIS — E55.9 VITAMIN D DEFICIENCY: ICD-10-CM

## 2023-08-31 DIAGNOSIS — I10 ESSENTIAL HYPERTENSION: Primary | ICD-10-CM

## 2023-08-31 DIAGNOSIS — E78.2 MIXED HYPERLIPIDEMIA: ICD-10-CM

## 2023-08-31 DIAGNOSIS — R60.0 BILATERAL EDEMA OF LOWER EXTREMITY: ICD-10-CM

## 2023-08-31 DIAGNOSIS — Z00.00 MEDICARE ANNUAL WELLNESS VISIT, SUBSEQUENT: Primary | ICD-10-CM

## 2023-08-31 PROCEDURE — G8427 DOCREV CUR MEDS BY ELIG CLIN: HCPCS | Performed by: FAMILY MEDICINE

## 2023-08-31 PROCEDURE — 1123F ACP DISCUSS/DSCN MKR DOCD: CPT | Performed by: FAMILY MEDICINE

## 2023-08-31 PROCEDURE — 3077F SYST BP >= 140 MM HG: CPT | Performed by: FAMILY MEDICINE

## 2023-08-31 PROCEDURE — 3078F DIAST BP <80 MM HG: CPT | Performed by: FAMILY MEDICINE

## 2023-08-31 PROCEDURE — 1036F TOBACCO NON-USER: CPT | Performed by: FAMILY MEDICINE

## 2023-08-31 PROCEDURE — 99214 OFFICE O/P EST MOD 30 MIN: CPT | Performed by: FAMILY MEDICINE

## 2023-08-31 PROCEDURE — G8417 CALC BMI ABV UP PARAM F/U: HCPCS | Performed by: FAMILY MEDICINE

## 2023-08-31 PROCEDURE — G0439 PPPS, SUBSEQ VISIT: HCPCS | Performed by: FAMILY MEDICINE

## 2023-08-31 RX ORDER — METOPROLOL SUCCINATE 25 MG/1
TABLET, EXTENDED RELEASE ORAL
Qty: 90 TABLET | Refills: 1 | Status: SHIPPED | OUTPATIENT
Start: 2023-08-31

## 2023-08-31 RX ORDER — POTASSIUM CHLORIDE 750 MG/1
TABLET, EXTENDED RELEASE ORAL
Qty: 180 TABLET | Refills: 1 | OUTPATIENT
Start: 2023-08-31

## 2023-08-31 RX ORDER — FUROSEMIDE 40 MG/1
TABLET ORAL
Qty: 180 TABLET | Refills: 0 | Status: SHIPPED | OUTPATIENT
Start: 2023-08-31

## 2023-08-31 RX ORDER — POTASSIUM CHLORIDE 750 MG/1
10 TABLET, EXTENDED RELEASE ORAL 2 TIMES DAILY
Qty: 90 TABLET | Refills: 1 | Status: SHIPPED | OUTPATIENT
Start: 2023-08-31

## 2023-08-31 RX ORDER — PRAVASTATIN SODIUM 20 MG
20 TABLET ORAL EVERY OTHER DAY
Qty: 90 TABLET | Refills: 1 | Status: SHIPPED | OUTPATIENT
Start: 2023-08-31

## 2023-08-31 ASSESSMENT — ANXIETY QUESTIONNAIRES
3. WORRYING TOO MUCH ABOUT DIFFERENT THINGS: 0
2. NOT BEING ABLE TO STOP OR CONTROL WORRYING: 0
6. BECOMING EASILY ANNOYED OR IRRITABLE: 0
1. FEELING NERVOUS, ANXIOUS, OR ON EDGE: 0
5. BEING SO RESTLESS THAT IT IS HARD TO SIT STILL: 0
IF YOU CHECKED OFF ANY PROBLEMS ON THIS QUESTIONNAIRE, HOW DIFFICULT HAVE THESE PROBLEMS MADE IT FOR YOU TO DO YOUR WORK, TAKE CARE OF THINGS AT HOME, OR GET ALONG WITH OTHER PEOPLE: SOMEWHAT DIFFICULT
GAD7 TOTAL SCORE: 0
4. TROUBLE RELAXING: 0
7. FEELING AFRAID AS IF SOMETHING AWFUL MIGHT HAPPEN: 0

## 2023-08-31 ASSESSMENT — PATIENT HEALTH QUESTIONNAIRE - PHQ9
8. MOVING OR SPEAKING SO SLOWLY THAT OTHER PEOPLE COULD HAVE NOTICED. OR THE OPPOSITE, BEING SO FIGETY OR RESTLESS THAT YOU HAVE BEEN MOVING AROUND A LOT MORE THAN USUAL: 1
6. FEELING BAD ABOUT YOURSELF - OR THAT YOU ARE A FAILURE OR HAVE LET YOURSELF OR YOUR FAMILY DOWN: 0
3. TROUBLE FALLING OR STAYING ASLEEP: 2
SUM OF ALL RESPONSES TO PHQ QUESTIONS 1-9: 4
7. TROUBLE CONCENTRATING ON THINGS, SUCH AS READING THE NEWSPAPER OR WATCHING TELEVISION: 0
4. FEELING TIRED OR HAVING LITTLE ENERGY: 0
2. FEELING DOWN, DEPRESSED OR HOPELESS: 0
5. POOR APPETITE OR OVEREATING: 1
10. IF YOU CHECKED OFF ANY PROBLEMS, HOW DIFFICULT HAVE THESE PROBLEMS MADE IT FOR YOU TO DO YOUR WORK, TAKE CARE OF THINGS AT HOME, OR GET ALONG WITH OTHER PEOPLE: 0
SUM OF ALL RESPONSES TO PHQ QUESTIONS 1-9: 4
SUM OF ALL RESPONSES TO PHQ QUESTIONS 1-9: 4
SUM OF ALL RESPONSES TO PHQ9 QUESTIONS 1 & 2: 0
1. LITTLE INTEREST OR PLEASURE IN DOING THINGS: 0
9. THOUGHTS THAT YOU WOULD BE BETTER OFF DEAD, OR OF HURTING YOURSELF: 0
SUM OF ALL RESPONSES TO PHQ QUESTIONS 1-9: 4

## 2023-08-31 NOTE — PATIENT INSTRUCTIONS
as we get older. But some older people have memory loss that's more than normal aging. It's called mild cognitive impairment, or MCI. It is not the same as dementia. People with the condition often know that their memory or mental function has changed. Tests may show some loss. But their minds work well overall. They can carry out daily tasks that are normal for them. People with MCI have a higher chance of one day getting dementia. But not all people who have it will get dementia. Some people may stay the same over time. What are the symptoms? People with MCI have more memory loss than what occurs with normal aging. They may have increasing trouble with recalling words and keeping up with conversations. They may also have trouble remembering important events and making decisions. What puts you at risk? The risk of getting MCI increases with age. Having high blood pressure or having a family history of MCI may also increase your risk. How is it diagnosed? Your doctor will do a physical exam.  You may be asked questions to check your memory and other mental skills. Your doctor may also talk to close friends and family members. This can help the doctor figure out how your memory and other mental skills have changed. You may get blood tests and tests that look at your brain. These questions and tests can make sure you don't have other conditions that can cause symptoms like MCI. These include depression, sleep problems, and side effects from medicines. How is it treated? There are no medicines to treat MCI or to keep it from progressing to dementia. But treating conditions like high blood pressure and diabetes may help. A person with MCI needs routine follow-up visits with their doctor to check on changes in the person's mental skills. How can you care for yourself at home? Keeping your body active can help slow MCI. Exercises like walking can help. Try to stay active mentally too.  Read or do things like

## 2023-09-01 DIAGNOSIS — R41.3 MEMORY LOSS: Primary | ICD-10-CM

## 2023-09-01 LAB
25(OH)D3 SERPL-MCNC: 42 NG/ML
ALBUMIN SERPL-MCNC: 4.4 G/DL (ref 3.4–5)
ALBUMIN/GLOB SERPL: 1.5 {RATIO} (ref 1.1–2.2)
ALP SERPL-CCNC: 84 U/L (ref 40–129)
ALT SERPL-CCNC: 23 U/L (ref 10–40)
ANION GAP SERPL CALCULATED.3IONS-SCNC: 8 MMOL/L (ref 3–16)
AST SERPL-CCNC: 25 U/L (ref 15–37)
BASOPHILS # BLD: 0.1 K/UL (ref 0–0.2)
BASOPHILS NFR BLD: 0.7 %
BILIRUB SERPL-MCNC: 0.8 MG/DL (ref 0–1)
BUN SERPL-MCNC: 13 MG/DL (ref 7–20)
CALCIUM SERPL-MCNC: 9.3 MG/DL (ref 8.3–10.6)
CHLORIDE SERPL-SCNC: 104 MMOL/L (ref 99–110)
CHOLEST SERPL-MCNC: 164 MG/DL (ref 0–199)
CO2 SERPL-SCNC: 28 MMOL/L (ref 21–32)
CREAT SERPL-MCNC: 0.9 MG/DL (ref 0.8–1.3)
DEPRECATED RDW RBC AUTO: 14.2 % (ref 12.4–15.4)
EOSINOPHIL # BLD: 0.2 K/UL (ref 0–0.6)
EOSINOPHIL NFR BLD: 2 %
GFR SERPLBLD CREATININE-BSD FMLA CKD-EPI: >60 ML/MIN/{1.73_M2}
GLUCOSE SERPL-MCNC: 82 MG/DL (ref 70–99)
HCT VFR BLD AUTO: 50.5 % (ref 40.5–52.5)
HDLC SERPL-MCNC: 44 MG/DL (ref 40–60)
HGB BLD-MCNC: 17.2 G/DL (ref 13.5–17.5)
LDLC SERPL CALC-MCNC: 98 MG/DL
LYMPHOCYTES # BLD: 1.4 K/UL (ref 1–5.1)
LYMPHOCYTES NFR BLD: 17.3 %
MCH RBC QN AUTO: 29.4 PG (ref 26–34)
MCHC RBC AUTO-ENTMCNC: 34.1 G/DL (ref 31–36)
MCV RBC AUTO: 86.1 FL (ref 80–100)
MONOCYTES # BLD: 0.8 K/UL (ref 0–1.3)
MONOCYTES NFR BLD: 9.3 %
NEUTROPHILS # BLD: 5.9 K/UL (ref 1.7–7.7)
NEUTROPHILS NFR BLD: 70.7 %
PLATELET # BLD AUTO: 245 K/UL (ref 135–450)
PMV BLD AUTO: 9 FL (ref 5–10.5)
POTASSIUM SERPL-SCNC: 4.5 MMOL/L (ref 3.5–5.1)
PROT SERPL-MCNC: 7.3 G/DL (ref 6.4–8.2)
RBC # BLD AUTO: 5.86 M/UL (ref 4.2–5.9)
SODIUM SERPL-SCNC: 140 MMOL/L (ref 136–145)
TRIGL SERPL-MCNC: 108 MG/DL (ref 0–150)
TSH SERPL DL<=0.005 MIU/L-ACNC: 2.56 UIU/ML (ref 0.27–4.2)
VLDLC SERPL CALC-MCNC: 22 MG/DL
WBC # BLD AUTO: 8.3 K/UL (ref 4–11)

## 2023-09-25 ENCOUNTER — TELEPHONE (OUTPATIENT)
Dept: FAMILY MEDICINE CLINIC | Age: 88
End: 2023-09-25

## 2023-09-25 NOTE — TELEPHONE ENCOUNTER
Patient states that he has a cough and sneezing, fatigued. no headaches/ fever/ SOB/ congestion/ body aches. No Nausea/ Vomiting/ diarrhea. Patient states that on the 15th they went to a wedding. Patient states that on the 19th is when he started feeling bad, and then on the 21st is when his wife was starting to feel bad.

## 2023-09-25 NOTE — TELEPHONE ENCOUNTER
Patient wife called in stating that they both have tested positive for Covid on 9/23/2023. They have both been taking Robitussin but would like to know if there is anything else they are able to take for the SXS. Please advise.

## 2023-09-26 NOTE — TELEPHONE ENCOUNTER
India Irizarry, please see message below from [phone] regarding patient and his wife (the wife is a patient of Alexandre Ribeiro). They do not have mychart to do virtual visits. TAHIR Caputo CNP  to [facility] Practice Support        9/25/23  1:07 PM  Note      They should isolate for 10 days from the onset of symptoms. Mucinex DM for the cough, congestion, tylenol as needed for aching. Increased fluids for hydration and protein in the diet.

## 2023-11-30 DIAGNOSIS — R60.0 BILATERAL EDEMA OF LOWER EXTREMITY: ICD-10-CM

## 2023-11-30 RX ORDER — POTASSIUM CHLORIDE 750 MG/1
TABLET, EXTENDED RELEASE ORAL
Qty: 90 TABLET | Refills: 1 | Status: SHIPPED | OUTPATIENT
Start: 2023-11-30

## 2023-11-30 NOTE — TELEPHONE ENCOUNTER
Refill Request     CONFIRM preferred pharmacy with the patient. If Mail Order Rx - Pend for 90 day refill. Last Seen: Last Seen Department: 8/31/2023  Last Seen by PCP: 8/31/2023    Last Written: 8/31/2023, #90, 1 refill    If no future appointment scheduled:  Review the last OV with PCP and review information for follow-up visit,  Route STAFF MESSAGE with patient name to the [facility] for scheduling with the following information:            -  Timing of next visit           -  Visit type ie Physical, OV, etc           -  Diagnoses/Reason ie. COPD, HTN - Do not use MEDICATION, Follow-up or CHECK UP - Give reason for visit      Next Appointment:   Future Appointments   Date Time Provider [address] [place] [address]   1/3/2024 12:40 PM Herminia Solorio MD [address] [place] [address] Neurology -   3/1/2024 10:30 AM Yesenia Fatima DO [facility] [address]       Message sent to Folloze to schedule appt with patient?   N/A      Requested Prescriptions     Pending Prescriptions Disp Refills    potassium chloride (KLOR-CON M) 10 MEQ extended release tablet [Pharmacy Med Name: POTASSIUM CHLORIDE ER M-10 MEQ TAB] 90 tablet 1     Sig: TAKE 1 TABLET BY MOUTH TWICE A DAY WITH LASIX

## 2024-01-02 ENCOUNTER — TELEPHONE (OUTPATIENT)
Age: 89
End: 2024-01-02

## 2024-01-02 NOTE — TELEPHONE ENCOUNTER
Patient cancelled appointment on 1/3/24 with Dr Ventura for NPT-Memory Loss r/b Dr Fatima    Reason: pt spouse states due to high pt high risk age and increase in flu cases they wish to r/s to a later date    Patient did reschedule appointment.    Appointment rescheduled for 4/11/24.

## 2024-02-21 ENCOUNTER — ANCILLARY PROCEDURE (OUTPATIENT)
Dept: EMERGENCY DEPT | Age: 89
End: 2024-02-21
Attending: STUDENT IN AN ORGANIZED HEALTH CARE EDUCATION/TRAINING PROGRAM
Payer: COMMERCIAL

## 2024-02-21 ENCOUNTER — APPOINTMENT (OUTPATIENT)
Dept: GENERAL RADIOLOGY | Age: 89
End: 2024-02-21
Payer: COMMERCIAL

## 2024-02-21 ENCOUNTER — HOSPITAL ENCOUNTER (INPATIENT)
Age: 89
LOS: 9 days | Discharge: NURSING, REHAB OR LONG-TERM CARE | End: 2024-03-01
Attending: STUDENT IN AN ORGANIZED HEALTH CARE EDUCATION/TRAINING PROGRAM | Admitting: INTERNAL MEDICINE
Payer: COMMERCIAL

## 2024-02-21 ENCOUNTER — APPOINTMENT (OUTPATIENT)
Dept: ULTRASOUND IMAGING | Age: 89
End: 2024-02-21
Payer: COMMERCIAL

## 2024-02-21 ENCOUNTER — APPOINTMENT (OUTPATIENT)
Dept: CT IMAGING | Age: 89
End: 2024-02-21
Payer: COMMERCIAL

## 2024-02-21 DIAGNOSIS — R10.11 RIGHT UPPER QUADRANT ABDOMINAL PAIN: ICD-10-CM

## 2024-02-21 DIAGNOSIS — R41.82 ALTERED MENTAL STATUS, UNSPECIFIED ALTERED MENTAL STATUS TYPE: Primary | ICD-10-CM

## 2024-02-21 DIAGNOSIS — K81.9 CHOLECYSTITIS: ICD-10-CM

## 2024-02-21 DIAGNOSIS — R53.1 GENERALIZED WEAKNESS: ICD-10-CM

## 2024-02-21 PROBLEM — K81.0 ACUTE CHOLECYSTITIS: Status: ACTIVE | Noted: 2024-02-21

## 2024-02-21 LAB
ALBUMIN SERPL-MCNC: 4.2 G/DL (ref 3.4–5)
ALBUMIN/GLOB SERPL: 1.4 {RATIO} (ref 1.1–2.2)
ALP SERPL-CCNC: 91 U/L (ref 40–129)
ALT SERPL-CCNC: 39 U/L (ref 10–40)
ANION GAP SERPL CALCULATED.3IONS-SCNC: 14 MMOL/L (ref 3–16)
AST SERPL-CCNC: 31 U/L (ref 15–37)
BASOPHILS # BLD: 0 K/UL (ref 0–0.2)
BASOPHILS NFR BLD: 0 %
BILIRUB SERPL-MCNC: 1 MG/DL (ref 0–1)
BILIRUB UR QL STRIP.AUTO: NEGATIVE
BUN SERPL-MCNC: 16 MG/DL (ref 7–20)
CALCIUM SERPL-MCNC: 8.6 MG/DL (ref 8.3–10.6)
CHLORIDE SERPL-SCNC: 99 MMOL/L (ref 99–110)
CLARITY UR: CLEAR
CO2 SERPL-SCNC: 23 MMOL/L (ref 21–32)
COLOR UR: YELLOW
CREAT SERPL-MCNC: 0.8 MG/DL (ref 0.8–1.3)
DACRYOCYTES BLD QL SMEAR: ABNORMAL
DEPRECATED RDW RBC AUTO: 14.3 % (ref 12.4–15.4)
EKG ATRIAL RATE: 95 BPM
EKG DIAGNOSIS: NORMAL
EKG P AXIS: 59 DEGREES
EKG P-R INTERVAL: 220 MS
EKG Q-T INTERVAL: 350 MS
EKG QRS DURATION: 82 MS
EKG QTC CALCULATION (BAZETT): 439 MS
EKG R AXIS: -2 DEGREES
EKG T AXIS: 59 DEGREES
EKG VENTRICULAR RATE: 95 BPM
EOSINOPHIL # BLD: 0 K/UL (ref 0–0.6)
EOSINOPHIL NFR BLD: 0 %
EPI CELLS #/AREA URNS HPF: ABNORMAL /HPF (ref 0–5)
FLUAV RNA RESP QL NAA+PROBE: NOT DETECTED
FLUBV RNA RESP QL NAA+PROBE: NOT DETECTED
GFR SERPLBLD CREATININE-BSD FMLA CKD-EPI: >60 ML/MIN/{1.73_M2}
GLUCOSE BLD-MCNC: 126 MG/DL (ref 70–99)
GLUCOSE SERPL-MCNC: 161 MG/DL (ref 70–99)
GLUCOSE UR STRIP.AUTO-MCNC: NEGATIVE MG/DL
HCT VFR BLD AUTO: 50 % (ref 40.5–52.5)
HGB BLD-MCNC: 16.8 G/DL (ref 13.5–17.5)
HGB UR QL STRIP.AUTO: ABNORMAL
KETONES UR STRIP.AUTO-MCNC: 15 MG/DL
LACTATE BLDV-SCNC: 1.5 MMOL/L (ref 0.4–2)
LEUKOCYTE ESTERASE UR QL STRIP.AUTO: NEGATIVE
LYMPHOCYTES # BLD: 1 K/UL (ref 1–5.1)
LYMPHOCYTES NFR BLD: 4 %
MCH RBC QN AUTO: 28.6 PG (ref 26–34)
MCHC RBC AUTO-ENTMCNC: 33.6 G/DL (ref 31–36)
MCV RBC AUTO: 85 FL (ref 80–100)
MONOCYTES # BLD: 1.6 K/UL (ref 0–1.3)
MONOCYTES NFR BLD: 8 %
MUCOUS THREADS #/AREA URNS LPF: ABNORMAL /LPF
NEUTROPHILS # BLD: 17.6 K/UL (ref 1.7–7.7)
NEUTROPHILS NFR BLD: 86 %
NEUTS BAND NFR BLD MANUAL: 1 % (ref 0–7)
NITRITE UR QL STRIP.AUTO: NEGATIVE
NT-PROBNP SERPL-MCNC: 736 PG/ML (ref 0–449)
OVALOCYTES BLD QL SMEAR: ABNORMAL
PERFORMED ON: ABNORMAL
PH UR STRIP.AUTO: 6 [PH] (ref 5–8)
PLATELET # BLD AUTO: 247 K/UL (ref 135–450)
PLATELET BLD QL SMEAR: ADEQUATE
PMV BLD AUTO: 7.2 FL (ref 5–10.5)
POIKILOCYTOSIS BLD QL SMEAR: ABNORMAL
POTASSIUM SERPL-SCNC: 3.9 MMOL/L (ref 3.5–5.1)
PROT SERPL-MCNC: 7.3 G/DL (ref 6.4–8.2)
PROT UR STRIP.AUTO-MCNC: 30 MG/DL
RBC # BLD AUTO: 5.88 M/UL (ref 4.2–5.9)
RBC #/AREA URNS HPF: ABNORMAL /HPF (ref 0–4)
SARS-COV-2 RNA RESP QL NAA+PROBE: NOT DETECTED
SLIDE REVIEW: ABNORMAL
SODIUM SERPL-SCNC: 136 MMOL/L (ref 136–145)
SP GR UR STRIP.AUTO: >=1.03 (ref 1–1.03)
TROPONIN, HIGH SENSITIVITY: 23 NG/L (ref 0–22)
TROPONIN, HIGH SENSITIVITY: 24 NG/L (ref 0–22)
TROPONIN, HIGH SENSITIVITY: 24 NG/L (ref 0–22)
UA COMPLETE W REFLEX CULTURE PNL UR: ABNORMAL
UA DIPSTICK W REFLEX MICRO PNL UR: YES
URN SPEC COLLECT METH UR: ABNORMAL
UROBILINOGEN UR STRIP-ACNC: 1 E.U./DL
VARIANT LYMPHS NFR BLD MANUAL: 1 % (ref 0–6)
WBC # BLD AUTO: 20.2 K/UL (ref 4–11)
WBC #/AREA URNS HPF: ABNORMAL /HPF (ref 0–5)

## 2024-02-21 PROCEDURE — 6360000002 HC RX W HCPCS: Performed by: STUDENT IN AN ORGANIZED HEALTH CARE EDUCATION/TRAINING PROGRAM

## 2024-02-21 PROCEDURE — 93010 ELECTROCARDIOGRAM REPORT: CPT | Performed by: INTERNAL MEDICINE

## 2024-02-21 PROCEDURE — 81001 URINALYSIS AUTO W/SCOPE: CPT

## 2024-02-21 PROCEDURE — 2580000003 HC RX 258: Performed by: INTERNAL MEDICINE

## 2024-02-21 PROCEDURE — 87449 NOS EACH ORGANISM AG IA: CPT

## 2024-02-21 PROCEDURE — 76705 ECHO EXAM OF ABDOMEN: CPT

## 2024-02-21 PROCEDURE — 36415 COLL VENOUS BLD VENIPUNCTURE: CPT

## 2024-02-21 PROCEDURE — 87636 SARSCOV2 & INF A&B AMP PRB: CPT

## 2024-02-21 PROCEDURE — 83880 ASSAY OF NATRIURETIC PEPTIDE: CPT

## 2024-02-21 PROCEDURE — 1200000000 HC SEMI PRIVATE

## 2024-02-21 PROCEDURE — 99285 EMERGENCY DEPT VISIT HI MDM: CPT

## 2024-02-21 PROCEDURE — 80053 COMPREHEN METABOLIC PANEL: CPT

## 2024-02-21 PROCEDURE — 83605 ASSAY OF LACTIC ACID: CPT

## 2024-02-21 PROCEDURE — 93005 ELECTROCARDIOGRAM TRACING: CPT | Performed by: STUDENT IN AN ORGANIZED HEALTH CARE EDUCATION/TRAINING PROGRAM

## 2024-02-21 PROCEDURE — 6360000002 HC RX W HCPCS: Performed by: INTERNAL MEDICINE

## 2024-02-21 PROCEDURE — 2580000003 HC RX 258: Performed by: STUDENT IN AN ORGANIZED HEALTH CARE EDUCATION/TRAINING PROGRAM

## 2024-02-21 PROCEDURE — 71250 CT THORAX DX C-: CPT

## 2024-02-21 PROCEDURE — 87040 BLOOD CULTURE FOR BACTERIA: CPT

## 2024-02-21 PROCEDURE — 71045 X-RAY EXAM CHEST 1 VIEW: CPT

## 2024-02-21 PROCEDURE — 96375 TX/PRO/DX INJ NEW DRUG ADDON: CPT

## 2024-02-21 PROCEDURE — 84484 ASSAY OF TROPONIN QUANT: CPT

## 2024-02-21 PROCEDURE — 6370000000 HC RX 637 (ALT 250 FOR IP)

## 2024-02-21 PROCEDURE — 85025 COMPLETE CBC W/AUTO DIFF WBC: CPT

## 2024-02-21 PROCEDURE — 99223 1ST HOSP IP/OBS HIGH 75: CPT

## 2024-02-21 PROCEDURE — 96365 THER/PROPH/DIAG IV INF INIT: CPT

## 2024-02-21 RX ORDER — POLYETHYLENE GLYCOL 3350 17 G/17G
17 POWDER, FOR SOLUTION ORAL DAILY PRN
Status: DISCONTINUED | OUTPATIENT
Start: 2024-02-21 | End: 2024-03-01 | Stop reason: HOSPADM

## 2024-02-21 RX ORDER — POTASSIUM CHLORIDE 7.45 MG/ML
10 INJECTION INTRAVENOUS PRN
Status: DISCONTINUED | OUTPATIENT
Start: 2024-02-21 | End: 2024-03-01 | Stop reason: HOSPADM

## 2024-02-21 RX ORDER — ONDANSETRON 4 MG/1
4 TABLET, ORALLY DISINTEGRATING ORAL EVERY 8 HOURS PRN
Status: DISCONTINUED | OUTPATIENT
Start: 2024-02-21 | End: 2024-03-01 | Stop reason: HOSPADM

## 2024-02-21 RX ORDER — MAGNESIUM SULFATE IN WATER 40 MG/ML
2000 INJECTION, SOLUTION INTRAVENOUS PRN
Status: DISCONTINUED | OUTPATIENT
Start: 2024-02-21 | End: 2024-03-01 | Stop reason: HOSPADM

## 2024-02-21 RX ORDER — GUAIFENESIN 600 MG/1
600 TABLET, EXTENDED RELEASE ORAL 2 TIMES DAILY
Status: DISCONTINUED | OUTPATIENT
Start: 2024-02-21 | End: 2024-03-01 | Stop reason: HOSPADM

## 2024-02-21 RX ORDER — ACETAMINOPHEN 650 MG/1
650 SUPPOSITORY RECTAL EVERY 6 HOURS PRN
Status: DISCONTINUED | OUTPATIENT
Start: 2024-02-21 | End: 2024-03-01 | Stop reason: HOSPADM

## 2024-02-21 RX ORDER — SODIUM CHLORIDE 9 MG/ML
INJECTION, SOLUTION INTRAVENOUS PRN
Status: DISCONTINUED | OUTPATIENT
Start: 2024-02-21 | End: 2024-03-01 | Stop reason: HOSPADM

## 2024-02-21 RX ORDER — POTASSIUM CHLORIDE 20 MEQ/1
40 TABLET, EXTENDED RELEASE ORAL PRN
Status: DISCONTINUED | OUTPATIENT
Start: 2024-02-21 | End: 2024-03-01 | Stop reason: HOSPADM

## 2024-02-21 RX ORDER — FUROSEMIDE 10 MG/ML
20 INJECTION INTRAMUSCULAR; INTRAVENOUS ONCE
Status: COMPLETED | OUTPATIENT
Start: 2024-02-21 | End: 2024-02-21

## 2024-02-21 RX ORDER — M-VIT,TX,IRON,MINS/CALC/FOLIC 27MG-0.4MG
1 TABLET ORAL DAILY
Status: DISCONTINUED | OUTPATIENT
Start: 2024-02-21 | End: 2024-03-01 | Stop reason: HOSPADM

## 2024-02-21 RX ORDER — SODIUM CHLORIDE 0.9 % (FLUSH) 0.9 %
5-40 SYRINGE (ML) INJECTION PRN
Status: DISCONTINUED | OUTPATIENT
Start: 2024-02-21 | End: 2024-03-01 | Stop reason: HOSPADM

## 2024-02-21 RX ORDER — METOPROLOL SUCCINATE 25 MG/1
25 TABLET, EXTENDED RELEASE ORAL DAILY
Status: DISCONTINUED | OUTPATIENT
Start: 2024-02-21 | End: 2024-03-01 | Stop reason: HOSPADM

## 2024-02-21 RX ORDER — ACETAMINOPHEN 325 MG/1
650 TABLET ORAL EVERY 6 HOURS PRN
Status: DISCONTINUED | OUTPATIENT
Start: 2024-02-21 | End: 2024-03-01 | Stop reason: HOSPADM

## 2024-02-21 RX ORDER — SODIUM CHLORIDE 0.9 % (FLUSH) 0.9 %
5-40 SYRINGE (ML) INJECTION EVERY 12 HOURS SCHEDULED
Status: DISCONTINUED | OUTPATIENT
Start: 2024-02-21 | End: 2024-03-01 | Stop reason: HOSPADM

## 2024-02-21 RX ORDER — FUROSEMIDE 40 MG/1
40 TABLET ORAL 2 TIMES DAILY PRN
Status: DISCONTINUED | OUTPATIENT
Start: 2024-02-21 | End: 2024-03-01 | Stop reason: HOSPADM

## 2024-02-21 RX ORDER — ONDANSETRON 2 MG/ML
4 INJECTION INTRAMUSCULAR; INTRAVENOUS EVERY 6 HOURS PRN
Status: DISCONTINUED | OUTPATIENT
Start: 2024-02-21 | End: 2024-03-01 | Stop reason: HOSPADM

## 2024-02-21 RX ORDER — PRAVASTATIN SODIUM 10 MG
20 TABLET ORAL EVERY OTHER DAY
Status: DISCONTINUED | OUTPATIENT
Start: 2024-02-21 | End: 2024-03-01 | Stop reason: HOSPADM

## 2024-02-21 RX ORDER — ENOXAPARIN SODIUM 100 MG/ML
40 INJECTION SUBCUTANEOUS DAILY
Status: DISCONTINUED | OUTPATIENT
Start: 2024-02-21 | End: 2024-03-01 | Stop reason: HOSPADM

## 2024-02-21 RX ADMIN — FUROSEMIDE 20 MG: 10 INJECTION, SOLUTION INTRAMUSCULAR; INTRAVENOUS at 14:53

## 2024-02-21 RX ADMIN — Medication 10 ML: at 20:42

## 2024-02-21 RX ADMIN — FUROSEMIDE 40 MG: 40 TABLET ORAL at 20:40

## 2024-02-21 RX ADMIN — ENOXAPARIN SODIUM 40 MG: 100 INJECTION SUBCUTANEOUS at 20:42

## 2024-02-21 RX ADMIN — GUAIFENESIN 600 MG: 600 TABLET, EXTENDED RELEASE ORAL at 20:40

## 2024-02-21 RX ADMIN — METOPROLOL SUCCINATE 25 MG: 25 TABLET, FILM COATED, EXTENDED RELEASE ORAL at 20:40

## 2024-02-21 RX ADMIN — PIPERACILLIN AND TAZOBACTAM 3375 MG: 3; .375 INJECTION, POWDER, FOR SOLUTION INTRAVENOUS at 17:21

## 2024-02-21 RX ADMIN — I-VITE, TAB 1000-60-2MG (60/BT) 1 TABLET: TAB at 20:40

## 2024-02-21 RX ADMIN — PRAVASTATIN SODIUM 20 MG: 10 TABLET ORAL at 20:40

## 2024-02-21 ASSESSMENT — PAIN SCALES - PAIN ASSESSMENT IN ADVANCED DEMENTIA (PAINAD)
BREATHING: 0
CONSOLABILITY: 0
TOTALSCORE: 0
FACIALEXPRESSION: 0
CONSOLABILITY: 0
BODYLANGUAGE: 0
BODYLANGUAGE: 0
FACIALEXPRESSION: 0
TOTALSCORE: 0
NEGVOCALIZATION: 0
NEGVOCALIZATION: 0
BREATHING: 0

## 2024-02-21 ASSESSMENT — PAIN - FUNCTIONAL ASSESSMENT: PAIN_FUNCTIONAL_ASSESSMENT: PAIN ASSESSMENT IN ADVANCED DEMENTIA (PAINAD)

## 2024-02-21 ASSESSMENT — LIFESTYLE VARIABLES
HOW MANY STANDARD DRINKS CONTAINING ALCOHOL DO YOU HAVE ON A TYPICAL DAY: PATIENT DOES NOT DRINK
HOW OFTEN DO YOU HAVE A DRINK CONTAINING ALCOHOL: NEVER

## 2024-02-21 ASSESSMENT — PAIN DESCRIPTION - LOCATION: LOCATION: ABDOMEN

## 2024-02-21 NOTE — H&P
Hospital Medicine History & Physical      PCP: Yesenia Fatima DO    Date of Admission: 2/21/2024    Date of Service: Pt seen/examined on 02/21/24     Chief Complaint:    Chief Complaint   Patient presents with    Cough     Cough for a several days.     Abdominal Pain     ABD pain and nausea started this AM.    Leg Swelling     Worsened lower extremity edema.          History Of Present Illness:      The patient is a 88 y.o. male with PMH of HLD, HTN, and BLE edema who presented to [facility] ED with complaint of cough, abd pain, and leg swelling. History limited 2/2 AMS. Pt's wife states that he is not the best historian, she states he doesn't usually need to know where he is or what month/year it is because he is always at home. She states that they have had respiratory symptoms for the past several days including congestion and cough. She states that this has not been improving but they have only been taking tessalon pearls for it. She states that last night he developed abdominal pain, dry heaving, and confusion. She states that she tried to give him some coke to drink and that settled his stomach for a little bit but did not last long.    Past Medical History:        Diagnosis Date    Arthritis     Hyperlipidemia     Influenza A 12/25/2017    Right-sided low back pain with right-sided sciatica        Past Surgical History:        Procedure Laterality Date    CATARACT REMOVAL      INGUINAL HERNIA REPAIR Bilateral 2-21-11    IwrIlrrmx-FMVIO-WziwzHussain Deleon MD    TONSILLECTOMY      TOOTH EXTRACTION         Medications Prior to Admission:    Prior to Admission medications    Medication Sig Start Date End Date Taking? Authorizing Provider   potassium chloride (KLOR-CON M) 10 MEQ extended release tablet TAKE 1 TABLET BY MOUTH TWICE A DAY WITH LASIX 11/30/23   Stone Gonzalez DO   pravastatin (PRAVACHOL) 20 MG tablet Take 1 tablet by mouth every other day 8/31/23   Yesenia Fatima DO   metoprolol succinate (TOPROL XL)  DETECTED     INFLUENZA B NOT DETECTED            EKG:   Encounter Date: 02/21/24   EKG 12 Lead (Syncope)   Result Value    Ventricular Rate 95    Atrial Rate 95    P-R Interval 220    QRS Duration 82    Q-T Interval 350    QTc Calculation (Bazett) 439    P Axis 59    R Axis -2    T Axis 59    Diagnosis      Sinus rhythm with 1st degree A-V block with occasional Premature ventricular complexesOtherwise normal ECGWhen compared with ECG of 15-APR-2022 23:24,Premature ventricular complexes are now Present        RADIOLOGY  US GALLBLADDER RUQ   Final Result   1. Equivocal findings for acute cholecystitis with gallbladder wall   thickening, but no pericholecystic fluid and no tenderness during imaging the   gallbladder.  There does appear to be fat stranding adjacent the gallbladder   on the CT suggesting acute cholecystitis.   2. Cholelithiasis and sludge in the gallbladder.   3. Right renal cysts including a 7.7 cm Bosniak 1 cyst at the upper pole and   a 2.5 cm Bosniak 2 cyst at the lower pole.  No follow-up imaging is   recommended.         POC US FAST ABDOMEN LIMITED   Final Result      CT CHEST ABDOMEN PELVIS WO CONTRAST Additional Contrast? None   Final Result   No acute pulmonary process identified.      Vascular disease.      Findings concerning for acute cholecystitis.  Further evaluation with right   upper quadrant ultrasound may be helpful.  No calcified gallstones are seen.      Findings likely related to constipation.      Stable left adrenal nodule.      Additional findings noted above.      RECOMMENDATIONS:   1 cm incidental right thyroid nodule. No follow-up imaging is recommended.      Reference: J Am Bony Radiol. 2015 Feb;12(2): 143-50      2.8 cm infrarenal abdominal aortic aneurysm suspected. Recommend follow-up   every 5 years.      Reference: J Am Bony Radiol 2013;10:789-794.      Multiple lesions, including right Bosniak II benign renal cyst measuring 6.7   cm. No follow-up imaging is recommended.

## 2024-02-21 NOTE — ED NOTES
1709 - Perfect Serve sent to Dr. Carbajal for General Surgery consult    1721 - Dr. Carbajal called back and spoke with Dr. Chacon to complete the consult

## 2024-02-21 NOTE — ED PROVIDER NOTES
[facility] ED     EMERGENCY DEPARTMENT ENCOUNTER         Pt Name: Stone Hoskins   MRN: [id number]   Birthdate 1935   Date of evaluation: 2/21/2024   Provider: Adarhs Chacon MD   PCP: Yesenia Fatima DO   Note Started: 2:34 PM EST 2/21/24       Chief Complaint     Cough (Cough for a several days. ), Abdominal Pain (ABD pain and nausea started this AM.), and Leg Swelling (Worsened lower extremity edema. )      History of Present Illness     Stone Hoskins is a 88 y.o. male who presents with several days of illness now complicated by generalized weakness and fatigue.  The patient has some difficulty verbalizing history of present illness and his wife at bedside and acts as the primary historian.    Per his wife they both have been struggling with an upper respiratory infection for several days now including nasal congestion and cough.  They have been using Tessalon Perles and their condition is not clearly improving.  Over the past 1 day his overall condition has declined and now with generalized weakness and having difficulty walking.  Today the patient also developed abdominal pain with retching at home without significant emesis production.  He also seems to have an altered mental status.  He has difficulty providing history and his wife states that his cognitive and functional status is much declined and therefore she brings him for evaluation    I have reviewed the nursing notes and agree unless otherwise noted.    Review of Systems     Positives and pertinent negatives as per HPI.    Past Medical, Surgical, Family, and Social History     He has a past medical history of Arthritis, Hyperlipidemia, Influenza A, and Right-sided low back pain with right-sided sciatica.  He has a past surgical history that includes Tonsillectomy; Tooth Extraction; Inguinal hernia repair (Bilateral, 2-21-11); and Cataract removal.  His family history is not on file.  He reports that he has quit smoking.  structures were examined and images saved for these views::          Gallbladder - Long Axis         Gallbladder - Short Axis         Anterior Wall Diameter     Findings:          Gallstones:  Present         Sludge:  Present         Sonographic Dimas's sign:  Present         Pericholecystic fluid:  Present         GB wall thickening?:  Absent         Maximal GB wall thickness in transverse (mm) =:  2.3         Common bile duct dilation?:  Indeterminate         Other findings::  Unable to obtain     Interpretation:          Cholelithiasis     Confirmatory study:          What confirmatory study was done?:  CT  Electronically signed by James Chacon (Nathan)  on Wednesday, February 21, 2024 at 4:32 PM : Attending: James Chacon (Nathan)       RECENT VITALS:  BP: 135/73, Temp: 99 °F (37.2 °C), Pulse: 96,Respirations: 24, SpO2: 94 %     Procedures     na    ED Course and MDM     Stone Hoskins is a 88 y.o. male who presents with several days of what may be characterized as a respiratory infection now complicated by generalized weakness difficulty with mobility and perhaps what is best characterized as altered mental status.  Here he is afebrile and hemodynamically stable though mildly tachycardic.  He appears mildly hypervolemic with peripheral edema and appears to be on a diuretic as needed.  This may be contributing to his relatively low oxygen as well though with no clear evidence of respiratory distress/failure.  His cognition may also suggest a more subacute to chronic cognitive or functional decline even parkinsonism though it does not appear he carries this diagnosis.  His spouse denies that this has been a previous diagnosis.    Overall the unifying diagnosis remains unclear at this time but certainly anticipate he may require hospitalization given his functional decline on background of infectious process.  He is tachycardic though with no other markers of SIRS.  Will obtain blood cultures in the course of

## 2024-02-22 LAB
ALBUMIN SERPL-MCNC: 4 G/DL (ref 3.4–5)
ALBUMIN/GLOB SERPL: 1.5 {RATIO} (ref 1.1–2.2)
ALP SERPL-CCNC: 83 U/L (ref 40–129)
ALT SERPL-CCNC: 39 U/L (ref 10–40)
ANION GAP SERPL CALCULATED.3IONS-SCNC: 10 MMOL/L (ref 3–16)
AST SERPL-CCNC: 33 U/L (ref 15–37)
BASOPHILS # BLD: 0 K/UL (ref 0–0.2)
BASOPHILS NFR BLD: 0 %
BILIRUB SERPL-MCNC: 2 MG/DL (ref 0–1)
BUN SERPL-MCNC: 13 MG/DL (ref 7–20)
CALCIUM SERPL-MCNC: 8.8 MG/DL (ref 8.3–10.6)
CHLORIDE SERPL-SCNC: 100 MMOL/L (ref 99–110)
CO2 SERPL-SCNC: 27 MMOL/L (ref 21–32)
CREAT SERPL-MCNC: 0.8 MG/DL (ref 0.8–1.3)
DEPRECATED RDW RBC AUTO: 14.2 % (ref 12.4–15.4)
EOSINOPHIL # BLD: 0 K/UL (ref 0–0.6)
EOSINOPHIL NFR BLD: 0 %
GFR SERPLBLD CREATININE-BSD FMLA CKD-EPI: >60 ML/MIN/{1.73_M2}
GLUCOSE BLD-MCNC: 105 MG/DL (ref 70–99)
GLUCOSE BLD-MCNC: 113 MG/DL (ref 70–99)
GLUCOSE BLD-MCNC: 119 MG/DL (ref 70–99)
GLUCOSE SERPL-MCNC: 119 MG/DL (ref 70–99)
HCT VFR BLD AUTO: 49.3 % (ref 40.5–52.5)
HGB BLD-MCNC: 16.9 G/DL (ref 13.5–17.5)
INR PPP: 1.14 (ref 0.84–1.16)
LEGIONELLA AG UR QL: NORMAL
LYMPHOCYTES # BLD: 1.1 K/UL (ref 1–5.1)
LYMPHOCYTES NFR BLD: 5 %
MAGNESIUM SERPL-MCNC: 2.1 MG/DL (ref 1.8–2.4)
MCH RBC QN AUTO: 28.9 PG (ref 26–34)
MCHC RBC AUTO-ENTMCNC: 34.2 G/DL (ref 31–36)
MCV RBC AUTO: 84.5 FL (ref 80–100)
MONOCYTES # BLD: 2.8 K/UL (ref 0–1.3)
MONOCYTES NFR BLD: 13 %
NEUTROPHILS # BLD: 17.4 K/UL (ref 1.7–7.7)
NEUTROPHILS NFR BLD: 79 %
NEUTS BAND NFR BLD MANUAL: 3 % (ref 0–7)
OVALOCYTES BLD QL SMEAR: ABNORMAL
PERFORMED ON: ABNORMAL
PLATELET # BLD AUTO: 238 K/UL (ref 135–450)
PLATELET BLD QL SMEAR: ADEQUATE
PMV BLD AUTO: 7.7 FL (ref 5–10.5)
POIKILOCYTOSIS BLD QL SMEAR: ABNORMAL
POTASSIUM SERPL-SCNC: 3.4 MMOL/L (ref 3.5–5.1)
PROT SERPL-MCNC: 6.7 G/DL (ref 6.4–8.2)
PROTHROMBIN TIME: 14.6 SEC (ref 11.5–14.8)
RBC # BLD AUTO: 5.84 M/UL (ref 4.2–5.9)
S PNEUM AG UR QL: NORMAL
SLIDE REVIEW: ABNORMAL
SODIUM SERPL-SCNC: 137 MMOL/L (ref 136–145)
WBC # BLD AUTO: 21.2 K/UL (ref 4–11)

## 2024-02-22 PROCEDURE — 6360000002 HC RX W HCPCS: Performed by: INTERNAL MEDICINE

## 2024-02-22 PROCEDURE — 2580000003 HC RX 258: Performed by: INTERNAL MEDICINE

## 2024-02-22 PROCEDURE — 1200000000 HC SEMI PRIVATE

## 2024-02-22 PROCEDURE — 97166 OT EVAL MOD COMPLEX 45 MIN: CPT

## 2024-02-22 PROCEDURE — 85610 PROTHROMBIN TIME: CPT

## 2024-02-22 PROCEDURE — 6370000000 HC RX 637 (ALT 250 FOR IP): Performed by: INTERNAL MEDICINE

## 2024-02-22 PROCEDURE — 36415 COLL VENOUS BLD VENIPUNCTURE: CPT

## 2024-02-22 PROCEDURE — 99222 1ST HOSP IP/OBS MODERATE 55: CPT | Performed by: SURGERY

## 2024-02-22 PROCEDURE — 83735 ASSAY OF MAGNESIUM: CPT

## 2024-02-22 PROCEDURE — 80053 COMPREHEN METABOLIC PANEL: CPT

## 2024-02-22 PROCEDURE — 97530 THERAPEUTIC ACTIVITIES: CPT

## 2024-02-22 PROCEDURE — 6370000000 HC RX 637 (ALT 250 FOR IP)

## 2024-02-22 PROCEDURE — 85025 COMPLETE CBC W/AUTO DIFF WBC: CPT

## 2024-02-22 PROCEDURE — 97162 PT EVAL MOD COMPLEX 30 MIN: CPT

## 2024-02-22 PROCEDURE — 99232 SBSQ HOSP IP/OBS MODERATE 35: CPT | Performed by: INTERNAL MEDICINE

## 2024-02-22 PROCEDURE — 97116 GAIT TRAINING THERAPY: CPT

## 2024-02-22 RX ADMIN — POTASSIUM BICARBONATE 40 MEQ: 782 TABLET, EFFERVESCENT ORAL at 18:15

## 2024-02-22 RX ADMIN — ENOXAPARIN SODIUM 40 MG: 100 INJECTION SUBCUTANEOUS at 10:43

## 2024-02-22 RX ADMIN — PIPERACILLIN AND TAZOBACTAM 3375 MG: 3; .375 INJECTION, POWDER, FOR SOLUTION INTRAVENOUS at 00:52

## 2024-02-22 RX ADMIN — PIPERACILLIN AND TAZOBACTAM 3375 MG: 3; .375 INJECTION, POWDER, FOR SOLUTION INTRAVENOUS at 10:46

## 2024-02-22 RX ADMIN — GUAIFENESIN 600 MG: 600 TABLET, EXTENDED RELEASE ORAL at 10:42

## 2024-02-22 RX ADMIN — GUAIFENESIN 600 MG: 600 TABLET, EXTENDED RELEASE ORAL at 20:39

## 2024-02-22 RX ADMIN — FUROSEMIDE 40 MG: 40 TABLET ORAL at 18:15

## 2024-02-22 RX ADMIN — PIPERACILLIN AND TAZOBACTAM 3375 MG: 3; .375 INJECTION, POWDER, FOR SOLUTION INTRAVENOUS at 18:15

## 2024-02-22 RX ADMIN — I-VITE, TAB 1000-60-2MG (60/BT) 1 TABLET: TAB at 10:42

## 2024-02-22 RX ADMIN — METOPROLOL SUCCINATE 25 MG: 25 TABLET, FILM COATED, EXTENDED RELEASE ORAL at 10:42

## 2024-02-22 ASSESSMENT — PAIN SCALES - PAIN ASSESSMENT IN ADVANCED DEMENTIA (PAINAD)
TOTALSCORE: 0
FACIALEXPRESSION: 0
NEGVOCALIZATION: 0
BREATHING: 0
BODYLANGUAGE: 0
CONSOLABILITY: 0

## 2024-02-22 NOTE — PROGRESS NOTES
[facility] Progress Note    Daily Progress Note for 2024 8:38 AM 0218/0218-01  Stone Hoskins : 1935 Age: 88 y.o. Sex: male  Length of Stay:  1    Interval History:      CC: F/U Cough (Cough for a several days. ), Abdominal Pain (ABD pain and nausea started this AM.), and Leg Swelling (Worsened lower extremity edema. )      Subjective:       The patient says he has no pain currently however he does have RUQ pain with palpation. No dyspnea. No vomiting this morning.       Objective:     Vitals:    24 1800 24 1845 24 0254   BP: (!) 141/71 (!) 145/77 (!) 152/67 127/76   Pulse: 90 87 88 91   Resp:    Temp:  98.7 °F (37.1 °C) 98.4 °F (36.9 °C) 98.2 °F (36.8 °C)   TempSrc:  Oral Oral Oral   SpO2: 93% 94% 96% 94%   Weight:       Height:              Intake/Output Summary (Last 24 hours) at 2024 0838  Last data filed at 2024 0530  Gross per 24 hour   Intake --   Output 300 ml   Net -300 ml     Body mass index is 28.32 kg/m².    Physical Exam:  General: Cooperative, pleasant/Ill appearing, on  Nasal cannula  HEENT:  Head: normocephalic,atraumatic, anicteric sclera, clear conjunctiva  Neck: Normal size, Jugular venous pulsations: normal  Respiratory:unlabored breathing, clear to auscultation with no crackles, wheezes rhonchi  Heart: Regular rate and rhythm, S1, S2-normal, No murmurs  Abdomen: soft, nondistended, nontender, normoactive bowel sounds,  Neurological/Psych: Alert and oriented times three, no focal neurological deficits, Mood and affect appropriate.  Skin: No obvious rashes    Extremities:  no edema, Pedal pulses 2+ bilaterally    Scheduled Medications:  sodium chloride flush, 5-40 mL, 2 times per day  enoxaparin, 40 mg, Daily  piperacillin-tazobactam, 3,375 mg, Q8H  metoprolol succinate, 25 mg, Daily  therapeutic multivitamin-minerals, 1 tablet, Daily  pravastatin, 20 mg, Every Other Day  guaiFENesin, 600 mg, BID        PRN

## 2024-02-22 NOTE — DISCHARGE INSTR - COC
Continuity of Care Form    Patient Name: Stone Hoskins   :  1935  MRN:  [id number]    Admit date:  2024  Discharge date:  2024    Code Status Order: Full Code   Advance Directives:     Admitting Physician:  Jovita Samayoa DO  PCP: Yesenia Fatima DO    Discharging Nurse: Tiarra TORRES RN  Discharging Hospital Unit/Room#: 0218/0218-01  Discharging Unit Phone Number: [phone]    Emergency Contact:   Extended Emergency Contact Information  Primary Emergency Contact: Georgia Hoskins  Address: [address] [place] [id number] [facility]  Home Phone: [phone]  Mobile Phone: [phone]  Relation: Spouse  Secondary Emergency Contact: Viri Pollard  Home Phone: [phone]  Mobile Phone: [phone]  Relation: Child   needed? No    Past Surgical History:  Past Surgical History:   Procedure Laterality Date    CATARACT REMOVAL      INGUINAL HERNIA REPAIR Bilateral 11    AedOyowdm-GJVLG-JjcmrHussain Deleon MD    TONSILLECTOMY      TOOTH EXTRACTION         Immunization History:   Immunization History   Administered Date(s) Administered    COVID-19, MODERNA BLUE border, Primary or Immunocompromised, (age 12y+), IM, 100 mcg/0.5mL 2021, 03/10/2021, 01/10/2022    COVID-19, MODERNA Bivalent, (age 12y+), IM, 50 mcg/0.5 mL 11/15/2022    COVID-19, MODERNA, ( formula), (age 12y+), IM, 50mcg/0.5mL 2023    Influenza, FLUAD, (age 65 y+), Adjuvanted, 0.5mL 10/06/2020, 2021, 10/20/2022, 10/20/2023    Influenza, FLUARIX, FLULAVAL, FLUZONE (age 6 mo+) AND AFLURIA, (age 3 y+), PF, 0.5mL 2017    Influenza, High Dose (Fluzone 65 yrs and older) 10/01/2018    Influenza, Triv, inactivated, subunit, adjuvanted, IM (Fluad 65 yrs and older) 10/10/2019    Pneumococcal, PCV-13, PREVNAR 13, (age 6w+), IM, 0.5mL 2017    Pneumococcal, PPSV23, PNEUMOVAX 23, (age 2y+), SC/IM, 0.5mL 2012    TDaP, ADACEL (age 10y-64y), BOOSTRIX (age 10y+), IM, 0.5mL  requires Acute Rehab for less 30 days.     Update Admission H&P: No change in H&P    PHYSICIAN SIGNATURE:  Electronically signed by FILIBERTO WEAVER MD on 3/1/24 at 10:02 AM EST

## 2024-02-22 NOTE — PROGRESS NOTES
4 Eyes Skin Assessment     NAME:  Stone Hoskins  YOB: 1935  MEDICAL RECORD NUMBER:  [id number]    The patient is being assessed for  Admission    I agree that at least one RN has performed a thorough Head to Toe Skin Assessment on the patient. ALL assessment sites listed below have been assessed.      Areas assessed by both nurses:    Head, Face, Ears, Shoulders, Back, Chest, Arms, Elbows, Hands, Sacrum. Buttock, Coccyx, Ischium, and Legs. Feet and Heels    Small ulcer with redness over the right shin.        Does the Patient have a Wound? Yes wound(s) were present on assessment. LDA wound assessment was Initiated and completed by RN       Conrado Prevention initiated by RN: No  Wound Care Orders initiated by RN: No    Pressure Injury (Stage 3,4, Unstageable, DTI, NWPT, and Complex wounds) if present, place Wound referral order by RN under : No    New Ostomies, if present place, Ostomy referral order under : No     Nurse 1 eSignature: Electronically signed by John Suresh RN on 2/21/24 at 11:09 PM EST    **SHARE this note so that the co-signing nurse can place an eSignature**    Nurse 2 eSignature: Electronically signed by Ingrid Fields RN on 2/22/24 at 6:14 AM EST

## 2024-02-22 NOTE — PLAN OF CARE
Problem: Discharge Planning  Goal: Discharge to home or other facility with appropriate resources  Outcome: Progressing  Flowsheets (Taken 2/21/2024 2033)  Discharge to home or other facility with appropriate resources: Identify barriers to discharge with patient and caregiver     Problem: Pain  Goal: Verbalizes/displays adequate comfort level or baseline comfort level  Outcome: Progressing  Flowsheets (Taken 2/21/2024 2032)  Verbalizes/displays adequate comfort level or baseline comfort level: Assess pain using appropriate pain scale     Problem: Safety - Adult  Goal: Free from fall injury  Outcome: Progressing

## 2024-02-22 NOTE — CONSULTS
Department of General Surgery Consult    PATIENT NAME: Stone Hoskins   YOB: 1935    ADMISSION DATE: 2/21/2024 12:37 PM      TODAY'S DATE: 2/22/2024    Reason for Consult: Possible cholecystitis    Chief Complaint: None    Historian: Patient    Requesting Physician:  Oswaldo    HISTORY OF PRESENT ILLNESS:              The patient is a 88 y.o. male who presents with cough, abdominal pain, and leg swelling.  He really is not able to give much history.  Per the EMR, he has several days of congestion and cough.  He also has been having some abdominal pain and dry heaving.  He denies any abdominal pain this morning as well as denying nausea.    Past Medical History:        Diagnosis Date    Arthritis     Hyperlipidemia     Influenza A 12/25/2017    Right-sided low back pain with right-sided sciatica        Past Surgical History:        Procedure Laterality Date    CATARACT REMOVAL      INGUINAL HERNIA REPAIR Bilateral 2-21-11    VjtSrzjru-GMJTK-QzmrdHussain Deleon MD    TONSILLECTOMY      TOOTH EXTRACTION         Current Medications:   Current Facility-Administered Medications: sodium chloride flush 0.9 % injection 5-40 mL, 5-40 mL, IntraVENous, 2 times per day  sodium chloride flush 0.9 % injection 5-40 mL, 5-40 mL, IntraVENous, PRN  0.9 % sodium chloride infusion, , IntraVENous, PRN  potassium chloride (KLOR-CON M) extended release tablet 40 mEq, 40 mEq, Oral, PRN **OR** potassium bicarb-citric acid (EFFER-K) effervescent tablet 40 mEq, 40 mEq, Oral, PRN **OR** potassium chloride 10 mEq/100 mL IVPB (Peripheral Line), 10 mEq, IntraVENous, PRN  magnesium sulfate 2000 mg in 50 mL IVPB premix, 2,000 mg, IntraVENous, PRN  enoxaparin (LOVENOX) injection 40 mg, 40 mg, SubCUTAneous, Daily  ondansetron (ZOFRAN-ODT) disintegrating tablet 4 mg, 4 mg, Oral, Q8H PRN **OR** ondansetron (ZOFRAN) injection 4 mg, 4 mg, IntraVENous, Q6H PRN  polyethylene glycol (GLYCOLAX) packet 17 g, 17 g, Oral, Daily    MG 2.10      Phos:   No results for input(s): \"PHOS\" in the last 72 hours.   INR:   Recent Labs     02/22/24  0556   INR 1.14       Radiology Review: Images personally reviewed by me.   CT and ultrasound images reviewed and suggest cholecystitis with cholelithiasis  Chest x-ray shows findings concerning for congestive heart failure    IMPRESSION/RECOMMENDATIONS:    Acute calculus cholecystitis, minimally symptomatic this time.  Continue to treat with IV antibiotics.  Continue supportive care.  We can discuss interval outpatient cholecystectomy once the acute episode is resolved    Electronically signed by KERRY SALINAS MD     [facility]  [id number]

## 2024-02-22 NOTE — PROGRESS NOTES
Bedside report given to Thierno RAMIREZ. Pt denies needs at this time. No s/s of distress. Respirations easy and unlabored. Pt stable. Bed in low position call light within reach. No further needs at this time.

## 2024-02-22 NOTE — PROGRESS NOTES
Inpatient Occupational Therapy Evaluation and Treatment    Unit: [address] [facility]  Date:  2/22/2024  Patient Name:    Stone Hoskins  Admitting diagnosis:  Acute cholecystitis [K81.0]  Cholecystitis [K81.9]  Generalized weakness [R53.1]  Right upper quadrant abdominal pain [R10.11]  Altered mental status, unspecified altered mental status type [R41.82]  Admit Date:  2/21/2024  Precautions/Restrictions/WB Status/ Lines/ Wounds/ Oxygen: Fall risk, Bed/chair alarm, Lines (IV and external catheter), Telemetry, and Telesitter      Treatment Time:  1030-1125  Treatment Number:  1  Timed Code Treatment Minutes: 45 minutes  Total Treatment Minutes:  55  minutes    Patient Goals for Therapy: none stated, family reports would like to see pt. up and moving around more          Discharge Recommendations: SNF  DME needs for discharge: Defer to facility       Therapy recommendations for staff:   Assist of 1 for ambulation with use of rolling walker (RW) and gait belt to/from BSC  to/from chair    History of Present Illness: Per H&P of Deborah Staley PA-C 2/21/2024: \"The patient is a 88 y.o. male with PMH of HLD, HTN, and BLE edema who [place] [facility] [place] with complaint of cough, abd pain, and leg swelling. History limited 2/2 AMS. Pt's wife states that he is not the best historian, she states he doesn't usually need to know where he is or what month/year it is because he is always at home. She states that they have had respiratory symptoms for the past several days including congestion and cough. She states that this has not been improving but they have only been taking tessalon pearls for it. She states that last night he developed abdominal pain, dry heaving, and confusion. She states that she tried to give him some coke to drink and that settled his stomach for a little bit but did not last long. \"    2/21/2024 CT chest scan indicates concerns for pulmonary edema  secondary to congestive heart failure.  US gallbladder results indicate  With RW and gait belt  Bed Mobility:   Supine to Sit:    Min A , HOB flat, With increased time, and With cues for hand placement to utilize bed rail  Sit to Supine:   Not Tested  Rolling:   Not Tested  Scooting in sitting: SBA  Scooting in supine:  Not Tested    Transfers:    Sit to stand:    Min A , With increased time, and With cues for hand placement Initial sit->stand transfer, pt. Demo'd posterior lean requiring brief Mod A to correct to upright position  Stand to sit:    Min A , With RW and gait belt, and With cues for hand placement  Bed to chair:     Min A  and With RW and gait belt  Bed/ chair to standard toilet:  Not Tested  Bed/chair to BSC:   Not Tested  Functional Mobility:   Mod A  and With RW and gait belt, requiring MAX Vcs and tactile cues throughout for walker placement, kyphotic posture demonstrated throughout. With verbal cues, was able to correct posture    See PT note for gait analysis.      ADLs:  Dressing:      UE:   Not Tested  LE:    Not Tested    Bathing:    UE:  Not Tested  LE:  Not Tested    Eating:   Not Tested    Toileting:  Not Tested    Grooming/hygiene: Not Tested    Activity Tolerance:   Pt completed therapy session with fatigue     BP (mmHg) HR (bpm) SpO2 (%) on RA Comments   Semi-puckett in bed 136/77 77 92    Seated in chair post functional-mobility 145/79 79 95    Standing       End of session         Positioning Needs:   Pt reclined in chair, alarm set, call light provided and all needs within reach .     Ther Ex / Activities Initiated:   N/A    Patient/Family Education:   Pt educated on role of inpatient OT, plan of care, importance of continued activity, DC recommendations, Functional transfer/mobility safety, Safety awareness, Transfer techniques, and Calling for assist with mobility.    CHF Education  N/A    Assessment:  Pt seen for Occupational therapy evaluation in acute care setting.  Pt demonstrated decreased Activity tolerance, ADLs, Balance , ROM, Safety Awareness,

## 2024-02-22 NOTE — CARE COORDINATION
[facility] referral    Referral called to Yi @ [facility].  Faxed face sheet, H/P, MAR and therapy notes for review: EPIC

## 2024-02-22 NOTE — PROGRESS NOTES
Called [facility] and spoke with paz  from surgery office to make sure patient has been consulted on , per dr menendez

## 2024-02-22 NOTE — PROGRESS NOTES
Informed patient that her daughter Viri called. Patient smiled and was appreciative. Reminded patient to use call light for help, call light within reach. Bed alarm on. Telesitter in place.

## 2024-02-22 NOTE — CARE COORDINATION
Case Management Assessment  Initial Evaluation    Date/Time of Evaluation: 2/22/2024 3:42 PM  Assessment Completed by: Bettye Vázquez RN    If patient is discharged prior to next notation, then this note serves as note for discharge by case management.    Patient Name: Stone Hoskins                   YOB: 1935  Diagnosis: Acute cholecystitis [K81.0]  Cholecystitis [K81.9]  Generalized weakness [R53.1]  Right upper quadrant abdominal pain [R10.11]  Altered mental status, unspecified altered mental status type [R41.82]                   Date / Time: 2/21/2024 12:37 PM    Patient Admission Status: Inpatient   Readmission Risk (Low < 19, Mod (19-27), High > 27): Readmission Risk Score: 11.8    Current PCP: Yesenia Fatima, DO  PCP verified by CM? Yes    Chart Reviewed: Yes      History Provided by: Patient, Significant Other  Patient Orientation: Alert and Oriented, Person, Place, Situation    Patient Cognition: Alert    Hospitalization in the last 30 days (Readmission):  No    If yes, Readmission Assessment in CM Navigator will be completed.    Advance Directives:      Code Status: Full Code   Patient's Primary Decision Maker is: Named in Scanned ACP Document    Primary Decision Maker: [place] - Spouse - [phone]    Secondary Decision Maker: Viri Pollard - Child - [phone]    Secondary Decision Maker: Lauren Lazaro - Child - [phone]    Secondary Decision Maker: Roni Hoskins - Child - [phone]    Discharge Planning:    Patient lives with: (P) Spouse/Significant Other Type of Home: (P) House  Primary Care Giver: Spouse  Patient Support Systems include: Spouse/Significant Other   Current Financial resources: (P) Medicare  Current community resources: (P) None  Current services prior to admission: (P) Durable Medical Equipment            Current DME: (P) Walker, Other (Comment) (rita, LALITHA, hurry cane)            Type of Home Care services:  (P) None    ADLS  Prior functional level: (P)  Bathing, Housework, Shopping, Mobility, Assistance with the following:, Cooking (spouse and family assists with care)  Current functional level: (P) Assistance with the following:, Bathing, Cooking, Housework, Shopping, Mobility (at baseline)    PT AM-PAC: 18 /24  OT AM-PAC: 14 /24    Family can provide assistance at DC: (P) Yes  Would you like Case Management to discuss the discharge plan with any other family members/significant others, and if so, who? (P) Yes (spouse)  Plans to Return to Present Housing: (P) Yes  Other Identified Issues/Barriers to RETURNING to current housing: NA  Potential Assistance needed at discharge: (P) Home Care            Potential DME:    Patient expects to discharge to: (P) House  Plan for transportation at discharge:      Financial    Payor: [employer] / Plan: [employer] / Product Type: *No Product type* /     Does insurance require precert for SNF: Yes    Potential assistance Purchasing Medications: (P) No  Meds-to-Beds request:        [facility] [id number] [facility] [address] [facility] -  [phone] - F [phone]  [address] [facility] [address]  Phone: [phone] Fax: [phone]    [facility] [id number] [facility] [address] [facility] [address] - P [phone] - F [phone]  [address] [address]  Phone: [phone] Fax: [phone]      Notes:    Factors facilitating achievement of predicted outcomes: Family support, Motivated, Cooperative, and Pleasant    Barriers to discharge: Pain, Confusion, and Medical complications    Additional Case Management Notes: Chart reviewed. Met with pt and spouse at bedside and explained the role of the CM. Pt w/mild confusion. Defers to spouse to answer questions. Plan: HOME with spouse. Will need HHC. Unsure of provider but would like to use the provider they used in 2017. +CM following      The Plan for Transition of Care is related to the following treatment  goals of Acute cholecystitis [K81.0]  Cholecystitis [K81.9]  Generalized weakness [R53.1]  Right upper quadrant abdominal pain [R10.11]  Altered mental status, unspecified altered mental status type [R41.82]    IF APPLICABLE: The Patient and/or patient representative Stone and his family were provided with a choice of provider and agrees with the discharge plan. Freedom of choice list with basic dialogue that supports the patient's individualized plan of care/goals and shares the quality data associated with the providers was provided to:     Patient Representative Name:       The Patient and/or Patient Representative Agree with the Discharge Plan?      Bettye Vázquez RN  Case Management Department  Ph: [phone]

## 2024-02-22 NOTE — PROGRESS NOTES
BP (!) 152/67   Pulse 88   Temp 98.4 °F (36.9 °C) (Oral)   Resp 18   Ht 1.778 m (5' 10\")   Wt 89.5 kg (197 lb 6.4 oz)   SpO2 96%   BMI 28.32 kg/m²     Patient alert and oriented to self only. With telemetry on.  Assessment completed. Respiration easy, even and unlabored. Patient tolerated night meds well with sips of water. Call light and bedside table within reach. Bed at lowest position, locked, side rails x3.  Pt denies needs at this time.

## 2024-02-22 NOTE — PLAN OF CARE
Problem: Discharge Planning  Goal: Discharge to home or other facility with appropriate resources  Outcome: Progressing  Flowsheets (Taken 2/22/2024 1043)  Discharge to home or other facility with appropriate resources: Identify barriers to discharge with patient and caregiver     Problem: Pain  Goal: Verbalizes/displays adequate comfort level or baseline comfort level  Outcome: Progressing  Flowsheets (Taken 2/22/2024 0254 by John Suresh RN)  Verbalizes/displays adequate comfort level or baseline comfort level: Assess pain using appropriate pain scale     Problem: Safety - Adult  Goal: Free from fall injury  Outcome: Progressing     Problem: Skin/Tissue Integrity  Goal: Absence of new skin breakdown  Description: 1.  Monitor for areas of redness and/or skin breakdown  2.  Assess vascular access sites hourly  3.  Every 4-6 hours minimum:  Change oxygen saturation probe site  4.  Every 4-6 hours:  If on nasal continuous positive airway pressure, respiratory therapy assess nares and determine need for appliance change or resting period.  Outcome: Progressing

## 2024-02-22 NOTE — ACP (ADVANCE CARE PLANNING)
Advance Care Planning     General Advance Care Planning (ACP) Conversation    Date of Conversation: 2/22/2024  Conducted with:  Healthcare Decision Maker: Named in Advance Directive or Healthcare Power of  (name) Georgiasheryl Hoskins    Healthcare Decision Maker:    Primary Decision Maker: [place] - Spouse - [phone]    Secondary Decision Maker: Viri Pollard - Child - [phone]    Secondary Decision Maker: Lauren Lazaro - Child - [phone]    Secondary Decision Maker: Roni Hoskins - Child - [phone]  Click here to complete Healthcare Decision Makers including selection of the Healthcare Decision Maker Relationship (ie \"Primary\").   Today we documented Decision Maker(s) consistent with ACP documents on file.    Content/Action Overview:  Has ACP document(s) on file - reflects the patient's care preferences  Reviewed DNR/DNI and patient elects Full Code (Attempt Resuscitation)    Length of Voluntary ACP Conversation in minutes:  <16 minutes (Non-Billable)    Bettye Vázquez RN

## 2024-02-22 NOTE — PROGRESS NOTES
Received a call from patient's daughter Viri. Updated her that patient had tried to climb out of bed despite safety precaution education. Patient's daughter verbalized patient can be forgetful. Telesitter placed in patient's room for patient's safety. Daughter noted.

## 2024-02-22 NOTE — PROGRESS NOTES
Was informed by PCA that patient was caught trying to get out of bed. Patient was reoriented on safety precautions, not to get out of bed without supervision. Nocturnist notified.  Telesitter placed in the room for patient safety.

## 2024-02-22 NOTE — PROGRESS NOTES
Inpatient Physical Therapy Evaluation & Treatment    Unit: [facility]  Date:  2/22/2024  Patient Name:    Stone Hoskins  Admitting diagnosis:  Acute cholecystitis [K81.0]  Cholecystitis [K81.9]  Generalized weakness [R53.1]  Right upper quadrant abdominal pain [R10.11]  Altered mental status, unspecified altered mental status type [R41.82]  Admit Date:  2/21/2024  Precautions/Restrictions/WB Status/ Lines/ Wounds/ Oxygen: Fall risk, Bed/chair alarm, Lines (IV and external catheter), and Telesitter    Treatment Time:  11:40-12:20  Treatment Number:  1   Timed Code Treatment Minutes: 30 minutes  Total Treatment Minutes:  40 minutes    Patient Stated Goals for Therapy:  patient did not state, but family is glad he is getting up and moving today.         Discharge Recommendations: Home with 24hr assistance and Home PT  DME needs for discharge: Needs Met       Therapy recommendation for EMS Transport: can transport by wheelchair    Therapy recommendations for staff:   Assist of 1 for ambulation with use of rolling walker (RW) and gait belt within room    History of Present Illness:   Admitted 2/21/24 with sepsis 2/2 acute cholecystitis, AMS, URI.   PMH of HLD, HTN, and BLE edema     Home Health S4 Level Recommendation:  Level 1 Standard        AM-PAC Mobility Score    AM-PAC Inpatient Mobility Raw Score : 18         Subjective  Patient sitting up in chair with family present (multiple children and wife).  Pt agreeable to this PT session.     Cognition    A&O Person and Place ; knows the year.  Able to follow 1 step commands    Pain   No  Location: N/A  Rating: NA /10  Pain Medicine Status: No request made    Preadmission Environment:   Pt. Lives                                              Spouse in good health, home 24/7; sisters live in area as well to stop in and assist PRN  Home environment:                            one story home  Steps to enter first floor:                     one steps to enter; no handrails  Steps  with patient home PT upon discharge as patient functioning close to baseline level and would benefit from continued therapy services    Goals :   To be met in 3 visits:  1). Independent with LE Ex x 10 reps  2). Sit to/from stand: Supervision  3). Bed to chair: Supervision  4). CHF goal: N/A    To be met in 6 visits:  1).  Supine to/from sit: Modified Independent  2).  Sit to/from stand: Modified Independent  3).  Bed to chair: Modified Independent  4).  Gait: Ambulate 50 ft.  with Supervision and use of rolling walker (RW)  5).  Tolerate B LE exercises 3 sets of 10-15 reps  6).  Ascend/descend 1 steps with SBA with use of hand rail bilateral and LRAD (least restrictive assistive device)    Rehabilitation Potential: Good  Strengths for achieving goals include:   Pt motivated, PLOF, Family Support, and Pt cooperative   Barriers to achieving goals include:    No Barriers    Plan    To be seen 2-3 x / week  while in acute care setting for therapeutic exercises, bed mobility, transfers, progressive gait training, balance training, and family/patient education.    Signature: Bettye Campos PT     If patient discharges from this facility prior to next visit, this note will serve as the Discharge Summary.

## 2024-02-22 NOTE — CARE COORDINATION
[facility]  Received referral regarding HC services from Karey JARAMILLO. [facility] is not in network with pt's insurance.    [employer]---they do everything via a 3rd party vendor called [employer] [phone].  If you call them, they will route you to rep per state.  They will need HC orders, notes, med list, etc...and then set up the HC. the turnaround time is 24 hrs.    CTN to assist CM with setting up HC at [facility]. Possible DC 2/23 per CM.      Electronically signed by Yi Almaraz RN on 2/22/2024 at 3:52 PM

## 2024-02-23 LAB
ALBUMIN SERPL-MCNC: 3.2 G/DL (ref 3.4–5)
ALBUMIN/GLOB SERPL: 1.1 {RATIO} (ref 1.1–2.2)
ALP SERPL-CCNC: 83 U/L (ref 40–129)
ALT SERPL-CCNC: 30 U/L (ref 10–40)
ANION GAP SERPL CALCULATED.3IONS-SCNC: 12 MMOL/L (ref 3–16)
AST SERPL-CCNC: 24 U/L (ref 15–37)
BASOPHILS # BLD: 0 K/UL (ref 0–0.2)
BASOPHILS NFR BLD: 0 %
BILIRUB SERPL-MCNC: 1.8 MG/DL (ref 0–1)
BUN SERPL-MCNC: 17 MG/DL (ref 7–20)
CALCIUM SERPL-MCNC: 7.9 MG/DL (ref 8.3–10.6)
CHLORIDE SERPL-SCNC: 102 MMOL/L (ref 99–110)
CO2 SERPL-SCNC: 24 MMOL/L (ref 21–32)
CREAT SERPL-MCNC: 0.9 MG/DL (ref 0.8–1.3)
DACRYOCYTES BLD QL SMEAR: ABNORMAL
DEPRECATED RDW RBC AUTO: 14.2 % (ref 12.4–15.4)
EOSINOPHIL # BLD: 0 K/UL (ref 0–0.6)
EOSINOPHIL NFR BLD: 0 %
GFR SERPLBLD CREATININE-BSD FMLA CKD-EPI: >60 ML/MIN/{1.73_M2}
GLUCOSE SERPL-MCNC: 108 MG/DL (ref 70–99)
HCT VFR BLD AUTO: 46.5 % (ref 40.5–52.5)
HGB BLD-MCNC: 15.8 G/DL (ref 13.5–17.5)
LYMPHOCYTES # BLD: 2.1 K/UL (ref 1–5.1)
LYMPHOCYTES NFR BLD: 9 %
MCH RBC QN AUTO: 29.2 PG (ref 26–34)
MCHC RBC AUTO-ENTMCNC: 34.1 G/DL (ref 31–36)
MCV RBC AUTO: 85.7 FL (ref 80–100)
MONOCYTES # BLD: 1.9 K/UL (ref 0–1.3)
MONOCYTES NFR BLD: 9 %
NEUTROPHILS # BLD: 17.2 K/UL (ref 1.7–7.7)
NEUTROPHILS NFR BLD: 80 %
NEUTS BAND NFR BLD MANUAL: 1 % (ref 0–7)
OVALOCYTES BLD QL SMEAR: ABNORMAL
PLATELET # BLD AUTO: 230 K/UL (ref 135–450)
PLATELET BLD QL SMEAR: ADEQUATE
PMV BLD AUTO: 8 FL (ref 5–10.5)
POIKILOCYTOSIS BLD QL SMEAR: ABNORMAL
POTASSIUM SERPL-SCNC: 3.7 MMOL/L (ref 3.5–5.1)
PROT SERPL-MCNC: 6.1 G/DL (ref 6.4–8.2)
RBC # BLD AUTO: 5.42 M/UL (ref 4.2–5.9)
SLIDE REVIEW: ABNORMAL
SODIUM SERPL-SCNC: 138 MMOL/L (ref 136–145)
VARIANT LYMPHS NFR BLD MANUAL: 1 % (ref 0–6)
WBC # BLD AUTO: 21.2 K/UL (ref 4–11)

## 2024-02-23 PROCEDURE — 97110 THERAPEUTIC EXERCISES: CPT

## 2024-02-23 PROCEDURE — 99232 SBSQ HOSP IP/OBS MODERATE 35: CPT | Performed by: SURGERY

## 2024-02-23 PROCEDURE — 85025 COMPLETE CBC W/AUTO DIFF WBC: CPT

## 2024-02-23 PROCEDURE — 6360000002 HC RX W HCPCS: Performed by: INTERNAL MEDICINE

## 2024-02-23 PROCEDURE — 2580000003 HC RX 258: Performed by: INTERNAL MEDICINE

## 2024-02-23 PROCEDURE — 99232 SBSQ HOSP IP/OBS MODERATE 35: CPT | Performed by: INTERNAL MEDICINE

## 2024-02-23 PROCEDURE — 80053 COMPREHEN METABOLIC PANEL: CPT

## 2024-02-23 PROCEDURE — 6370000000 HC RX 637 (ALT 250 FOR IP)

## 2024-02-23 PROCEDURE — 36415 COLL VENOUS BLD VENIPUNCTURE: CPT

## 2024-02-23 PROCEDURE — 97530 THERAPEUTIC ACTIVITIES: CPT

## 2024-02-23 PROCEDURE — 1200000000 HC SEMI PRIVATE

## 2024-02-23 RX ADMIN — I-VITE, TAB 1000-60-2MG (60/BT) 1 TABLET: TAB at 09:12

## 2024-02-23 RX ADMIN — METOPROLOL SUCCINATE 25 MG: 25 TABLET, FILM COATED, EXTENDED RELEASE ORAL at 09:12

## 2024-02-23 RX ADMIN — GUAIFENESIN 600 MG: 600 TABLET, EXTENDED RELEASE ORAL at 09:12

## 2024-02-23 RX ADMIN — PIPERACILLIN AND TAZOBACTAM 3375 MG: 3; .375 INJECTION, POWDER, FOR SOLUTION INTRAVENOUS at 09:14

## 2024-02-23 RX ADMIN — PRAVASTATIN SODIUM 20 MG: 10 TABLET ORAL at 09:18

## 2024-02-23 RX ADMIN — PIPERACILLIN AND TAZOBACTAM 3375 MG: 3; .375 INJECTION, POWDER, FOR SOLUTION INTRAVENOUS at 18:31

## 2024-02-23 RX ADMIN — PIPERACILLIN AND TAZOBACTAM 3375 MG: 3; .375 INJECTION, POWDER, FOR SOLUTION INTRAVENOUS at 02:02

## 2024-02-23 RX ADMIN — ENOXAPARIN SODIUM 40 MG: 100 INJECTION SUBCUTANEOUS at 09:12

## 2024-02-23 RX ADMIN — GUAIFENESIN 600 MG: 600 TABLET, EXTENDED RELEASE ORAL at 22:34

## 2024-02-23 ASSESSMENT — PAIN SCALES - GENERAL: PAINLEVEL_OUTOF10: 0

## 2024-02-23 NOTE — PLAN OF CARE
Problem: Discharge Planning  Goal: Discharge to home or other facility with appropriate resources  2/23/2024 0044 by Jhon Suresh RN  Outcome: Progressing  Flowsheets (Taken 2/22/2024 2031)  Discharge to home or other facility with appropriate resources: Identify barriers to discharge with patient and caregiver     Problem: Pain  Goal: Verbalizes/displays adequate comfort level or baseline comfort level  2/23/2024 0044 by John Suresh RN  Outcome: Progressing  Flowsheets (Taken 2/22/2024 2003)  Verbalizes/displays adequate comfort level or baseline comfort level: Assess pain using appropriate pain scale     Problem: Safety - Adult  Goal: Free from fall injury  2/23/2024 0044 by John Suresh RN  Outcome: Progressing     Problem: Skin/Tissue Integrity  Goal: Absence of new skin breakdown  Description: 1.  Monitor for areas of redness and/or skin breakdown  2.  Assess vascular access sites hourly  3.  Every 4-6 hours minimum:  Change oxygen saturation probe site  4.  Every 4-6 hours:  If on nasal continuous positive airway pressure, respiratory therapy assess nares and determine need for appliance change or resting period.  2/23/2024 0044 by John Suresh, RN  Outcome: Progressing

## 2024-02-23 NOTE — PROGRESS NOTES
Inpatient Occupational Therapy Treatment    Unit: [address] [place]  Date:  2/23/2024  Patient Name:    Stone Hoskins  Admitting diagnosis:  Acute cholecystitis [K81.0]  Cholecystitis [K81.9]  Generalized weakness [R53.1]  Right upper quadrant abdominal pain [R10.11]  Altered mental status, unspecified altered mental status type [R41.82]  Admit Date:  2/21/2024  Precautions/Restrictions/WB Status/ Lines/ Wounds/ Oxygen: Fall risk, Bed/chair alarm, Lines (IV and external catheter), Telemetry, and Telesitter      Treatment Time:  13:25-13:50  Treatment Number:  2  Timed Code Treatment Minutes: 15 minutes  Total Treatment Minutes:  25  minutes    Patient Goals for Therapy: none stated      Discharge Recommendations: SNF  DME needs for discharge: Defer to facility       Therapy recommendations for staff:   Assist of 1 for ambulation with use of rolling walker (RW) and gait belt to/from BSC  to/from chair    History of Present Illness: Per H&P of Deborah Staley PA-C 2/21/2024: \"The patient is a 88 y.o. male with PMH of HLD, HTN, and BLE edema who presented to [facility] ED with complaint of cough, abd pain, and leg swelling. History limited 2/2 AMS. Pt's wife states that he is not the best historian, she states he doesn't usually need to know where he is or what month/year it is because he is always at home. She states that they have had respiratory symptoms for the past several days including congestion and cough. She states that this has not been improving but they have only been taking tessalon pearls for it. She states that last night he developed abdominal pain, dry heaving, and confusion. She states that she tried to give him some coke to drink and that settled his stomach for a little bit but did not last long. \"    2/21/2024 CT chest scan indicates concerns for pulmonary edema  secondary to congestive heart failure.  US gallbladder results indicate possible cholecystitis.      Home Health S4 Level Recommendation:  NA    AM-PAC  requiring MAX verbal and tactile cues throughout for walker placement and correcting posture.     See PT note for gait analysis.      ADLs:  Dressing:      UE:   Not Tested  LE:    Not Tested    Bathing:    UE:  Not Tested  LE:  Not Tested    Eating:   Not Tested    Toileting:  Not Tested    Grooming/hygiene: Not Tested    Activity Tolerance:   Pt completed therapy session with fatigue     BP (mmHg) HR (bpm) SpO2 (%) on RA Comments   Semi-puckett in bed       Seated in chair post functional-mobility       Standing       End of session         Positioning Needs:   Pt reclined in chair, alarm set, call light provided and all needs within reach .     Ther Ex / Activities Initiated:   Chair push ups x10  B LE edema exercises: toe curls, ankle pumps, heel slides, hip abd/add    Patient/Family Education:   Pt educated on role of inpatient OT, plan of care, importance of continued activity, DC recommendations, Functional transfer/mobility safety, Safety awareness, Transfer techniques, and Calling for assist with mobility.    CHF Education  N/A    Assessment:  Pt seen for Occupational therapy treatment in acute care setting.  Pt demonstrated decreased Activity tolerance, ADLs, Balance , ROM, Safety Awareness, Strength, and Transfers. Pt functioning below baseline and will likely benefit from skilled occupational therapy services to maximize safety and independence.    Pt. Required MAX Vcs for safety awareness.     Recommending SNF upon discharge as patient functioning well below baseline, demonstrates good rehab potential and unable to return home due to limited safety awareness. and decreased endurance and strength necessary for daily activities.    Goal(s) :   To be met in 3 Visits:  Bed to toilet/BSC:       CGA  Pt will complete 3/3 CHF goals     N/A    To be met in 5 Visits:  Supine to/from Sit in preparation for ADL task:   SBA  Toileting        Min A  Grooming       CGA  Upper Body Dressing:      Min A  Lower Body

## 2024-02-23 NOTE — PROGRESS NOTES
[facility]    PATIENT NAME: Stone Hoskins     TODAY'S DATE: 2/23/2024    CHIEF COMPLAINT: None, feels well.     INTERVAL HISTORY/HPI:    Pt states he feels fine. Denies abdominal pain, nausea, or vomiting.     REVIEW OF SYSTEMS:  Pertinent positives and negatives as per interval history section    OBJECTIVE:  VITALS:  /69   Pulse 99   Temp 98.2 °F (36.8 °C) (Oral)   Resp 16   Ht 1.778 m (5' 10\")   Wt 89.5 kg (197 lb 6.4 oz)   SpO2 94%   BMI 28.32 kg/m²     INTAKE/OUTPUT:    I/O last 3 completed shifts:  In: 579.8 [P.O.:240; IV Piggyback:339.8]  Out: 2475 [Urine:2475]  I/O this shift:  In: 222 [P.O.:222]  Out: 300 [Urine:300]    CONSTITUTIONAL:  awake and alert  LUNGS:  Respirations easy and unlabored  CARD:  regular rate and rhythm  ABDOMEN:  normal bowel sounds, soft, non-distended, non-tender     Data:  CBC:   Recent Labs     02/21/24  1303 02/22/24  0556 02/23/24  0554   WBC 20.2* 21.2* 21.2*   HGB 16.8 16.9 15.8   HCT 50.0 49.3 46.5    238 230     BMP:    Recent Labs     02/21/24  1303 02/22/24  0556 02/23/24  0554    137 138   K 3.9 3.4* 3.7   CL 99 100 102   CO2 23 27 24   BUN 16 13 17   CREATININE 0.8 0.8 0.9   GLUCOSE 161* 119* 108*     Hepatic:   Recent Labs     02/21/24  1303 02/22/24  0556 02/23/24  0554   AST 31 33 24   ALT 39 39 30   BILITOT 1.0 2.0* 1.8*   ALKPHOS 91 83 83     Mag:      Recent Labs     02/22/24  0556   MG 2.10      Phos:   No results for input(s): \"PHOS\" in the last 72 hours.   INR:   Recent Labs     02/22/24  0556   INR 1.14       Radiology Review:  *Imaging personally reviewed by me.   NA      ASSESSMENT AND PLAN:  Acute calculous cholecystitis, symptomatically resolved. Unclear etiology of persistent leukocytosis. Continue antibiotics and supportive care. Advance diet     Electronically signed by KERRY SALINAS MD     [id number]

## 2024-02-23 NOTE — CARE COORDINATION
INTERDISCIPLINARY PLAN OF CARE CONFERENCE    Date/Time: 2/23/2024 4:14 PM  Completed by: Meg Stone RN, Case Management      Patient Name:  Stone Hoskins  YOB: 1935  Admitting Diagnosis: Acute cholecystitis [K81.0]  Cholecystitis [K81.9]  Generalized weakness [R53.1]  Right upper quadrant abdominal pain [R10.11]  Altered mental status, unspecified altered mental status type [R41.82]     Admit Date/Time:  2/21/2024 12:37 PM    Chart reviewed. Interdisciplinary team contacted or reviewed plan related to patient progress and discharge plans.   Disciplines included Case Management, Nursing, and Dietitian.    Current Status:Stable  PT/OT recommendation for discharge plan of care:  Home with 24hr assistance and Home PT     Expected D/C Disposition:  Home  Confirmed plan with patient and/or family Yes confirmed with: (name) wife  Met with:patient and wife  Discharge Plan Comments: Reviewed chart and met with pt and wife. Plan remains for pt to return home with wife and she will provide 24/7 assist. Discussed HHC and both remain agreeable. Will cont to follow for additional dc needs.    Home O2 in place on admit: No  Pt informed of need to bring portable home O2 tank on day of discharge for nursing to connect prior to leaving:  Not Indicated  Verbalized agreement/Understanding:  Not Indicated

## 2024-02-23 NOTE — PROGRESS NOTES
[facility]    Daily Progress Note for 2024 8:44 AM 0218/0218-01  Stone Hoskins : 1935 Age: 88 y.o. Sex: male  Length of Stay:  2    Interval History:      CC: F/U Cough (Cough for a several days. ), Abdominal Pain (ABD pain and nausea started this AM.), and Leg Swelling (Worsened lower extremity edema. )    Subjective:       Mr Hoskins says he feels good today however he later reported abdominal pain after breakfast.    Objective:     Vitals:    24 1508 24 1815 24 0204   BP: 118/72 128/64 133/72 128/72   Pulse: (!) 104  92 (!) 101   Resp: 18   16   Temp: 98.7 °F (37.1 °C)  98.4 °F (36.9 °C) 98.9 °F (37.2 °C)   TempSrc: Oral  Oral Oral   SpO2: 94%  93% 95%   Weight:       Height:              Intake/Output Summary (Last 24 hours) at 2024 0844  Last data filed at 2024 0623  Gross per 24 hour   Intake 579.77 ml   Output 2175 ml   Net -1595.23 ml       Body mass index is 28.32 kg/m².    Physical Exam:  General: Cooperative, pleasant/Ill appearing, on  Nasal cannula  HEENT:  Head: normocephalic,atraumatic, anicteric sclera, clear conjunctiva  Neck: Normal size, Jugular venous pulsations: normal  Respiratory:unlabored breathing, clear to auscultation with no crackles, wheezes rhonchi  Heart: Regular rate and rhythm, S1, S2-normal, No murmurs  Abdomen: soft, nondistended, nontender, normoactive bowel sounds,  Neurological/Psych: Alert and oriented to self and situation, no focal neurological deficits, Mood and affect appropriate.  Skin: No obvious rashes    Extremities:  no edema, Pedal pulses 2+ bilaterally    Scheduled Medications:  sodium chloride flush, 5-40 mL, 2 times per day  enoxaparin, 40 mg, Daily  piperacillin-tazobactam, 3,375 mg, Q8H  metoprolol succinate, 25 mg, Daily  therapeutic multivitamin-minerals, 1 tablet, Daily  pravastatin, 20 mg, Every Other Day  guaiFENesin, 600 mg, BID        PRN Medications:  sodium chloride

## 2024-02-23 NOTE — CARE COORDINATION
HC SERVICES  Telephone call to [facility] for [facility] for HC referral [phone]. Spoke with Starla.     HC order with facesheet, recent progress note and med list need to be faxed to:  [phone]     at discharge    CTN to inform CM when pt is discharged home. CM to fax referral with orders to above fax number. Per rep, turn around time for HC arrangement is 24 hours.    3:48 LM for CM with above info if pt discharged over weekend.      Electronically signed by Yi Almaraz RN on 2/23/2024 at 10:10 AM

## 2024-02-23 NOTE — FLOWSHEET NOTE
02/23/24 0900   Vital Signs   Temp 98.2 °F (36.8 °C)   Temp Source Oral   Pulse 99   Heart Rate Source Monitor   Respirations 16   /69   MAP (Calculated) 89   BP Location Left upper arm   BP Method Automatic   Patient Position Semi fowlers   Pain Assessment   Pain Assessment None - Denies Pain   Opioid-Induced Sedation   POSS Score 1   Oxygen Therapy   SpO2 94 %   O2 Device None (Room air)     Shift assessment complete. See flow sheet. Scheduled medications given, See MAR.   Head to toe complete. Vital signs logged and active bowel sounds in all 4 quadrants.    Pt awake in bed. Medications taken without difficulty. Pt up eating breakfast and denies pain.       No further needs noted at this time. Call light and bedside table within reach. Bed in lowest position, wheels locked and side rails up x2.     Siomara Eldridge RN

## 2024-02-23 NOTE — PROGRESS NOTES
Inpatient Physical Therapy Treatment    Unit: [facility]  Date:  2/23/2024  Patient Name:    Stone Hoskins  Admitting diagnosis:  Acute cholecystitis [K81.0]  Cholecystitis [K81.9]  Generalized weakness [R53.1]  Right upper quadrant abdominal pain [R10.11]  Altered mental status, unspecified altered mental status type [R41.82]  Admit Date:  2/21/2024  Precautions/Restrictions/WB Status/ Lines/ Wounds/ Oxygen: Fall risk, Bed/chair alarm, Lines (IV and external catheter), and Telesitter    Treatment Time:  1644 - 1709  Treatment Number:  2   Timed Code Treatment Minutes: 23 minutes  Total Treatment Minutes:  23 minutes    Patient Stated Goals for Therapy:  patient did not state, but family is glad he is getting up and moving today.         Discharge Recommendations: SNF  DME needs for discharge: Needs Met       Therapy recommendation for EMS Transport: can transport by wheelchair    Therapy recommendations for staff:   Assist of 1 for ambulation with use of rolling walker (RW) and gait belt within room    History of Present Illness:   Admitted 2/21/24 with sepsis 2/2 acute cholecystitis, AMS, URI.   PMH of HLD, HTN, and BLE edema     Home Health S4 Level Recommendation:  Level 1 Standard        AM-PAC Mobility Score    AM-PAC Inpatient Mobility Raw Score : 18         Subjective  Patient sitting up in chair with family present (multiple children and wife).  Pt agreeable to this PT session.     Cognition    A&O Person and Place ; knows the year.  Able to follow 1 step commands    Pain   No  Location: N/A  Rating: NA /10  Pain Medicine Status: No request made    Preadmission Environment:   Pt. Lives                                              Spouse in good health, home 24/7; sisters live in area as well to stop in and assist PRN  Home environment:                            one story home  Steps to enter first floor:                     one steps to enter; no handrails  Steps to second floor/basement:        N/A  Laundry:                                               1st floor; one step to get down into utility room  Bathroom:                                           tub/shower unit and standard height toilet  Pt sleeps in a:                                     Flat bed  Equipment owned:                              RW, rollator, and manual WC; Hurrycane cane     Preadmission Status:  Pt. Able to drive:                                 No  Pt. Fully independent with ADLs:       No  Pt. Required assistance for:               Dressing, Cleaning, Cooking, Laundry , and grocery shopping  Pt. independent for functional transfers and utilized Rollator/WC for mobility in home and Unknown out in community  History of falls:                                                Unknown  Home Health Services:                       None; family reports HH in the past    Objective  Does this pt have an acute or acute on chronic diagnosis of CHF? No    Upper Extremity ROM/Strength  Please see OT evaluation.      Lower Extremity ROM / Strength   AROM WFL: No  ROM limitations: Ankle DF limited to less than neutral. Bilat LE edema (L>R)    BLE strength impaired, but not formally assessed with MMT. Grossly at least 3+/5 bilat based on demonstrated functional mobility.    Lower Extremity Sensation    NT    Coordination  WFL    Tone  WNL    Balance  Static Sitting:  Good ; Independent  Dynamic Sitting:  Good ; Supervision   Comments: at edge of chair, stable    Static Standing: Fair +; CGA  Dynamic Standing: Fair +; Min A for postural cuing   Comments: diminished ankle strategy, compensating with hip strategy.     Posture  Seated: WNL  Standing: Forward flexed at hips/trunk (significant)    Bed Mobility   Supine to Sit:    Not Tested  Sit to Supine:   Not Tested  Rolling:   Not Tested   Scooting in sitting: Supervision   Scooting in supine:  Not Tested   Bridging:  Not Tested    Transfer Training     Sit to stand:   Unable to perform during first 4 trials,  decreased performance of sit>stand with repetition; initially unable but progressively able to perform with Min A. He benefits from constant cues for postural adjustment during transfers.     Recommending SNF upon discharge as patient functioning well below baseline, demonstrates good rehab potential and unable to return home due to home environment not conducive to patient recovery and limited safety awareness.    Goals :   To be met in 3 visits:  1). Independent with LE Ex x 10 reps  2). Sit to/from stand: Supervision  3). Bed to chair: Supervision  4). CHF goal: N/A    To be met in 6 visits:  1).  Supine to/from sit: Modified Independent  2).  Sit to/from stand: Modified Independent  3).  Bed to chair: Modified Independent  4).  Gait: Ambulate 50 ft.  with Supervision and use of rolling walker (RW)  5).  Tolerate B LE exercises 3 sets of 10-15 reps  6).  Ascend/descend 1 steps with SBA with use of hand rail bilateral and LRAD (least restrictive assistive device)    Rehabilitation Potential: Good  Strengths for achieving goals include:   Pt motivated, PLOF, Family Support, and Pt cooperative   Barriers to achieving goals include:    No Barriers    Plan    To be seen 2-3 x / week  while in acute care setting for therapeutic exercises, bed mobility, transfers, progressive gait training, balance training, and family/patient education.    Signature: Aaron Aragon, PT     If patient discharges from this facility prior to next visit, this note will serve as the Discharge Summary.

## 2024-02-23 NOTE — PLAN OF CARE
Problem: Discharge Planning  Goal: Discharge to home or other facility with appropriate resources  2/23/2024 1040 by Siomara Eldridge RN  Outcome: Progressing  Flowsheets (Taken 2/23/2024 0911)  Discharge to home or other facility with appropriate resources: Identify barriers to discharge with patient and caregiver  2/23/2024 0044 by John Suresh RN  Outcome: Progressing  Flowsheets (Taken 2/22/2024 2031)  Discharge to home or other facility with appropriate resources: Identify barriers to discharge with patient and caregiver     Problem: Pain  Goal: Verbalizes/displays adequate comfort level or baseline comfort level  2/23/2024 1040 by Siomara Eldridge RN  Outcome: Progressing  2/23/2024 0044 by John Suresh RN  Outcome: Progressing  Flowsheets (Taken 2/22/2024 2003)  Verbalizes/displays adequate comfort level or baseline comfort level: Assess pain using appropriate pain scale     Problem: Safety - Adult  Goal: Free from fall injury  2/23/2024 1040 by Siomara Eldridge RN  Outcome: Progressing  2/23/2024 0044 by John Suresh RN  Outcome: Progressing     Problem: Skin/Tissue Integrity  Goal: Absence of new skin breakdown  Description: 1.  Monitor for areas of redness and/or skin breakdown  2.  Assess vascular access sites hourly  3.  Every 4-6 hours minimum:  Change oxygen saturation probe site  4.  Every 4-6 hours:  If on nasal continuous positive airway pressure, respiratory therapy assess nares and determine need for appliance change or resting period.  2/23/2024 1040 by Siomara Eldridge RN  Outcome: Progressing  2/23/2024 0044 by John Suresh RN  Outcome: Progressing

## 2024-02-23 NOTE — PROGRESS NOTES
/72   Pulse 92   Temp 98.4 °F (36.9 °C) (Oral)   Resp 16   Ht 1.778 m (5' 10\")   Wt 89.5 kg (197 lb 6.4 oz)   SpO2 93%   BMI 28.32 kg/m²     Patient alert and oriented to self. With telemetry on. With male purewick draining clear kaci urine.  Bilateral lower limbs elevated with 1 pillow. Assessment completed. Respiration easy, even and unlabored. Patient tolerated night meds well. Call light and bedside table within reach. Bed at lowest position, locked, side rails x3, bed alarm on. With telesitter in the room.  Pt denies needs at this time.

## 2024-02-23 NOTE — PROGRESS NOTES
Bedside Mobility Assessment Tool (BMAT):     Assessment Level 1- Sit and Shake    1. From a semi-reclined position, ask patient to sit up and rotate to a seated position at the side of the bed. Can use the bedrail.    2. Ask patient to reach out and grab your hand and shake making sure patient reaches across his/her midline.   Pass- Patient is able to come to a seated position, maintain core strength. Maintains seated balance while reaching across midline. Move on to Assessment Level 2.     Assessment Level 2- Stretch and Point   1. With patient in seated position at the side of the bed, have patient place both feet on the floor (or stool) with knees no higher than hips.    2. Ask patient to stretch one leg and straighten the knee, then bend the ankle/flex and point the toes. If appropriate, repeat with the other leg.   Pass- Patient is able to demonstrate appropriate quad strength on intended weight bearing limb(s). Move onto Assessment Level 3.     Assessment Level 3- Stand   1. Ask patient to elevate off the bed or chair (seated to standing) using an assistive device (cane, bedrail).    2. Patient should be able to raise buttocks off be and hold for a count of five. May repeat once.   Pass- Patient maintains standing stability for at least 5 seconds, proceed to assessment level 4.    Assessment Level 4- Walk   1. Ask patient to march in place at bedside.    2. Then ask patient to advance step and return each foot. Some medical conditions may render a patient from stepping backwards, use your best clinical judgement.   Fail- Patient not able to complete tasks OR requires use of assistive device. Patient is MOBILITY LEVEL 3.       Mobility Level- 3

## 2024-02-24 LAB
ALBUMIN SERPL-MCNC: 3 G/DL (ref 3.4–5)
ALBUMIN/GLOB SERPL: 0.9 {RATIO} (ref 1.1–2.2)
ALP SERPL-CCNC: 91 U/L (ref 40–129)
ALT SERPL-CCNC: 34 U/L (ref 10–40)
ANION GAP SERPL CALCULATED.3IONS-SCNC: 12 MMOL/L (ref 3–16)
AST SERPL-CCNC: 26 U/L (ref 15–37)
BASOPHILS # BLD: 0 K/UL (ref 0–0.2)
BASOPHILS NFR BLD: 0.1 %
BILIRUB SERPL-MCNC: 1.6 MG/DL (ref 0–1)
BUN SERPL-MCNC: 18 MG/DL (ref 7–20)
CALCIUM SERPL-MCNC: 7.8 MG/DL (ref 8.3–10.6)
CHLORIDE SERPL-SCNC: 101 MMOL/L (ref 99–110)
CO2 SERPL-SCNC: 23 MMOL/L (ref 21–32)
CREAT SERPL-MCNC: 0.9 MG/DL (ref 0.8–1.3)
DEPRECATED RDW RBC AUTO: 14.4 % (ref 12.4–15.4)
EOSINOPHIL # BLD: 0 K/UL (ref 0–0.6)
EOSINOPHIL NFR BLD: 0.2 %
GFR SERPLBLD CREATININE-BSD FMLA CKD-EPI: >60 ML/MIN/{1.73_M2}
GLUCOSE SERPL-MCNC: 113 MG/DL (ref 70–99)
HCT VFR BLD AUTO: 45.5 % (ref 40.5–52.5)
HGB BLD-MCNC: 15.6 G/DL (ref 13.5–17.5)
LYMPHOCYTES # BLD: 0.9 K/UL (ref 1–5.1)
LYMPHOCYTES NFR BLD: 4.4 %
MAGNESIUM SERPL-MCNC: 2.2 MG/DL (ref 1.8–2.4)
MCH RBC QN AUTO: 29 PG (ref 26–34)
MCHC RBC AUTO-ENTMCNC: 34.3 G/DL (ref 31–36)
MCV RBC AUTO: 84.6 FL (ref 80–100)
MONOCYTES # BLD: 2.1 K/UL (ref 0–1.3)
MONOCYTES NFR BLD: 10.1 %
NEUTROPHILS # BLD: 17.5 K/UL (ref 1.7–7.7)
NEUTROPHILS NFR BLD: 85.2 %
PLATELET # BLD AUTO: 241 K/UL (ref 135–450)
PMV BLD AUTO: 8 FL (ref 5–10.5)
POTASSIUM SERPL-SCNC: 3.2 MMOL/L (ref 3.5–5.1)
PROT SERPL-MCNC: 6.2 G/DL (ref 6.4–8.2)
RBC # BLD AUTO: 5.38 M/UL (ref 4.2–5.9)
SODIUM SERPL-SCNC: 136 MMOL/L (ref 136–145)
WBC # BLD AUTO: 20.6 K/UL (ref 4–11)

## 2024-02-24 PROCEDURE — 1200000000 HC SEMI PRIVATE

## 2024-02-24 PROCEDURE — 6360000002 HC RX W HCPCS: Performed by: INTERNAL MEDICINE

## 2024-02-24 PROCEDURE — 83735 ASSAY OF MAGNESIUM: CPT

## 2024-02-24 PROCEDURE — 6370000000 HC RX 637 (ALT 250 FOR IP): Performed by: INTERNAL MEDICINE

## 2024-02-24 PROCEDURE — 99232 SBSQ HOSP IP/OBS MODERATE 35: CPT | Performed by: SURGERY

## 2024-02-24 PROCEDURE — 99232 SBSQ HOSP IP/OBS MODERATE 35: CPT | Performed by: INTERNAL MEDICINE

## 2024-02-24 PROCEDURE — 85025 COMPLETE CBC W/AUTO DIFF WBC: CPT

## 2024-02-24 PROCEDURE — 2580000003 HC RX 258: Performed by: INTERNAL MEDICINE

## 2024-02-24 PROCEDURE — 6370000000 HC RX 637 (ALT 250 FOR IP)

## 2024-02-24 PROCEDURE — 36415 COLL VENOUS BLD VENIPUNCTURE: CPT

## 2024-02-24 PROCEDURE — 80053 COMPREHEN METABOLIC PANEL: CPT

## 2024-02-24 RX ORDER — LANOLIN ALCOHOL/MO/W.PET/CERES
3 CREAM (GRAM) TOPICAL NIGHTLY PRN
Status: DISCONTINUED | OUTPATIENT
Start: 2024-02-24 | End: 2024-03-01 | Stop reason: HOSPADM

## 2024-02-24 RX ADMIN — PIPERACILLIN AND TAZOBACTAM 3375 MG: 3; .375 INJECTION, POWDER, FOR SOLUTION INTRAVENOUS at 01:30

## 2024-02-24 RX ADMIN — METOPROLOL SUCCINATE 25 MG: 25 TABLET, FILM COATED, EXTENDED RELEASE ORAL at 08:50

## 2024-02-24 RX ADMIN — GUAIFENESIN 600 MG: 600 TABLET, EXTENDED RELEASE ORAL at 08:50

## 2024-02-24 RX ADMIN — Medication 10 ML: at 08:51

## 2024-02-24 RX ADMIN — PIPERACILLIN AND TAZOBACTAM 3375 MG: 3; .375 INJECTION, POWDER, FOR SOLUTION INTRAVENOUS at 17:34

## 2024-02-24 RX ADMIN — GUAIFENESIN 600 MG: 600 TABLET, EXTENDED RELEASE ORAL at 20:52

## 2024-02-24 RX ADMIN — PIPERACILLIN AND TAZOBACTAM 3375 MG: 3; .375 INJECTION, POWDER, FOR SOLUTION INTRAVENOUS at 09:37

## 2024-02-24 RX ADMIN — POTASSIUM CHLORIDE 40 MEQ: 1500 TABLET, EXTENDED RELEASE ORAL at 09:38

## 2024-02-24 RX ADMIN — I-VITE, TAB 1000-60-2MG (60/BT) 1 TABLET: TAB at 08:50

## 2024-02-24 RX ADMIN — Medication 3 MG: at 20:52

## 2024-02-24 RX ADMIN — ENOXAPARIN SODIUM 40 MG: 100 INJECTION SUBCUTANEOUS at 08:50

## 2024-02-24 RX ADMIN — ACETAMINOPHEN 650 MG: 325 TABLET ORAL at 03:21

## 2024-02-24 ASSESSMENT — PAIN DESCRIPTION - PAIN TYPE: TYPE: ACUTE PAIN

## 2024-02-24 ASSESSMENT — PAIN DESCRIPTION - LOCATION: LOCATION: SHOULDER

## 2024-02-24 ASSESSMENT — PAIN DESCRIPTION - ORIENTATION: ORIENTATION: LEFT

## 2024-02-24 ASSESSMENT — PAIN - FUNCTIONAL ASSESSMENT: PAIN_FUNCTIONAL_ASSESSMENT: ACTIVITIES ARE NOT PREVENTED

## 2024-02-24 ASSESSMENT — PAIN SCALES - GENERAL
PAINLEVEL_OUTOF10: 0
PAINLEVEL_OUTOF10: 0
PAINLEVEL_OUTOF10: 5

## 2024-02-24 ASSESSMENT — PAIN DESCRIPTION - DESCRIPTORS: DESCRIPTORS: JABBING

## 2024-02-24 NOTE — PLAN OF CARE
Problem: Discharge Planning  Goal: Discharge to home or other facility with appropriate resources  2/24/2024 0922 by Zenaida Painter RN  Outcome: Progressing  2/23/2024 2254 by NIKOLAS DO  Outcome: Progressing     Problem: Pain  Goal: Verbalizes/displays adequate comfort level or baseline comfort level  2/24/2024 0922 by Zenaida Painter RN  Outcome: Progressing  2/23/2024 2254 by NIKOLAS DO  Outcome: Progressing     Problem: Safety - Adult  Goal: Free from fall injury  2/24/2024 0922 by Zenaida Painter RN  Outcome: Progressing  2/23/2024 2254 by NIKOLAS DO  Outcome: Progressing     Problem: Skin/Tissue Integrity  Goal: Absence of new skin breakdown  Description: 1.  Monitor for areas of redness and/or skin breakdown  2.  Assess vascular access sites hourly  3.  Every 4-6 hours minimum:  Change oxygen saturation probe site  4.  Every 4-6 hours:  If on nasal continuous positive airway pressure, respiratory therapy assess nares and determine need for appliance change or resting period.  2/24/2024 0922 by Zenaida Painter RN  Outcome: Progressing  2/23/2024 2254 by NIKOLAS DO  Outcome: Progressing

## 2024-02-24 NOTE — FLOWSHEET NOTE
02/24/24 0845   Vital Signs   Temp 98.2 °F (36.8 °C)   Temp Source Oral   Pulse 100   Heart Rate Source Monitor   Respirations 18   /75   MAP (Calculated) 93   BP Location Left upper arm   BP Method Automatic   Patient Position Semi fowlers   Pain Assessment   Pain Assessment None - Denies Pain   Opioid-Induced Sedation   POSS Score 1   Oxygen Therapy   SpO2 94 %   O2 Device None (Room air)     AM assessment completed, see flow sheet. Pt is alert. Vital signs are WNL. Respirations are even & easy. No complaints voiced. Pt denies needs at this time. SR up x 2, and bed in low position. Call light is within reach.

## 2024-02-24 NOTE — PROGRESS NOTES
[facility] SURGERY    PATIENT NAME: Stone Hoskins     TODAY'S DATE: 2/24/2024    CHIEF COMPLAINT: None, feels well.     INTERVAL HISTORY/HPI:    Pt with RUQ pain, no nausea or emesis, no fevers     REVIEW OF SYSTEMS:  Pertinent positives and negatives as per interval history section    OBJECTIVE:  VITALS:  /75   Pulse 100   Temp 98.2 °F (36.8 °C) (Oral)   Resp 18   Ht 1.778 m (5' 10\")   Wt 89.5 kg (197 lb 6.4 oz)   SpO2 94%   BMI 28.32 kg/m²     INTAKE/OUTPUT:    I/O last 3 completed shifts:  In: 561.8 [P.O.:222; IV Piggyback:339.8]  Out: 1375 [Urine:1375]  No intake/output data recorded.    CONSTITUTIONAL:  awake and alert  LUNGS:  Respirations easy and unlabored  CARD:  regular rate and rhythm  ABDOMEN:  normal bowel sounds, soft, non-distended, tender RUQ    Data:  CBC:   Recent Labs     02/22/24  0556 02/23/24  0554 02/24/24  0638   WBC 21.2* 21.2* 20.6*   HGB 16.9 15.8 15.6   HCT 49.3 46.5 45.5    230 241       BMP:    Recent Labs     02/22/24  0556 02/23/24  0554 02/24/24  0638    138 136   K 3.4* 3.7 3.2*    102 101   CO2 27 24 23   BUN 13 17 18   CREATININE 0.8 0.9 0.9   GLUCOSE 119* 108* 113*       Hepatic:   Recent Labs     02/22/24  0556 02/23/24  0554 02/24/24  0638   AST 33 24 26   ALT 39 30 34   BILITOT 2.0* 1.8* 1.6*   ALKPHOS 83 83 91       Mag:      Recent Labs     02/22/24  0556 02/24/24  0638   MG 2.10 2.20        Phos:   No results for input(s): \"PHOS\" in the last 72 hours.   INR:   Recent Labs     02/22/24  0556   INR 1.14         Radiology Review:  *Imaging personally reviewed by me.   NA      ASSESSMENT AND PLAN:  Acute calculous cholecystitis,   Continue abx  As he is now having RUQ pain and WBC persists will back diet down     Electronically signed by Awais Carbajal MD     [id number]

## 2024-02-24 NOTE — PROGRESS NOTES
/67   Pulse 91   Temp 98.5 °F (36.9 °C) (Oral)   Resp 18   Ht 1.778 m (5' 10\")   Wt 89.5 kg (197 lb 6.4 oz)   SpO2 93%   BMI 28.32 kg/m²     Pt awake in bed. Pt alert and oriented to person and place only. Assessment complete. Meds passed. Pt denies needs at this time.        Bedside Mobility Assessment Tool (BMAT):     Assessment Level 1- Sit and Shake    1. From a semi-reclined position, ask patient to sit up and rotate to a seated position at the side of the bed. Can use the bedrail.    2. Ask patient to reach out and grab your hand and shake making sure patient reaches across his/her midline.   Pass- Patient is able to come to a seated position, maintain core strength. Maintains seated balance while reaching across midline. Move on to Assessment Level 2.     Assessment Level 2- Stretch and Point   1. With patient in seated position at the side of the bed, have patient place both feet on the floor (or stool) with knees no higher than hips.    2. Ask patient to stretch one leg and straighten the knee, then bend the ankle/flex and point the toes. If appropriate, repeat with the other leg.   Pass- Patient is able to demonstrate appropriate quad strength on intended weight bearing limb(s). Move onto Assessment Level 3.     Assessment Level 3- Stand   1. Ask patient to elevate off the bed or chair (seated to standing) using an assistive device (cane, bedrail).    2. Patient should be able to raise buttocks off be and hold for a count of five. May repeat once.   Pass- Patient maintains standing stability for at least 5 seconds, proceed to assessment level 4.    Assessment Level 4- Walk   1. Ask patient to march in place at bedside.    2. Then ask patient to advance step and return each foot. Some medical conditions may render a patient from stepping backwards, use your best clinical judgement.   Fail- Patient not able to complete tasks OR requires use of assistive device. Patient is MOBILITY LEVEL 3.        Mobility Level- 3

## 2024-02-24 NOTE — FLOWSHEET NOTE
02/24/24 0145   Vital Signs   Temp 98.2 °F (36.8 °C)   Temp Source Oral   Pulse 95   Heart Rate Source Monitor   Respirations 16   BP (!) 150/67   MAP (Calculated) 95   MAP (mmHg) 88   BP Location Right upper arm   BP Method Automatic   Patient Position Semi wlers   Pain Assessment   Pain Assessment None - Denies Pain   Pain Level 0   Opioid-Induced Sedation   POSS Score 1   Oxygen Therapy   SpO2 97 %   O2 Device None (Room air)     Pt VS as shown above.

## 2024-02-24 NOTE — PLAN OF CARE
Problem: Discharge Planning  Goal: Discharge to home or other facility with appropriate resources  2/23/2024 2254 by NIKOLAS DO  Outcome: Progressing  2/23/2024 1040 by Siomara Eldridge RN  Outcome: Progressing  Flowsheets (Taken 2/23/2024 0911)  Discharge to home or other facility with appropriate resources: Identify barriers to discharge with patient and caregiver     Problem: Pain  Goal: Verbalizes/displays adequate comfort level or baseline comfort level  2/23/2024 2254 by NIKOLAS DO  Outcome: Progressing  2/23/2024 1040 by Siomara Eldridge, RN  Outcome: Progressing     Problem: Safety - Adult  Goal: Free from fall injury  2/23/2024 2254 by NIKOLAS DO  Outcome: Progressing  2/23/2024 1040 by Siomara Eldridge, RN  Outcome: Progressing     Problem: Skin/Tissue Integrity  Goal: Absence of new skin breakdown  Description: 1.  Monitor for areas of redness and/or skin breakdown  2.  Assess vascular access sites hourly  3.  Every 4-6 hours minimum:  Change oxygen saturation probe site  4.  Every 4-6 hours:  If on nasal continuous positive airway pressure, respiratory therapy assess nares and determine need for appliance change or resting period.  2/23/2024 2254 by NIKOLAS DO  Outcome: Progressing  2/23/2024 1040 by Siomara Eldridge, RN  Outcome: Progressing

## 2024-02-25 LAB
ALBUMIN SERPL-MCNC: 3 G/DL (ref 3.4–5)
ALP SERPL-CCNC: 132 U/L (ref 40–129)
ALT SERPL-CCNC: 52 U/L (ref 10–40)
ANION GAP SERPL CALCULATED.3IONS-SCNC: 12 MMOL/L (ref 3–16)
AST SERPL-CCNC: 39 U/L (ref 15–37)
BILIRUB DIRECT SERPL-MCNC: 0.6 MG/DL (ref 0–0.3)
BILIRUB INDIRECT SERPL-MCNC: 0.6 MG/DL (ref 0–1)
BILIRUB SERPL-MCNC: 1.2 MG/DL (ref 0–1)
BUN SERPL-MCNC: 15 MG/DL (ref 7–20)
CALCIUM SERPL-MCNC: 7.9 MG/DL (ref 8.3–10.6)
CHLORIDE SERPL-SCNC: 102 MMOL/L (ref 99–110)
CO2 SERPL-SCNC: 24 MMOL/L (ref 21–32)
CREAT SERPL-MCNC: 0.8 MG/DL (ref 0.8–1.3)
DEPRECATED RDW RBC AUTO: 14.5 % (ref 12.4–15.4)
GFR SERPLBLD CREATININE-BSD FMLA CKD-EPI: >60 ML/MIN/{1.73_M2}
GLUCOSE SERPL-MCNC: 91 MG/DL (ref 70–99)
HCT VFR BLD AUTO: 44.9 % (ref 40.5–52.5)
HGB BLD-MCNC: 15 G/DL (ref 13.5–17.5)
MCH RBC QN AUTO: 28.7 PG (ref 26–34)
MCHC RBC AUTO-ENTMCNC: 33.4 G/DL (ref 31–36)
MCV RBC AUTO: 86.1 FL (ref 80–100)
PLATELET # BLD AUTO: 263 K/UL (ref 135–450)
PMV BLD AUTO: 7.1 FL (ref 5–10.5)
POTASSIUM SERPL-SCNC: 4 MMOL/L (ref 3.5–5.1)
PROT SERPL-MCNC: 5.5 G/DL (ref 6.4–8.2)
RBC # BLD AUTO: 5.22 M/UL (ref 4.2–5.9)
SODIUM SERPL-SCNC: 138 MMOL/L (ref 136–145)
WBC # BLD AUTO: 17.3 K/UL (ref 4–11)

## 2024-02-25 PROCEDURE — 6370000000 HC RX 637 (ALT 250 FOR IP)

## 2024-02-25 PROCEDURE — 2580000003 HC RX 258: Performed by: INTERNAL MEDICINE

## 2024-02-25 PROCEDURE — 99232 SBSQ HOSP IP/OBS MODERATE 35: CPT | Performed by: SURGERY

## 2024-02-25 PROCEDURE — 80048 BASIC METABOLIC PNL TOTAL CA: CPT

## 2024-02-25 PROCEDURE — 36415 COLL VENOUS BLD VENIPUNCTURE: CPT

## 2024-02-25 PROCEDURE — 99232 SBSQ HOSP IP/OBS MODERATE 35: CPT | Performed by: INTERNAL MEDICINE

## 2024-02-25 PROCEDURE — 97535 SELF CARE MNGMENT TRAINING: CPT

## 2024-02-25 PROCEDURE — 6360000002 HC RX W HCPCS: Performed by: INTERNAL MEDICINE

## 2024-02-25 PROCEDURE — 97530 THERAPEUTIC ACTIVITIES: CPT

## 2024-02-25 PROCEDURE — 80076 HEPATIC FUNCTION PANEL: CPT

## 2024-02-25 PROCEDURE — 85027 COMPLETE CBC AUTOMATED: CPT

## 2024-02-25 PROCEDURE — 1200000000 HC SEMI PRIVATE

## 2024-02-25 RX ADMIN — PIPERACILLIN AND TAZOBACTAM 3375 MG: 3; .375 INJECTION, POWDER, FOR SOLUTION INTRAVENOUS at 17:33

## 2024-02-25 RX ADMIN — GUAIFENESIN 600 MG: 600 TABLET, EXTENDED RELEASE ORAL at 07:59

## 2024-02-25 RX ADMIN — ENOXAPARIN SODIUM 40 MG: 100 INJECTION SUBCUTANEOUS at 08:31

## 2024-02-25 RX ADMIN — Medication 10 ML: at 08:00

## 2024-02-25 RX ADMIN — I-VITE, TAB 1000-60-2MG (60/BT) 1 TABLET: TAB at 07:59

## 2024-02-25 RX ADMIN — PRAVASTATIN SODIUM 20 MG: 10 TABLET ORAL at 08:02

## 2024-02-25 RX ADMIN — GUAIFENESIN 600 MG: 600 TABLET, EXTENDED RELEASE ORAL at 19:50

## 2024-02-25 RX ADMIN — PIPERACILLIN AND TAZOBACTAM 3375 MG: 3; .375 INJECTION, POWDER, FOR SOLUTION INTRAVENOUS at 09:32

## 2024-02-25 RX ADMIN — PIPERACILLIN AND TAZOBACTAM 3375 MG: 3; .375 INJECTION, POWDER, FOR SOLUTION INTRAVENOUS at 03:05

## 2024-02-25 RX ADMIN — METOPROLOL SUCCINATE 25 MG: 25 TABLET, FILM COATED, EXTENDED RELEASE ORAL at 08:00

## 2024-02-25 NOTE — PLAN OF CARE
Problem: Discharge Planning  Goal: Discharge to home or other facility with appropriate resources  2/24/2024 2054 by NIKOLAS DO  Outcome: Progressing  2/24/2024 0922 by Zenaida Painter RN  Outcome: Progressing     Problem: Pain  Goal: Verbalizes/displays adequate comfort level or baseline comfort level  2/24/2024 2054 by NIKOLAS DO  Outcome: Progressing  2/24/2024 0922 by Zenaida Painter, RN  Outcome: Progressing     Problem: Safety - Adult  Goal: Free from fall injury  2/24/2024 2054 by NIKOLAS DO  Outcome: Progressing  2/24/2024 0922 by Zenaida Painter, RN  Outcome: Progressing     Problem: Skin/Tissue Integrity  Goal: Absence of new skin breakdown  Description: 1.  Monitor for areas of redness and/or skin breakdown  2.  Assess vascular access sites hourly  3.  Every 4-6 hours minimum:  Change oxygen saturation probe site  4.  Every 4-6 hours:  If on nasal continuous positive airway pressure, respiratory therapy assess nares and determine need for appliance change or resting period.  2/24/2024 2054 by NIKOLAS DO  Outcome: Progressing  2/24/2024 0922 by Zenaida Painter, RN  Outcome: Progressing

## 2024-02-25 NOTE — PROGRESS NOTES
[facility]    Daily Progress Note for 2024 8:41 AM 0218/0218-01  Stone Hoskins : 1935 Age: 88 y.o. Sex: male  Length of Stay:  4    Interval History:      CC: F/U Cough (Cough for a several days. ), Abdominal Pain (ABD pain and nausea started this AM.), and Leg Swelling (Worsened lower extremity edema. )    Subjective:       Mr Hoskins says he is feeling well. He still has some cough such as when he came in. He did have more pain after eating yesterday.    Objective:     Vitals:    24 1333 24 2047 24 0345 24 0757   BP: 133/73 (!) 142/78 123/72 139/79   Pulse: 88 95 95 83   Resp: 18 18 18 18   Temp: 98.6 °F (37 °C) 98.6 °F (37 °C) 97.5 °F (36.4 °C) 97.9 °F (36.6 °C)   TempSrc: Oral Oral Oral Oral   SpO2: 93% 94% 95% 92%   Weight:       Height:              Intake/Output Summary (Last 24 hours) at 2024 0841  Last data filed at 2024 0345  Gross per 24 hour   Intake --   Output 1100 ml   Net -1100 ml       Body mass index is 28.32 kg/m².    Physical Exam:  General: Cooperative, pleasant/Ill appearing, on  Nasal cannula  HEENT:  Head: normocephalic,atraumatic, anicteric sclera, clear conjunctiva  Neck: Normal size, Jugular venous pulsations: normal  Respiratory:unlabored breathing, clear to auscultation with no crackles, wheezes rhonchi  Heart: Regular rate and rhythm, S1, S2-normal, No murmurs  Abdomen: soft, nondistended, nontender, normoactive bowel sounds,  Neurological/Psych: Alert and oriented to self and situation, no focal neurological deficits, Mood and affect appropriate.  Skin: No obvious rashes    Extremities:  no edema, Pedal pulses 2+ bilaterally    Scheduled Medications:  sodium chloride flush, 5-40 mL, 2 times per day  enoxaparin, 40 mg, Daily  piperacillin-tazobactam, 3,375 mg, Q8H  metoprolol succinate, 25 mg, Daily  therapeutic multivitamin-minerals, 1 tablet, Daily  pravastatin, 20 mg, Every Other Day  guaiFENesin, 600 mg,    Final Result   No acute pulmonary process identified.      Vascular disease.      Findings concerning for acute cholecystitis.  Further evaluation with right   upper quadrant ultrasound may be helpful.  No calcified gallstones are seen.      Findings likely related to constipation.      Stable left adrenal nodule.      Additional findings noted above.      RECOMMENDATIONS:   1 cm incidental right thyroid nodule. No follow-up imaging is recommended.      Reference: J Am Bony Radiol. 2015 Feb;12(2): 143-50      2.8 cm infrarenal abdominal aortic aneurysm suspected. Recommend follow-up   every 5 years.      Reference: J Am Bony Radiol 2013;10:789-794.      Multiple lesions, including right Bosniak II benign renal cyst measuring 6.7   cm. No follow-up imaging is recommended.      JACR 2018 Feb; 264-273, Management of the Incidental Renal Mass on CT,   [employer] 2021; 814-848, Bosniak Classification of Cystic Renal Masses,   Version 2019.         XR CHEST PORTABLE   Final Result   Poor inspiration.  There are findings concerning for pulmonary edema   secondary to congestive heart failure..             Microbiology: Cultures reviewed.   @[facility]@  @[facility]@    Lab Results   Component Value Date    LABA1C 5.2 12/06/2019      Body mass index is 28.32 kg/m².       Impression/Plan:        ASSESSMENT/PLAN:    Sepsis 2/2 acute cholecystitis  - CT chest/abd/pelvis and RUQ US as above  - Criteria: +HR, +WBC  - blood cx pending   - Zosyn D#4  - general surgery consulted   - attempted to advance diet but he had more pain, back on clear liquids and continue inpatient    AMS  - likely 2/2 infection and possible dementia?  - pt's wife states that he has returned to his baseline     URI, cough  - CXR and CT chest as above, no acute pulmonary process  - covid and flu negative   - strep and legionella pending   - mucinex  - breathing well on room air     Generalized weakness  - likely 2/2 above   - PT/OT     Elevated  troponin  - 23->24->repeat ordered  - EKG as above  - no CP     HTN  - on Toprol XL   - monitor BP     HLD  - on statin     Chronic BLE edema  - on prn lasix   - last echo 6/2021 without CHF  - appear most likely related to venous stasis     Note sepsis 2/2 acute cholecystitis makes the patient higher risk for morbidity and mortality requiring testing and treatment.     DVT Prophylaxis: Lovenox     Management discussed with RN,     Electronically signed by: Jovita Samayoa DO, 2/25/2024 8:41 AM

## 2024-02-25 NOTE — FLOWSHEET NOTE
02/25/24 0345   Vital Signs   Temp 97.5 °F (36.4 °C)   Temp Source Oral   Pulse 95   Heart Rate Source Monitor   Respirations 18   /72   MAP (Calculated) 89   BP Location Left upper arm   BP Method Automatic   Patient Position Semi fowlers   Opioid-Induced Sedation   POSS Score 1   Oxygen Therapy   SpO2 95 %   O2 Device None (Room air)     Pt VS as shown above.

## 2024-02-25 NOTE — PROGRESS NOTES
Inpatient Occupational Therapy Treatment    Unit: [address] [facility]  Date:  2/25/2024  Patient Name:    Stone Hoskins  Admitting diagnosis:  Acute cholecystitis [K81.0]  Cholecystitis [K81.9]  Generalized weakness [R53.1]  Right upper quadrant abdominal pain [R10.11]  Altered mental status, unspecified altered mental status type [R41.82]  Admit Date:  2/21/2024  Precautions/Restrictions/WB Status/ Lines/ Wounds/ Oxygen: Fall risk, Bed/chair alarm, Lines (IV and external catheter), Telemetry, and Telesitter      Treatment Time:  2315-8090  Treatment Number:  3  Timed Code Treatment Minutes: 24 minutes  Total Treatment Minutes:  24  minutes    Patient Goals for Therapy: none stated      Discharge Recommendations: SNF  DME needs for discharge: Defer to facility       Therapy recommendations for staff:   Assist of 1 for ambulation with use of rolling walker (RW) and gait belt to/from BSC  to/from chair    Seen as cotx due to eexpected need for 2 skilled therapists, no need to continue.    History of Present Illness: Per H&P of Deborah Staley PA-C 2/21/2024: \"The patient is a 88 y.o. male with PMH of HLD, HTN, and BLE edema who [place] [facility] [place] with complaint of cough, abd pain, and leg swelling. History limited 2/2 AMS. Pt's wife states that he is not the best historian, she states he doesn't usually need to know where he is or what month/year it is because he is always at home. She states that they have had respiratory symptoms for the past several days including congestion and cough. She states that this has not been improving but they have only been taking tessalon pearls for it. She states that last night he developed abdominal pain, dry heaving, and confusion. She states that she tried to give him some coke to drink and that settled his stomach for a little bit but did not last long. \"    2/21/2024 CT chest scan indicates concerns for pulmonary edema  secondary to congestive heart failure.  US gallbladder results

## 2024-02-25 NOTE — PROGRESS NOTES
[facility] SURGERY    PATIENT NAME: Stone Hoskins     TODAY'S DATE: 2/25/2024    CHIEF COMPLAINT: None, feels well.     INTERVAL HISTORY/HPI:    Pt with stable RUQ pain, no nausea or emesis, no fevers     REVIEW OF SYSTEMS:  Pertinent positives and negatives as per interval history section    OBJECTIVE:  VITALS:  /72   Pulse 95   Temp 97.5 °F (36.4 °C) (Oral)   Resp 18   Ht 1.778 m (5' 10\")   Wt 89.5 kg (197 lb 6.4 oz)   SpO2 95%   BMI 28.32 kg/m²     INTAKE/OUTPUT:    I/O last 3 completed shifts:  In: -   Out: 1400 [Urine:1400]  No intake/output data recorded.    CONSTITUTIONAL:  awake and alert  LUNGS:  Respirations easy and unlabored  CARD:  regular rate and rhythm  ABDOMEN:  normal bowel sounds, soft, non-distended, tender RUQ    Data:  CBC:   Recent Labs     02/23/24  0554 02/24/24  0638 02/25/24  0726   WBC 21.2* 20.6* 17.3*   HGB 15.8 15.6 15.0   HCT 46.5 45.5 44.9    241 263       BMP:    Recent Labs     02/23/24  0554 02/24/24  0638    136   K 3.7 3.2*    101   CO2 24 23   BUN 17 18   CREATININE 0.9 0.9   GLUCOSE 108* 113*       Hepatic:   Recent Labs     02/23/24  0554 02/24/24  0638   AST 24 26   ALT 30 34   BILITOT 1.8* 1.6*   ALKPHOS 83 91       Mag:      Recent Labs     02/24/24  0638   MG 2.20        Phos:   No results for input(s): \"PHOS\" in the last 72 hours.   INR:   No results for input(s): \"INR\" in the last 72 hours.      Radiology Review:  *Imaging personally reviewed by me.   NA      ASSESSMENT AND PLAN:  Acute calculous cholecystitis,   Continue abx  Some improvement in WBC - stay on clear liquids today  LFTs increased.  Repeat tomorrow and if trending up then get MRCP.     Electronically signed by Awais Carbajal MD     [id number]

## 2024-02-25 NOTE — PROGRESS NOTES
[place] [facility]    Daily Progress Note for 2024 7:15 PM 0218/0218-01  Stone Hoskins : 1935 Age: 88 y.o. Sex: male  Length of Stay:  3    Interval History:      CC: F/U Cough (Cough for a several days. ), Abdominal Pain (ABD pain and nausea started this AM.), and Leg Swelling (Worsened lower extremity edema. )      Subjective:       Seen in am with wife at bedside. Says he is feeling better. Diet advanced.       Objective:     Vitals:    24 2229 24 0145 24 0845 24 1333   BP: 123/67 (!) 150/67 128/75 133/73   Pulse: 91 95 100 88   Resp: 18 16 18 18   Temp: 98.5 °F (36.9 °C) 98.2 °F (36.8 °C) 98.2 °F (36.8 °C) 98.6 °F (37 °C)   TempSrc: Oral Oral Oral Oral   SpO2: 93% 97% 94% 93%   Weight:       Height:              Intake/Output Summary (Last 24 hours) at 2024 1915  Last data filed at 2024 1736  Gross per 24 hour   Intake --   Output 900 ml   Net -900 ml       Body mass index is 28.32 kg/m².    Physical Exam:  General: Cooperative, pleasant/Ill appearing, on  Nasal cannula  HEENT:  Head: normocephalic,atraumatic, anicteric sclera, clear conjunctiva  Neck: Normal size, Jugular venous pulsations: normal  Respiratory:unlabored breathing, clear to auscultation with no crackles, wheezes rhonchi  Heart: Regular rate and rhythm, S1, S2-normal, No murmurs  Abdomen: soft, nondistended, nontender, normoactive bowel sounds,  Neurological/Psych: Alert and oriented times three, no focal neurological deficits, Mood and affect appropriate.  Skin: No obvious rashes    Extremities:  no edema, Pedal pulses 2+ bilaterally    Scheduled Medications:  sodium chloride flush, 5-40 mL, 2 times per day  enoxaparin, 40 mg, Daily  piperacillin-tazobactam, 3,375 mg, Q8H  metoprolol succinate, 25 mg, Daily  therapeutic multivitamin-minerals, 1 tablet, Daily  pravastatin, 20 mg, Every Other Day  guaiFENesin, 600 mg, BID        PRN Medications:  sodium chloride flush, 5-40  CHF  - appear most likely related to venous stasis     Note sepsis 2/2 acute cholecystitis makes the patient higher risk for morbidity and mortality requiring testing and treatment.     DVT Prophylaxis: Lovenox     Management discussed with RN,     Electronically signed by: Jovita Samayoa DO, 2/24/2024 7:15 PM

## 2024-02-25 NOTE — PLAN OF CARE
Problem: Discharge Planning  Goal: Discharge to home or other facility with appropriate resources  2/25/2024 0925 by Zenaida Painter RN  Outcome: Progressing  2/24/2024 2054 by NIKOLAS DO  Outcome: Progressing     Problem: Pain  Goal: Verbalizes/displays adequate comfort level or baseline comfort level  2/25/2024 0925 by Zenaida Painter RN  Outcome: Progressing  2/24/2024 2054 by NIKOLAS DO  Outcome: Progressing     Problem: Safety - Adult  Goal: Free from fall injury  2/25/2024 0925 by Zenaida Painter RN  Outcome: Progressing  2/24/2024 2054 by NIKOLAS DO  Outcome: Progressing     Problem: Skin/Tissue Integrity  Goal: Absence of new skin breakdown  Description: 1.  Monitor for areas of redness and/or skin breakdown  2.  Assess vascular access sites hourly  3.  Every 4-6 hours minimum:  Change oxygen saturation probe site  4.  Every 4-6 hours:  If on nasal continuous positive airway pressure, respiratory therapy assess nares and determine need for appliance change or resting period.  2/25/2024 0925 by Zenaida Painter RN  Outcome: Progressing  2/24/2024 2054 by NIKOLAS DO  Outcome: Progressing

## 2024-02-25 NOTE — PROGRESS NOTES
Inpatient Physical Therapy Treatment    Unit: [facility]  Date:  2/25/2024  Patient Name:    Stone Hoskins  Admitting diagnosis:  Acute cholecystitis [K81.0]  Cholecystitis [K81.9]  Generalized weakness [R53.1]  Right upper quadrant abdominal pain [R10.11]  Altered mental status, unspecified altered mental status type [R41.82]  Admit Date:  2/21/2024  Precautions/Restrictions/WB Status/ Lines/ Wounds/ Oxygen: Fall risk, Bed/chair alarm, Lines (IV and external catheter), and Telesitter    Treatment Time:  1969 - 3579  Treatment Number:  3   Timed Code Treatment Minutes: 23 minutes  Total Treatment Minutes:  23 minutes    Patient Stated Goals for Therapy:  patient did not state, but family is glad he is getting up and moving today.         Discharge Recommendations: SNF  DME needs for discharge: Needs Met       Therapy recommendation for EMS Transport: can transport by wheelchair    Therapy recommendations for staff:   Assist of 1 for ambulation with use of rolling walker (RW) and gait belt within room    History of Present Illness:   Admitted 2/21/24 with sepsis 2/2 acute cholecystitis, AMS, URI.   PMH of HLD, HTN, and BLE edema     Home Health S4 Level Recommendation:  Level 1 Standard        AM-PAC Mobility Score    AM-PAC Inpatient Mobility Raw Score : 18       Subjective  Patient sitting up in chair with family present (multiple children and wife).  Pt agreeable to this PT session.     Cognition    A&O Person and Place ; knows the year.  Able to follow 1 step commands    Pain   No  Location: N/A  Rating: NA /10  Pain Medicine Status: No request made    Preadmission Environment:   Pt. Lives                                              Spouse in good health, home 24/7; sisters live in area as well to stop in and assist PRN  Home environment:                            one story home  Steps to enter first floor:                     one steps to enter; no handrails  Steps to second floor/basement:        N/A  Laundry:    sit:   Min A  to help control descent  Bed to/from Chair:  Min A  with use of gait belt and rolling walker (RW)    Gait gait completed as indicated below  Distance:      42 ft  Deviations (firm surface/linoleum):  decreased mati, forward flexed posture, decreased foot clearance bilaterally, and decreased step length bilaterally  Assistive Device Used:    rolling walker (RW)  Level of Assist:    CGA with Min A via strong tactile cues for regular postural adjustment  Comment: Family reports     Stair Training deferred, pt unsafe/ not appropriate to complete stairs at this time    Therapeutic Exercises Initiated  all completed bilaterally unless indicated  Supine:  N/A    Seated:  Ankle pumps: 30 reps  Ankle INV/EV: 30 reps    Standing:  Repeat sit<>stand x 3 reps  Semi-tandem stance with fwd wt translation to promote heel cord stretch x 30 sec both directions  Discussed with pt and family passive low load stretch for HC from seated position.     Activity Tolerance   During therapy session noted pt with no adverse symptoms to activity    Positioning Needs   Pt up in chair, alarm set, positioned in proper neutral alignment and pressure relief provided.   Call light provided and all needs within reach    Other Activities  None.    Patient/Family Education   Pt educated on role of inpatient PT, POC, importance of continued activity, HEP and calling for assist with mobility.      Assessment  Pt seen today for physical therapy Treatment. Pt demonstrated decreased Activity tolerance, Balance, and Safety as well as decreased independence with Ambulation and Transfers. Pt demonstrates improvements in sit to stands and ambulation today. However, pt continues to have difficulty following VC's.    Recommending SNF upon discharge as patient functioning well below baseline, demonstrates good rehab potential and unable to return home due to home environment not conducive to patient recovery and limited safety awareness.    Goals

## 2024-02-25 NOTE — FLOWSHEET NOTE
02/25/24 0757   Vital Signs   Temp 97.9 °F (36.6 °C)   Temp Source Oral   Pulse 83   Heart Rate Source Monitor   Respirations 18   /79   MAP (Calculated) 99   BP Location Left upper arm   BP Method Automatic   Patient Position Semi fowlers   Pain Assessment   Pain Assessment None - Denies Pain   Opioid-Induced Sedation   POSS Score 1   Oxygen Therapy   SpO2 92 %   O2 Device None (Room air)     AM assessment completed, see flow sheet. Pt is alert. Vital signs are WNL. Respirations are even & easy. No complaints voiced. Pt denies needs at this time. SR up x 2, and bed in low position. Call light is within reach.

## 2024-02-25 NOTE — PROGRESS NOTES
BP (!) 142/78   Pulse 95   Temp 98.6 °F (37 °C) (Oral)   Resp 18   Ht 1.778 m (5' 10\")   Wt 89.5 kg (197 lb 6.4 oz)   SpO2 94%   BMI 28.32 kg/m²     Pt awake in bed. Pt alert and oriented to person and situation only. Assessment complete. Meds passed. Pt denies needs at this time.        Bedside Mobility Assessment Tool (BMAT):     Assessment Level 1- Sit and Shake    1. From a semi-reclined position, ask patient to sit up and rotate to a seated position at the side of the bed. Can use the bedrail.    2. Ask patient to reach out and grab your hand and shake making sure patient reaches across his/her midline.   Pass- Patient is able to come to a seated position, maintain core strength. Maintains seated balance while reaching across midline. Move on to Assessment Level 2.     Assessment Level 2- Stretch and Point   1. With patient in seated position at the side of the bed, have patient place both feet on the floor (or stool) with knees no higher than hips.    2. Ask patient to stretch one leg and straighten the knee, then bend the ankle/flex and point the toes. If appropriate, repeat with the other leg.   Pass- Patient is able to demonstrate appropriate quad strength on intended weight bearing limb(s). Move onto Assessment Level 3.     Assessment Level 3- Stand   1. Ask patient to elevate off the bed or chair (seated to standing) using an assistive device (cane, bedrail).    2. Patient should be able to raise buttocks off be and hold for a count of five. May repeat once.   Pass- Patient maintains standing stability for at least 5 seconds, proceed to assessment level 4.    Assessment Level 4- Walk   1. Ask patient to march in place at bedside.    2. Then ask patient to advance step and return each foot. Some medical conditions may render a patient from stepping backwards, use your best clinical judgement.   Fail- Patient not able to complete tasks OR requires use of assistive device. Patient is MOBILITY LEVEL 3.

## 2024-02-26 ENCOUNTER — APPOINTMENT (OUTPATIENT)
Dept: MRI IMAGING | Age: 89
End: 2024-02-26
Payer: COMMERCIAL

## 2024-02-26 PROBLEM — R79.89 ELEVATED LFTS: Status: ACTIVE | Noted: 2024-02-26

## 2024-02-26 LAB
ALBUMIN SERPL-MCNC: 3 G/DL (ref 3.4–5)
ALP SERPL-CCNC: 210 U/L (ref 40–129)
ALT SERPL-CCNC: 89 U/L (ref 10–40)
AST SERPL-CCNC: 68 U/L (ref 15–37)
BACTERIA BLD CULT ORG #2: NORMAL
BACTERIA BLD CULT: NORMAL
BILIRUB DIRECT SERPL-MCNC: 0.5 MG/DL (ref 0–0.3)
BILIRUB INDIRECT SERPL-MCNC: 0.5 MG/DL (ref 0–1)
BILIRUB SERPL-MCNC: 1 MG/DL (ref 0–1)
PROT SERPL-MCNC: 6 G/DL (ref 6.4–8.2)

## 2024-02-26 PROCEDURE — 2580000003 HC RX 258: Performed by: INTERNAL MEDICINE

## 2024-02-26 PROCEDURE — 97535 SELF CARE MNGMENT TRAINING: CPT

## 2024-02-26 PROCEDURE — 80076 HEPATIC FUNCTION PANEL: CPT

## 2024-02-26 PROCEDURE — 6360000002 HC RX W HCPCS: Performed by: INTERNAL MEDICINE

## 2024-02-26 PROCEDURE — 74181 MRI ABDOMEN W/O CONTRAST: CPT

## 2024-02-26 PROCEDURE — 99232 SBSQ HOSP IP/OBS MODERATE 35: CPT | Performed by: SURGERY

## 2024-02-26 PROCEDURE — 97530 THERAPEUTIC ACTIVITIES: CPT

## 2024-02-26 PROCEDURE — 6370000000 HC RX 637 (ALT 250 FOR IP)

## 2024-02-26 PROCEDURE — 6370000000 HC RX 637 (ALT 250 FOR IP): Performed by: INTERNAL MEDICINE

## 2024-02-26 PROCEDURE — 36415 COLL VENOUS BLD VENIPUNCTURE: CPT

## 2024-02-26 PROCEDURE — 99232 SBSQ HOSP IP/OBS MODERATE 35: CPT | Performed by: INTERNAL MEDICINE

## 2024-02-26 PROCEDURE — 1200000000 HC SEMI PRIVATE

## 2024-02-26 RX ADMIN — I-VITE, TAB 1000-60-2MG (60/BT) 1 TABLET: TAB at 09:07

## 2024-02-26 RX ADMIN — GUAIFENESIN 600 MG: 600 TABLET, EXTENDED RELEASE ORAL at 09:07

## 2024-02-26 RX ADMIN — GUAIFENESIN 600 MG: 600 TABLET, EXTENDED RELEASE ORAL at 19:44

## 2024-02-26 RX ADMIN — Medication 3 MG: at 19:44

## 2024-02-26 RX ADMIN — PIPERACILLIN AND TAZOBACTAM 3375 MG: 3; .375 INJECTION, POWDER, FOR SOLUTION INTRAVENOUS at 01:41

## 2024-02-26 RX ADMIN — PIPERACILLIN AND TAZOBACTAM 3375 MG: 3; .375 INJECTION, POWDER, FOR SOLUTION INTRAVENOUS at 09:07

## 2024-02-26 RX ADMIN — PIPERACILLIN AND TAZOBACTAM 3375 MG: 3; .375 INJECTION, POWDER, FOR SOLUTION INTRAVENOUS at 17:46

## 2024-02-26 RX ADMIN — METOPROLOL SUCCINATE 25 MG: 25 TABLET, FILM COATED, EXTENDED RELEASE ORAL at 09:14

## 2024-02-26 RX ADMIN — ENOXAPARIN SODIUM 40 MG: 100 INJECTION SUBCUTANEOUS at 09:08

## 2024-02-26 NOTE — PROGRESS NOTES
BP (!) 144/69   Pulse 87   Temp 98.8 °F (37.1 °C) (Oral)   Resp 17   Ht 1.778 m (5' 10\")   Wt 89.5 kg (197 lb 6.4 oz)   SpO2 93%   BMI 28.32 kg/m²     Patient alert and oriented to self and place, sitting on recliner watching tv. Assessment completed. Respiration easy, even and unlabored. Patient tolerated night meds well. Call light and bedside table within reach. Chair locked, chair alarm on. With telesitter in the room.  Pt denies needs at this time.

## 2024-02-26 NOTE — CARE COORDINATION
CM update:    SNF recommendation noted. Freedom of choice list provided.  Per spouse would prefer to have SNF placement in the [facility]. She is concerned that with their new managed medicare that SNF will not be covered. After reviewing the list she picked the following 5 facilities:  #1 [facility] - referral under review  #2 [facility]- referral sent for review  If these are not in network the proceed to numbers 3-5.  #3 [facility]  #4 [facility]  #5 [facility]  CM following

## 2024-02-26 NOTE — PLAN OF CARE
Problem: Discharge Planning  Goal: Discharge to home or other facility with appropriate resources  2/26/2024 1137 by Tracee Junior RN  Outcome: Progressing  2/25/2024 2231 by John Suresh RN  Outcome: Progressing  Flowsheets (Taken 2/25/2024 1945)  Discharge to home or other facility with appropriate resources: Identify barriers to discharge with patient and caregiver     Problem: Pain  Goal: Verbalizes/displays adequate comfort level or baseline comfort level  2/26/2024 1137 by Tracee Junior RN  Outcome: Progressing  2/25/2024 2231 by John Suresh RN  Outcome: Progressing     Problem: Safety - Adult  Goal: Free from fall injury  2/26/2024 1137 by Tracee Junior RN  Outcome: Progressing  2/25/2024 2231 by John Suresh RN  Outcome: Progressing

## 2024-02-26 NOTE — PLAN OF CARE
Problem: Discharge Planning  Goal: Discharge to home or other facility with appropriate resources  2/25/2024 2231 by John Suresh RN  Outcome: Progressing  Flowsheets (Taken 2/25/2024 1945)  Discharge to home or other facility with appropriate resources: Identify barriers to discharge with patient and caregiver     Problem: Pain  Goal: Verbalizes/displays adequate comfort level or baseline comfort level  2/25/2024 2231 by John Suresh RN  Outcome: Progressing     Problem: Safety - Adult  Goal: Free from fall injury  2/25/2024 2231 by John Suresh RN  Outcome: Progressing     Problem: Skin/Tissue Integrity  Goal: Absence of new skin breakdown  Description: 1.  Monitor for areas of redness and/or skin breakdown  2.  Assess vascular access sites hourly  3.  Every 4-6 hours minimum:  Change oxygen saturation probe site  4.  Every 4-6 hours:  If on nasal continuous positive airway pressure, respiratory therapy assess nares and determine need for appliance change or resting period.  2/25/2024 2231 by John Suresh RN  Outcome: Progressing     Problem: Neurosensory - Adult  Goal: Achieves stable or improved neurological status  Outcome: Progressing  Flowsheets (Taken 2/25/2024 1945)  Achieves stable or improved neurological status: Assess for and report changes in neurological status     Problem: Skin/Tissue Integrity - Adult  Goal: Skin integrity remains intact  Outcome: Progressing  Flowsheets (Taken 2/25/2024 1945)  Skin Integrity Remains Intact: Monitor for areas of redness and/or skin breakdown     Problem: Musculoskeletal - Adult  Goal: Return mobility to safest level of function  Outcome: Progressing  Flowsheets (Taken 2/25/2024 1945)  Return Mobility to Safest Level of Function: Assist with transfers and ambulation using safe patient handling equipment as needed     Problem: Genitourinary - Adult  Goal: Absence of urinary retention  Outcome: Progressing  Flowsheets (Taken 2/25/2024  1945)  Absence of urinary retention: Monitor intake/output and perform bladder scan as needed

## 2024-02-26 NOTE — FLOWSHEET NOTE
24 0745   Vital Signs   Temp 98.2 °F (36.8 °C)   Temp Source Oral   Pulse 82   Heart Rate Source Monitor   Respirations 16   BP (!) 134/58   MAP (Calculated) 83   BP Location Left upper arm   BP Method Automatic   Patient Position Semi fowlers   Pain Assessment   Pain Assessment None - Denies Pain   Oxygen Therapy   SpO2 94 %   O2 Device None (Room air)     Shift assessment complete. See flow sheet. Scheduled meds given. See MAR.  Patients head-toe complete, VS are logged, and active bowel sound noted in all four quadrants.    Patient denies any pain at this time. Patient able to answer name, , place correctly.     No further needs  noted at this time. Call light and bedside table are within reach. The bed is locked and is in the lowest position.      Tracee Junior RN

## 2024-02-26 NOTE — PROGRESS NOTES
[facility] GENERAL SURGERY DAILY PROGRESS NOTE    SUBJECTIVE: Awake, alert.  Denies pain.     OBJECTIVE: CURRENT VITALS:  /75   Pulse 89   Temp 97.8 °F (36.6 °C) (Oral)   Resp 16   Ht 1.778 m (5' 10\")   Wt 89.5 kg (197 lb 6.4 oz)   SpO2 95%   BMI 28.32 kg/m²          ABD: Soft.  Non distended. He has tenderness upper abdomen.     LABS:    CBC:   Recent Labs     02/24/24  0638 02/25/24  0726   WBC 20.6* 17.3*   RBC 5.38 5.22   HGB 15.6 15.0   HCT 45.5 44.9   MCV 84.6 86.1   RDW 14.4 14.5    263     BMP:   Recent Labs     02/24/24  0638 02/25/24  0726    138   K 3.2* 4.0    102   CO2 23 24   BUN 18 15   CREATININE 0.9 0.8     Recent Labs     02/24/24  0638   MG 2.20             ASSESSMENT:   Acute calculous cholecystitis  Elevated LFT      PLAN:   MRCP to evaluate CBD  Continue antibiotics         TEJAS BARRIOS MD

## 2024-02-26 NOTE — PROGRESS NOTES
[place] [facility]    Daily Progress Note for 2024 10:06 AM 0218/0218-01  Stone Hoskins : 1935 Age: 88 y.o. Sex: male  Length of Stay:  5    Interval History:      CC: F/U Cough (Cough for a several days. ), Abdominal Pain (ABD pain and nausea started this AM.), and Leg Swelling (Worsened lower extremity edema. )    Subjective:       Mr Hoskins says he has mild RUQ pain    Objective:     Vitals:    24 1944 24 0138 24 0745 24 0914   BP: (!) 144/69 (!) 148/79 (!) 134/58 (!) 134/58   Pulse: 87 92 82 82   Resp: 17 16 16    Temp: 98.8 °F (37.1 °C) 97.6 °F (36.4 °C) 98.2 °F (36.8 °C)    TempSrc: Oral Oral Oral    SpO2: 93% 93% 94%    Weight:       Height:              Intake/Output Summary (Last 24 hours) at 2024 1006  Last data filed at 2024 0542  Gross per 24 hour   Intake 1413.15 ml   Output 1700 ml   Net -286.85 ml       Body mass index is 28.32 kg/m².    Physical Exam:  General: Cooperative, pleasant/Ill appearing, on  Nasal cannula  HEENT:  Head: normocephalic,atraumatic, anicteric sclera, clear conjunctiva  Neck: Normal size, Jugular venous pulsations: normal  Respiratory:unlabored breathing, clear to auscultation with no crackles, wheezes rhonchi  Heart: Regular rate and rhythm, S1, S2-normal, No murmurs  Abdomen: soft, nondistended, mild RUQ tenderness, normoactive bowel sounds,  Neurological/Psych: Alert and oriented to self and situation, no focal neurological deficits, Mood and affect appropriate.  Skin: No obvious rashes    Extremities:  no edema, Pedal pulses 2+ bilaterally    Scheduled Medications:  sodium chloride flush, 5-40 mL, 2 times per day  enoxaparin, 40 mg, Daily  piperacillin-tazobactam, 3,375 mg, Q8H  metoprolol succinate, 25 mg, Daily  therapeutic multivitamin-minerals, 1 tablet, Daily  pravastatin, 20 mg, Every Other Day  guaiFENesin, 600 mg, BID        PRN Medications:  melatonin, 3 mg, Nightly PRN  sodium chloride flush,  6/2021 without CHF  - appear most likely related to venous stasis      DVT Prophylaxis: Bertram Donald M.D.

## 2024-02-26 NOTE — PROGRESS NOTES
MRI screening completed with wife via telephone.    Per wife patient is allergic to contrast.    MRI tech given report and aware NO CONTRAST.

## 2024-02-27 ENCOUNTER — APPOINTMENT (OUTPATIENT)
Dept: GENERAL RADIOLOGY | Age: 89
End: 2024-02-27
Payer: COMMERCIAL

## 2024-02-27 LAB
ALBUMIN SERPL-MCNC: 3.2 G/DL (ref 3.4–5)
ALP SERPL-CCNC: 236 U/L (ref 40–129)
ALT SERPL-CCNC: 95 U/L (ref 10–40)
ANION GAP SERPL CALCULATED.3IONS-SCNC: 11 MMOL/L (ref 3–16)
AST SERPL-CCNC: 62 U/L (ref 15–37)
BILIRUB DIRECT SERPL-MCNC: 0.4 MG/DL (ref 0–0.3)
BILIRUB INDIRECT SERPL-MCNC: 0.4 MG/DL (ref 0–1)
BILIRUB SERPL-MCNC: 0.8 MG/DL (ref 0–1)
BUN SERPL-MCNC: 12 MG/DL (ref 7–20)
CALCIUM SERPL-MCNC: 8.7 MG/DL (ref 8.3–10.6)
CHLORIDE SERPL-SCNC: 105 MMOL/L (ref 99–110)
CO2 SERPL-SCNC: 26 MMOL/L (ref 21–32)
CREAT SERPL-MCNC: 0.8 MG/DL (ref 0.8–1.3)
DEPRECATED RDW RBC AUTO: 14.2 % (ref 12.4–15.4)
EKG ATRIAL RATE: 92 BPM
EKG DIAGNOSIS: NORMAL
EKG P AXIS: 50 DEGREES
EKG P-R INTERVAL: 210 MS
EKG Q-T INTERVAL: 362 MS
EKG QRS DURATION: 82 MS
EKG QTC CALCULATION (BAZETT): 447 MS
EKG R AXIS: -4 DEGREES
EKG T AXIS: 49 DEGREES
EKG VENTRICULAR RATE: 92 BPM
GFR SERPLBLD CREATININE-BSD FMLA CKD-EPI: >60 ML/MIN/{1.73_M2}
GLUCOSE SERPL-MCNC: 90 MG/DL (ref 70–99)
HCT VFR BLD AUTO: 44.6 % (ref 40.5–52.5)
HGB BLD-MCNC: 15.1 G/DL (ref 13.5–17.5)
MCH RBC QN AUTO: 28.7 PG (ref 26–34)
MCHC RBC AUTO-ENTMCNC: 34 G/DL (ref 31–36)
MCV RBC AUTO: 84.3 FL (ref 80–100)
PHOSPHATE SERPL-MCNC: 3.1 MG/DL (ref 2.5–4.9)
PLATELET # BLD AUTO: 302 K/UL (ref 135–450)
PMV BLD AUTO: 7.3 FL (ref 5–10.5)
POTASSIUM SERPL-SCNC: 3.5 MMOL/L (ref 3.5–5.1)
PROT SERPL-MCNC: 6.3 G/DL (ref 6.4–8.2)
RBC # BLD AUTO: 5.29 M/UL (ref 4.2–5.9)
SODIUM SERPL-SCNC: 142 MMOL/L (ref 136–145)
WBC # BLD AUTO: 14.2 K/UL (ref 4–11)

## 2024-02-27 PROCEDURE — 93010 ELECTROCARDIOGRAM REPORT: CPT | Performed by: INTERNAL MEDICINE

## 2024-02-27 PROCEDURE — 92526 ORAL FUNCTION THERAPY: CPT

## 2024-02-27 PROCEDURE — 99232 SBSQ HOSP IP/OBS MODERATE 35: CPT | Performed by: INTERNAL MEDICINE

## 2024-02-27 PROCEDURE — 80076 HEPATIC FUNCTION PANEL: CPT

## 2024-02-27 PROCEDURE — 1200000000 HC SEMI PRIVATE

## 2024-02-27 PROCEDURE — 6360000002 HC RX W HCPCS: Performed by: INTERNAL MEDICINE

## 2024-02-27 PROCEDURE — 92610 EVALUATE SWALLOWING FUNCTION: CPT

## 2024-02-27 PROCEDURE — 80048 BASIC METABOLIC PNL TOTAL CA: CPT

## 2024-02-27 PROCEDURE — 2580000003 HC RX 258: Performed by: INTERNAL MEDICINE

## 2024-02-27 PROCEDURE — 97530 THERAPEUTIC ACTIVITIES: CPT

## 2024-02-27 PROCEDURE — APPSS15 APP SPLIT SHARED TIME 0-15 MINUTES

## 2024-02-27 PROCEDURE — 36415 COLL VENOUS BLD VENIPUNCTURE: CPT

## 2024-02-27 PROCEDURE — 99232 SBSQ HOSP IP/OBS MODERATE 35: CPT | Performed by: SURGERY

## 2024-02-27 PROCEDURE — 93005 ELECTROCARDIOGRAM TRACING: CPT | Performed by: INTERNAL MEDICINE

## 2024-02-27 PROCEDURE — 85027 COMPLETE CBC AUTOMATED: CPT

## 2024-02-27 PROCEDURE — 84100 ASSAY OF PHOSPHORUS: CPT

## 2024-02-27 PROCEDURE — 6370000000 HC RX 637 (ALT 250 FOR IP)

## 2024-02-27 PROCEDURE — 71045 X-RAY EXAM CHEST 1 VIEW: CPT

## 2024-02-27 RX ADMIN — METOPROLOL SUCCINATE 25 MG: 25 TABLET, FILM COATED, EXTENDED RELEASE ORAL at 09:31

## 2024-02-27 RX ADMIN — ENOXAPARIN SODIUM 40 MG: 100 INJECTION SUBCUTANEOUS at 09:31

## 2024-02-27 RX ADMIN — PIPERACILLIN AND TAZOBACTAM 3375 MG: 3; .375 INJECTION, POWDER, FOR SOLUTION INTRAVENOUS at 17:05

## 2024-02-27 RX ADMIN — GUAIFENESIN 600 MG: 600 TABLET, EXTENDED RELEASE ORAL at 09:31

## 2024-02-27 RX ADMIN — PIPERACILLIN AND TAZOBACTAM 3375 MG: 3; .375 INJECTION, POWDER, FOR SOLUTION INTRAVENOUS at 02:07

## 2024-02-27 RX ADMIN — I-VITE, TAB 1000-60-2MG (60/BT) 1 TABLET: TAB at 09:31

## 2024-02-27 RX ADMIN — PRAVASTATIN SODIUM 20 MG: 10 TABLET ORAL at 09:34

## 2024-02-27 RX ADMIN — PIPERACILLIN AND TAZOBACTAM 3375 MG: 3; .375 INJECTION, POWDER, FOR SOLUTION INTRAVENOUS at 09:31

## 2024-02-27 ASSESSMENT — PAIN DESCRIPTION - LOCATION: LOCATION: CHEST

## 2024-02-27 ASSESSMENT — PAIN SCALES - GENERAL: PAINLEVEL_OUTOF10: 7

## 2024-02-27 NOTE — FLOWSHEET NOTE
02/27/24 0823   Vital Signs   Temp 98.4 °F (36.9 °C)   Temp Source Oral   Pulse 92   Heart Rate Source Monitor   Respirations 16   BP (!) 166/81   MAP (Calculated) 109   BP Location Right upper arm   BP Method Automatic   Patient Position Semi fowlers   Oxygen Therapy   SpO2 94 %   O2 Device None (Room air)     Shift assessment complete. See flow sheet. Scheduled meds given. See MAR.  Patients head-toe complete, VS are logged, and active bowel sound noted in all four quadrants.    Patient denies any pain at this time. Did struggle with taking PO medications MD at bedside and SLP ordered.    No further needs  noted at this time. Call light and bedside table are within reach. The bed is locked and is in the lowest position.      Tracee Junior RN

## 2024-02-27 NOTE — PLAN OF CARE
Problem: SLP Adult - Impaired Swallowing  Goal: By Discharge: Advance to least restrictive diet without signs or symptoms of aspiration for planned discharge setting.  See evaluation for individualized goals.  Note: SLP completed evaluation. Please refer to notes in EMR.    Bhavana Null M.A., [facility] #[id number]  Speech-Language Pathologist  [employer] Phone (inpatient): [phone]  Speech Desk (outpatient)- [phone]    Certified to provide:  FEES, MBS, Vital Stim, and [place] [facility] (MDTP)

## 2024-02-27 NOTE — PROGRESS NOTES
Inpatient Physical Therapy Treatment    Unit: [facility]  Date:  2/27/2024  Patient Name:    Stone Hoskins  Admitting diagnosis:  Acute cholecystitis [K81.0]  Cholecystitis [K81.9]  Generalized weakness [R53.1]  Right upper quadrant abdominal pain [R10.11]  Altered mental status, unspecified altered mental status type [R41.82]  Admit Date:  2/21/2024  Precautions/Restrictions/WB Status/ Lines/ Wounds/ Oxygen: Fall risk, Bed/chair alarm, Lines (IV and external catheter), and Telesitter    Treatment Time:  15:13-15:36  Treatment Number:  4   Timed Code Treatment Minutes: 23 minutes  Total Treatment Minutes:  23 minutes    Patient Stated Goals for Therapy:  \"I need to pee\"-wants assistance to toilet       Discharge Recommendations: SNF  DME needs for discharge: Needs Met       Therapy recommendation for EMS Transport: can transport by wheelchair    Therapy recommendations for staff:   Assist of 2 for transfers with use of rolling walker (RW) and gait belt to/from BSC  to/from chair    History of Present Illness:   Admitted 2/21/24 with sepsis 2/2 acute cholecystitis, AMS, URI.   PMH of HLD, HTN, and BLE edema     Home Health S4 Level Recommendation:  Level 1 Standard        AM-PAC Mobility Score    AM-PAC Inpatient Mobility Raw Score : 13       Subjective  Patient lying reclined in bed with no family present.  Pt agreeable to this PT session.     Cognition    A&O Person; cannot state where he is and why he's here. Did not ask about date.  Able to follow 1 step commands.   Confused, flight of ideas, word salad.    Pain   No  Location: N/A  Rating: NA /10  Pain Medicine Status: No request made    Preadmission Environment:   Pt. Lives                                              Spouse in good health, home 24/7; sisters live in area as well to stop in and assist PRN  Home environment:                            one story home  Steps to enter first floor:                     one steps to enter; no handrails  Steps to second  flexed posture, decreased foot clearance bilaterally, and decreased step length bilaterally  Assistive Device Used:    rolling walker (RW)  Level of Assist:    CGA with Min A via strong tactile cues for regular postural adjustment  Comment:     Stair Training deferred, pt unsafe/ not appropriate to complete stairs at this time    Therapeutic Exercises Initiated  all completed bilaterally unless indicated  Supine:  N/A    Seated:  N/A    Standing:  N/A    Activity Tolerance   During therapy session noted pt with no adverse symptoms to activity    Positioning Needs   Pt up in chair, alarm set, positioned in proper neutral alignment and pressure relief provided.   Call light provided and all needs within reach    Other Activities  None.    Patient/Family Education   Pt educated on role of inpatient PT, POC, importance of continued activity, HEP and calling for assist with mobility.    Assessment  Pt oriented to self only at this time. Pleasant and cooperative. He needed Mod/Max A for transfers due to heavy posterior lean in standing and poor leg advancement for pivot to chair.     Recommending SNF upon discharge as patient functioning well below baseline, demonstrates good rehab potential and unable to return home due to home environment not conducive to patient recovery and limited safety awareness.    Goals :   To be met in 3 visits:  1). Independent with LE Ex x 10 reps  2). Sit to/from stand: Supervision  3). Bed to chair: Supervision  4). CHF goal: N/A    To be met in 6 visits:  1).  Supine to/from sit: Modified Independent  2).  Sit to/from stand: Modified Independent  3).  Bed to chair: Modified Independent  4).  Gait: Ambulate 50 ft.  with Supervision and use of rolling walker (RW)  5).  Tolerate B LE exercises 3 sets of 10-15 reps  6).  Ascend/descend 1 steps with SBA with use of hand rail bilateral and LRAD (least restrictive assistive device)    Rehabilitation Potential: Good  Strengths for achieving goals

## 2024-02-27 NOTE — PROGRESS NOTES
Occupational Therapy  Orders received and chart reviewed. Pt c/o chest pain at start of session. RN notified, vitals obtained: /74, O2 94%, HR 97. Will continue to follow. Re-attempt as appropriate and schedule permits.     Elisabeth Glynn, OTR/L #[id number]

## 2024-02-27 NOTE — PLAN OF CARE
Problem: Pain  Goal: Verbalizes/displays adequate comfort level or baseline comfort level  2/27/2024 1134 by Tracee Junior RN  Outcome: Progressing  2/26/2024 2212 by John Suresh RN  Outcome: Progressing     Problem: Safety - Adult  Goal: Free from fall injury  2/27/2024 1134 by Tracee Junior RN  Outcome: Progressing  2/26/2024 2212 by John Suresh RN  Outcome: Progressing     Problem: Skin/Tissue Integrity  Goal: Absence of new skin breakdown  Description: 1.  Monitor for areas of redness and/or skin breakdown  2.  Assess vascular access sites hourly  3.  Every 4-6 hours minimum:  Change oxygen saturation probe site  4.  Every 4-6 hours:  If on nasal continuous positive airway pressure, respiratory therapy assess nares and determine need for appliance change or resting period.  2/26/2024 2212 by John Suresh RN  Outcome: Progressing

## 2024-02-27 NOTE — PROGRESS NOTES
/75   Pulse 93   Temp 98.4 °F (36.9 °C) (Oral)   Resp 17   Ht 1.778 m (5' 10\")   Wt 89.5 kg (197 lb 6.4 oz)   SpO2 94%   BMI 28.32 kg/m²     Patient alert and oriented to person and place, sitting on recliner, watching tv. Assessment completed. Respiration easy, even and unlabored. Patient tolerated night meds well. Call light and bedside table within reach. Chair locked, chair alarm on. With telesitter in the room.  Pt denies needs at this time.

## 2024-02-27 NOTE — PROGRESS NOTES
Perfect serve sent to Dr. Rogelio Houston regarding patient having moist congested cough, patient unable to expectorate sputum. No new order.

## 2024-02-27 NOTE — PLAN OF CARE
Problem: Discharge Planning  Goal: Discharge to home or other facility with appropriate resources  2/26/2024 2212 by John Suresh RN  Outcome: Progressing  Flowsheets (Taken 2/26/2024 1941)  Discharge to home or other facility with appropriate resources: Identify barriers to discharge with patient and caregiver     Problem: Pain  Goal: Verbalizes/displays adequate comfort level or baseline comfort level  2/26/2024 2212 by John Suresh RN  Outcome: Progressing     Problem: Safety - Adult  Goal: Free from fall injury  2/26/2024 2212 by John Suresh RN  Outcome: Progressing     Problem: Skin/Tissue Integrity  Goal: Absence of new skin breakdown  Description: 1.  Monitor for areas of redness and/or skin breakdown  2.  Assess vascular access sites hourly  3.  Every 4-6 hours minimum:  Change oxygen saturation probe site  4.  Every 4-6 hours:  If on nasal continuous positive airway pressure, respiratory therapy assess nares and determine need for appliance change or resting period.  2/26/2024 2212 by John Suresh RN  Outcome: Progressing     Problem: Neurosensory - Adult  Goal: Achieves stable or improved neurological status  2/26/2024 2212 by John Suresh RN  Outcome: Progressing     Problem: Skin/Tissue Integrity - Adult  Goal: Skin integrity remains intact  Outcome: Progressing     Problem: Musculoskeletal - Adult  Goal: Return mobility to safest level of function  Outcome: Progressing  Flowsheets (Taken 2/26/2024 1941)  Return Mobility to Safest Level of Function: Assist with transfers and ambulation using safe patient handling equipment as needed     Problem: Genitourinary - Adult  Goal: Absence of urinary retention  Outcome: Progressing

## 2024-02-27 NOTE — PLAN OF CARE
Problem: Discharge Planning  Goal: Discharge to home or other facility with appropriate resources  2/27/2024 1136 by Tracee Junior RN  Outcome: Progressing  2/27/2024 1134 by Tracee Junior RN  Outcome: Progressing  2/26/2024 2212 by John Suresh RN  Outcome: Progressing  Flowsheets (Taken 2/26/2024 1941)  Discharge to home or other facility with appropriate resources: Identify barriers to discharge with patient and caregiver     Problem: Pain  Goal: Verbalizes/displays adequate comfort level or baseline comfort level  2/27/2024 1136 by Tracee Junior RN  Outcome: Progressing  2/27/2024 1134 by Tracee Junior RN  Outcome: Progressing  2/26/2024 2212 by John Suresh RN  Outcome: Progressing     Problem: Skin/Tissue Integrity  Goal: Absence of new skin breakdown  Description: 1.  Monitor for areas of redness and/or skin breakdown  2.  Assess vascular access sites hourly  3.  Every 4-6 hours minimum:  Change oxygen saturation probe site  4.  Every 4-6 hours:  If on nasal continuous positive airway pressure, respiratory therapy assess nares and determine need for appliance change or resting period.  2/26/2024 2212 by John Suresh RN  Outcome: Progressing

## 2024-02-27 NOTE — PROGRESS NOTES
[facility]    Daily Progress Note for 2024 9:38 AM 0218/0218-01  Stone Hoskins : 1935 Age: 88 y.o. Sex: male  Length of Stay:  6    Interval History:      CC: F/U Cough (Cough for a several days. ), Abdominal Pain (ABD pain and nausea started this AM.), and Leg Swelling (Worsened lower extremity edema. )    Subjective:       Mr Hoskins says he has mild RUQ pain    Objective:     Vitals:    24 1935 24 0202 24 0823 24 0931   BP: 134/75 (!) 159/85 (!) 166/81 (!) 166/81   Pulse: 93 97 92 92   Resp: 17 17 16    Temp: 98.4 °F (36.9 °C) 98.5 °F (36.9 °C) 98.4 °F (36.9 °C)    TempSrc: Oral Oral Oral    SpO2: 94% 93% 94%    Weight:       Height:              Intake/Output Summary (Last 24 hours) at 2024 0938  Last data filed at 2024 0614  Gross per 24 hour   Intake --   Output 400 ml   Net -400 ml       Body mass index is 28.32 kg/m².    Physical Exam:  General: Cooperative, pleasant/Ill appearing, on  Nasal cannula  HEENT:  Head: normocephalic,atraumatic, anicteric sclera, clear conjunctiva  Neck: Normal size, Jugular venous pulsations: normal  Respiratory:unlabored breathing, clear to auscultation with no crackles, wheezes rhonchi  Heart: Regular rate and rhythm, S1, S2-normal, No murmurs  Abdomen: soft, nondistended, mild RUQ tenderness, normoactive bowel sounds,  Neurological/Psych: Alert and oriented to self and situation, no focal neurological deficits, Mood and affect appropriate.  Skin: No obvious rashes    Extremities:  no edema, Pedal pulses 2+ bilaterally    Scheduled Medications:  sodium chloride flush, 5-40 mL, 2 times per day  enoxaparin, 40 mg, Daily  piperacillin-tazobactam, 3,375 mg, Q8H  metoprolol succinate, 25 mg, Daily  therapeutic multivitamin-minerals, 1 tablet, Daily  pravastatin, 20 mg, Every Other Day  guaiFENesin, 600 mg, BID        PRN Medications:  melatonin, 3 mg, Nightly PRN  sodium chloride flush, 5-40 mL,

## 2024-02-27 NOTE — FLOWSHEET NOTE
02/27/24 1000   Vital Signs   Temp 98.4 °F (36.9 °C)   Temp Source Oral   Pulse 96   Heart Rate Source Monitor   Respirations 16   BP (!) 146/74   MAP (Calculated) 98   BP Location Right upper arm   BP Method Automatic   Patient Position Semi fowlers   Pain Assessment   Pain Assessment 0-10   Pain Level 7   Pain Location Chest   Oxygen Therapy   SpO2 94 %   O2 Device None (Room air)     Patient having CO chest pain, rating 7/10, MD notified and stat chest xray and EKG ordered.

## 2024-02-27 NOTE — CARE COORDINATION
CM contacted SNF according to pt preference:    1. [facility] - denied, not in network   2. [facility] ([place]) - no beds until Friday   3. [facility] - denied, not in network   4. [facility] - denied, not in network   5. [facility] - referral sent

## 2024-02-27 NOTE — PROGRESS NOTES
Speech Language Pathology  Swallowing Disorders and Dysphagia  Clinical Bedside Swallow Assessment  Facility/Department: [address] MEDICAL-SURGICAL    Instrumentation: Yes. MBSS is warranted to further assess oropharyngeal structures and functions. Order placed at this time.  Diet recommendation: Ice chips & SMALL sips of water with SLP/RN only (oral care must be completed prior); Meds whole with thin liquids (water)  Risk management: upright for all intake, stay upright for at least 30 mins after intake, small bites/sips, close supervision, oral care 2-3x/day to reduce adverse affects in the event of aspiration, increase physical mobility as able, slow rate of intake, STRICT aspiration precautions, and hold PO and contact SLP if s/s of aspiration or worsening respiratory status develop.    NAME:Stone Hoskins  : 1935 (88 y.o.)   MRN: [id number]  ROOM: [id number]  ADMISSION DATE: 2024  PATIENT DIAGNOSIS(ES): Acute cholecystitis [K81.0]  Cholecystitis [K81.9]  Generalized weakness [R53.1]  Right upper quadrant abdominal pain [R10.11]  Altered mental status, unspecified altered mental status type [R41.82]  Chief Complaint   Patient presents with    Cough     Cough for a several days.     Abdominal Pain     ABD pain and nausea started this AM.    Leg Swelling     Worsened lower extremity edema.      Patient Active Problem List    Diagnosis Date Noted    Primary hypertension     Elevated LFTs 2024    Cholecystitis 2024    Altered mental status 2024    Generalized weakness 2024    Acute hypoxemic respiratory failure (HCC)     Tracheobronchitis     Acute encephalopathy     Bronchospasm     Sepsis (HCC) 2019    Subconjunctival hemorrhage of left eye 2018    ARDS (adult respiratory distress syndrome) (HCC)     Acute respiratory failure with hypoxia (HCC)     SVT (supraventricular tachycardia) 2017    Bilateral edema of lower extremity 2015    Right-sided low  Status as listed in chart:  Full Code      Cranial nerve exam:   CN V (trigeminal): ophthalmic, maxillary, and mandibular facial sensation- Impaired - Possibly attributed to altered mental status   CN VII (facial): WNL b/l  CN IX/X (glossopharyngeal/vagus): MPT: Impaired 3.9 (pt did not take a very deep breath; struggles to follow commands); pitch range: WNL; vocal quality: WNL; cough: Weak- perceptually, Wet, and Non-Productive  CN XII (hypoglossal): WNL b/l    Laryngeal function exam:   Secretions: Oral mucosa pink and moist   Vocal quality: See CN exam above  MPT: See CN exam above  S/Z ratio: DNT  Pitch range: See CN exam above  Cough: See CN exam above    Oral Care Status:    [x] Oral Care WFL  [] Poor oral care status  [x] Edentulous  [] Upper Dentures  [] Lower Dentures  [] Missing/Broken Teeth  [] Evidence of dental cavities/carries    Oral Care Completed?   [] Yes   [x] No  Not warranted-oral care adequate upon assessment    PO trials:   Baseline cough noted prior to PO trials? [x] Yes   [] No  Baseline SPO2%: 94%  Baseline RR: 16  Supplemental O2 Status: RA     Ice: no anterior bolus loss , suspect functional A-P bolus transit, swallow timing subjectively appears timely, delayed cough , and wet breath sounds upon exhalation     IDDSI 0 (thin):   - 5cc bolus (tsp): no anterior bolus loss , suspect functional A-P bolus transit, suspect premature bolus loss into pharynx, immediate throat clearing, and wet breath sounds upon exhalation, auditory swallow  - Cup: no anterior bolus loss , suspect functional A-P bolus transit, swallow timing subjectively appears timely, and delayed cough   - Straw: no anterior bolus loss , suspect functional A-P bolus transit, suspect premature bolus loss into pharynx, delayed cough , and immediate throat clearing      IDDSI 4 (puree): no anterior bolus loss , suspect functional A-P bolus transit, swallow timing subjectively appears timely, oral clearance grossly WFL, and no  clinical s/s of aspiration      IDDSI 7 (regular): no anterior bolus loss , suspect functional A-P bolus transit, swallow timing subjectively appears timely, grossly functional mastication, oral clearance grossly WFL, and no clinical s/s of aspiration    3 oz water: JOSE    SPO2% following trials: 94% RA  RR following trials: 16    Impressions:    Patient presents as moderate aspiration risk and low PNA / adverse pulmonary event risk given the following factors:  dependence for feeding / oral care    Clinical signs of oropharyngeal dysphagia likely chronic related to AMS and generalized weakness. Swallow prognosis is fair. Instrumental swallow study is indicated.  Given overt clinical s/s of aspiration at bedside, pt is not safe for oral diet prior to instrumental study    Instrumentation: Yes. MBSS is warranted to further assess oropharyngeal structures and functions. Order placed at this time.  Diet recommendation: Ice chips & SMALL sips of water with SLP/RN only (oral care must be completed prior); Meds whole with thin liquids (water)  Risk management: upright for all intake, stay upright for at least 30 mins after intake, small bites/sips, close supervision, oral care 2-3x/day to reduce adverse affects in the event of aspiration, increase physical mobility as able, slow rate of intake, STRICT aspiration precautions, and hold PO and contact SLP if s/s of aspiration or worsening respiratory status develop.    Prognosis: Fair    Recommended Intervention:   [x] Dysphagia tx  [] Videostroboscopy                      [x] NPO   [x] MBS       [] Speech/Cog Eval    [x] Therapeutic PO Trials     [x] Ice Chips   [] Other:  [] FEES                                                 Dysphagia Therapeutic Intervention:   []  Bolus control Exercises  []  Oral Motor Exercises  [x]  [place] Protocol  []  Thermal Stimulation  [x]  Oral Care    []  Vital Stim/NMES  []  Laryngeal Exercises  [x]  Patient/Family Education  []

## 2024-02-27 NOTE — PROGRESS NOTES
General Surgery  Daily Progress Note    Pt Name: Stone Hoskins  Medical Record Number: [id number]  Date of Birth 1935   Today's Date: 2/27/2024    SUBJECTIVE  Alert and oriented for me this morning. No abdominal pain or nausea. Concern was raised for aspiration given cough after sipping pepsi this morning.       OBJECTIVE  Vitals:    02/27/24 0202 02/27/24 0823 02/27/24 0931 02/27/24 1000   BP: (!) 159/85 (!) 166/81 (!) 166/81 (!) 146/74   Pulse: 97 92 92 96   Resp: 17 16  16   Temp: 98.5 °F (36.9 °C) 98.4 °F (36.9 °C)  98.4 °F (36.9 °C)   TempSrc: Oral Oral  Oral   SpO2: 93% 94%  94%   Weight:       Height:           Gen: No distress. Alert.   Resp: No accessory muscle use.  CV: Regular rate. Regular rhythm.   GI: Non-tender. Non-distended.  Normal bowel sounds.  Skin: Warm and dry. No nodule or rash on exposed extremities.     I/O last 3 completed shifts:  In: 1413.2 [P.O.:560; IV Piggyback:853.2]  Out: 1300 [Urine:1300]  I/O this shift:  In: -   Out: 200 [Urine:200]    LABS  CBC:   Recent Labs     02/25/24  0726 02/27/24  0646   WBC 17.3* 14.2*   HGB 15.0 15.1   HCT 44.9 44.6   MCV 86.1 84.3    302     BMP:   Recent Labs     02/25/24  0726 02/27/24  0646    142   K 4.0 3.5    105   CO2 24 26   PHOS  --  3.1   BUN 15 12   CREATININE 0.8 0.8     LIVER PROFILE:   Recent Labs     02/25/24  0726 02/26/24  0547 02/27/24  0646   AST 39* 68* 62*   ALT 52* 89* 95*   BILIDIR 0.6* 0.5* 0.4*   BILITOT 1.2* 1.0 0.8   ALKPHOS 132* 210* 236*     PT/INR: No results for input(s): \"PROTIME\", \"INR\" in the last 72 hours.  APTT: No results for input(s): \"APTT\" in the last 72 hours.  UA:No results for input(s): \"NITRITE\", \"COLORU\", \"PHUR\", \"LABCAST\", \"WBCUA\", \"RBCUA\", \"MUCUS\", \"TRICHOMONAS\", \"YEAST\", \"BACTERIA\", \"CLARITYU\", \"SPECGRAV\", \"LEUKOCYTESUR\", \"UROBILINOGEN\", \"BILIRUBINUR\", \"BLOODU\", \"GLUCOSEU\", \"AMORPHOUS\" in the last 72 hours.    Invalid input(s): \"KETONESU\"      IMAGING  XR CHEST PORTABLE   Final  Classification of Cystic Renal Masses,   Version 2019.         XR CHEST PORTABLE   Final Result   Poor inspiration.  There are findings concerning for pulmonary edema   secondary to congestive heart failure..               ASSESSMENT:  Stone Hoskins is a 88 y.o. male with a pmhx of HTN and HLD who presented with cough, abdominal pain and leg swelling. On admission he had AMS and was unable to provide significant history.     CT and RUQ US 2/21 reviewed and suggests cholecystitis and cholelithiasis.     Acute calculus cholecystitis  Transaminitis   AMS - back to baseline per spouse    MRCP was obtained 2/26 given rise in LFTs. This re-demonstrated cholecystitis. Negative for CBD stone.     Afebrile  WBC trending down  LFTs persistently elevated  Bili wnl      PLAN:  Conservative treatment as below with plans to discharge on PO abx and follow up in 1-2 weeks as outpatient to discuss interval CCY with Dr. Kike Ceja to include Zosyn - recommend transition to PO at discharge to complete 10 day course  Okay to advance diet as tolerated after SLP evaluation - will need low fat recommendation until gallbladder has been removed  Monitor LFTs  Encourage ambulation - PTOT is following  PRN pain control and antiemetics  DVT prophylaxis with Lx      Dispo: cc    Daniel Roa PA-C  2/27/2024 11:13 AM    I have personally performed a face to face diagnostic evaluation on this patient and agree with the progress note and care plan of ELSIE Hawkins. More than half of the time spent on this encounter was completed by me including the history, physical examination and the entire medical decision making. My findings are as follows:    Patient is a little confused this morning.  At 1 point he told me his wife was in the hospital to rooms over.  BP (!) 155/81   Pulse 84   Temp 98.6 °F (37 °C) (Oral)   Resp 16   Ht 1.778 m (5' 10\")   Wt 89.5 kg (197 lb 6.4 oz)   SpO2 94%   BMI 28.32 kg/m²   Awake and alert in NAD  RESP:

## 2024-02-28 ENCOUNTER — APPOINTMENT (OUTPATIENT)
Dept: GENERAL RADIOLOGY | Age: 89
End: 2024-02-28
Payer: COMMERCIAL

## 2024-02-28 LAB
GLUCOSE BLD-MCNC: 102 MG/DL (ref 70–99)
GLUCOSE BLD-MCNC: 137 MG/DL (ref 70–99)
GLUCOSE BLD-MCNC: 86 MG/DL (ref 70–99)
GLUCOSE BLD-MCNC: 88 MG/DL (ref 70–99)
GLUCOSE BLD-MCNC: 94 MG/DL (ref 70–99)
PERFORMED ON: ABNORMAL
PERFORMED ON: ABNORMAL
PERFORMED ON: NORMAL

## 2024-02-28 PROCEDURE — 6370000000 HC RX 637 (ALT 250 FOR IP)

## 2024-02-28 PROCEDURE — 97530 THERAPEUTIC ACTIVITIES: CPT

## 2024-02-28 PROCEDURE — 97116 GAIT TRAINING THERAPY: CPT

## 2024-02-28 PROCEDURE — 97535 SELF CARE MNGMENT TRAINING: CPT

## 2024-02-28 PROCEDURE — 6360000002 HC RX W HCPCS: Performed by: INTERNAL MEDICINE

## 2024-02-28 PROCEDURE — 6370000000 HC RX 637 (ALT 250 FOR IP): Performed by: INTERNAL MEDICINE

## 2024-02-28 PROCEDURE — 74230 X-RAY XM SWLNG FUNCJ C+: CPT

## 2024-02-28 PROCEDURE — 1200000000 HC SEMI PRIVATE

## 2024-02-28 PROCEDURE — 2580000003 HC RX 258: Performed by: INTERNAL MEDICINE

## 2024-02-28 PROCEDURE — 97110 THERAPEUTIC EXERCISES: CPT

## 2024-02-28 PROCEDURE — 92526 ORAL FUNCTION THERAPY: CPT

## 2024-02-28 PROCEDURE — 99232 SBSQ HOSP IP/OBS MODERATE 35: CPT | Performed by: INTERNAL MEDICINE

## 2024-02-28 PROCEDURE — 92611 MOTION FLUOROSCOPY/SWALLOW: CPT

## 2024-02-28 RX ORDER — AMOXICILLIN AND CLAVULANATE POTASSIUM 875; 125 MG/1; MG/1
1 TABLET, FILM COATED ORAL EVERY 12 HOURS SCHEDULED
Status: DISCONTINUED | OUTPATIENT
Start: 2024-02-28 | End: 2024-03-01 | Stop reason: HOSPADM

## 2024-02-28 RX ORDER — DIMETHICONE, OXYBENZONE, AND PADIMATE O 2; 2.5; 6.6 G/100G; G/100G; G/100G
STICK TOPICAL
Status: COMPLETED
Start: 2024-02-28 | End: 2024-02-28

## 2024-02-28 RX ADMIN — AMOXICILLIN AND CLAVULANATE POTASSIUM 1 TABLET: 875; 125 TABLET, FILM COATED ORAL at 15:45

## 2024-02-28 RX ADMIN — Medication 10 ML: at 22:57

## 2024-02-28 RX ADMIN — GUAIFENESIN 600 MG: 600 TABLET, EXTENDED RELEASE ORAL at 22:41

## 2024-02-28 RX ADMIN — ENOXAPARIN SODIUM 40 MG: 100 INJECTION SUBCUTANEOUS at 11:21

## 2024-02-28 RX ADMIN — METOPROLOL SUCCINATE 25 MG: 25 TABLET, FILM COATED, EXTENDED RELEASE ORAL at 11:20

## 2024-02-28 RX ADMIN — I-VITE, TAB 1000-60-2MG (60/BT) 1 TABLET: TAB at 15:45

## 2024-02-28 RX ADMIN — Medication: at 02:20

## 2024-02-28 RX ADMIN — AMOXICILLIN AND CLAVULANATE POTASSIUM 1 TABLET: 875; 125 TABLET, FILM COATED ORAL at 22:41

## 2024-02-28 RX ADMIN — PIPERACILLIN AND TAZOBACTAM 3375 MG: 3; .375 INJECTION, POWDER, FOR SOLUTION INTRAVENOUS at 02:10

## 2024-02-28 NOTE — PROGRESS NOTES
Inpatient Physical Therapy Treatment    Unit: [facility]  Date:  2/28/2024  Patient Name:    Stone Hoskins  Admitting diagnosis:  Acute cholecystitis [K81.0]  Cholecystitis [K81.9]  Generalized weakness [R53.1]  Right upper quadrant abdominal pain [R10.11]  Altered mental status, unspecified altered mental status type [R41.82]  Admit Date:  2/21/2024  Precautions/Restrictions/WB Status/ Lines/ Wounds/ Oxygen: Fall risk, Bed/chair alarm, Lines (IV and external catheter), and Telesitter    Treatment Time:  09:45-10:40  Treatment Number:  5   Timed Code Treatment Minutes: 55 minutes  Total Treatment Minutes: 55 minutes    Patient Stated Goals for Therapy:  \"whatever you want to do today\".       Discharge Recommendations: ARU/IRF (inpatient rehab facility)    DME needs for discharge: Needs Met       Therapy recommendation for EMS Transport: can transport by wheelchair    Therapy recommendations for staff:   Assist of 1 for transfers with use of rolling walker (RW) and gait belt to/from BSC  to/from chair    History of Present Illness:   Admitted 2/21/24 with sepsis 2/2 acute cholecystitis, AMS, URI.   PMH of HLD, HTN, and BLE edema   Cognition has waxed and waned since admission.     Home Health S4 Level Recommendation:  Level 1 Standard        AM-PAC Mobility Score    AM-PAC Inpatient Mobility Raw Score : 13       Subjective  Patient lying reclined in bed with family present (wife).  Pt agreeable to this PT session.     Cognition    A&O Person and Place  Able to follow 1 step commands.     Pain   No  Location: N/A  Rating: NA /10  Pain Medicine Status: No request made    Preadmission Environment:   Pt. Lives                                              Spouse in good health, home 24/7; sisters live in area as well to stop in and assist PRN  Home environment:                            one story home  Steps to enter first floor:                     one steps to enter; no handrails  Steps to second floor/basement:         care. Family has been frequently present and actively engaged during therapy sessions.     Recommending ARU/IRF/Inpatient Rehab Facility upon discharge as patient functioning well below baseline, demonstrates good rehab potential and unable to return home due to home environment not conducive to patient recovery and limited safety awareness.    Goals :   To be met in 3 visits:  1). Independent with LE Ex x 10 reps  2). Sit to/from stand: Supervision  3). Bed to chair: Supervision  4). CHF goal: N/A    To be met in 6 visits:  1).  Supine to/from sit: Modified Independent  2).  Sit to/from stand: Modified Independent  3).  Bed to chair: Modified Independent  4).  Gait: Ambulate 50 ft.  with Supervision and use of rolling walker (RW)  5).  Tolerate B LE exercises 3 sets of 10-15 reps  6).  Ascend/descend 1 steps with SBA with use of hand rail bilateral and LRAD (least restrictive assistive device)    Rehabilitation Potential: Good  Strengths for achieving goals include:   Pt motivated, PLOF, Family Support, and Pt cooperative   Barriers to achieving goals include:    No Barriers    Plan    To be seen 3-5 x / week  while in acute care setting for therapeutic exercises, bed mobility, transfers, progressive gait training, balance training, and family/patient education.    Signature: Bettye Campos, PT     If patient discharges from this facility prior to next visit, this note will serve as the Discharge Summary.

## 2024-02-28 NOTE — PROGRESS NOTES
Speech-Language Pathology  Swallowing Disorders and Dysphagia     SLP Brief       Name: Stone Hoskins  : 1935  Medical Diagnosis: Acute cholecystitis [K81.0]  Cholecystitis [K81.9]  Generalized weakness [R53.1]  Right upper quadrant abdominal pain [R10.11]  Altered mental status, unspecified altered mental status type [R41.82]    MBS tentatively scheduled for [id number] per radiology schedule.       Bhavana Null M.A., [facility]-SLP #[id number]  Speech-Language Pathologist  Phone: [phone]

## 2024-02-28 NOTE — PLAN OF CARE
Problem: Discharge Planning  Goal: Discharge to home or other facility with appropriate resources  Outcome: Progressing  Flowsheets (Taken 2/28/2024 1100)  Discharge to home or other facility with appropriate resources: Identify barriers to discharge with patient and caregiver     Problem: Pain  Goal: Verbalizes/displays adequate comfort level or baseline comfort level  Outcome: Progressing     Problem: Safety - Adult  Goal: Free from fall injury  Outcome: Progressing     Problem: Skin/Tissue Integrity  Goal: Absence of new skin breakdown  Description: 1.  Monitor for areas of redness and/or skin breakdown  2.  Assess vascular access sites hourly  3.  Every 4-6 hours minimum:  Change oxygen saturation probe site  4.  Every 4-6 hours:  If on nasal continuous positive airway pressure, respiratory therapy assess nares and determine need for appliance change or resting period.  Outcome: Progressing     Problem: Neurosensory - Adult  Goal: Achieves stable or improved neurological status  Outcome: Progressing     Problem: Skin/Tissue Integrity - Adult  Goal: Skin integrity remains intact  Outcome: Progressing  Flowsheets (Taken 2/28/2024 1311)  Skin Integrity Remains Intact: Monitor for areas of redness and/or skin breakdown     Problem: Musculoskeletal - Adult  Goal: Return mobility to safest level of function  Outcome: Progressing  Flowsheets (Taken 2/28/2024 1100)  Return Mobility to Safest Level of Function: Assist with transfers and ambulation using safe patient handling equipment as needed     Problem: Genitourinary - Adult  Goal: Absence of urinary retention  Outcome: Progressing

## 2024-02-28 NOTE — PROGRESS NOTES
Inpatient Occupational Therapy Treatment    Unit: [facility]  Date:  2/28/2024  Patient Name:    Stone Hoskins  Admitting diagnosis:  Acute cholecystitis [K81.0]  Cholecystitis [K81.9]  Generalized weakness [R53.1]  Right upper quadrant abdominal pain [R10.11]  Altered mental status, unspecified altered mental status type [R41.82]  Admit Date:  2/21/2024  Precautions/Restrictions/WB Status/ Lines/ Wounds/ Oxygen: Fall risk, Bed/chair alarm, Lines (IV and external catheter), and Telesitter    External catheter discontinued during session per patient request, RN notified.    Treatment Time:  14:50-15:35  Treatment Number:  5  Timed Code Treatment Minutes: 45 minutes  Total Treatment Minutes:  45 minutes    Patient Goals for Therapy: \"get out of bed\"      Discharge Recommendations: Acute Rehab (ARU)/ Inpatient Rehab Facility (IRF)  DME needs for discharge: Defer to facility       Therapy recommendations for staff:   Assist of 1 for ambulation with use of rolling walker (RW) and gait belt to/from BSC  to/from chair    History of Present Illness: Per H&P of Deborah Staley PA-C 2/21/2024: \"The patient is a 88 y.o. male with PMH of HLD, HTN, and BLE edema who presented to [facility] ED with complaint of cough, abd pain, and leg swelling. History limited 2/2 AMS. Pt's wife states that he is not the best historian, she states he doesn't usually need to know where he is or what month/year it is because he is always at home. She states that they have had respiratory symptoms for the past several days including congestion and cough. She states that this has not been improving but they have only been taking tessalon pearls for it. She states that last night he developed abdominal pain, dry heaving, and confusion. She states that she tried to give him some coke to drink and that settled his stomach for a little bit but did not last long. \"    2/21/2024 CT chest scan indicates concerns for pulmonary edema  secondary to congestive heart

## 2024-02-28 NOTE — PROGRESS NOTES
[facility] Progress Note    Daily Progress Note for 2024 10:34 AM 0227/0227-02  Stone Hoskins : 1935 Age: 88 y.o. Sex: male  Length of Stay:  7    Interval History:      CC: F/U Cough (Cough for a several days. ), Abdominal Pain (ABD pain and nausea started this AM.), and Leg Swelling (Worsened lower extremity edema. )    Subjective:     He got very confused yesterday  Now he is calm, oriented     Objective:     Vitals:    24 1000 24 1409 24 2200 24 0159   BP: (!) 146/74 (!) 155/81 (!) 157/82 (!) 156/83   Pulse: 96 84 98 92   Resp: 16 16 16 16   Temp: 98.4 °F (36.9 °C) 98.6 °F (37 °C) 98.1 °F (36.7 °C) 97.7 °F (36.5 °C)   TempSrc: Oral Oral Oral Oral   SpO2: 94% 94% 93% 92%   Weight:       Height:              Intake/Output Summary (Last 24 hours) at 2024 1034  Last data filed at 2024 0159  Gross per 24 hour   Intake 60 ml   Output 1200 ml   Net -1140 ml       Body mass index is 28.32 kg/m².    Physical Exam:  General: Cooperative, pleasant/Ill appearing, on  Nasal cannula  HEENT:  Head: normocephalic,atraumatic, anicteric sclera, clear conjunctiva  Neck: Normal size, Jugular venous pulsations: normal  Respiratory:unlabored breathing, clear to auscultation with no crackles, wheezes rhonchi  Heart: Regular rate and rhythm, S1, S2-normal, No murmurs  Abdomen: soft, nondistended, mild RUQ tenderness, normoactive bowel sounds,  Neurological/Psych: Alert and oriented to self and situation, no focal neurological deficits, Mood and affect appropriate.  Skin: No obvious rashes    Extremities:  no edema, Pedal pulses 2+ bilaterally    Scheduled Medications:  sodium chloride flush, 5-40 mL, 2 times per day  enoxaparin, 40 mg, Daily  piperacillin-tazobactam, 3,375 mg, Q8H  metoprolol succinate, 25 mg, Daily  therapeutic multivitamin-minerals, 1 tablet, Daily  pravastatin, 20 mg, Every Other Day  guaiFENesin, 600 mg, BID        PRN Medications:  melatonin, 3 mg,  the gallbladder   on the CT suggesting acute cholecystitis.   2. Cholelithiasis and sludge in the gallbladder.   3. Right renal cysts including a 7.7 cm Bosniak 1 cyst at the upper pole and   a 2.5 cm Bosniak 2 cyst at the lower pole.  No follow-up imaging is   recommended.         POC US FAST ABDOMEN LIMITED   Final Result      CT CHEST ABDOMEN PELVIS WO CONTRAST Additional Contrast? None   Final Result   No acute pulmonary process identified.      Vascular disease.      Findings concerning for acute cholecystitis.  Further evaluation with right   upper quadrant ultrasound may be helpful.  No calcified gallstones are seen.      Findings likely related to constipation.      Stable left adrenal nodule.      Additional findings noted above.      RECOMMENDATIONS:   1 cm incidental right thyroid nodule. No follow-up imaging is recommended.      Reference: J Am Bony Radiol. 2015 Feb;12(2): 143-50      2.8 cm infrarenal abdominal aortic aneurysm suspected. Recommend follow-up   every 5 years.      Reference: J Am Bony Radiol 2013;10:789-794.      Multiple lesions, including right Bosniak II benign renal cyst measuring 6.7   cm. No follow-up imaging is recommended.      JACR 2018 Feb; 264-273, Management of the Incidental Renal Mass on CT,   RadioGraphics 2021; 814-848, Bosniak Classification of Cystic Renal Masses,   Version 2019.         XR CHEST PORTABLE   Final Result   Poor inspiration.  There are findings concerning for pulmonary edema   secondary to congestive heart failure..         FL MODIFIED BARIUM SWALLOW W VIDEO    (Results Pending)       Microbiology: Cultures reviewed.   @[facility]@  [unfilled]    Lab Results   Component Value Date    LABA1C 5.2 12/06/2019      Body mass index is 28.32 kg/m².       Impression/Plan:        ASSESSMENT/PLAN:    Sepsis 2/2 acute cholecystitis  - CT chest/abd/pelvis and RUQ US as above  - Criteria: +HR, +WBC  - blood cx pending   - Zosyn D#5  - general surgery consulted   -  attempted to advance diet but he had more pain, back on clear liquids and continue inpatient  - LFTs going up.  MRCP ordered -no choledocholithiasis   Will need interval cholecystectomy    AMS  - likely 2/2 infection and possible dementia?  - pt's wife states that he has returned to his baseline  Confused last night       Generalized weakness  - likely 2/2 above   - PT/OT     Elevated troponin  - 23->24->repeat ordered  - EKG as above  - no CP     HTN  - on Toprol XL   - monitor BP     HLD  - on statin    Cough right after sipping pepsi  Imaging negative  Speech eval- MBS today     Chronic BLE edema  - on prn lasix   - last echo 6/2021 without CHF  - appear most likely related to venous stasis      DVT Prophylaxis: Lovenox   Speech eval.  MBS    Pt/ot  Needs SNF       ALEX Donald.

## 2024-02-28 NOTE — FLOWSHEET NOTE
02/28/24 1115   Vital Signs   Temp 97.6 °F (36.4 °C)   Temp Source Oral   Pulse 85   Heart Rate Source Monitor   Respirations 16   BP (!) 148/84   MAP (Calculated) 105   BP Location Right upper arm   BP Method Automatic   Patient Position Semi fowlers   Pain Assessment   Pain Assessment None - Denies Pain   Opioid-Induced Sedation   POSS Score 1   Oxygen Therapy   SpO2 94 %   O2 Device None (Room air)     Shift assessment complete. See flow sheet. Scheduled medications given, See MAR.   Head to toe complete. Vital signs logged and active bowel sounds in all 4 quadrants.    Patient awake in bed. Metoprolol given, see MAR, pt began coughing after 2nd sip of water, so other medications held until after modified barium swallow.       No further needs noted at this time. Call light and bedside table within reach. Bed in lowest position, wheels locked and side rails up x2.     Siomara Eldridge RN

## 2024-02-28 NOTE — CARE COORDINATION
CM Update:    Multiple referrals made  [facility], [facility], [facility], [facility] and [facility] - all are out of network  [employer]   Network list pulled by Yi RAMIREZ, CM  In network options near this area include  [facility] and [facility].    Referral called to [facility]  Faxed face sheet, H/P, MAR and therapy notes for review: Yes  Bed available?: NOT in network  Insurance precertification required?  Yes    Referral to [facility] PENDING review. See updated PT/OT recs and SLP cognitive eval ordered.

## 2024-02-28 NOTE — PROGRESS NOTES
Comprehensive Nutrition Assessment    Type and Reason for Visit:  Initial, RD Nutrition Re-Screen/LOS    Nutrition Recommendations/Plan:   Continue Minced and Moist Diet with Mildly Thick Liquids  Added Magic Cups BID      Malnutrition Assessment:  Malnutrition Status:  At risk for malnutrition (Comment) (02/28/24 4711)    Context:  Acute Illness     Findings of the 6 clinical characteristics of malnutrition:  Energy Intake:  50% or less of estimated energy requirements for 5 or more days  Weight Loss:  No significant weight loss     Body Fat Loss:  Unable to assess     Muscle Mass Loss:  Unable to assess    Fluid Accumulation:  Unable to assess     Strength:  Not Performed    Nutrition Assessment:    Pt. nutritionally compromised AEB he was admitted with acute calculus cholecystitis 7 days ago and treated with ATB;  his intakes have been minimal d/t diet has been NPO/ CL 5 out of the last 7 days.  At risk for further nutrition compromise r/t noted with cough last 3 days; found to have dysphagia and placed on mechanically altered diet; he is confused and PO intakes of any food or liquids has been < 25%.  Will continue minced and moist diet with mildly thick liquids will add magic cups BID and request a current body weight .     Nutrition Related Findings:    Pt  A& O but poor historian d/t confusion per wife; bedside swallow eval / ST d/t c/o of coughing and choking; poor intakes x 7 day of admission; Incontinet of BM  today Wound Type: None       Current Nutrition Intake & Therapies:    Average Meal Intake: NPO, 1-25% (poor intake documention ine EMR; alos NPO /CL ~ 5 of the 7 days)  Average Supplements Intake: None Ordered  ADULT DIET; Dysphagia - Minced and Moist; Mildly Thick (Nectar)    Anthropometric Measures:  Height: 177.8 cm (5' 10\")  Ideal Body Weight (IBW): 166 lbs (75 kg)    Admission Body Weight: 89.5 kg (197 lb 5 oz)  Current Body Weight: 89.5 kg (197 lb 5 oz), 118.9 % IBW. Weight Source: Bed  Scale  Current BMI (kg/m2): 28.3  Usual Body Weight: 194 kg (427 lb 11.1 oz) (oFFICE Visit T FOR medicare)  % Weight Change (Calculated): -53.9                    BMI Categories: Overweight (BMI 25.0-29.9)    Estimated Daily Nutrient Needs:  Energy Requirements Based On: Kcal/kg  Weight Used for Energy Requirements: Current  Energy (kcal/day): 6191-4557 based ~ 23-25 kcal/kg cbw  Weight Used for Protein Requirements: Current  Protein (g/day): 90 based ~ 1 gr/kg cbw  Method Used for Fluid Requirements: 1 ml/kcal  Fluid (ml/day): 3928-8561    Nutrition Diagnosis:   Inadequate oral intake related to altered GI function, impaired respiratory function, swallowing difficulty as evidenced by NPO or clear liquid status due to medical condition, intake 0-25%, swallow study results, GI abnormality    Nutrition Interventions:   Food and/or Nutrient Delivery: Continue Current Diet, Start Oral Nutrition Supplement  Nutrition Education/Counseling: No recommendation at this time  Coordination of Nutrition Care: Continue to monitor while inpatient       Goals:     Goals: PO intake 50% or greater, by next RD assessment       Nutrition Monitoring and Evaluation:   Behavioral-Environmental Outcomes: None Identified  Food/Nutrient Intake Outcomes: Diet Advancement/Tolerance, Food and Nutrient Intake, Supplement Intake  Physical Signs/Symptoms Outcomes: Chewing or Swallowing, Weight, Nutrition Focused Physical Findings, GI Status, Biochemical Data    Discharge Planning:    Too soon to determine     Tiffany Xie RD, LD  Contact: [phone]

## 2024-02-28 NOTE — CARE COORDINATION
[facility] - Acute Rehab Unit   After review, this patient is felt to be:       []  Appropriate for Acute Inpatient Rehab    [x]  Appropriate for Acute Inpatient Rehab Pending Insurance Authorization    []  Not appropriate for Acute Inpatient Rehab    []  Referral received and ARU reviewing patient; Evaluation ongoing.      Will start precert when speech and OT notes completed. Virgen Eden CM  Will notify DCP with further updates. Thank you for the referral.  Bri Ferreira RN    1500  Precert initiated for ARU. Bri Ferreira RN

## 2024-02-28 NOTE — CARE COORDINATION
[facility] Acute Rehab [facility]   After review, this patient is felt to be:       []  Appropriate for Acute Inpatient Rehab    []  Appropriate for Acute Inpatient Rehab Pending Insurance Authorization    []  Not appropriate for Acute Inpatient Rehab    [x]  Referral received and ARU reviewing patient; Evaluation ongoing.      Will follow for MBS results and therapy today.   Will notify DCP with further updates. Thank you for the referral.  Bri Ferreira RN

## 2024-02-28 NOTE — PROCEDURES
SPEECH/LANGUAGE PATHOLOGY  Swallowing Disorders and Dysphagia  VIDEOFLUOROSCOPIC STUDY OF SWALLOWING (MBS)  Facility/Department: [address] MEDICAL-SURGICAL    Diet Recommendation: IDDSI 5 Minced and moist Solids ; IDDSI 2 Mildly Thick Liquids; Meds whole in puree  Risk Management: upright for all intake, stay upright for at least 30 mins after intake, small bites/sips, close supervision, oral care 2-3x/day to reduce adverse affects in the event of aspiration, increase physical mobility as able, alternate bites/sips, slow rate of intake, STRICT aspiration precautions, and hold PO and contact SLP if s/s of aspiration or worsening respiratory status develop.  Specialist Referrals: Neuro      PATIENT NAME:  Stone Hoskins      :  1935    Room: 0227/0227-02   TODAY'S DATE:  2024    [x]The admitting diagnosis and active problem list, as listed below have been reviewed prior to initiation of this evaluation.     ADMITTING DIAGNOSIS: Acute cholecystitis [K81.0]  Cholecystitis [K81.9]  Generalized weakness [R53.1]  Right upper quadrant abdominal pain [R10.11]  Altered mental status, unspecified altered mental status type [R41.82]     ACTIVE PROBLEM LIST:   Patient Active Problem List   Diagnosis    Dermatitis    Mixed hyperlipidemia    Vitamin D deficiency    Right-sided low back pain with right-sided sciatica    Bilateral edema of lower extremity    SVT (supraventricular tachycardia)    Primary hypertension    ARDS (adult respiratory distress syndrome) (HCC)    Acute respiratory failure with hypoxia (HCC)    Subconjunctival hemorrhage of left eye    Sepsis (HCC)    Acute hypoxemic respiratory failure (HCC)    Tracheobronchitis    Acute encephalopathy    Bronchospasm    Cholecystitis    Altered mental status    Generalized weakness    Elevated LFTs           PAST MEDICAL HISTORY        Diagnosis Date    Arthritis     Hyperlipidemia     Influenza A 2017    Right-sided low back pain with right-sided  airway   []  4 Material enters the airway, contacts the vocal folds, an is ejected from the airway   []  5 Material enters the airway, contacts the vocal folds, and is not ejected from the airway   []  6 Material enters the airway, passes below the vocal folds and is ejected into the larynx or out of the airway   [x]  7 Material enters the airway, passes below the vocal folds, and is not ejected from the trachea despite effort   []  8 Material enters the airway, passes below the vocal folds, and no effort is made to eject.     Assessment: moderate oropharyngeal dysphagia, likely chronic related to Progressive nature of disease/condition and the aging swallow Swallow safety is impaired; swallow efficiency is preserved  Pt appears to be at moderate risk for aspiration PNA, and moderate risk for malnutrition/dehydration. Diet modification is warranted. Swallow prognosis is fair given stable respiratory status, good oral care status, and caregiver support. Pt appears to be a good candidate for swallow rehabilitation    Patient presents as moderate aspiration risk and moderate PNA / adverse pulmonary event risk given the following factors:  dependence for feeding / oral care  reduced ambulation     Diet Recommendation: IDDSI 5 Minced and moist Solids ; IDDSI 2 Mildly Thick Liquids; Meds whole in puree  Risk Management: upright for all intake, stay upright for at least 30 mins after intake, small bites/sips, close supervision, oral care 2-3x/day to reduce adverse affects in the event of aspiration, increase physical mobility as able, alternate bites/sips, slow rate of intake, STRICT aspiration precautions, and hold PO and contact SLP if s/s of aspiration or worsening respiratory status develop.  Specialist Referrals: Neuro  Ancillary Tests: N/A  Goals: Tolerate LRD  Follow-up exam: Follow-up with SLP is recommended    Diagnosis Code: R13.12- oropharyngeal dysphagia    Education   SLP educated the patient re: Role of SLP,

## 2024-02-28 NOTE — PLAN OF CARE
Problem: Discharge Planning  Goal: Discharge to home or other facility with appropriate resources  2/27/2024 2204 by Nadeem Brasher RN  Outcome: Progressing  2/27/2024 1136 by Tracee Junior RN  Outcome: Progressing  2/27/2024 1134 by Tracee Junior RN  Outcome: Progressing     Problem: Pain  Goal: Verbalizes/displays adequate comfort level or baseline comfort level  2/27/2024 2204 by Nadeem Brasher RN  Outcome: Progressing  2/27/2024 1136 by Tracee Junior RN  Outcome: Progressing  2/27/2024 1134 by Tracee Junior RN  Outcome: Progressing     Problem: Safety - Adult  Goal: Free from fall injury  2/27/2024 2204 by Nadeem Brasher RN  Outcome: Progressing  2/27/2024 1136 by Tracee Junior RN  Outcome: Progressing  2/27/2024 1134 by Tracee Junior RN  Outcome: Progressing     Problem: Skin/Tissue Integrity  Goal: Absence of new skin breakdown  Description: 1.  Monitor for areas of redness and/or skin breakdown  2.  Assess vascular access sites hourly  3.  Every 4-6 hours minimum:  Change oxygen saturation probe site  4.  Every 4-6 hours:  If on nasal continuous positive airway pressure, respiratory therapy assess nares and determine need for appliance change or resting period.  Outcome: Progressing     Problem: Neurosensory - Adult  Goal: Achieves stable or improved neurological status  Outcome: Progressing     Problem: Skin/Tissue Integrity - Adult  Goal: Skin integrity remains intact  Outcome: Progressing     Problem: Musculoskeletal - Adult  Goal: Return mobility to safest level of function  Outcome: Progressing     Problem: Genitourinary - Adult  Goal: Absence of urinary retention  Outcome: Progressing

## 2024-02-28 NOTE — FLOWSHEET NOTE
Shift assessment complete. See doc flow. Nightly medications given see MAR. Oriented to self only. Pleasant. Moved closer to the nurses station Room #227. Patient is impulsive and kicks legs OOB. Tele-sitter in room. Patient possibly aspirated today on Pepsi, NPO with sips of clear liquids at this time, no concerns noted. Speech eval in the morning. Patient denies pain or nausea at this time. Pure-wick in place. Patient repositioned for comfort. Bed alarm in place. Call light and bedside table within easy reach.    02/27/24 2200   Vital Signs   Temp 98.1 °F (36.7 °C)   Temp Source Oral   Pulse 98   Heart Rate Source Monitor   Respirations 16   BP (!) 157/82   MAP (Calculated) 107   BP Location Left upper arm   BP Method Automatic   Patient Position Semi fowlers   Oxygen Therapy   SpO2 93 %   O2 Device None (Room air)

## 2024-02-29 LAB
GLUCOSE BLD-MCNC: 118 MG/DL (ref 70–99)
GLUCOSE BLD-MCNC: 82 MG/DL (ref 70–99)
GLUCOSE BLD-MCNC: 90 MG/DL (ref 70–99)
PERFORMED ON: ABNORMAL
PERFORMED ON: NORMAL
PERFORMED ON: NORMAL

## 2024-02-29 PROCEDURE — 99231 SBSQ HOSP IP/OBS SF/LOW 25: CPT | Performed by: INTERNAL MEDICINE

## 2024-02-29 PROCEDURE — 97110 THERAPEUTIC EXERCISES: CPT

## 2024-02-29 PROCEDURE — 92526 ORAL FUNCTION THERAPY: CPT

## 2024-02-29 PROCEDURE — 6370000000 HC RX 637 (ALT 250 FOR IP)

## 2024-02-29 PROCEDURE — 6360000002 HC RX W HCPCS: Performed by: INTERNAL MEDICINE

## 2024-02-29 PROCEDURE — 92523 SPEECH SOUND LANG COMPREHEN: CPT

## 2024-02-29 PROCEDURE — 1200000000 HC SEMI PRIVATE

## 2024-02-29 PROCEDURE — 97535 SELF CARE MNGMENT TRAINING: CPT

## 2024-02-29 PROCEDURE — 2580000003 HC RX 258: Performed by: INTERNAL MEDICINE

## 2024-02-29 PROCEDURE — 6370000000 HC RX 637 (ALT 250 FOR IP): Performed by: INTERNAL MEDICINE

## 2024-02-29 RX ADMIN — Medication 10 ML: at 21:29

## 2024-02-29 RX ADMIN — ENOXAPARIN SODIUM 40 MG: 100 INJECTION SUBCUTANEOUS at 11:14

## 2024-02-29 RX ADMIN — Medication 10 ML: at 11:16

## 2024-02-29 RX ADMIN — AMOXICILLIN AND CLAVULANATE POTASSIUM 1 TABLET: 875; 125 TABLET, FILM COATED ORAL at 21:29

## 2024-02-29 RX ADMIN — I-VITE, TAB 1000-60-2MG (60/BT) 1 TABLET: TAB at 11:15

## 2024-02-29 RX ADMIN — PRAVASTATIN SODIUM 20 MG: 10 TABLET ORAL at 11:24

## 2024-02-29 RX ADMIN — GUAIFENESIN 600 MG: 600 TABLET, EXTENDED RELEASE ORAL at 11:15

## 2024-02-29 RX ADMIN — AMOXICILLIN AND CLAVULANATE POTASSIUM 1 TABLET: 875; 125 TABLET, FILM COATED ORAL at 11:15

## 2024-02-29 RX ADMIN — GUAIFENESIN 600 MG: 600 TABLET, EXTENDED RELEASE ORAL at 21:30

## 2024-02-29 RX ADMIN — METOPROLOL SUCCINATE 25 MG: 25 TABLET, FILM COATED, EXTENDED RELEASE ORAL at 11:15

## 2024-02-29 NOTE — FLOWSHEET NOTE
Shift assessment complete. See doc flow. Nightly medications given crushed in pudding see MAR. Oriented to self only. Pleasant and redirectable. MBS today showed aspiration. Pure-wick removed, swelling to penis, pictures taken. Jossie-area cleansed w/ barrier cream wipes. Barrier cream applied to groin and scrotum due to redness. Pt repositioned with pillow support. Patient can be impulsive and attempt to get OOB w/o notice. Tele-sitter in room. Bed alarm in place. Call light and bedside table within easy reach.    02/28/24 2207   Vital Signs   Temp 98.2 °F (36.8 °C)   Temp Source Oral   Pulse 100   Heart Rate Source Monitor   Respirations 17   /69   MAP (Calculated) 91   BP Location Right upper arm   BP Method Automatic   Patient Position Up in chair   Oxygen Therapy   SpO2 92 %   O2 Device None (Room air)

## 2024-02-29 NOTE — PROGRESS NOTES
2/29/2024   Evaluating Therapist: Linsey Fagan    SPEECH PATHOLOGY DIAGNOSIS:   Moderate Cognitive-Linguistic Deficit    THERAPY RECOMMENDATIONS:   []Speech Pathology intervention is not warranted at this time.   [x]Speech Pathology intervention is recommended with emphasis on the following:     Onset Date: 02/21/2024  Chart Reviewed: : [x] Yes [] No    CASE HISTORY:     Recent Neuro Radiography: [] MRI Brain    [] CT Head WO Contrast  [] CTA Head/Neck W Contrast   [x] N/A         Medical record review/interview: Per MD H&P:   History Of Present Illness:     The patient is a 88 y.o. male with PMH of HLD, HTN, and BLE edema who [place] [facility] [place] with complaint of cough, abd pain, and leg swelling. History limited 2/2 AMS. Pt's wife states that he is not the best historian, she states he doesn't usually need to know where he is or what month/year it is because he is always at home. She states that they have had respiratory symptoms for the past several days including congestion and cough. She states that this has not been improving but they have only been taking tessalon pearls for it. She states that last night he developed abdominal pain, dry heaving, and confusion. She states that she tried to give him some coke to drink and that settled his stomach for a little bit but did not last long.    Behavior / Cognition: alert, pleasant mood, distractible , and requires cues    Primary Complaint: Confusion that differs from baseline    Pain: The patient does not complain of pain     Vitals/labs:     Lab Values:     CBC:        Lab Results   Component Value Date     WBC 14.2 (H) 02/27/2024     HGB 15.1 02/27/2024     HCT 44.6 02/27/2024     MCV 84.3 02/27/2024      02/27/2024            Basic Metabolic Panel        Lab Results   Component Value Date/Time      02/27/2024 06:46 AM      02/27/2024 06:46 AM     CO2 26 02/27/2024 06:46 AM     GLUCOSE 90 02/27/2024 06:46 AM     BUN 12 02/27/2024 06:46 AM      the pancreatic tail measuring 8 mm, low risk   by imaging.  Recommend follow-up pancreas protocol MRI in 2 years.           US GALLBLADDER RUQ   Final Result   1. Equivocal findings for acute cholecystitis with gallbladder wall   thickening, but no pericholecystic fluid and no tenderness during imaging the   gallbladder.  There does appear to be fat stranding adjacent the gallbladder   on the CT suggesting acute cholecystitis.   2. Cholelithiasis and sludge in the gallbladder.   3. Right renal cysts including a 7.7 cm Bosniak 1 cyst at the upper pole and   a 2.5 cm Bosniak 2 cyst at the lower pole.  No follow-up imaging is   recommended.           POC US FAST ABDOMEN LIMITED   Final Result       CT CHEST ABDOMEN PELVIS WO CONTRAST Additional Contrast? None   Final Result   No acute pulmonary process identified.       Vascular disease.       Findings concerning for acute cholecystitis.  Further evaluation with right   upper quadrant ultrasound may be helpful.  No calcified gallstones are seen.       Findings likely related to constipation.       Stable left adrenal nodule.       Additional findings noted above.       RECOMMENDATIONS:   1 cm incidental right thyroid nodule. No follow-up imaging is recommended.       Reference: J Am Bony Radiol. 2015 Feb;12(2): 143-50       2.8 cm infrarenal abdominal aortic aneurysm suspected. Recommend follow-up   every 5 years.       Reference: J Am Bony Radiol 2013;10:789-794.       Multiple lesions, including right Bosniak II benign renal cyst measuring 6.7   cm. No follow-up imaging is recommended.       JACR 2018 Feb; 264-273, Management of the Incidental Renal Mass on CT,   [employer] 2021; 814-848, Bosniak Classification of Cystic Renal Masses,   Version 2019.           XR CHEST PORTABLE   Final Result   Poor inspiration.  There are findings concerning for pulmonary edema   secondary to congestive heart failure..         Vision / Hearing:  Vision: Wears glasses for

## 2024-02-29 NOTE — CARE COORDINATION
Call received from Zenaida ogden [facility] that they request a P2P before making decision for ARU. Her phone number is [phone] Ext [phone] and she would like  to call her with MD details and also provide the  phone number and fax.  left for ELLA Mayen. Bri Ferreira RN

## 2024-02-29 NOTE — PLAN OF CARE
Problem: Discharge Planning  Goal: Discharge to home or other facility with appropriate resources  Outcome: Progressing  Flowsheets (Taken 2/29/2024 1056)  Discharge to home or other facility with appropriate resources: Identify barriers to discharge with patient and caregiver     Problem: Pain  Goal: Verbalizes/displays adequate comfort level or baseline comfort level  Outcome: Progressing     Problem: Safety - Adult  Goal: Free from fall injury  Outcome: Progressing     Problem: Skin/Tissue Integrity  Goal: Absence of new skin breakdown  Description: 1.  Monitor for areas of redness and/or skin breakdown  2.  Assess vascular access sites hourly  3.  Every 4-6 hours minimum:  Change oxygen saturation probe site  4.  Every 4-6 hours:  If on nasal continuous positive airway pressure, respiratory therapy assess nares and determine need for appliance change or resting period.  Outcome: Progressing     Problem: Neurosensory - Adult  Goal: Achieves stable or improved neurological status  Outcome: Progressing     Problem: Skin/Tissue Integrity - Adult  Goal: Skin integrity remains intact  Outcome: Progressing  Flowsheets  Taken 2/29/2024 1334  Skin Integrity Remains Intact: Monitor for areas of redness and/or skin breakdown  Taken 2/29/2024 1056  Skin Integrity Remains Intact: Monitor for areas of redness and/or skin breakdown     Problem: Musculoskeletal - Adult  Goal: Return mobility to safest level of function  Outcome: Progressing     Problem: Genitourinary - Adult  Goal: Absence of urinary retention  Outcome: Progressing     Problem: Nutrition Deficit:  Goal: Optimize nutritional status  Outcome: Progressing     Problem: ABCDS Injury Assessment  Goal: Absence of physical injury  Outcome: Progressing

## 2024-02-29 NOTE — PROGRESS NOTES
Speech Language Pathology  Swallowing Disorders and Dysphagia    Dysphagia Treatment/Follow-Up Note  Facility/Department: [address] MEDICAL-SURGICAL    Recommendations: SLP recommends to continue with IDDSI 5 Minced and moist Solids; IDDSI 2 Mildly Thick Liquids; Meds whole in puree  Risk Management: upright for all intake, stay upright for at least 30 mins after intake, small bites/sips, oral care 2-3x/day to reduce adverse affects in the event of aspiration, increase physical mobility as able, alternate bites/sips, slow rate of intake, and general aspiration precautions.     NAME:Stone Hoskins  : 1935 (88 y.o.)   MRN: [id number]  ROOM: [facility]  ADMISSION DATE: 2024  PATIENT DIAGNOSIS(ES): Acute cholecystitis [K81.0]  Cholecystitis [K81.9]  Generalized weakness [R53.1]  Right upper quadrant abdominal pain [R10.11]  Altered mental status, unspecified altered mental status type [R41.82]  Allergies   Allergen Reactions    Contrast [Iodides] Other (See Comments)     Pt went into SVT.     Zocor [Simvastatin] Nausea Only     GI       DATE ONSET: 2024    Pain: The patient does not complain of pain       Current Diet: ADULT DIET; Dysphagia - Minced and Moist; Mildly Thick (Nectar)  ADULT ORAL NUTRITION SUPPLEMENT; Lunch, Dinner; Frozen Oral Supplement    Diet Tolerance:  Patient tolerating current diet level without signs/symptoms of aspiration per report.      Dysphagia Treatment and Impressions:  Subjective: Pt seen in room at bedside with RN (Umesh) permission  RN Report/Chart Review: N/A  Patient tolerance to current diet and treatment: Per pt report pt tolerating diet recommendation.   Noteworthy events since last seen: Pt completed MBS yesterday.    Respiratory Status: Pt with SPO2% of 97 on RA  with RR of 20  Oral Care Status:     Liquid PO Trials:   IDDSI 2 Mildly Thick (nectar):  Assessed via cup: no anterior bolus loss , suspect functional A-P bolus transit, swallow timing  03/05/2024)  Goal 1: The patient will tolerate least restrictive diet with no clinical s/s of aspiration or worsening respiratory/pulmonary status  2/29/2024 : Goal addressed, see above. Ongoing, progressing.                    Speech/Language/Cog Goals: N/A                    Recommendations:  Solid Consistency: IDDSI 5 Minced and moist Solids  Liquid Consistency: IDDSI 2 Mildly Thick Liquids  Medication: Meds whole in puree    Plan:    Continued Dysphagia treatment with goals per plan of care.    Discharge Recommendations: SLP at discharge is pending clinical progression    If pt discharges from hospital prior to Speech/Swallowing discharge, this note serves as tx and discharge summary.     Total Treatment Time / Charges     Time in Time out Total Time / units   Cognitive Tx         Speech Tx      Dysphagia Tx 1537 1547 10 mins / 1 unit     Signature:     Linsey Fagan  SLP  Clinician     Supervisor: Bhavana Null MA, CCC-SLP

## 2024-02-29 NOTE — PROGRESS NOTES
Level Recommendation:  NA    AM-PAC Score: AM-PAC Inpatient Daily Activity Raw Score: 15     Subjective:  Patient sitting up in chair with spouse present.   Pt agreeable to this OT session.     Cognition:    A&O x4   Able to follow 1 step commands    Pain:   No  Location: NA  Rating: NA /10  Pain Medicine Status: Denies need    Preadmission Environment:   Pt. Lives     Spouse in good health, home 24/7; sisters live in area as well to stop in and assist PRN  Home environment:    one story home  Steps to enter first floor:   one steps to enter; no handrails  Steps to second floor/basement: N/A  Laundry:     1st floor; one step to get down into utility room  Bathroom:     tub/shower unit and standard height toilet  Pt sleeps in a:    Flat bed  Equipment owned:    RW, rollator, and manual WC; Hurrycane cane    Preadmission Status:  Pt. Able to drive:    No  Pt. Fully independent with ADLs:  No  Pt. Required assistance for:   Dressing, Cleaning, Cooking, Laundry , and grocery shopping  Pt. independent for functional transfers and utilized Rollator/WC for mobility in home and Unknown out in community  History of falls:    Unknown  Home Health Services:  None; family reports HH in the past    Balance:  Sitting:    SBA to maintain sitting balance on the toilet  Standing:    CGA to maintain static standing balance with the use of a gait belt and RW      Marek to maintain dynamic standing balance with the use of a gait belt and RW    Bed Mobility:   Supine to Sit:    Not Tested   Sit to Supine:   Not Tested  Rolling:   Not Tested  Scooting in sitting: Not Tested  Scooting in supine:  Not Tested    Transfers:    Sit to stand:    Min A  with the use of a gait belt, RW, verbal cues for appropriate hand transitions, and increased time  Stand to sit:    Min A  with the use of a gait belt, RW, verbal cues for appropriate hand transitions, and increased time  Bed to chair:     Not Tested  Bed/ chair to standard toilet:  Min A  with  services to maximize safety and independence.     Patient seen for skilled acute care occupational therapy treatment session. Patient continues to require minimal assistance for functional transfer performance and functional mobility. Patient ambulated to/from the bathroom with no LOB noted, however, unsteady. Patient requires constant verbal, tactile, and visual cueing due to lack of motor planning and safety awareness. Patient continues functioning below baseline as he was modified independent with bed mobility, functional transfer performance, functional mobility, and he currently requires extensive assistance with those tasks. Patient would benefit from skilled OT services at a rehabilitation facility upon discharge in order to return to PLOF and prior living environment with the least amount of assistance/supervision required. Patient is an excellent candidate for ARU due to his motivation to participate, increased activity tolerance, and strong family support. Patient would benefit from continued skilled acute care occupational therapy services during his length of stay to increase independence with ADL performance and functional mobility in order to maximize their functional potential in the acute care setting.      Recommending ARU/IRF/Inpatient Rehab Facility upon discharge as patient functioning well below baseline, demonstrates good rehab potential and unable to return home due to limited safety awareness.    Goal(s) : Progress note, goals re-assessed and ongoing 2/29/24  To be met in 3 Visits:  Bed to toilet/BSC:       CGA  Pt will complete 3/3 CHF goals     N/A    To be met in 5 Visits:  Supine to/from Sit in preparation for ADL task:   SBA  Toileting        Min A  Grooming       CGA  Upper Body Dressing:      Min A  Lower Body Dressing:      Min A  Pt to demonstrate UE therapeutic exs x 15 reps with minimal cues    Rehabilitation Potential: Good  Strengths for achieving goals include: Family Support  and Pt cooperative   Barriers to achieving goals include:  Complexity of condition and Weakness    Plan:  To be seen 3-5 x/wk while in acute care setting for therapeutic exercises, bed mobility, transfers, family/patient education, ADL/IADL retraining, and energy conservation training.    Electronically signed by Michael Mcnulty OT on 2/29/2024 at 4:12 PM      If patient discharges from this facility prior to next visit, this note will serve as the Discharge Summary

## 2024-02-29 NOTE — PROGRESS NOTES
[facility] Progress [facility]    Daily Progress Note for 2024 10:33 AM [id number]/[id number]  Stone Hoskins : 1935 Age: 88 y.o. Sex: male  Length of Stay:  8    Interval History:      CC: F/U Cough (Cough for a several days. ), Abdominal Pain (ABD pain and nausea started this AM.), and Leg Swelling (Worsened lower extremity edema. )    Subjective:       Now he is calm, oriented     Objective:     Vitals:    24 1530 24 1552 24 2207 24 0520   BP: 133/75  136/69 (!) 145/75   Pulse: 93  100 83   Resp: 16  17 16   Temp: 98.3 °F (36.8 °C)  98.2 °F (36.8 °C) 98.2 °F (36.8 °C)   TempSrc: Oral  Oral Oral   SpO2: 94%  92% 93%   Weight:       Height:  1.778 m (5' 10\")            Intake/Output Summary (Last 24 hours) at 2024 1033  Last data filed at 2024 0520  Gross per 24 hour   Intake 360 ml   Output 450 ml   Net -90 ml       Body mass index is 28.32 kg/m².    Physical Exam:  General: Cooperative, pleasant/Ill appearing, on  Nasal cannula  HEENT:  Head: normocephalic,atraumatic, anicteric sclera, clear conjunctiva  Neck: Normal size, Jugular venous pulsations: normal  Respiratory:unlabored breathing, clear to auscultation with no crackles, wheezes rhonchi  Heart: Regular rate and rhythm, S1, S2-normal, No murmurs  Abdomen: soft, nondistended, mild RUQ tenderness, normoactive bowel sounds,  Neurological/Psych: Alert and oriented to self and situation, no focal neurological deficits, Mood and affect appropriate.  Skin: No obvious rashes    Extremities:  no edema, Pedal pulses 2+ bilaterally    Scheduled Medications:  amoxicillin-clavulanate, 1 tablet, 2 times per day  sodium chloride flush, 5-40 mL, 2 times per day  enoxaparin, 40 mg, Daily  metoprolol succinate, 25 mg, Daily  therapeutic multivitamin-minerals, 1 tablet, Daily  pravastatin, 20 mg, Every Other Day  guaiFENesin, 600 mg, BID        PRN Medications:  melatonin, 3 mg, Nightly PRN  sodium chloride flush, 5-40 mL,  chest/abd/pelvis and RUQ US as above  - Criteria: +HR, +WBC  - blood cx pending   - Zosyn D#5--> augmentin   - general surgery consulted   - attempted to advance diet but he had more pain, back on clear liquids and continue inpatient  - LFTs going up.  MRCP ordered -no choledocholithiasis   Will need interval cholecystectomy    AMS  - likely 2/2 infection and possible dementia?  - pt's wife states that he has returned to his baseline  Confused last night       Generalized weakness  - likely 2/2 above   - PT/OT     Elevated troponin  - 23->24->repeat ordered  - EKG as above  - no CP     HTN  - on Toprol XL   - monitor BP     HLD  - on statin    Cough right after sipping pepsi  Imaging negative  Speech eval- MBS today     Chronic BLE edema  - on prn lasix   - last echo 6/2021 without CHF  - appear most likely related to venous stasis      DVT Prophylaxis: Lovenox   Speech eval.  MBS  On diet    Pt/ot  Needs SNF       ALEX Donald.

## 2024-02-29 NOTE — CARE COORDINATION
Received notification from Agustin with [facility] that patient is approved for ARU and can admit tomorrow. She will send authorization 3/1/24. Patient will need rapid COVID test  prior to admission.discussed with ELLA Mayen RN

## 2024-02-29 NOTE — FLOWSHEET NOTE
Patient awake in bed. Oriented to self only. Pleasant. Has slept overnight. Incont of urine. Jossie- area cleansed, Barrier cream applied. Swelling to penis has decreased. Patient repositioned for comfort. Bed alarm in place. Call light and bedside table within reach.    02/29/24 0520   Vital Signs   Temp 98.2 °F (36.8 °C)   Temp Source Oral   Pulse 83   Heart Rate Source Monitor   Respirations 16   BP (!) 145/75   MAP (Calculated) 98   BP Location Left upper arm   BP Method Automatic   Patient Position Semi fowlers   Oxygen Therapy   SpO2 93 %   O2 Device None (Room air)

## 2024-02-29 NOTE — CARE COORDINATION
Reviewed chart, met with pt and wife at bedside. Discussed in rounds. Pt ready for DC. Spoke with Bri at [facility], [facility] is requesting peer to peer at [phone], Zenaida is contact. Wife asked about [facility] benefits. This has not been discussed before. Spoke with Oumou at [facility] who states pt does have benefits for HC and ARU, no SNF. Faxed info over to Agustin at [facility] to get ARU recs reviewed. Spoke with Bri at [facility] and updated her. Will contact Agustin at [facility] later today for update. Dr. AGUILAR made aware as well. Will continue to monitor.        1620- received call from Bri at [facility]. Pt approved for 5 days. They can accept him tomorrow morning. MD made aware via perfect serve.

## 2024-02-29 NOTE — FLOWSHEET NOTE
02/29/24 1045   Vital Signs   Temp 98.1 °F (36.7 °C)   Temp Source Oral   Pulse 81   Heart Rate Source Monitor   Respirations 16   /72   MAP (Calculated) 92   BP Location Right upper arm   BP Method Automatic   Patient Position Semi fowlers   Pain Assessment   Pain Assessment None - Denies Pain   Opioid-Induced Sedation   POSS Score 1   Oxygen Therapy   SpO2 93 %   O2 Device None (Room air)     Shift assessment complete. See flow sheet. Scheduled medications given, See MAR.   Head to toe complete. Vital signs logged and active bowel sounds in all 4 quadrants.    Pt awake in bed. Medications taken without difficulty. Incontinent care provided, and pt assisted up to chair.       No further needs noted at this time. Call light and bedside table within reach. Bed in lowest position, wheels locked and side rails up x2.     Siomara Eldridge RN

## 2024-02-29 NOTE — PLAN OF CARE
Problem: Discharge Planning  Goal: Discharge to home or other facility with appropriate resources  2/28/2024 2232 by Nadeem Brasher RN  Outcome: Progressing  2/28/2024 1311 by Siomara Eldridge RN  Outcome: Progressing  Flowsheets (Taken 2/28/2024 1100)  Discharge to home or other facility with appropriate resources: Identify barriers to discharge with patient and caregiver     Problem: Pain  Goal: Verbalizes/displays adequate comfort level or baseline comfort level  2/28/2024 2232 by Nadeem Brasher RN  Outcome: Progressing  2/28/2024 1311 by Siomara Eldridge RN  Outcome: Progressing     Problem: Safety - Adult  Goal: Free from fall injury  2/28/2024 2232 by Nadeem Brasher RN  Outcome: Progressing  2/28/2024 1311 by Siomara Eldridge RN  Outcome: Progressing     Problem: Skin/Tissue Integrity  Goal: Absence of new skin breakdown  Description: 1.  Monitor for areas of redness and/or skin breakdown  2.  Assess vascular access sites hourly  3.  Every 4-6 hours minimum:  Change oxygen saturation probe site  4.  Every 4-6 hours:  If on nasal continuous positive airway pressure, respiratory therapy assess nares and determine need for appliance change or resting period.  2/28/2024 2232 by Nadeem Brasher RN  Outcome: Progressing  2/28/2024 1311 by Siomara Eldridge RN  Outcome: Progressing     Problem: Neurosensory - Adult  Goal: Achieves stable or improved neurological status  2/28/2024 2232 by Nadeem Brasher RN  Outcome: Progressing  2/28/2024 1311 by Siomara Eldridge RN  Outcome: Progressing     Problem: Skin/Tissue Integrity - Adult  Goal: Skin integrity remains intact  2/28/2024 2232 by Nadeem Brasher RN  Outcome: Progressing  2/28/2024 1311 by Siomara Eldridge RN  Outcome: Progressing  Flowsheets (Taken 2/28/2024 1311)  Skin Integrity Remains Intact: Monitor for areas of redness and/or skin breakdown     Problem: Musculoskeletal - Adult  Goal: Return mobility to safest level of function  2/28/2024 2232 by

## 2024-03-01 ENCOUNTER — HOSPITAL ENCOUNTER (INPATIENT)
Age: 89
DRG: 948 | End: 2024-03-01
Attending: STUDENT IN AN ORGANIZED HEALTH CARE EDUCATION/TRAINING PROGRAM | Admitting: STUDENT IN AN ORGANIZED HEALTH CARE EDUCATION/TRAINING PROGRAM
Payer: OTHER GOVERNMENT

## 2024-03-01 VITALS
BODY MASS INDEX: 28.26 KG/M2 | RESPIRATION RATE: 16 BRPM | TEMPERATURE: 98.2 F | SYSTOLIC BLOOD PRESSURE: 122 MMHG | OXYGEN SATURATION: 94 % | WEIGHT: 197.4 LBS | HEART RATE: 101 BPM | HEIGHT: 70 IN | DIASTOLIC BLOOD PRESSURE: 67 MMHG

## 2024-03-01 PROBLEM — R53.81 DEBILITY: Status: ACTIVE | Noted: 2024-03-01

## 2024-03-01 LAB — SARS-COV-2 RDRP RESP QL NAA+PROBE: NOT DETECTED

## 2024-03-01 PROCEDURE — 6370000000 HC RX 637 (ALT 250 FOR IP)

## 2024-03-01 PROCEDURE — 6360000002 HC RX W HCPCS: Performed by: INTERNAL MEDICINE

## 2024-03-01 PROCEDURE — 6370000000 HC RX 637 (ALT 250 FOR IP): Performed by: INTERNAL MEDICINE

## 2024-03-01 PROCEDURE — 6370000000 HC RX 637 (ALT 250 FOR IP): Performed by: STUDENT IN AN ORGANIZED HEALTH CARE EDUCATION/TRAINING PROGRAM

## 2024-03-01 PROCEDURE — 99239 HOSP IP/OBS DSCHRG MGMT >30: CPT | Performed by: INTERNAL MEDICINE

## 2024-03-01 PROCEDURE — 1280000000 HC REHAB R&B

## 2024-03-01 PROCEDURE — 87635 SARS-COV-2 COVID-19 AMP PRB: CPT

## 2024-03-01 PROCEDURE — 2580000003 HC RX 258: Performed by: INTERNAL MEDICINE

## 2024-03-01 RX ORDER — LANOLIN ALCOHOL/MO/W.PET/CERES
3 CREAM (GRAM) TOPICAL NIGHTLY PRN
Status: DISPENSED | OUTPATIENT
Start: 2024-03-01

## 2024-03-01 RX ORDER — ACETAMINOPHEN 325 MG/1
650 TABLET ORAL EVERY 6 HOURS PRN
Status: ACTIVE | OUTPATIENT
Start: 2024-03-01

## 2024-03-01 RX ORDER — METOPROLOL SUCCINATE 25 MG/1
25 TABLET, EXTENDED RELEASE ORAL DAILY
Status: CANCELLED | OUTPATIENT
Start: 2024-03-02

## 2024-03-01 RX ORDER — AMOXICILLIN AND CLAVULANATE POTASSIUM 875; 125 MG/1; MG/1
1 TABLET, FILM COATED ORAL EVERY 12 HOURS SCHEDULED
Status: CANCELLED | OUTPATIENT
Start: 2024-03-01

## 2024-03-01 RX ORDER — AMOXICILLIN AND CLAVULANATE POTASSIUM 875; 125 MG/1; MG/1
1 TABLET, FILM COATED ORAL EVERY 12 HOURS SCHEDULED
Status: DISCONTINUED | OUTPATIENT
Start: 2024-03-01 | End: 2024-03-04

## 2024-03-01 RX ORDER — ENOXAPARIN SODIUM 100 MG/ML
40 INJECTION SUBCUTANEOUS DAILY
Status: DISPENSED | OUTPATIENT
Start: 2024-03-02

## 2024-03-01 RX ORDER — LANOLIN ALCOHOL/MO/W.PET/CERES
3 CREAM (GRAM) TOPICAL NIGHTLY PRN
Status: CANCELLED | OUTPATIENT
Start: 2024-03-01

## 2024-03-01 RX ORDER — M-VIT,TX,IRON,MINS/CALC/FOLIC 27MG-0.4MG
1 TABLET ORAL DAILY
Status: DISPENSED | OUTPATIENT
Start: 2024-03-02

## 2024-03-01 RX ORDER — AMOXICILLIN AND CLAVULANATE POTASSIUM 875; 125 MG/1; MG/1
1 TABLET, FILM COATED ORAL EVERY 12 HOURS SCHEDULED
Qty: 10 TABLET | Refills: 0
Start: 2024-03-01 | End: 2024-03-01 | Stop reason: HOSPADM

## 2024-03-01 RX ORDER — ACETAMINOPHEN 650 MG/1
650 SUPPOSITORY RECTAL EVERY 6 HOURS PRN
Status: DISCONTINUED | OUTPATIENT
Start: 2024-03-01 | End: 2024-03-04

## 2024-03-01 RX ORDER — ACETAMINOPHEN 650 MG/1
650 SUPPOSITORY RECTAL EVERY 6 HOURS PRN
Status: CANCELLED | OUTPATIENT
Start: 2024-03-01

## 2024-03-01 RX ORDER — POLYETHYLENE GLYCOL 3350 17 G/17G
17 POWDER, FOR SOLUTION ORAL DAILY PRN
Status: CANCELLED | OUTPATIENT
Start: 2024-03-01

## 2024-03-01 RX ORDER — GUAIFENESIN 600 MG/1
600 TABLET, EXTENDED RELEASE ORAL 2 TIMES DAILY
Status: DISPENSED | OUTPATIENT
Start: 2024-03-01

## 2024-03-01 RX ORDER — METOPROLOL SUCCINATE 25 MG/1
25 TABLET, EXTENDED RELEASE ORAL DAILY
Status: DISPENSED | OUTPATIENT
Start: 2024-03-02

## 2024-03-01 RX ORDER — POLYETHYLENE GLYCOL 3350 17 G/17G
17 POWDER, FOR SOLUTION ORAL DAILY PRN
Status: ACTIVE | OUTPATIENT
Start: 2024-03-01

## 2024-03-01 RX ORDER — PRAVASTATIN SODIUM 20 MG
20 TABLET ORAL EVERY OTHER DAY
Status: DISPENSED | OUTPATIENT
Start: 2024-03-02

## 2024-03-01 RX ORDER — ONDANSETRON 4 MG/1
4 TABLET, ORALLY DISINTEGRATING ORAL EVERY 8 HOURS PRN
Status: CANCELLED | OUTPATIENT
Start: 2024-03-01

## 2024-03-01 RX ORDER — GUAIFENESIN 600 MG/1
600 TABLET, EXTENDED RELEASE ORAL 2 TIMES DAILY
Status: CANCELLED | OUTPATIENT
Start: 2024-03-01

## 2024-03-01 RX ORDER — ACETAMINOPHEN 325 MG/1
650 TABLET ORAL EVERY 6 HOURS PRN
Status: CANCELLED | OUTPATIENT
Start: 2024-03-01

## 2024-03-01 RX ORDER — PRAVASTATIN SODIUM 10 MG
20 TABLET ORAL EVERY OTHER DAY
Status: CANCELLED | OUTPATIENT
Start: 2024-03-02

## 2024-03-01 RX ORDER — ONDANSETRON 4 MG/1
4 TABLET, ORALLY DISINTEGRATING ORAL EVERY 8 HOURS PRN
Status: ACTIVE | OUTPATIENT
Start: 2024-03-01

## 2024-03-01 RX ORDER — FUROSEMIDE 40 MG/1
40 TABLET ORAL 2 TIMES DAILY PRN
Status: CANCELLED | OUTPATIENT
Start: 2024-03-01

## 2024-03-01 RX ORDER — FUROSEMIDE 40 MG/1
40 TABLET ORAL 2 TIMES DAILY PRN
Status: DISPENSED | OUTPATIENT
Start: 2024-03-01

## 2024-03-01 RX ORDER — ENOXAPARIN SODIUM 100 MG/ML
40 INJECTION SUBCUTANEOUS DAILY
Status: CANCELLED | OUTPATIENT
Start: 2024-03-02

## 2024-03-01 RX ORDER — M-VIT,TX,IRON,MINS/CALC/FOLIC 27MG-0.4MG
1 TABLET ORAL DAILY
Status: CANCELLED | OUTPATIENT
Start: 2024-03-02

## 2024-03-01 RX ADMIN — METOPROLOL SUCCINATE 25 MG: 25 TABLET, FILM COATED, EXTENDED RELEASE ORAL at 10:51

## 2024-03-01 RX ADMIN — I-VITE, TAB 1000-60-2MG (60/BT) 1 TABLET: TAB at 10:54

## 2024-03-01 RX ADMIN — GUAIFENESIN 600 MG: 600 TABLET, EXTENDED RELEASE ORAL at 10:54

## 2024-03-01 RX ADMIN — Medication 10 ML: at 10:57

## 2024-03-01 RX ADMIN — Medication 3 MG: at 21:48

## 2024-03-01 RX ADMIN — AMOXICILLIN AND CLAVULANATE POTASSIUM 1 TABLET: 875; 125 TABLET, FILM COATED ORAL at 10:52

## 2024-03-01 RX ADMIN — GUAIFENESIN 600 MG: 600 TABLET ORAL at 21:48

## 2024-03-01 RX ADMIN — AMOXICILLIN AND CLAVULANATE POTASSIUM 1 TABLET: 875; 125 TABLET, FILM COATED ORAL at 21:48

## 2024-03-01 RX ADMIN — ENOXAPARIN SODIUM 40 MG: 100 INJECTION SUBCUTANEOUS at 10:57

## 2024-03-01 ASSESSMENT — PAIN SCALES - GENERAL
PAINLEVEL_OUTOF10: 0
PAINLEVEL_OUTOF10: 0

## 2024-03-01 NOTE — FLOWSHEET NOTE
Shift assessment complete. See doc flow. Nightly medications given crushed in pudding and sips of nectar thick water see MAR. Oriented to self only. Incont of urine, check and change, No pure-wick. Swelling present to penis. Jossie area cleansed w/ bath wipes and barrier cream applied. Patient repositioned for comfort. Bed alarm in place. Call light and bedside table within easy reach.    02/29/24 2125   Vital Signs   Temp 97.9 °F (36.6 °C)   Temp Source Oral   Pulse 79   Heart Rate Source Monitor   Respirations 16   /70   MAP (Calculated) 91   BP Location Left upper arm   BP Method Automatic   Patient Position High fowlers   Intake   P.O. 360 mL   PO Meals Eaten (%) 76 - 100%  (pills crushed in pudding)

## 2024-03-01 NOTE — PROGRESS NOTES
IRF ALVINO Admission Assessment  [facility] Unit    Patient:Stone Hoskins     Rehab Dx/Hx: Debility [R53.81]   :1935  MRN:[id number]  Date of Admit: 3/1/2024     Pain Effect on Sleep:  Over the past 5 days, how much of the time has pain made it hard for you to sleep at night?  []  0. Does not apply - I have not had any pain or hurting in the past 5 days  [x]  1. Rarely or not at all  []  2. Occasionally  []  3. Frequently  []  4. Almost constantly  []  8. Unable to answer    Over the past 5 days, how often have you limited your participation in rehabilitation therapy sessions due to pain?  []  0. Does not apply - I have not received rehabilitation therapy in the past 5 days  [x]  1. Rarely or not at all  []  2. Occasionally  []  3. Frequently  []  4. Almost constantly  []  8. Unable to answer    Pain Interference with Day-to-Day Activities:  Over the past 5 days, how often have you limited your day-to-day activities (excluding rehabilitation therapy session)?  [x]  1. Rarely or not at all  []  2. Occasionally  []  3. Frequently  []  4. Almost constantly  []  8. Unable to answer      High-Risk Drug Classes: Use and Indication   Is taking: Check if the pt is taking any medications by pharmacological classification  Indication noted: If column 1 is checked, check if there is an indication noted for all meds in the drug class Is taking  (check all that apply) Indication noted (check all that apply)   Antipsychotic [] []   Anticoagulant [x] [x]   Antibiotic [] []   Opioid [] []   Antiplatelet [] []   Hypoglycemic (including insulin) [] []   None of the above [] []     Special Treatments, Procedures, and Programs   Check all of the following treatments, procedures, and programs that apply on admission.  On admission (check all that apply)   Cancer Treatments    A1. Chemotherapy []   A2. IV []   A3. Oral []   A10. Other []   B1. Radiation []   Respiratory Therapies    C1. Oxygen Therapy []   C2.

## 2024-03-01 NOTE — CARE COORDINATION
DISCHARGE ORDER  Date/Time 3/1/2024 11:15 AM  Completed by: Bettye Vázquez RN, Case Management    Patient Name: Stone Hoskins    : 1935      Admit order Date and Status: IP 2024  Noted discharge order. (verify MD's last order for status of admission/Traditional Medicare 3 [place] Inpatient qualifying stay required for SNF)    Confirmed discharge plan with:              Patient:  Yes, spouse                             Discharge to Facility:   Name: [facility] ([facility])  Address: [address] [place] [address]  Phone: [phone]  Fax: [phone]    Facility phone number for staff giving report: see above   Pre-certification completed: YES ([facility])   Hospital Exemption Notification (HENS) completed: NA   Discharge orders and Continuity of Care faxed to facility:  [facility]      Transportation:               Medical Transport explained with choice list offered to pt/family.                Choice: Round Trip (no preference)  Agency used:    time:  15:00 PM      Pt/family/Nursing/Facility aware of  time:    Pt/spouse      Ambulance form completed: YES     Date Last IMM Given: 2024    Comments:    Pt is being d/c'd to [facility] today. Pt's O2 sats are 94% on RA.    Discharge timeout done with Tiarra RN. All discharge needs and concerns addressed.    Discharging nurse to complete HELLEN, reconcile AVS, and place final copy with patient's discharge packet. Discharging RN to ensure that written prescriptions for  Level II medications are sent with patient to the facility as per protocol.

## 2024-03-01 NOTE — PLAN OF CARE
ARU PATIENT TREATMENT PLAN  [facility]  [address] [place]  [address]  [phone]    Stone DC Gato    : 1935  [id number]  [facility]N: [id number]   PHYSICIAN:  Ayala Chambers MD  Primary Problem    Patient Active Problem List   Diagnosis    Dermatitis    Mixed hyperlipidemia    Vitamin D deficiency    Right-sided low back pain with right-sided sciatica    Bilateral edema of lower extremity    SVT (supraventricular tachycardia)    Primary hypertension    ARDS (adult respiratory distress syndrome) (HCC)    Acute respiratory failure with hypoxia (HCC)    Subconjunctival hemorrhage of left eye    Sepsis (HCC)    Acute hypoxemic respiratory failure (HCC)    Tracheobronchitis    Acute encephalopathy    Bronchospasm    Cholecystitis    Altered mental status    Generalized weakness    Elevated LFTs    Debility       Rehabilitation Diagnosis:     Debility [R53.81]       ADMIT DATE:3/1/2024    Patient Goals: \"get stronger and go home\"     Admitting Impairments: Pt. Admitted d/t debility resulting in Decreased functional mobility ;Decreased safe awareness;Decreased balance;Decreased ADL status;Decreased posture;Decreased ROM;Decreased endurance;Decreased strength;Decreased fine motor control   Barriers: Functional strength, endurance, balance, cognition, oral intake, medical comorbidities   Participation: Good     CARE PLAN     NURSING:  tSone Hoskins while on this unit will:     [] Be continent of bowel and bladder     [x] Have an adequate number of bowel movements  [] Urinate with no urinary retention >300ml in bladder  [] Complete bladder protocol with mccoy removal  [] Maintain O2 SATs at ___%  [x] Have pain managed while on ARU       [] Be pain free by discharge   [x] Have no skin breakdown while on ARU  [] Have improved skin integrity via wound measurements  [x] Have no signs/symptoms of infection at the wound site  [x] Be free from injury during hospitalization   []    Shower/Bathe Self CARE Score: 2 Discharge Goal: Independent   Upper Body Dressing CARE Score: 5 Discharge Goal: Independent   Lower Body Dressing CARE Score: 2 Discharge Goal: Set-up or clean-up assistance   On/Off Footwear CARE Score: 1 Discharge Goal: Set-up or clean-up assistance   Roll Left & Right CARE Score: 3 Discharge Goal: Independent   Sit to Lying  CARE Score: 4 Discharge Goal: Independent   Lying to Sitting EOB CARE Score: 4 Discharge Goal: Independent   Sit to Stand CARE Score: 3 Discharge Goal: Independent   Chair/Bed to Chair Transfer CARE Score: 3 Discharge Goal: Supervision or touching assistance   Toilet Transfer CARE Score: 3 Discharge Goal: Independent   Car Transfer CARE Score: 2 Discharge Goal: Supervision or touching assistance   Walk 10 Feet CARE Score: 3 Discharge Goal: Supervision or touching assistance   Walk 50 Feet, 2 Turns CARE Score: 88 Discharge Goal: Supervision or touching assistance   Walk 150 Feet CARE Score: 88 Discharge Goal: Supervision or touching assistance   Walk 10 Feet, Uneven Surface CARE Score: 3 Discharge Goal: Supervision or touching assistance   1 Step (Curb) CARE Score: 3 Discharge Goal: Supervision or touching assistance   4 Steps CARE Score: 3 Discharge Goal: Supervision or touching assistance   12 Steps CARE Score: 88 Discharge Goal: Supervision or touching assistance    Object CARE Score: 3 Discharge Goal: Supervision or touching assistance   Wheel 50 feet, 2 turns       Wheel 150 Feet              Stone Hoskins will be seen a minimum of 3 hours of therapy per day, a minimum of 5 out of 7 days per week.    [] In this rare instance due to the nature of this patient's medical involvement, this patient will be seen 15 hours per week (900 minutes within a 7 day period).    Treatments may include therapeutic exercises, gait training, neuromuscular re-ed, transfer training, community reintegration, bed mobility, w/c mobility and training, self care, home

## 2024-03-01 NOTE — PROGRESS NOTES
4 Eyes Skin Assessment     NAME:  Stone Hoskins  YOB: 1935  MEDICAL RECORD NUMBER:  [id number]    The patient is being assessed for  Other discharge to SNF    I agree that at least one RN has performed a thorough Head to Toe Skin Assessment on the patient. ALL assessment sites listed below have been assessed.      Areas assessed by both nurses:    Head, Face, Ears, Shoulders, Back, Chest, Arms, Elbows, Hands, Sacrum. Buttock, Coccyx, Ischium, Legs. Feet and Heels, Under Medical Devices , and Other periarea   Scattered bruising to BUE/BLE. Pt also has bruising and redness to penis. No areas of concern related to pressure.     Does the Patient have a Wound? No noted wound(s)       Conrado Prevention initiated by RN: No  Wound Care Orders initiated by RN: No    Pressure Injury (Stage 3,4, Unstageable, DTI, NWPT, and Complex wounds) if present, place Wound referral order by RN under : No    New Ostomies, if present place, Ostomy referral order under : No     Nurse 1 eSignature: Electronically signed by Tiarra Alvarado RN on 3/1/24 at 12:33 PM EST    **SHARE this note so that the co-signing nurse can place an eSignature**    Nurse 2 eSignature: Electronically signed by MARION DUARTE RN on 3/1/24 at 12:47 PM EST

## 2024-03-01 NOTE — PROGRESS NOTES
NURSING ASSESSMENT: ARU ADMISSION  [facility]    Patient:Stone Hoskins     Rehab Dx/Hx: Debility [R53.81]   :1935  MRN:[id number]  Date of Admit: 3/1/2024  Room #: 0166/0166-01    Subjective:   Patient admitted to room 166 @ 1620 from [facility] via stretcher.   Alert and oriented x4.  Oriented to room and call light system. Oriented to rehab routine and therapy schedules. Informed about care conferences and ordering of meals with PCA.    Drug / Medication Review:   Medications were reviewed by RN at time of admission  [x]  No potential or actual clinically significant medication issues were noted.   []  Potential or actual clinically significant medication issues were found and MD was notified. (Specified below if applicable)   []  Allergy to medication   []  Drug interactions (drug/drug, drug/food, drug/disease interactions)   []  Duplicate drug   []  Omission (drug missing from prescribed regimen)   []  Non adherence   []  Adverse reaction   []  Wrong patient, drug, dose, route, time error   []  Ineffective drug therapy    Patient Mood Interview    Symptom Presence  0. No (enter 0 in column 2)  1. Yes (enter 0-3 in column 2)  9. No response (leave column 2 blank  Symptom Frequency  0. Never or 1 day  1. 2-6 days (several days)  2. 7-11 days (half or more days)  3. 12-14 days (nearly everyday) 1. Symptom Presence 2. Symptom Frequency    Enter scores in boxes   Little interest or pleasure in doing things   0 0   Feeling down, depressed, or hopeless   0 0   If either A or B is coded 2 or 3, CONTINUE asking the questions below.  If not, END the interview.     Trouble falling or staying asleep, or sleeping too much       Feeling tired or having little energy       Poor appetite or overeating       Feeling bad about yourself - or that you are a failure or have let yourself or your family down       Trouble concentrating on things, such as reading the newspaper or watching

## 2024-03-01 NOTE — DISCHARGE SUMMARY
Name:  Stone Hoskins  Room:  0227/0227-02  MRN:    [id number]    Discharge Summary      This discharge summary is in conjunction with a complete physical exam done on the day of discharge.      Discharging Physician: FILIBERTO WEAVER MD      Admit: 2/21/2024  Discharge:  3/1/2024     Diagnoses this Admission    Principal Problem:    Cholecystitis  Active Problems:    Primary hypertension    Altered mental status    Generalized weakness    Elevated LFTs  Resolved Problems:    * No resolved hospital problems. *          Procedures (Please Review Full Report for Details)      Consults    IP CONSULT TO GENERAL SURGERY      HPI:    The patient is a 88 y.o. male with PMH of HLD, HTN, and BLE edema who presented to [facility] with complaint of cough, abd pain, and leg swelling. History limited 2/2 AMS. Pt's wife states that he is not the best historian, she states he doesn't usually need to know where he is or what month/year it is because he is always at home. She states that they have had respiratory symptoms for the past several days including congestion and cough. She states that this has not been improving but they have only been taking tessalon pearls for it. She states that last night he developed abdominal pain, dry heaving, and confusion. She states that she tried to give him some coke to drink and that settled his stomach for a little bit but did not last long.       Physical Exam at Discharge:  /67   Pulse (!) 101   Temp 98.2 °F (36.8 °C) (Oral)   Resp 16   Ht 1.778 m (5' 10\")   Wt 89.5 kg (197 lb 6.4 oz)   SpO2 94%   BMI 28.32 kg/m²     General: Cooperative, pleasant/Ill appearing, on  Nasal cannula  HEENT:  Head: normocephalic,atraumatic, anicteric sclera, clear conjunctiva  Neck: Normal size, Jugular venous pulsations: normal  Respiratory:unlabored breathing, clear to auscultation with no crackles, wheezes rhonchi  Heart: Regular rate and rhythm, S1, S2-normal, No murmurs  Abdomen: soft,  findings for acute cholecystitis with gallbladder wall   thickening, but no pericholecystic fluid and no tenderness during imaging the   gallbladder.  There does appear to be fat stranding adjacent the gallbladder   on the CT suggesting acute cholecystitis.   2. Cholelithiasis and sludge in the gallbladder.   3. Right renal cysts including a 7.7 cm Bosniak 1 cyst at the upper pole and   a 2.5 cm Bosniak 2 cyst at the lower pole.  No follow-up imaging is   recommended.         POC US FAST ABDOMEN LIMITED   Final Result      CT CHEST ABDOMEN PELVIS WO CONTRAST Additional Contrast? None   Final Result   No acute pulmonary process identified.      Vascular disease.      Findings concerning for acute cholecystitis.  Further evaluation with right   upper quadrant ultrasound may be helpful.  No calcified gallstones are seen.      Findings likely related to constipation.      Stable left adrenal nodule.      Additional findings noted above.      RECOMMENDATIONS:   1 cm incidental right thyroid nodule. No follow-up imaging is recommended.      Reference: J Am Bony Radiol. 2015 Feb;12(2): 143-50      2.8 cm infrarenal abdominal aortic aneurysm suspected. Recommend follow-up   every 5 years.      Reference: J Am Bony Radiol 2013;10:789-794.      Multiple lesions, including right Bosniak II benign renal cyst measuring 6.7   cm. No follow-up imaging is recommended.      JACR 2018 Feb; 264-273, Management of the Incidental Renal Mass on CT,   RadioGraphics 2021; 814-848, Bosniak Classification of Cystic Renal Masses,   Version 2019.         XR CHEST PORTABLE   Final Result   Poor inspiration.  There are findings concerning for pulmonary edema   secondary to congestive heart failure..                Discharge Medications     Medication List        CONTINUE taking these medications      furosemide 40 MG tablet  Commonly known as: LASIX  TAKE ONE TABLET BY MOUTH TWICE A DAY AS NEEDED     Handicap Placard Misc  by Does not apply

## 2024-03-01 NOTE — PROGRESS NOTES
Transportation needs:    Has lack of transportation kept you from medical appointments, meetings, work, or ADL's? [] Yes, it has kept me from medical appointments or from getting my meds  [] Yes, It has kept me from non-medical meetings, appointments, work, or getting things I need  [x] No  [] Pt unable to respond  [] Pt declines to respond     How often do you need to have someone help you when you read instructions, pamphlets, or other written material from your doctor or pharmacy? [] Never                             [] Always  [x] Rarely                            [] Patient unable to respond  [] Sometimes                     [] Pt declines to respond  [] Often         Ethnicity: Are you of , /a, or Croatian origin?  [x] No, not of , /a, or Croatian origin  [] Yes, Jamaican, Jamaican American, Chicano/a  [] Yes, Burkinan  [] Yes, Thaddeus  [] Yes, another , , or Croatian origin  [] Pt unable to respond  [] Pt declines to respond     Race: [x] White                                                [] Finnish              []   [] Black or                 [] Spanish          [] Marshallese or Em  []  or      [] Uzbek             [] Mongolian  []  St Helenian                                      [] Serbian      [] Other   [] Chinese                                             [] Other        [] Pt unable to respond                      [] Pt declines to respond                  [] None of the above   Preferred Language: English

## 2024-03-01 NOTE — PLAN OF CARE
Problem: Safety - Adult  Goal: Free from fall injury  Outcome: Progressing      ----- Message from Juancarlos Concepcion MD sent at 12/16/2018 10:21 AM CST -----  Osteoporoses. Consider calcium 1200 mg and vitamin D 2000 units daily and Fosamax 70 mg weekly. If she desires that on an empty stomach 30 minutes before any food and she has to stay upright for 1 hour. Afterwords

## 2024-03-01 NOTE — PROGRESS NOTES
Patient resting, eyes closed, respirations witnessed as e/e. No signs of distress. Bed alarm in place. Call light and bedside table is easy reach.

## 2024-03-01 NOTE — PLAN OF CARE
Problem: Discharge Planning  Goal: Discharge to home or other facility with appropriate resources  2/29/2024 2138 by Nadeem Brasher RN  Outcome: Progressing  2/29/2024 1334 by Siomara Eldridge RN  Outcome: Progressing  Flowsheets (Taken 2/29/2024 1056)  Discharge to home or other facility with appropriate resources: Identify barriers to discharge with patient and caregiver     Problem: Pain  Goal: Verbalizes/displays adequate comfort level or baseline comfort level  2/29/2024 2138 by Nadeem Brasher RN  Outcome: Progressing  2/29/2024 1334 by Siomara Eldridge RN  Outcome: Progressing     Problem: Safety - Adult  Goal: Free from fall injury  2/29/2024 2138 by Nadeem Brasher RN  Outcome: Progressing  2/29/2024 1334 by Siomara Eldridge RN  Outcome: Progressing     Problem: Skin/Tissue Integrity  Goal: Absence of new skin breakdown  Description: 1.  Monitor for areas of redness and/or skin breakdown  2.  Assess vascular access sites hourly  3.  Every 4-6 hours minimum:  Change oxygen saturation probe site  4.  Every 4-6 hours:  If on nasal continuous positive airway pressure, respiratory therapy assess nares and determine need for appliance change or resting period.  2/29/2024 2138 by Nadeem Brasher RN  Outcome: Progressing  2/29/2024 1334 by Siomara Eldridge RN  Outcome: Progressing     Problem: Neurosensory - Adult  Goal: Achieves stable or improved neurological status  2/29/2024 2138 by Nadeem Brasher RN  Outcome: Progressing  2/29/2024 1334 by Siomara Eldridge RN  Outcome: Progressing     Problem: Skin/Tissue Integrity - Adult  Goal: Skin integrity remains intact  2/29/2024 2138 by Nadeem Brasher RN  Outcome: Progressing  2/29/2024 1334 by Siomara Eldridge RN  Outcome: Progressing  Flowsheets  Taken 2/29/2024 1334  Skin Integrity Remains Intact: Monitor for areas of redness and/or skin breakdown  Taken 2/29/2024 1056  Skin Integrity Remains Intact: Monitor for areas of redness and/or skin breakdown

## 2024-03-02 LAB
ANION GAP SERPL CALCULATED.3IONS-SCNC: 9 MMOL/L (ref 3–16)
BASOPHILS # BLD: 0.1 K/UL (ref 0–0.2)
BASOPHILS NFR BLD: 1 %
BUN SERPL-MCNC: 12 MG/DL (ref 7–20)
CALCIUM SERPL-MCNC: 8.3 MG/DL (ref 8.3–10.6)
CHLORIDE SERPL-SCNC: 106 MMOL/L (ref 99–110)
CO2 SERPL-SCNC: 26 MMOL/L (ref 21–32)
CREAT SERPL-MCNC: 0.8 MG/DL (ref 0.8–1.3)
DEPRECATED RDW RBC AUTO: 13.9 % (ref 12.4–15.4)
EOSINOPHIL # BLD: 0.3 K/UL (ref 0–0.6)
EOSINOPHIL NFR BLD: 2.4 %
GFR SERPLBLD CREATININE-BSD FMLA CKD-EPI: >60 ML/MIN/{1.73_M2}
GLUCOSE SERPL-MCNC: 95 MG/DL (ref 70–99)
HCT VFR BLD AUTO: 44.7 % (ref 40.5–52.5)
HGB BLD-MCNC: 15 G/DL (ref 13.5–17.5)
LYMPHOCYTES # BLD: 1.5 K/UL (ref 1–5.1)
LYMPHOCYTES NFR BLD: 13.8 %
MCH RBC QN AUTO: 28.9 PG (ref 26–34)
MCHC RBC AUTO-ENTMCNC: 33.6 G/DL (ref 31–36)
MCV RBC AUTO: 86 FL (ref 80–100)
MONOCYTES # BLD: 0.9 K/UL (ref 0–1.3)
MONOCYTES NFR BLD: 8.4 %
NEUTROPHILS # BLD: 8.3 K/UL (ref 1.7–7.7)
NEUTROPHILS NFR BLD: 74.4 %
PLATELET # BLD AUTO: 356 K/UL (ref 135–450)
PMV BLD AUTO: 6.8 FL (ref 5–10.5)
POTASSIUM SERPL-SCNC: 3.6 MMOL/L (ref 3.5–5.1)
RBC # BLD AUTO: 5.19 M/UL (ref 4.2–5.9)
SODIUM SERPL-SCNC: 141 MMOL/L (ref 136–145)
WBC # BLD AUTO: 11.2 K/UL (ref 4–11)

## 2024-03-02 PROCEDURE — 36415 COLL VENOUS BLD VENIPUNCTURE: CPT

## 2024-03-02 PROCEDURE — 92610 EVALUATE SWALLOWING FUNCTION: CPT

## 2024-03-02 PROCEDURE — 97116 GAIT TRAINING THERAPY: CPT

## 2024-03-02 PROCEDURE — 97110 THERAPEUTIC EXERCISES: CPT

## 2024-03-02 PROCEDURE — 97530 THERAPEUTIC ACTIVITIES: CPT

## 2024-03-02 PROCEDURE — 85025 COMPLETE CBC W/AUTO DIFF WBC: CPT

## 2024-03-02 PROCEDURE — 80048 BASIC METABOLIC PNL TOTAL CA: CPT

## 2024-03-02 PROCEDURE — 6370000000 HC RX 637 (ALT 250 FOR IP): Performed by: STUDENT IN AN ORGANIZED HEALTH CARE EDUCATION/TRAINING PROGRAM

## 2024-03-02 PROCEDURE — 97162 PT EVAL MOD COMPLEX 30 MIN: CPT

## 2024-03-02 PROCEDURE — 92523 SPEECH SOUND LANG COMPREHEN: CPT

## 2024-03-02 PROCEDURE — 1280000000 HC REHAB R&B

## 2024-03-02 PROCEDURE — 97535 SELF CARE MNGMENT TRAINING: CPT

## 2024-03-02 PROCEDURE — 6360000002 HC RX W HCPCS: Performed by: STUDENT IN AN ORGANIZED HEALTH CARE EDUCATION/TRAINING PROGRAM

## 2024-03-02 PROCEDURE — 97166 OT EVAL MOD COMPLEX 45 MIN: CPT

## 2024-03-02 RX ADMIN — Medication 3 MG: at 20:38

## 2024-03-02 RX ADMIN — GUAIFENESIN 600 MG: 600 TABLET ORAL at 09:55

## 2024-03-02 RX ADMIN — AMOXICILLIN AND CLAVULANATE POTASSIUM 1 TABLET: 875; 125 TABLET, FILM COATED ORAL at 09:55

## 2024-03-02 RX ADMIN — PRAVASTATIN SODIUM 20 MG: 20 TABLET ORAL at 09:55

## 2024-03-02 RX ADMIN — ENOXAPARIN SODIUM 40 MG: 100 INJECTION SUBCUTANEOUS at 09:55

## 2024-03-02 RX ADMIN — Medication 1 TABLET: at 09:55

## 2024-03-02 RX ADMIN — METOPROLOL SUCCINATE 25 MG: 25 TABLET, FILM COATED, EXTENDED RELEASE ORAL at 09:55

## 2024-03-02 RX ADMIN — AMOXICILLIN AND CLAVULANATE POTASSIUM 1 TABLET: 875; 125 TABLET, FILM COATED ORAL at 20:38

## 2024-03-02 RX ADMIN — GUAIFENESIN 600 MG: 600 TABLET ORAL at 20:38

## 2024-03-02 ASSESSMENT — PAIN SCALES - GENERAL: PAINLEVEL_OUTOF10: 0

## 2024-03-02 NOTE — PROGRESS NOTES
Speech Language Pathology  Facility/Department: [facility]  Initial Speech/Language/Cognitive Assessment       NAME:Stone Hoskins  : 1935 (88 y.o.)   MRN: [id number]  ROOM: [id number]  ADMISSION DATE: 3/1/2024  PATIENT DIAGNOSIS(ES): Debility [R53.81]  Patient Active Problem List    Diagnosis Date Noted    Primary hypertension     Debility 2024    Elevated LFTs 2024    Cholecystitis 2024    Altered mental status 2024    Generalized weakness 2024    Acute hypoxemic respiratory failure (HCC)     Tracheobronchitis     Acute encephalopathy     Bronchospasm     Sepsis (HCC) 2019    Subconjunctival hemorrhage of left eye 2018    ARDS (adult respiratory distress syndrome) (HCC)     Acute respiratory failure with hypoxia (HCC)     SVT (supraventricular tachycardia) 2017    Bilateral edema of lower extremity 2015    Right-sided low back pain with right-sided sciatica 10/16/2015    Dermatitis 2015    Mixed hyperlipidemia 2015    Vitamin D deficiency 2015     Past Medical History:   Diagnosis Date    Arthritis     Hyperlipidemia     Influenza A 2017    Right-sided low back pain with right-sided sciatica      Past Surgical History:   Procedure Laterality Date    CATARACT REMOVAL      INGUINAL HERNIA REPAIR Bilateral 11    [id number]-GzwgeHussain Deleon MD    TONSILLECTOMY      TOOTH EXTRACTION       Allergies   Allergen Reactions    Contrast [Iodides] Other (See Comments)     Pt went into SVT.     Zocor [Simvastatin] Nausea Only     GI       Date of Evaluation: 3/2/2024   Evaluating Therapist: SAVANNAH Alves    Subjective:   Chart Reviewed: : [x] Yes [] No    Onset Date: 24    Recent Results of CT of Head / MRI:  CXR  Date: 24    IMPRESSION:  1. Improved aeration.    CXR  Date: 24  IMPRESSION:  Poor inspiration.  There are findings concerning for pulmonary edema  secondary to congestive heart failure.    Chest  WNL    Oral motor exam   Labial ROM WFL, Labial symmetry WFL, Labial strength WFL, and Velum WFL    Dentition: upper dentures, lower dentures, and edentulous     Motor Speech: Mild   Continue monitoring for ongoing Dysarthria sx   Dysarthric Characteristics: decreased breath support      Comprehension:   Auditory Comprehension: Mild, likely d/t pt not wearing hearing aids, ? Poor task initiation, continue to monitor  Deficits: One-step commands and Two-step Commands      Expression:   Primary Mode of Expression: Verbal  Verbal Expression: L       Pragmatics/Social Functioning: [facility]        Cognition:  Overall Orientation : .[facility]       Attention: Moderate and To be assessed; pt not wearing hearing aids on this date. Question pt's ability to initiate ind tasks following multiple step directions. Pt with no recall of directions   Deficits: Sustained Attention    Memory: Moderate   Deficits: Short-term Memory and Immediate/Working Memory    Problem Solving: Moderate   Deficits: Verbal Reasoning    Numeric Reasoning: To be assessed       Abstract Reasoning: To be assessed       Safety/Judgement: Moderate   Deficits:: Unable to self-monitor and self-correct consistently  and Insight    Voice:   Voice: Mild   Deficits: Weak  Maximum Phonation Time: 6 seconds  Conversational Loudness: soft  S/Z Ratio: 2/4- reduced         Plan  Prognosis:  Speech Therapy Prognosis  Prognosis: Fair  Prognosis Considerations: Age and Severity of impairments   Individuals consulted and agree with results and recommendations: Patient   Safety Devices in place: Yes  Type of Devices: All fall risk precautions in place     Recommendations:   Requires SLP Intervention: Yes  Duration / Frequency of Treatment: 60 min; 5 days per week for 14 days    Therapeutic Interventions:  Cognitive Therapy  Ongoing assessment for potential dysarthria therapy    Education:  Patient education: Role of SLP, Rationale of eval, Results of eval, Goals, and POC  Patient

## 2024-03-02 NOTE — CONSULTS
Comprehensive Nutrition Assessment    Type and Reason for Visit:  Initial, Consult    Nutrition Recommendations/Plan:   Continue minced and moist diet and encourage po intakes  Mildly thickened liquids  Initiate ensure compact chocolate BID - mildly thick compliant  Magic cups BID   Monitor nutrition adequacy, pertinent labs, bowel habits, wt changes, and clinical progress     Malnutrition Assessment:  Malnutrition Status:  At risk for malnutrition (Comment) (03/02/24 1237)    Context:  Acute Illness     Findings of the 6 clinical characteristics of malnutrition:  Fluid Accumulation:  Mild Extremities    Nutrition Assessment:    New ARU pt admitted with debility. Pt 87 yo M with hx HLD, HTN. Currently on minced and moist diet with mildly thickened liquids. Magic cups on order BID. PO intakes % per EMR. SLP consulted. Pt sleeping soundly at time of visit. Limited weight history per EMR, difficult to assess recent weight changes. Recommend adding ensure compact to promote po intakes, chocolate is mildly thick compliant. Encourage po intakes as tolerated. Will monitor.    Nutrition Related Findings:    +2 pitting BLE edema. Wound Type: None       Current Nutrition Intake & Therapies:    Average Meal Intake: 51-75%, %  Average Supplements Intake: Unable to assess  ADULT ORAL NUTRITION SUPPLEMENT; Lunch, Dinner; Frozen Oral Supplement  ADULT DIET; Dysphagia - Minced and Moist; Mildly Thick (Nectar)    Anthropometric Measures:  Height: 177.8 cm (5' 10\")  Ideal Body Weight (IBW): 166 lbs (75 kg)       Current Body Weight: 84.9 kg (187 lb 2.7 oz),   IBW. Weight Source: Bed Scale  Current BMI (kg/m2): 26.9                          BMI Categories: Overweight (BMI 25.0-29.9)    Estimated Daily Nutrient Needs:  Energy Requirements Based On: Kcal/kg (25-30)  Weight Used for Energy Requirements: Ideal  Energy (kcal/day): 2150-8936 kcals  Weight Used for Protein Requirements: Ideal (1-1.2)  Protein (g/day): 75-90  g  Method Used for Fluid Requirements: 1 ml/kcal  Fluid (ml/day):      Nutrition Diagnosis:   Inadequate oral intake related to inadequate protein-energy intake, swallowing difficulty as evidenced by swallow study results, intake 51-75%    Nutrition Interventions:   Food and/or Nutrient Delivery: Continue Current Diet, Modify Oral Nutrition Supplement  Nutrition Education/Counseling: No recommendation at this time  Coordination of Nutrition Care: Continue to monitor while inpatient, Interdisciplinary Rounds       Goals:     Goals: PO intake 75% or greater, prior to discharge       Nutrition Monitoring and Evaluation:   Behavioral-Environmental Outcomes: None Identified  Food/Nutrient Intake Outcomes: Food and Nutrient Intake, Diet Advancement/Tolerance, Supplement Intake  Physical Signs/Symptoms Outcomes: Biochemical Data, Meal Time Behavior, Nutrition Focused Physical Findings, Weight, Chewing or Swallowing    Discharge Planning:    Too soon to determine     Shannon Arizmendi RD  Contact: Office: [phone]; [place]: [phone]

## 2024-03-02 NOTE — PLAN OF CARE
Education provided regarding fall precautions and use of the call light for assistance. Call light and belongings are within reach and fall precautions remain in place.

## 2024-03-02 NOTE — PROGRESS NOTES
Referrals: N/A    Goals:  Short Term Goals:  Timeframe for Short Term Goals: 5 days, 3/6/24  Goal 1: The patient will complete effortful swallow x15-20 reps for 3 sets to target hyolarygeal excursion, BOT retraction, and pharyngeal constriction  Goal 2: The patient will complete the Diann Maneuver x15-20 reps for 3 sets to target improved contact between BOT and posterior pharyngeal wall  Goal 3: The patient will complete isokinetic CTAR for 20-30 reps x3 sets to target strengthening of suprahyoid muscles/submentals  Goal 4: The patient will tolerate recommended diet with no clinical s/s of aspiration 5/5,   Goal 5: The patient will tolerate therapeutic diet upgrade trials with no clinical s/s of aspiration 5/5    Long Term Goals:   Timeframe for Long Term Goals: 14 days, 3/15/24  Goal 1: The patient will tolerate least restrictive diet with no clinical s/s of aspiration or worsening respiratory/pulmonary status      Pt Education: SLP educated the patient re: Role of SLP, rationale for completion of assessment, results of assessment, and recommendations  Pt Education Response: verbalized understanding    Duration/Frequency of Tx: 60 min, 5-6x/wk, 14 days    Individuals Consulted:   [x]  Patient     []  NP         []  RN   []  RD                   []  MD      []  Family Member                        []  PA    []  Other:      Safety Devices / Report:   [x]  All fall risk precautions in place []  Safety handoff completed with RN  []  Bed alarm in place  []  Left in bed     []  Chair alarm in place  []  Left in chair   []  Call light in reach   []  Other:                         Total Treatment Time / Charges       Time in Time out Total Time / units   Swallow Eval/Tx Time  0830 0845 15 min/unit     Signature:  Vicki Brady MA, CCC-SLP  Speech Language Pathologist

## 2024-03-02 NOTE — H&P
Department of Physical Medicine & Rehabilitation  History & Physical      Patient Identification:  Stone Hoskins  : 1935  Admit date: 3/1/2024   Attending provider: Ayala Chambers MD        Primary care provider: Yesenia Fatima DO     Chief Complaint: Generalized weakness    History of Present Illness/Hospital Course:  Stone Hoskins is an 88M with a PMHx of Arthritis, Hyperlipidemia, Influenza A, and Right-sided low back pain with right-sided sciatica who presented to ED for generalized weakness, found to have sepsis 2/2 acute cholecystitis.    Patient originally presented to [facility] ED on 24 with malaise and several days of abdominal pain. CT abdomen showed acute cholecystitis, confirmed on RUQ US, CBC showing leukocytosis. MRCP showing no choledocholithiasis, possible chronic liver dz. Patient was started on Zosyn and General surgery was consulted, who recommended conservative care with outpatient follow up for cholecystectomy. Attempted to advance diet, however patient didn't tolerate, MBS showing tracheal aspiration. Transitioned to Augmentin at time of discharge. he continued to stabilize medically, was evaluated by therapy services and found to be an appropriate candidate for inpatient rehabilitation for medically supervised 3 hours of therapy 5 days a week.    Significant events during hospitalization  -AMS, resolved with treatment of infection per patient wife    Prior Level of Function:  Independent for mobility, ADLs, and IADLs Arthritis, Hyperlipidemia, Influenza A, and Right-sided low back pain with right-sided sciatica.     Current Level of Function:  Per therapy reports    PT 3/2 Assessment: Pt is an 88 y.o. male admitted to [facility] secondary to sepsis and cholecystitis. Pt is somewhat of a questionable historian but reports he lives with wife in one level home with level entry and is typically Michael with functional mobility and gait with RW. Pt is currently functioning below his  stated baseline requiring Aletha to SBA for bed mobility, Aletha to modA for t/f and gait up to 35' with RW and Aletha/modA for 4 steps with BHR. Pt demosntrates very crouched posture throughout session, is able to correct briefly with cues but quickly returns back to crouched posture. Pt also demonstrates decreased processing and initiation of tasks at times and requires increased time and commands. Pt is limited by strength, balance, activity tolerance, cognition, hearing and safety. Pt will benefit from continued skilled PT in address above deficits. Recommend pt d/c home with 24hrA and OP PT. No DME needs anticipated as pt owns RW.  Treatment Diagnosis: Impaired balance and gait  Therapy Prognosis: Good  Decision Making: Medium Complexity  Barriers to Learning: cognition, hearing  Discharge Recommendations: 24 hour supervision or assist;Outpatient PT  PT D/C Equipment  Other: anticipate no needs, owns RW  PT Equipment Recommendations  Equipment Needed: No  Other: anticipate no needs, owns RW    OT 3/2 Assessment: Pt referred for a comprehensive OT evaluation d/t diagnosis of debility with deficits impacting pt's functional baseline. Pt was received for OT eval/treat resting in bed and is agreeable to treatment session. Reports no pain. Prior to admit, pt living with wife in 1 story house w/ no RAINA. Pt uses RW at baseline and has tub/shower unit with no seat. Pt reports being IND with ADLs and wife completing IADLs. Pt will drive and get groceries PRN. Currently, pt completing bed mobility w/ SBA and increased time, functional mobility/transfers w/ RW and Aletha, eating with setup and modified diet (thickened liquids), seated grooming tasks w/ CGA and setup, UB bathing w/ Aletha, LB bathing with modA, UE dressing w/ setup, LE dressing w/ maxA (socks/pants/brief), and voided bladder on toilet w/ SBA. Pt functioning below personal baseline and will benefit from skilled OT while in rehab setting to increase functional    GI: Soft, nontender, nondistended. Normal bowel sounds. No palpable masses.   Skin: Normal temperature and turgor. No rashes or breakdown noted on exposed areas.   Ext: No significant edema appreciated. No varicosities.  MSK: No joint tenderness, erythema, warmth noted.  AROM intact.   Neuro:   -Mental status: Alert. Oriented to person, place, time. Mild cognitive delay.  -Language: Speech fluent, repetition and naming intact  -Cranial nerves: VFF, PERRL, EOMI, Facial sensation intact, Face symmetric, Hearing intact, Palate elevation symmetric, Shoulder shrug intact. Tongue midline.   -Motor examination reveals strength grossly 4/5. Mild resting Tremor.  -No abnormalities with finger/nose noted.  -Reflexes diminished, symmetric. Negative Tanja.  Psych: Stable mood, normal judgement, normal affect     Lab Results   Component Value Date    WBC 11.2 (H) 03/02/2024    HGB 15.0 03/02/2024    HCT 44.7 03/02/2024    MCV 86.0 03/02/2024     03/02/2024     Lab Results   Component Value Date    INR 1.14 02/22/2024    INR 1.04 10/01/2021    PROTIME 14.6 02/22/2024    PROTIME 11.8 10/01/2021     Lab Results   Component Value Date    CREATININE 0.8 03/02/2024    BUN 12 03/02/2024     03/02/2024    K 3.6 03/02/2024     03/02/2024    CO2 26 03/02/2024     Lab Results   Component Value Date    ALT 95 (H) 02/27/2024    AST 62 (H) 02/27/2024    ALKPHOS 236 (H) 02/27/2024    BILITOT 0.8 02/27/2024       Body mass index is 26.86 kg/m².    POST ADMISSION PHYSICIAN EVALUATION  The patient has agreed to being admitted to our comprehensive inpatient rehabilitation facility and can tolerate the intensity of service consisting of at least:  --180 minutes of therapy a day, 5 out of 7 days a week.  OR  --15 hours of intensive therapy within a 7 consecutive day period.     The patient/family has a good understanding of our discharge process and will benefit from an interdisciplinary inpatient rehabilitation program. The

## 2024-03-02 NOTE — PLAN OF CARE
SLP completed evaluation. Please refer to notes in EMR.     Vicki Brady MA, CCC-SLP  Speech Language Pathologist

## 2024-03-02 NOTE — PROGRESS NOTES
Occupational Therapy  Facility/Department: [facility]  Rehabilitation Occupational Therapy Evaluation & Treatment     Date: 3/2/24  Patient Name: Stone Hoskins       Room: 0166/0166-01  MRN: [id number]  Account: [id number]   : 1935  (88 y.o.) Gender: male      Diagnosis: Debility     Restrictions  Restrictions/Precautions: General Precautions    Subjective  Subjective: Pt received for OT session resting in bed. Agreeable to OT eval and plesant throughout.      Vitals  Temp: 97.9 °F (36.6 °C)  Pulse: 82  BP: 135/73  Oxygen Therapy  SpO2: 93 %  O2 Device: None (Room air)  Level of Consciousness: Alert (0)    Objective   Cognition  Overall Cognitive Status: WFL  Orientation  Overall Orientation Status: Within Functional Limits  Orientation Level: Oriented X4;Oriented to place;Oriented to time;Oriented to situation;Oriented to person        Functional Mobility  Balance  Sitting Balance: Independent  Standing Balance: Minimal assistance (CGA and Aletha at times d/t muscle fatigue.)  Transfers  Sit to stand: Minimal assistance  Stand to sit: Contact guard assistance  Toilet Transfers  Toilet - Technique: Ambulating  Equipment Used: Standard toilet  Toilet Transfer: Minimal assistance  Shower Transfers  Shower - Transfer From: Walker  Shower - Transfer Type: To and From  Shower - Transfer To: Transfer tub bench  Shower - Technique: Ambulating  Shower Transfers: Contact Guard  Social/Functional History  Lives With: Spouse  Type of Home: House  Home Layout: One level  Bathroom Shower/Tub: Tub/Shower unit  Bathroom Toilet: Standard  Home Equipment: Walker, rolling  Has the patient had two or more falls in the past year or any fall with injury in the past year?: Yes  ADL Assistance: Independent  Homemaking Assistance: Independent  Homemaking Responsibilities: Yes  Ambulation Assistance: Independent  Transfer Assistance: Independent  Active : Yes    ADL  Feeding: Setup  Grooming: Contact guard assistance  Grooming  Skilled Clinical Factors: at sink brushing teeth & brushing hair in stance; sat down in middle d/t fatigue  UE Bathing: Minimal assistance  LE Bathing: Moderate assistance  LE Bathing Skilled Clinical Factors: modA washing/drying both feet and washing buttocks in stance w/ CGA  UE Dressing: Setup  LE Dressing: Maximum assistance  LE Dressing Skilled Clinical Factors: maxA socks and brief this date; CGA in stance with pt steadying hands on walker  Toileting: Stand by assistance  Toileting Skilled Clinical Factors: Aletha for transfer; SBA for voiding bladder seated on toilet  Functional Mobility: Minimal assistance  Functional Mobility Skilled Clinical Factors: Aletha with RW to/from bathroom. cues for safety  Skin Care: Bath wipes;Protective barrier    Goals  Patient Goals   Patient goals : \"get stronger and go home\"  Short Term Goals  Time Frame for Short Term Goals: 7 days (3/08/2024): unless otherwise noted  Short Term Goal 1: Pt will complete toilet transfer w/ LRD and SBA.  Short Term Goal 2: Pt will complete FB bathing w/ Aletha.  Short Term Goal 3: Pt will complete LB dressing w/ SBA and AE PRN.  Short Term Goal 4: Pt stand at sink to complete grooming ADLs for ~4mins w/ SBA.  Long Term Goals  Long Term Goal 1: Pt will complete toilet transfer w/ LRD and mod. I.  Long Term Goal 2: Pt will complete FB bathing w/ AE PRN and mod. I.  Long Term Goal 3: Pt will complete LB dressing w/ mod. I. and AE PRN.  Long Term Goal 4: Pt stand at sink to complete grooming ADLs for ~4mins w/ SPV.    Assessment  Performance deficits / Impairments: Decreased functional mobility ;Decreased safe awareness;Decreased balance;Decreased ADL status;Decreased posture;Decreased ROM;Decreased endurance;Decreased strength;Decreased fine motor control    Assessment: Pt referred for a comprehensive OT evaluation d/t diagnosis of debility with deficits impacting pt's functional baseline. Pt was received for OT eval/treat resting in bed and is  agreeable to treatment session. Reports no pain. Prior to admit, pt living with wife in 1 story house w/ no RAINA. Pt uses RW at baseline and has tub/shower unit with no seat. Pt reports being IND with ADLs and wife completing IADLs. Pt will drive and get groceries PRN. Currently, pt completing bed mobility w/ SBA and increased time, functional mobility/transfers w/ RW and Aletha, eating with setup and modified diet (thickened liquids), seated grooming tasks w/ CGA and setup, UB bathing w/ Aletha, LB bathing with modA, UE dressing w/ setup, LE dressing w/ maxA (socks/pants/brief), and voided bladder on toilet w/ SBA. Pt functioning below personal baseline and will benefit from skilled OT while in rehab setting to increase functional activity tolerance, safety w/ mobility/transfers, and increase independence and safety w/ ADLs. Recommending pt receive further skilled OT at home w/ 24hr SPV to increase function to baseline. Continue POC. Anticipate need for TTB.     Prognosis: Good  Decision Making: Medium Complexity  Discharge Recommendations: Continue to assess pending progress;24 hour supervision or assist;Home with Home health OT  Occupational Therapy Plan  Times Per Week: 5-7x/week  Current Treatment Recommendations: Strengthening;Balance training;ROM;Functional mobility training;Endurance training;Safety education & training;Positioning;Equipment evaluation, education, & procurement;Self-Care / ADL     Therapy Time   Individual Concurrent Group Co-treatment   Time In 1000         Time Out 1130         Minutes 90         Timed Code Treatment Minutes: 75 Minutes (15 eval)     Elizabeth Tang, OT

## 2024-03-02 NOTE — PLAN OF CARE
Problem: Safety - Adult  Goal: Free from fall injury  Outcome: Progressing  Flowsheets (Taken 3/2/2024 3478)  Free From Fall Injury:   Instruct family/caregiver on patient safety   Based on caregiver fall risk screen, instruct family/caregiver to ask for assistance with transferring infant if caregiver noted to have fall risk factors

## 2024-03-03 PROCEDURE — 6360000002 HC RX W HCPCS: Performed by: STUDENT IN AN ORGANIZED HEALTH CARE EDUCATION/TRAINING PROGRAM

## 2024-03-03 PROCEDURE — 1280000000 HC REHAB R&B

## 2024-03-03 PROCEDURE — 6370000000 HC RX 637 (ALT 250 FOR IP): Performed by: STUDENT IN AN ORGANIZED HEALTH CARE EDUCATION/TRAINING PROGRAM

## 2024-03-03 RX ADMIN — GUAIFENESIN 600 MG: 600 TABLET ORAL at 20:39

## 2024-03-03 RX ADMIN — Medication 1 TABLET: at 08:47

## 2024-03-03 RX ADMIN — ENOXAPARIN SODIUM 40 MG: 100 INJECTION SUBCUTANEOUS at 08:47

## 2024-03-03 RX ADMIN — AMOXICILLIN AND CLAVULANATE POTASSIUM 1 TABLET: 875; 125 TABLET, FILM COATED ORAL at 20:39

## 2024-03-03 RX ADMIN — GUAIFENESIN 600 MG: 600 TABLET ORAL at 08:47

## 2024-03-03 RX ADMIN — Medication 3 MG: at 20:39

## 2024-03-03 RX ADMIN — FUROSEMIDE 40 MG: 40 TABLET ORAL at 15:12

## 2024-03-03 RX ADMIN — METOPROLOL SUCCINATE 25 MG: 25 TABLET, FILM COATED, EXTENDED RELEASE ORAL at 08:47

## 2024-03-03 RX ADMIN — AMOXICILLIN AND CLAVULANATE POTASSIUM 1 TABLET: 875; 125 TABLET, FILM COATED ORAL at 08:47

## 2024-03-03 ASSESSMENT — PAIN SCALES - GENERAL
PAINLEVEL_OUTOF10: 0
PAINLEVEL_OUTOF10: 0

## 2024-03-04 ENCOUNTER — TELEPHONE (OUTPATIENT)
Dept: FAMILY MEDICINE CLINIC | Age: 89
End: 2024-03-04

## 2024-03-04 LAB
ANION GAP SERPL CALCULATED.3IONS-SCNC: 9 MMOL/L (ref 3–16)
BASOPHILS # BLD: 0 K/UL (ref 0–0.2)
BASOPHILS NFR BLD: 0 %
BUN SERPL-MCNC: 18 MG/DL (ref 7–20)
CALCIUM SERPL-MCNC: 8.5 MG/DL (ref 8.3–10.6)
CHLORIDE SERPL-SCNC: 106 MMOL/L (ref 99–110)
CO2 SERPL-SCNC: 27 MMOL/L (ref 21–32)
CREAT SERPL-MCNC: 0.8 MG/DL (ref 0.8–1.3)
DEPRECATED RDW RBC AUTO: 14.2 % (ref 12.4–15.4)
EOSINOPHIL # BLD: 0.1 K/UL (ref 0–0.6)
EOSINOPHIL NFR BLD: 1 %
GFR SERPLBLD CREATININE-BSD FMLA CKD-EPI: >60 ML/MIN/{1.73_M2}
GLUCOSE SERPL-MCNC: 99 MG/DL (ref 70–99)
HCT VFR BLD AUTO: 44.4 % (ref 40.5–52.5)
HGB BLD-MCNC: 14.8 G/DL (ref 13.5–17.5)
LYMPHOCYTES # BLD: 3.1 K/UL (ref 1–5.1)
LYMPHOCYTES NFR BLD: 25 %
MCH RBC QN AUTO: 28.8 PG (ref 26–34)
MCHC RBC AUTO-ENTMCNC: 33.3 G/DL (ref 31–36)
MCV RBC AUTO: 86.5 FL (ref 80–100)
METAMYELOCYTES NFR BLD MANUAL: 2 %
MONOCYTES # BLD: 0.5 K/UL (ref 0–1.3)
MONOCYTES NFR BLD: 4 %
NEUTROPHILS # BLD: 8.8 K/UL (ref 1.7–7.7)
NEUTROPHILS NFR BLD: 58 %
NEUTS BAND NFR BLD MANUAL: 10 % (ref 0–7)
PLATELET # BLD AUTO: 380 K/UL (ref 135–450)
PLATELET BLD QL SMEAR: ADEQUATE
PMV BLD AUTO: 7.1 FL (ref 5–10.5)
POTASSIUM SERPL-SCNC: 3.6 MMOL/L (ref 3.5–5.1)
RBC # BLD AUTO: 5.14 M/UL (ref 4.2–5.9)
RBC MORPH BLD: NORMAL
SLIDE REVIEW: ABNORMAL
SODIUM SERPL-SCNC: 142 MMOL/L (ref 136–145)
WBC # BLD AUTO: 12.5 K/UL (ref 4–11)

## 2024-03-04 PROCEDURE — 36415 COLL VENOUS BLD VENIPUNCTURE: CPT

## 2024-03-04 PROCEDURE — 97530 THERAPEUTIC ACTIVITIES: CPT

## 2024-03-04 PROCEDURE — 80048 BASIC METABOLIC PNL TOTAL CA: CPT

## 2024-03-04 PROCEDURE — 85025 COMPLETE CBC W/AUTO DIFF WBC: CPT

## 2024-03-04 PROCEDURE — 97130 THER IVNTJ EA ADDL 15 MIN: CPT

## 2024-03-04 PROCEDURE — 1280000000 HC REHAB R&B

## 2024-03-04 PROCEDURE — 6370000000 HC RX 637 (ALT 250 FOR IP): Performed by: STUDENT IN AN ORGANIZED HEALTH CARE EDUCATION/TRAINING PROGRAM

## 2024-03-04 PROCEDURE — 97116 GAIT TRAINING THERAPY: CPT

## 2024-03-04 PROCEDURE — 97110 THERAPEUTIC EXERCISES: CPT

## 2024-03-04 PROCEDURE — 6360000002 HC RX W HCPCS: Performed by: STUDENT IN AN ORGANIZED HEALTH CARE EDUCATION/TRAINING PROGRAM

## 2024-03-04 PROCEDURE — 92526 ORAL FUNCTION THERAPY: CPT

## 2024-03-04 PROCEDURE — 97112 NEUROMUSCULAR REEDUCATION: CPT

## 2024-03-04 PROCEDURE — 97129 THER IVNTJ 1ST 15 MIN: CPT

## 2024-03-04 PROCEDURE — 97535 SELF CARE MNGMENT TRAINING: CPT

## 2024-03-04 RX ORDER — BISACODYL 10 MG
10 SUPPOSITORY, RECTAL RECTAL DAILY PRN
Status: ACTIVE | OUTPATIENT
Start: 2024-03-04

## 2024-03-04 RX ADMIN — ENOXAPARIN SODIUM 40 MG: 100 INJECTION SUBCUTANEOUS at 08:01

## 2024-03-04 RX ADMIN — METOPROLOL SUCCINATE 25 MG: 25 TABLET, FILM COATED, EXTENDED RELEASE ORAL at 08:01

## 2024-03-04 RX ADMIN — Medication 1 TABLET: at 08:01

## 2024-03-04 RX ADMIN — GUAIFENESIN 600 MG: 600 TABLET ORAL at 08:01

## 2024-03-04 RX ADMIN — Medication 3 MG: at 21:04

## 2024-03-04 RX ADMIN — PRAVASTATIN SODIUM 20 MG: 20 TABLET ORAL at 08:01

## 2024-03-04 RX ADMIN — GUAIFENESIN 600 MG: 600 TABLET ORAL at 21:04

## 2024-03-04 RX ADMIN — AMOXICILLIN AND CLAVULANATE POTASSIUM 1 TABLET: 875; 125 TABLET, FILM COATED ORAL at 08:01

## 2024-03-04 ASSESSMENT — PAIN SCALES - GENERAL
PAINLEVEL_OUTOF10: 0
PAINLEVEL_OUTOF10: 0

## 2024-03-04 NOTE — TELEPHONE ENCOUNTER
Care Transitions Initial Follow Up Call    Outreach made within 2 business days of discharge: Yes    Patient: Stone Hoskins Patient : 1935   MRN: [id number]  Reason for Admission: There are no discharge diagnoses documented for the most recent discharge.  Discharge Date: 3/1/24       Spoke with: patient admitted to ARU. Will schedule TCM once DC    Discharge department/facility: [facility]    TCM Interactive Patient Contact:  Was patient able to fill all prescriptions: Yes  Was patient instructed to bring all medications to the follow-up visit: Yes  Is patient taking all medications as directed in the discharge summary? Yes  Does patient understand their discharge instructions: Yes  Does patient have questions or concerns that need addressed prior to 7-14 day follow up office visit: no    Scheduled appointment with PCP within 7-14 days    Follow Up  Future Appointments   Date Time Provider Department Center   3/4/2024  2:00 PM Bhavana Alvarado, PT [facility]   3/5/2024  9:00 AM Alexa Parra, MIKEL [facility]   3/5/2024 12:30 PM Elizabeth Tang, OT [facility]   3/5/2024  2:00 PM Bettye Funez, SLP [facility]   2024 10:20 AM Shilpa Solorio MD [facility] Neurology -       Brionna Hoang RN

## 2024-03-04 NOTE — PROGRESS NOTES
[facility] Inpatient Rehabilitation Progress Note    Stone Hoskins  3/3/2024  [id number]    Chief Complaint: Debility    Subjective:   No acute events overnight. Wife present at bedside, states he is well. Asks about heart rate. Patient seen and examined, states he is well and denies new onset complaints.    ROS: Patient Denies Fevers/Chills, Nausea/vomiting, or Chest pain.     Objective:  Patient Vitals for the past 24 hrs:   BP Temp Temp src Pulse Resp SpO2   03/03/24 2030 118/65 98.5 °F (36.9 °C) Oral 85 18 93 %   03/03/24 0844 131/67 98.3 °F (36.8 °C) Oral 93 18 94 %       Gen: No distress, pleasant.   HEENT: Normocephalic, atraumatic.   CV: Regular rate and rhythm.   Resp: No respiratory distress.   Abd: Soft, nontender   Ext: No edema.   Neuro: Alert, oriented, appropriately interactive.     Wt Readings from Last 3 Encounters:   03/01/24 84.9 kg (187 lb 2.7 oz)   02/21/24 89.5 kg (197 lb 6.4 oz)   08/31/23 88 kg (194 lb)       Laboratory data:   Lab Results   Component Value Date    WBC 11.2 (H) 03/02/2024    HGB 15.0 03/02/2024    HCT 44.7 03/02/2024    MCV 86.0 03/02/2024     03/02/2024       Lab Results   Component Value Date/Time     03/02/2024 07:53 AM    K 3.6 03/02/2024 07:53 AM     03/02/2024 07:53 AM    CO2 26 03/02/2024 07:53 AM    BUN 12 03/02/2024 07:53 AM    CREATININE 0.8 03/02/2024 07:53 AM    GLUCOSE 95 03/02/2024 07:53 AM    CALCIUM 8.3 03/02/2024 07:53 AM        Therapy progress:       PT    Supine to Sit: Supervision or touching assistance  Sit to Supine: Supervision or touching assistance   Sit to Stand: Partial/moderate assistance  Chair/Bed to Chair Transfer: Partial/moderate assistance  Car Transfer: Substantial/maximal assistance  Ambulation 10 ft: Partial/moderate assistance  Ambulation 50 ft:    Ambulation 150 ft:    Stairs - 1 Step: Partial/moderate assistance  Stairs - 4 Step: Partial/moderate assistance  Stairs - 12 Step:      OT    Eating: Setup or clean-up  assistance  Oral Hygiene: Supervision or touching assistance  Bathing: Substantial/maximal assistance  Upper Body Dressing: Setup or clean-up assistance  Lower Body Dressing: Substantial/maximal assistance  Toilet Transfer: Partial/moderate assistance  Toilet Hygiene: Supervision or touching assistance    Speech Therapy         ADULT ORAL NUTRITION SUPPLEMENT; Lunch, Dinner; Frozen Oral Supplement  ADULT DIET; Dysphagia - Minced and Moist; Mildly Thick (Nectar)  ADULT ORAL NUTRITION SUPPLEMENT; Breakfast, Dinner; Standard 4 oz Oral Supplement    Body mass index is 26.86 kg/m².    Assessment and Plan:  Sepsis 2/2 acute cholelithiasis  Debility   -Augmentin- unclear stop date  -Attempt to advance diet as able     AMS-Resolved  -CTM     Oropharyngeal dysphia  -SLP following  -Minced and moist solids, mildly thick liquids     Bladder   -High risk retention   -Monitor PVRs, SC prn >300cc     Venous stasis  -PRN Lasix for swelling, give if patient symptomatic     Bowel   -High risk constipation   -senna+colace BID, PRN miralax, MoM, and bisacodyl supp.     Safety   -fall precautions     Pain control  -tylenol PRN     PPx  -DVT: lovenox  -GI: pantoprazole     FULL CODE    Jaun Jones,    3/3/2024, 10:16 PM

## 2024-03-04 NOTE — PROGRESS NOTES
Physical Therapy  Facility/Department: [facility]  Rehabilitation Physical Therapy Treatment Note    NAME: Stone Hoskins  : 1935 (88 y.o.)  MRN: [id number]  CODE STATUS: Full Code    Date of Service: 3/4/24       Restrictions:  Restrictions/Precautions: General Precautions, Fall Risk, Swallowing - Thickened Liquids, Modified Diet  Position Activity Restriction  Other position/activity restrictions: Up with assist     SUBJECTIVE  Subjective  Subjective: Pt asleep in recliner upon therapist's entrance, agreeable to participation in PT session.  Pain: pt denies pain          OBJECTIVE       Functional Mobility  Balance  Sitting Balance: Supervision  Standing Balance: Stand by assistance  Standing Balance  Activity: sup for sitting balance edge of mat, SBA for static standing with RW, up to CGA for dynamic balance with RW  Transfers  Surface: Wheelchair;To chair with arms;To mat;From mat;From chair with arms  Additional Factors: Verbal cues;Hand placement cues;Increased time to complete  Device: Walker  Sit to Stand  Assistance Level: Contact guard assist  Skilled Clinical Factors: multiple throughout session, with RW, VC for sequencing/hand placement, demos kyphotic posture/flexed knees in standing  Stand to Sit  Assistance Level: Contact guard assist  Skilled Clinical Factors: multiple throughout session, with RW, VC for sequencing/hand placement      Environmental Mobility  Ambulation  Surface: Level surface  Device: Rolling walker  Distance: 50', 100', 4x10' target stepping  Activity: Transport Items;Within Unit  Activity Comments: 1) In order to improve endurance for gait while improving posture, pt instructed to collect object from high shelf, transport 10' away one object at a time for 5 objects. Pt required seated rest break after 4 objects secondary to fatigue. Pt required CGA for steadying and frequent VCs for upright posture/knee extension. 2) To improve gait mechanics including step length and heel  strike, pt instructed to ambulate 4x10' placing each foot on a target while emphasizing heel strike. Pt required VC for technique and CGA for steadying particularly when turning.  Additional Factors: Verbal cues;Increased time to complete  Assistance Level: Contact guard assist  Gait Deviations: Slow mati;Decreased step length bilateral;Narrow base of support;Decreased heel strike right;Decreased heel strike left      Neuromuscular Education  Neuromuscular Education: Yes  Neuromuscular Comments: In order to improve standing posture and dual task, pt instructed to stand with RW and reach outside [place] for letters on sticky notes in front of him and spell different words. Pt able to complete task with SBA for safety and VC for spelling and context for word finding. Pt spelled 4 different words and required seated rest breaks between each due to fatigue.      PT Exercises  Exercise Treatment: To improve BLE strength and endurance, pt instructed to alternately place feet up to 6\" step (sofy HR support) to targets placed to encourage wider steps. Pt required SBA for safety and VC for posture.      ASSESSMENT/PROGRESS TOWARDS GOALS  Vital Signs  BP: 118/73  BP Location: Right upper arm  MAP (Calculated): 88  SpO2: 96 %  O2 Device: None (Room air)    Assessment  Assessment: Pt seen in PM for PT session focusing on transfers, gait, and neuromuscular reeducation for posture. Pt required CGA for t/f and gait with RW and SBA for balance/dual task activity. Pt primarily limited by fatigue and demos severely kyphotic/flexed posture throughout session. Pt able to achieve upright standing posture with VC but reverts to flexed posture seconds after. Pt continues to require skilled PT in ARU to address remaining barriers including weakness, impaired balance, impaired posture, and decreased functional capacity for sustained activity.  Activity Tolerance: Patient tolerated treatment well  Discharge Recommendations: 24 hour supervision  Education  Education Provided Comments: role of PT, transfer training, improved posture, importance of increased mobility, family education regarding care conference  Education Method: Verbal;Demonstration  Barriers to Learning: Hearing;Cognition  Education Outcome: Verbalized understanding;Continued education needed        Therapy Time   Individual Concurrent Group Co-treatment   Time In 1400         Time Out 1500         Minutes 60           Timed Code Treatment Minutes: 60 Minutes       PUNEET LEIVA, ELIO, 03/04/24 at 4:11 PM

## 2024-03-04 NOTE — PROGRESS NOTES
Occupational Therapy  Facility/Department: [facility]  Rehabilitation Occupational Therapy Daily Treatment Note    Date: 3/4/24  Patient Name: Stone Hoskins       Room: 0166/0166-01  MRN: [id number]  Account: [id number]   : 1935  (88 y.o.) Gender: male                    Past Medical History:  has a past medical history of Arthritis, Hyperlipidemia, Influenza A, and Right-sided low back pain with right-sided sciatica.  Past Surgical History:   has a past surgical history that includes Tonsillectomy; Tooth Extraction; Inguinal hernia repair (Bilateral, 11); and Cataract removal.    Restrictions  Restrictions/Precautions: General Precautions, Fall Risk, Swallowing - Thickened Liquids, Modified Diet  Other position/activity restrictions: Up with assist    Subjective  Subjective: Pt in bed at OT arrival. Pleasant and agreeable to therapy. Denies pain. /61, HR 84.        Objective     ADL  Feeding  Assistance Level: Set-up  Skilled Clinical Factors: LEWIS for lunch; assist to open packages  Lower Extremity Dressing  Assistance Level: Moderate assistance;Verbal cues;Increased time to complete  Skilled Clinical Factors: min A for balance in stance; assist to manage briefs/pants to waist in standing; increased time to thread BLE through briefs and pants; requries CGA-SBA when reaching anteriorly/inferiorly to thread BLE  Putting On/Taking Off Footwear  Assistance Level: Maximum assistance;Verbal cues;Increased time to complete  Skilled Clinical Factors: asssit to doff sock, pt able to thread sock over R foot, requires assist to pull over heel; pt may benefit from sock aid          Bed Mobility  Overall Assistance Level: Stand By Assist  Additional Factors: Head of bed raised;Increased time to complete;Verbal cues  Supine to Sit  Assistance Level: Stand by assist  Skilled Clinical Factors: HOB elevated, incr time  Scooting  Assistance Level: Stand by assist  Skilled Clinical Factors: towards  facilitate increased independence with ADLs and IADLs. Cont OT POC  Activity Tolerance: Patient tolerated treatment well  Discharge Recommendations: Continue to assess pending progress;24 hour supervision or assist;Home with Home health OT  OT Equipment Recommendations  Equipment Needed: Yes  Mobility Devices: ADL Assistive Devices  ADL Assistive Devices: Transfer Tub Bench  Safety Devices  Safety Devices in place: Yes  Type of devices: Call light within reach;All fall risk precautions in place;Left in chair;Patient at risk for falls;Gait belt;Chair alarm in place;Nurse notified    Patient Education  Education  Education Given To: Patient  Education Provided: Role of Therapy;Plan of Care;ADL Function;Safety;Transfer Training;Fall Prevention Strategies;Energy Conservation  Education Provided Comments: role of OT, POC, hand placement with transfers, activity/standing tolerance, performance of ADLs  Education Method: Demonstration;Verbal  Barriers to Learning: Cognition;Hearing  Education Outcome: Continued education needed;Verbalized understanding    Plan  Occupational Therapy Plan  Times Per Week: 5-7x/week  Current Treatment Recommendations: Strengthening;Balance training;ROM;Functional mobility training;Endurance training;Safety education & training;Positioning;Equipment evaluation, education, & procurement;Self-Care / ADL    Goals  Patient Goals   Patient goals : \"get stronger and go home\"  Short Term Goals  Time Frame for Short Term Goals: 7 days (3/08/2024): unless otherwise noted - goals ongoing 3/4  Short Term Goal 1: Pt will complete toilet transfer w/ LRD and SBA.  Short Term Goal 2: Pt will complete FB bathing w/ Aletha.  Short Term Goal 3: Pt will complete LB dressing w/ SBA and AE PRN.  Short Term Goal 4: Pt stand at sink to complete grooming ADLs for ~4mins w/ SBA.  Long Term Goals  Long Term Goal 1: Pt will complete toilet transfer w/ LRD and mod. I.  Long Term Goal 2: Pt will complete FB bathing w/ AE  PRN and mod. I.  Long Term Goal 3: Pt will complete LB dressing w/ mod. I. and AE PRN.  Long Term Goal 4: Pt stand at sink to complete grooming ADLs for ~4mins w/ SPV.      Therapy Time   Individual Concurrent Group Co-treatment   Time In 1030         Time Out 1130         Minutes 60         Timed Code Treatment Minutes: 60 Minutes       Mathieu Zelaya, OT

## 2024-03-04 NOTE — PROGRESS NOTES
[facility]: ACUTE REHAB UNIT  SPEECH-LANGUAGE PATHOLOGY      [x] Daily  [] Weekly Care Conference Note  [] Discharge    Patient:Stone Hoskins      :1935  MRN:[id number]  Rehab Dx/Hx: Debility [R53.81]    Precautions: falls and aspirations  Home situation: lives with wife. Wife manages finances, medications, and all household tasks. Pt is an active .   ST Dx: [] Aphasia  [] Dysarthria  [] Apraxia   [x] Oropharyngeal dysphagia [x] Cognitive Impairment  [] Other:   Date of Admit: 3/1/2024  Room #: 0166/0166-01    Current functional status (updated daily):         Pt being seen for : [] Speech/Language Treatment  [x] Dysphagia Treatment [x] Cognitive Treatment  [] Other:  Communication: [x]WFL  [] Aphasia  [] Dysarthria  [] Apraxia  [] Pragmatic Impairment [] Non-verbal  [] Hearing Loss  [] Other:   Cognition: [] WFL  [] Mild  [x] Moderate  [x] Severe [] Unable to Assess  [] Other:  Memory: [] WFL  [] Mild  [x] Moderate  [] Severe [] Unable to Assess  [] Other:  Behavior: [x] Alert  [x] Cooperative  [x]  Pleasant  [] Confused  [] Agitated  [] Uncooperative  [] Distractible [] Motivated  [] Self-Limiting [] Anxious  [] Other:  Endurance:  [x] Adequate for participation in SLP sessions  [] Reduced overall  [] Lethargic  [] Other:  Safety: [] No concerns at this time  [x] Reduced insight into deficits  [x]  Reduced safety awareness [] Not following call light procedures  [] Unable to Assess  [] Other:    Current Diet Order:ADULT ORAL NUTRITION SUPPLEMENT; Lunch, Dinner; Frozen Oral Supplement  ADULT DIET; Dysphagia - Minced and Moist; Mildly Thick (Nectar)  ADULT ORAL NUTRITION SUPPLEMENT; Breakfast, Dinner; Standard 4 oz Oral Supplement  Swallowing Precautions: upright for all intake, stay upright for at least 30 mins after intake, small bites/sips, close supervision, oral care 2-3x/day to reduce adverse affects in the event of aspiration, increase physical mobility as able, alternate bites/sips, slow rate of  Speech/Language Treatment  [] Instruction in HEP [] Group [] Dysphagia Treatment [] Cognitive Treatment   [] Other:      Total Time Breakdown / Charges    Time in Time out Total Time / units   Cognitive Tx 0930 1000 30 min / 2 units    Speech Tx      Dysphagia Tx 0900 0930 30 min/ 1 unit        Electronically Signed by     Bettye Funez MA CCC-SLP #[id number]  Speech Language Pathologist

## 2024-03-04 NOTE — CARE COORDINATION
Case Management ARU Admission Assessment   [facility]    Patient:Stone Hoskins     Rehab Dx/Hx: Debility [R53.81]   :1935  MRN:[id number]  Date of Admit: 3/1/2024  Room #: 0166/0166-01       Objective:  reviewed chart and to complete initial assessment and review the role of Case Management while on the ARU. Educate patient on team conferences. Discuss family training with the patient/family on how it is encouraged on the unit. Order for \"discharge planning\" has been addressed.          Family Present: no   Primary : [facility] Decision Maker:   Primary Decision Maker: Georgia Hoskins - Spouse - [phone]    Secondary Decision Maker: Viri Pollard - Child - [phone]    Secondary Decision Maker: Lauren Lazaro - Child - [phone]    Secondary Decision Maker: Roni Hoskins - Child - [phone]   Current PCP:  Yesenia Fatima DO       Admit date:  3/1/2024   Insurance: [employer]    Precert required for SNF:  [] No       [x] Yes   3 Night stay required: [x] No       [] Yes   Admitting diagnosis: Debility [R53.81]       Current home situation: Independent PLOF walker level.Lives with spouse in a one level home with level entry.   DME at home: [x] Walker     [x] Cane>hurry       [] RTS        [] BSC      [] Shower Chair        [] O2       [] HHN     [] CPAP       [] BiPap      [] Hospital Bed       [x] W/C        [] Other:    Medical concerns requiring training: none   Medication concerns:  Require financial assistance with meds? [x] No       [] If yes, please explain:   [] Yes              Services prior to admission: none   Preference for HHC or OP Therapy: [x] Home health       [] Outpatient       [] No preference   Patient's goal(s): \"get stronger and go home\"    Working or volunteering PTA? retired       Transportation needs: Spouse can assist   Has lack of transportation kept you from medical appointments, meetings, work, or ADL's? [] Yes, it  respond                      [] Pt declines to respond                  [] None of the above   Preferred Language: English     ECOC on chart for MD Signature.     Signature:  Paige Kim RN

## 2024-03-04 NOTE — PROGRESS NOTES
[facility] Inpatient Rehabilitation Progress Note    Stone Hoskins  3/4/2024  [id number]    Chief Complaint: Debility    Subjective:   No acute events overnight. Today Stone is seen with Speech and his wife present. He reports that he is feeling well and denies acute complaints at this time.    ROS: denies f/c, n/v, cp, sob    Objective:  Patient Vitals for the past 24 hrs:   BP Temp Temp src Pulse Resp SpO2   03/04/24 0800 126/66 -- -- 93 -- --   03/04/24 0745 (!) 126/56 97.5 °F (36.4 °C) Oral 94 16 95 %   03/03/24 2030 118/65 98.5 °F (36.9 °C) Oral 85 18 93 %     Gen: No distress, pleasant.   HEENT: Normocephalic, atraumatic.   CV: Regular rate and rhythm.   Resp: No respiratory distress.   Abd: Soft, nondistended  Ext: No edema.   Neuro: Alert, oriented, appropriately interactive.     Wt Readings from Last 3 Encounters:   03/01/24 84.9 kg (187 lb 2.7 oz)   02/21/24 89.5 kg (197 lb 6.4 oz)   08/31/23 88 kg (194 lb)       Laboratory data:   Lab Results   Component Value Date    WBC 12.5 (H) 03/04/2024    HGB 14.8 03/04/2024    HCT 44.4 03/04/2024    MCV 86.5 03/04/2024     03/04/2024       Lab Results   Component Value Date/Time     03/04/2024 06:44 AM    K 3.6 03/04/2024 06:44 AM     03/04/2024 06:44 AM    CO2 27 03/04/2024 06:44 AM    BUN 18 03/04/2024 06:44 AM    CREATININE 0.8 03/04/2024 06:44 AM    GLUCOSE 99 03/04/2024 06:44 AM    CALCIUM 8.5 03/04/2024 06:44 AM        Therapy progress:       PT    Supine to Sit: Supervision or touching assistance  Sit to Supine: Supervision or touching assistance   Sit to Stand: Partial/moderate assistance  Chair/Bed to Chair Transfer: Partial/moderate assistance  Car Transfer: Substantial/maximal assistance  Ambulation 10 ft: Partial/moderate assistance  Ambulation 50 ft:    Ambulation 150 ft:    Stairs - 1 Step: Partial/moderate assistance  Stairs - 4 Step: Partial/moderate assistance  Stairs - 12 Step:      OT    Eating: Setup or clean-up

## 2024-03-05 LAB
BASOPHILS # BLD: 0.1 K/UL (ref 0–0.2)
BASOPHILS NFR BLD: 1 %
DEPRECATED RDW RBC AUTO: 14.3 % (ref 12.4–15.4)
EOSINOPHIL # BLD: 0.2 K/UL (ref 0–0.6)
EOSINOPHIL NFR BLD: 1.7 %
HCT VFR BLD AUTO: 43.4 % (ref 40.5–52.5)
HGB BLD-MCNC: 14.7 G/DL (ref 13.5–17.5)
LYMPHOCYTES # BLD: 2.1 K/UL (ref 1–5.1)
LYMPHOCYTES NFR BLD: 18.2 %
MCH RBC QN AUTO: 29.3 PG (ref 26–34)
MCHC RBC AUTO-ENTMCNC: 34 G/DL (ref 31–36)
MCV RBC AUTO: 86.2 FL (ref 80–100)
MONOCYTES # BLD: 0.9 K/UL (ref 0–1.3)
MONOCYTES NFR BLD: 7.5 %
NEUTROPHILS # BLD: 8.2 K/UL (ref 1.7–7.7)
NEUTROPHILS NFR BLD: 71.6 %
PLATELET # BLD AUTO: 339 K/UL (ref 135–450)
PMV BLD AUTO: 7.2 FL (ref 5–10.5)
RBC # BLD AUTO: 5.03 M/UL (ref 4.2–5.9)
WBC # BLD AUTO: 11.4 K/UL (ref 4–11)

## 2024-03-05 PROCEDURE — 1280000000 HC REHAB R&B

## 2024-03-05 PROCEDURE — 36415 COLL VENOUS BLD VENIPUNCTURE: CPT

## 2024-03-05 PROCEDURE — 97129 THER IVNTJ 1ST 15 MIN: CPT

## 2024-03-05 PROCEDURE — 97535 SELF CARE MNGMENT TRAINING: CPT

## 2024-03-05 PROCEDURE — 85025 COMPLETE CBC W/AUTO DIFF WBC: CPT

## 2024-03-05 PROCEDURE — 6370000000 HC RX 637 (ALT 250 FOR IP): Performed by: STUDENT IN AN ORGANIZED HEALTH CARE EDUCATION/TRAINING PROGRAM

## 2024-03-05 PROCEDURE — 97130 THER IVNTJ EA ADDL 15 MIN: CPT

## 2024-03-05 PROCEDURE — 97530 THERAPEUTIC ACTIVITIES: CPT

## 2024-03-05 PROCEDURE — 92526 ORAL FUNCTION THERAPY: CPT

## 2024-03-05 PROCEDURE — 6360000002 HC RX W HCPCS: Performed by: STUDENT IN AN ORGANIZED HEALTH CARE EDUCATION/TRAINING PROGRAM

## 2024-03-05 PROCEDURE — 97116 GAIT TRAINING THERAPY: CPT

## 2024-03-05 PROCEDURE — 97110 THERAPEUTIC EXERCISES: CPT

## 2024-03-05 RX ADMIN — GUAIFENESIN 600 MG: 600 TABLET ORAL at 19:55

## 2024-03-05 RX ADMIN — Medication 1 TABLET: at 08:53

## 2024-03-05 RX ADMIN — METOPROLOL SUCCINATE 25 MG: 25 TABLET, FILM COATED, EXTENDED RELEASE ORAL at 08:53

## 2024-03-05 RX ADMIN — ENOXAPARIN SODIUM 40 MG: 100 INJECTION SUBCUTANEOUS at 08:53

## 2024-03-05 RX ADMIN — GUAIFENESIN 600 MG: 600 TABLET ORAL at 08:53

## 2024-03-05 RX ADMIN — Medication 3 MG: at 19:55

## 2024-03-05 ASSESSMENT — PAIN SCALES - GENERAL
PAINLEVEL_OUTOF10: 0
PAINLEVEL_OUTOF10: 0

## 2024-03-05 NOTE — PROGRESS NOTES
[facility]: ACUTE REHAB UNIT  SPEECH-LANGUAGE PATHOLOGY      [x] Daily  [] Weekly Care Conference Note  [] Discharge    Patient:Stone Hoskins      :1935  MRN:[id number]  Rehab Dx/Hx: Debility [R53.81]    Precautions: falls and aspirations  Home situation: lives with wife. Wife manages finances, medications, and all household tasks. Pt is an active .   ST Dx: [] Aphasia  [] Dysarthria  [] Apraxia   [x] Oropharyngeal dysphagia [x] Cognitive Impairment  [] Other:   Date of Admit: 3/1/2024  Room #: 0166/0166-01    Current functional status (updated daily):         Pt being seen for : [] Speech/Language Treatment  [x] Dysphagia Treatment [x] Cognitive Treatment  [] Other:  Communication: [x]WFL  [] Aphasia  [] Dysarthria  [] Apraxia  [] Pragmatic Impairment [] Non-verbal  [] Hearing Loss  [] Other:   Cognition: [] WFL  [] Mild  [x] Moderate  [x] Severe [] Unable to Assess  [] Other:  Memory: [] WFL  [] Mild  [x] Moderate  [] Severe [] Unable to Assess  [] Other:  Behavior: [x] Alert  [x] Cooperative  [x]  Pleasant  [] Confused  [] Agitated  [] Uncooperative  [] Distractible [] Motivated  [] Self-Limiting [] Anxious  [] Other:  Endurance:  [x] Adequate for participation in SLP sessions  [] Reduced overall  [] Lethargic  [] Other:  Safety: [] No concerns at this time  [x] Reduced insight into deficits  [x]  Reduced safety awareness [] Not following call light procedures  [] Unable to Assess  [] Other:    Current Diet Order:ADULT ORAL NUTRITION SUPPLEMENT; Lunch, Dinner; Frozen Oral Supplement  ADULT DIET; Dysphagia - Minced and Moist; Mildly Thick (Nectar)  ADULT ORAL NUTRITION SUPPLEMENT; Breakfast, Dinner; Standard 4 oz Oral Supplement  Swallowing Precautions: upright for all intake, stay upright for at least 30 mins after intake, small bites/sips, close supervision, oral care 2-3x/day to reduce adverse affects in the event of aspiration, increase physical mobility as able, alternate bites/sips, slow rate of    Estimated discharge date: TBD    Interventions used this date:  [] Speech/Language Treatment  [] Instruction in HEP [] Group [] Dysphagia Treatment [] Cognitive Treatment   [] Other:      Total Time Breakdown / Charges    Time in Time out Total Time / units   Cognitive Tx 1430 1500 30 min / 2 units    Speech Tx      Dysphagia Tx 1400 1430 30 min/ 1 unit        Electronically Signed by     Bettye Funez MA CCC-SLP #[id number]  Speech Language Pathologist

## 2024-03-05 NOTE — PROGRESS NOTES
Physical Therapy  Facility/Department: [facility]  Rehabilitation Physical Therapy Treatment Note    NAME: Stone Hoskins  : 1935 (88 y.o.)  MRN: [id number]  CODE STATUS: Full Code    Date of Service: 3/5/24       Restrictions:  Restrictions/Precautions: General Precautions, Fall Risk, Swallowing - Thickened Liquids, Modified Diet  Position Activity Restriction  Other position/activity restrictions: Up with assist     SUBJECTIVE  Subjective  Subjective: Pt in bed upon arrival, agreeable to therapy  Pain: pt denies pain        OBJECTIVE  98 bpm, 95% following ambulation; 136/70 at rest     Cognition  Overall Cognitive Status: Exceptions  Arousal/Alertness: Delayed responses to stimuli  Safety Judgement: Decreased awareness of need for assistance;Decreased awareness of need for safety  Problem Solving: Assistance required to implement solutions;Assistance required to identify errors made;Assistance required to correct errors made;Decreased awareness of errors  Insights: Decreased awareness of deficits  Initiation: Requires cues for some  Sequencing: Requires cues for some  Orientation  Overall Orientation Status: Within Functional Limits  Orientation Level: Oriented X4;Oriented to place;Oriented to time;Oriented to situation;Oriented to person    Functional Mobility  Supine to Sit  Assistance Level: Stand by assist  Skilled Clinical Factors: HOB elevated, increased time to complete  Sit to Stand  Assistance Level: Contact guard assist;Stand by assist  Skilled Clinical Factors: CGA-SBA with RW  Stand to Sit  Assistance Level: Stand by assist  Skilled Clinical Factors: with RW      Environmental Mobility  Ambulation  Surface: Level surface  Device: Rolling walker  Distance: 150 ft in hallway, 50 ft to bathroom in gym, 30 ft to stairs, 70 ft back to recliner at end  of session  Activity: Within Unit  Activity Comments: Pt ambulates with slow mati, downward gaze, short step length, minimal heel strike and  falls  Restraints  Restraints Initially in Place: No    EDUCATION  Education  Education Given To: Patient  Education Provided: Role of Therapy;Plan of Care;Mobility Training;Transfer Training;Safety;Fall Prevention Strategies;Precautions;Family Education;Energy Conservation  Education Provided Comments: safe hand placement with transfers, safety, progressing endurance  Education Method: Verbal;Demonstration  Barriers to Learning: Hearing;Cognition  Education Outcome: Verbalized understanding;Continued education needed        Therapy Time   Individual Concurrent Group Co-treatment   Time In 0900         Time Out 1000         Minutes 60           Timed Code Treatment Minutes: 60 Minutes       Alexa Parra PT, 03/05/24 at 11:56 AM

## 2024-03-05 NOTE — PROGRESS NOTES
arms;Standard toilet  Additional Factors: Set-up;Verbal cues;Hand placement cues;Increased time to complete  Device: Walker  Sit to Stand  Assistance Level: Contact guard assist;Minimal assistance  Skilled Clinical Factors: CGA/Aletha this date throughout session from bedside chair, toilet, TTB.  Stand to Sit  Assistance Level: Contact guard assist   OT Exercises  Exercise Treatment: w/ 2# DC, completed x15 reps of bicep curls, chest press, shoulder press, scapular ABduction, and supination/pronation.     Assessment  Assessment: Pt received for OT session resting in bedside chair, agreeable to session. Completed sit<>stand to/from RW w/ CGA from chair, CGA from toilet, and Aletha from TTB. Pt incontinent of bladder this date, voided bowels on toilet and completed hygiene seated on toilet w/ increased time. Pt completed UB bathing w/ SBA, LB bathing w/ Aletha (HHA with CGA while standing to bathe buttocks), assist with feet and lower legs for thoroughness. Pt completed UB dressing w/ Aletah this date, and LB dressing w/ modA and increased time. TotalA for socks d/t increased swelling. Completed standing grooming tasks at sink (brushing teeth) with increased time and verbal/tactile cues to quads for maintaining full stance. Transferred to bedside chair where pt completed BUE AROM exercises w/ 2# weight. Required tactile and verbal cues for proper form and sequencing of exercise technique. Pt continues to make good progress towards therapy goals. Continue POC.     Activity Tolerance: Patient tolerated treatment well  Discharge Recommendations: Continue to assess pending progress;24 hour supervision or assist;Home with Home health OT  OT Equipment Recommendations  Equipment Needed: Yes  ADL Assistive Devices: Transfer Tub Bench  Other: CTA  Safety Devices  Safety Devices in place: Yes  Type of devices: Call light within reach;All fall risk precautions in place;Left in chair;Patient at risk for falls;Gait belt;Chair alarm in  place;Nurse notified  Restraints  Initially in place: No    Patient Education  Education  Education Given To: Patient  Education Provided: Role of Therapy;Plan of Care;ADL Function;Safety;Transfer Training;Fall Prevention Strategies;Energy Conservation  Education Provided Comments: role of OT, POC, hand placement with transfers, activity/standing tolerance, performance of ADLs  Education Method: Demonstration;Verbal  Barriers to Learning: Cognition;Hearing  Education Outcome: Continued education needed;Verbalized understanding    Plan  Occupational Therapy Plan  Times Per Week: 5-7x/week  Current Treatment Recommendations: Strengthening;Balance training;ROM;Functional mobility training;Endurance training;Safety education & training;Positioning;Equipment evaluation, education, & procurement;Self-Care / ADL    Goals all non-met goals ongoing as of 3/05/2024  Patient Goals   Patient goals : \"get stronger and go home\"  Short Term Goals  Time Frame for Short Term Goals: 7 days (3/08/2024): unless otherwise noted - goals ongoing 3/4  Short Term Goal 1: Pt will complete toilet transfer w/ LRD and SBA.  Short Term Goal 2: Pt will complete FB bathing w/ Aletha.  Short Term Goal 3: Pt will complete LB dressing w/ SBA and AE PRN.  Short Term Goal 4: Pt stand at sink to complete grooming ADLs for ~4mins w/ SBA.  Long Term Goals  Long Term Goal 1: Pt will complete toilet transfer w/ LRD and mod. I.  Long Term Goal 2: Pt will complete FB bathing w/ AE PRN and mod. I.  Long Term Goal 3: Pt will complete LB dressing w/ mod. I. and AE PRN.  Long Term Goal 4: Pt stand at sink to complete grooming ADLs for ~4mins w/ SPV.    Therapy Time   Individual Concurrent Group Co-treatment   Time In 1230         Time Out 1330         Minutes 60         Timed Code Treatment Minutes: 60 Minutes     Elizabeth Tang OT

## 2024-03-05 NOTE — PATIENT CARE CONFERENCE
[facility]  Inpatient Rehabilitation  Weekly Team Conference Note    Date: 3/6/2024  Patient Name: Stone Hoskins        MRN: [id number]    : 1935  (88 y.o.)  Gender: male   Referring Practitioner: Ayala Chambers MD  Diagnosis: Sepsis      Interventions to be utilized toward barriers to discharge, per discipline:  NURSING  Nursing observed barriers to dc: Limited safety awareness, Decreased endurance, Upper extremity weakness, Lower extremity weakness, Incontinence of bladder, Skin Care, Wound Care, and Medication managment  Nursing interventions: Assist with ADLs, medication management  Family Education: No  Fall Risk:  Yes    Stay with me?: Yes    PHYSICAL THERAPY  Physical therapy observed barriers to dc:    Baseline: mod I with RW, lives with spouse    Current level: CGA-SBA for transfers, ambulates up to 150 ft with RW and CGA-SBA, climbs 4 stairs with BHR and CGA   Barriers to DC: endurance, safety awareness, cognition   Needs in order to achieve dc home/next level of care: supervision for transfers and gait with RW, , OP PT      Physical therapy interventions:   Current Treatment Recommendations: Strengthening, Wheelchair mobility training, ROM, Balance training, Gait training, Home exercise program, Therapeutic activities, Safety education & training, Stair training, Functional mobility training, Transfer training, Neuromuscular re-education, Manual, Patient/Caregiver education & training, Endurance training, Equipment evaluation, education, & procurement, Positioning    PT Goals:            Short Term Goals  Time Frame for Short Term Goals: 1 week 24  Short Term Goal 1: Pt will complete bed mobility with Sup  Short Term Goal 2: Pt will complete functional t/f with CGA with LRAD; goal met 3/5 - pt completes transfers with CGA  Short Term Goal 3: Pt will ambulate >50' with LRAD with CGA without LOB; goal met 3/5 - pt ambulates >50 ft with RW and CGA-SBA, no  Positioning, Equipment evaluation, education, & procurement, Self-Care / ADL    OT Goals:  Patient Goals   Patient goals : \"get stronger and go home\"  Short Term Goals  Time Frame for Short Term Goals: 7 days (3/08/2024): unless otherwise noted - goals ongoing 3/4  Short Term Goal 1: Pt will complete toilet transfer w/ LRD and SBA.  Short Term Goal 2: Pt will complete FB bathing w/ Aletha.  Short Term Goal 3: Pt will complete LB dressing w/ SBA and AE PRN.  Short Term Goal 4: Pt stand at sink to complete grooming ADLs for ~4mins w/ SBA.  Long Term Goals  Long Term Goal 1: Pt will complete toilet transfer w/ LRD and mod. I.  Long Term Goal 2: Pt will complete FB bathing w/ AE PRN and mod. I.  Long Term Goal 3: Pt will complete LB dressing w/ mod. I. and AE PRN.  Long Term Goal 4: Pt stand at sink to complete grooming ADLs for ~4mins w/ SPV.    OT Assessment: Pt received for OT session resting in bedside chair, agreeable to session. Completed sit<>stand to/from RW w/ CGA from chair, CGA from toilet, and Aletha from TTB. Pt incontinent of bladder this date, voided bowels on toilet and completed hygiene seated on toilet w/ increased time. Pt completed UB bathing w/ SBA, LB bathing w/ Aletha (HHA with CGA while standing to bathe buttocks), assist with feet and lower legs for thoroughness. Pt completed UB dressing w/ Aletha this date, and LB dressing w/ modA and increased time. TotalA for socks d/t increased swelling. Completed standing grooming tasks at sink (brushing teeth) with increased time and verbal/tactile cues to quads for maintaining full stance. Transferred to bedside chair where pt completed BUE AROM exercises w/ 2# weight. Required tactile and verbal cues for proper form and sequencing of exercise technique. Pt continues to make good progress towards therapy goals. Continue POC     SPEECH THERAPY   Speech therapy observed barriers to dc:    Baseline:  lives with wife. Wife manages finances, medications, and all  nutritional status.         CASE MANAGEMENT  Assessment: 88 yr old male. Dx:Debility. Independent PLOF walker level.Lives with spouse in a one level home with level entry. Patient owns walker, hurry cane and M WC. Therapy recommendations are 24 hour supervision or assist;Outpatient. No DME.         Interdisciplinary Goals:   1.)Pt will complete functional t/f with Sup with LRAD   2.) Pt will complete toileting w/ SPV.  3.) pt will tolerate thin liquids following FFWP guidelines with no overt s/s of aspiration   4.)  5.)      [x]  Family Training discussed at conference and to be scheduled with wife.      Discharge Plan   Estimated discharge date: 3/15/24  Destination: outpatient therapies  Pass:No  Services at Discharge: Outpatient Physical Therapy, Occupational Therapy, and Speech Therapy   Equipment at Discharge: TTB    Team Members Present at Conference:  : Paige Kim RN    Occupational Therapist: Diandra Orona OT  Physical Therapist: Alexa Parra PT  Speech Therapist: Bettye Funez MA CCC-SLP  Nurse: Viky Crowell RN  Dietician: Sarah Harkins RD, LD  : Kimo Reyna OTR/L  Psychiatry: N/A    Family members present at conference: No      I led this team conference and I approve the established interdisciplinary plan of care as documented within the medical record of Stone Hoskins.    MD: Electronically signed by Ayala Chambers MD on 3/6/2024 at 12:14 PM

## 2024-03-05 NOTE — CARE COORDINATION
Chart reviewed for ARU. IP day #: 4  Discharge date: TBD  Therapy recommendations: 24 hr sup/assist w/outpatient therapies  DME needed: None    Team conference set for 03/06/24.    Paige Kim RN

## 2024-03-05 NOTE — PROGRESS NOTES
[facility] Inpatient Rehabilitation Progress Note    Stone Hoskins  3/5/2024  [id number]    Chief Complaint: Debility    Subjective:   No acute events overnight. Today Stone is seen with therapy present. He reports that he is feeling well and denies acute complaints at this time.     ROS: denies f/c, n/v, cp, sob    Objective:  Patient Vitals for the past 24 hrs:   BP Temp Temp src Pulse Resp SpO2   03/05/24 0853 136/70 -- -- 83 -- --   03/05/24 0715 131/81 97.8 °F (36.6 °C) Oral 93 18 94 %   03/04/24 2100 137/81 98.3 °F (36.8 °C) Oral 86 18 94 %   03/04/24 1400 118/73 -- -- -- -- 96 %     Gen: No distress, pleasant.   HEENT: Normocephalic, atraumatic.   CV: Regular rate and rhythm.   Resp: No respiratory distress. Lungs CTAB  Abd: Soft, nondistended  Ext: No edema.   Neuro: Alert, oriented, appropriately interactive.     Wt Readings from Last 3 Encounters:   03/01/24 84.9 kg (187 lb 2.7 oz)   02/21/24 89.5 kg (197 lb 6.4 oz)   08/31/23 88 kg (194 lb)       Laboratory data:   Lab Results   Component Value Date    WBC 11.4 (H) 03/05/2024    HGB 14.7 03/05/2024    HCT 43.4 03/05/2024    MCV 86.2 03/05/2024     03/05/2024       Lab Results   Component Value Date/Time     03/04/2024 06:44 AM    K 3.6 03/04/2024 06:44 AM     03/04/2024 06:44 AM    CO2 27 03/04/2024 06:44 AM    BUN 18 03/04/2024 06:44 AM    CREATININE 0.8 03/04/2024 06:44 AM    GLUCOSE 99 03/04/2024 06:44 AM    CALCIUM 8.5 03/04/2024 06:44 AM        Therapy progress:       PT    Supine to Sit: Supervision or touching assistance  Sit to Supine: Supervision or touching assistance   Sit to Stand: Partial/moderate assistance  Chair/Bed to Chair Transfer: Partial/moderate assistance  Car Transfer: Substantial/maximal assistance  Ambulation 10 ft: Partial/moderate assistance  Ambulation 50 ft:    Ambulation 150 ft:    Stairs - 1 Step: Partial/moderate assistance  Stairs - 4 Step: Partial/moderate assistance  Stairs - 12 Step:       OT    Eating: Setup or clean-up assistance  Oral Hygiene: Supervision or touching assistance  Bathing: Substantial/maximal assistance  Upper Body Dressing: Setup or clean-up assistance  Lower Body Dressing: Substantial/maximal assistance  Toilet Transfer: Partial/moderate assistance  Toilet Hygiene: Supervision or touching assistance    Speech Therapy         ADULT ORAL NUTRITION SUPPLEMENT; Lunch, Dinner; Frozen Oral Supplement  ADULT DIET; Dysphagia - Minced and Moist; Mildly Thick (Nectar)  ADULT ORAL NUTRITION SUPPLEMENT; Breakfast, Dinner; Standard 4 oz Oral Supplement    Body mass index is 26.86 kg/m².    Assessment and Plan:    Debility   - due to Sepsis 2/2 acute cholelithiasis  - completed 10 day course of abx, discontinued augmentin  - follow up with General Surgery outpatient  - PT, OT     AMS-Resolved  - CTM     Oropharyngeal dysphagia  - on modified diet  - SLP following     Bladder   - High risk retention   - Monitor PVRs, SC prn >300cc     Venous stasis  - PRN Lasix for swelling, give if patient symptomatic  - daily weights     Bowel   - High risk constipation   - adjust medications as needed for regular bowel movements     Safety   - fall precautions     Pain control  - tylenol PRN     PPx  -DVT: lovenox  -GI: pantoprazole     FULL CODE    Therapy progress: progresses to CGA for t/f     Ayala Chambers MD   3/5/2024, 11:28 AM

## 2024-03-06 LAB
ANION GAP SERPL CALCULATED.3IONS-SCNC: 5 MMOL/L (ref 3–16)
BASOPHILS # BLD: 0.1 K/UL (ref 0–0.2)
BASOPHILS NFR BLD: 0.8 %
BUN SERPL-MCNC: 17 MG/DL (ref 7–20)
CALCIUM SERPL-MCNC: 8.6 MG/DL (ref 8.3–10.6)
CHLORIDE SERPL-SCNC: 104 MMOL/L (ref 99–110)
CO2 SERPL-SCNC: 30 MMOL/L (ref 21–32)
CREAT SERPL-MCNC: 0.7 MG/DL (ref 0.8–1.3)
DEPRECATED RDW RBC AUTO: 14.5 % (ref 12.4–15.4)
EOSINOPHIL # BLD: 0.2 K/UL (ref 0–0.6)
EOSINOPHIL NFR BLD: 2.2 %
GFR SERPLBLD CREATININE-BSD FMLA CKD-EPI: >60 ML/MIN/{1.73_M2}
GLUCOSE SERPL-MCNC: 85 MG/DL (ref 70–99)
HCT VFR BLD AUTO: 44.3 % (ref 40.5–52.5)
HGB BLD-MCNC: 14.6 G/DL (ref 13.5–17.5)
LYMPHOCYTES # BLD: 2.1 K/UL (ref 1–5.1)
LYMPHOCYTES NFR BLD: 18.8 %
MCH RBC QN AUTO: 28.5 PG (ref 26–34)
MCHC RBC AUTO-ENTMCNC: 32.9 G/DL (ref 31–36)
MCV RBC AUTO: 86.7 FL (ref 80–100)
MONOCYTES # BLD: 0.8 K/UL (ref 0–1.3)
MONOCYTES NFR BLD: 7.5 %
NEUTROPHILS # BLD: 8 K/UL (ref 1.7–7.7)
NEUTROPHILS NFR BLD: 70.7 %
PLATELET # BLD AUTO: 330 K/UL (ref 135–450)
PMV BLD AUTO: 7.1 FL (ref 5–10.5)
POTASSIUM SERPL-SCNC: 4.2 MMOL/L (ref 3.5–5.1)
RBC # BLD AUTO: 5.11 M/UL (ref 4.2–5.9)
SODIUM SERPL-SCNC: 139 MMOL/L (ref 136–145)
WBC # BLD AUTO: 11.3 K/UL (ref 4–11)

## 2024-03-06 PROCEDURE — 1280000000 HC REHAB R&B

## 2024-03-06 PROCEDURE — 6360000002 HC RX W HCPCS: Performed by: STUDENT IN AN ORGANIZED HEALTH CARE EDUCATION/TRAINING PROGRAM

## 2024-03-06 PROCEDURE — 6370000000 HC RX 637 (ALT 250 FOR IP): Performed by: STUDENT IN AN ORGANIZED HEALTH CARE EDUCATION/TRAINING PROGRAM

## 2024-03-06 PROCEDURE — 97535 SELF CARE MNGMENT TRAINING: CPT

## 2024-03-06 PROCEDURE — 85025 COMPLETE CBC W/AUTO DIFF WBC: CPT

## 2024-03-06 PROCEDURE — 97116 GAIT TRAINING THERAPY: CPT

## 2024-03-06 PROCEDURE — 97130 THER IVNTJ EA ADDL 15 MIN: CPT

## 2024-03-06 PROCEDURE — 97129 THER IVNTJ 1ST 15 MIN: CPT

## 2024-03-06 PROCEDURE — 97530 THERAPEUTIC ACTIVITIES: CPT

## 2024-03-06 PROCEDURE — 92526 ORAL FUNCTION THERAPY: CPT

## 2024-03-06 PROCEDURE — 80048 BASIC METABOLIC PNL TOTAL CA: CPT

## 2024-03-06 PROCEDURE — 36415 COLL VENOUS BLD VENIPUNCTURE: CPT

## 2024-03-06 RX ADMIN — GUAIFENESIN 600 MG: 600 TABLET ORAL at 08:08

## 2024-03-06 RX ADMIN — METOPROLOL SUCCINATE 25 MG: 25 TABLET, FILM COATED, EXTENDED RELEASE ORAL at 08:08

## 2024-03-06 RX ADMIN — PRAVASTATIN SODIUM 20 MG: 20 TABLET ORAL at 08:08

## 2024-03-06 RX ADMIN — Medication 1 TABLET: at 08:08

## 2024-03-06 RX ADMIN — Medication 3 MG: at 21:23

## 2024-03-06 RX ADMIN — ENOXAPARIN SODIUM 40 MG: 100 INJECTION SUBCUTANEOUS at 08:08

## 2024-03-06 RX ADMIN — GUAIFENESIN 600 MG: 600 TABLET ORAL at 21:23

## 2024-03-06 ASSESSMENT — PAIN SCALES - GENERAL
PAINLEVEL_OUTOF10: 0
PAINLEVEL_OUTOF10: 0

## 2024-03-06 NOTE — PROGRESS NOTES
[facility]: ACUTE REHAB UNIT  SPEECH-LANGUAGE PATHOLOGY      [x] Daily  [] Weekly Care Conference Note  [] Discharge    Patient:Stone Hoskins      :1935  MRN:[id number]  Rehab Dx/Hx: Debility [R53.81]    Precautions: falls and aspirations  Home situation: lives with wife. Wife manages finances, medications, and all household tasks. Pt is an active .   ST Dx: [] Aphasia  [] Dysarthria  [] Apraxia   [x] Oropharyngeal dysphagia [x] Cognitive Impairment  [] Other:   Date of Admit: 3/1/2024  Room #: 0166/0166-01    Current functional status (updated daily):         Pt being seen for : [] Speech/Language Treatment  [x] Dysphagia Treatment [x] Cognitive Treatment  [] Other:  Communication: [x]WFL  [] Aphasia  [] Dysarthria  [] Apraxia  [] Pragmatic Impairment [] Non-verbal  [] Hearing Loss  [] Other:   Cognition: [] WFL  [] Mild  [x] Moderate  [x] Severe [] Unable to Assess  [] Other:  Memory: [] WFL  [] Mild  [x] Moderate  [] Severe [] Unable to Assess  [] Other:  Behavior: [x] Alert  [x] Cooperative  [x]  Pleasant  [] Confused  [] Agitated  [] Uncooperative  [] Distractible [] Motivated  [] Self-Limiting [] Anxious  [] Other:  Endurance:  [x] Adequate for participation in SLP sessions  [] Reduced overall  [] Lethargic  [] Other:  Safety: [] No concerns at this time  [x] Reduced insight into deficits  [x]  Reduced safety awareness [] Not following call light procedures  [] Unable to Assess  [] Other:    Current Diet Order:ADULT ORAL NUTRITION SUPPLEMENT; Lunch, Dinner; Frozen Oral Supplement  ADULT DIET; Dysphagia - Minced and Moist; Mildly Thick (Nectar)  ADULT ORAL NUTRITION SUPPLEMENT; Breakfast, Dinner; Standard 4 oz Oral Supplement  Swallowing Precautions: upright for all intake, stay upright for at least 30 mins after intake, small bites/sips, close supervision, oral care 2-3x/day to reduce adverse affects in the event of aspiration, increase physical mobility as able, alternate bites/sips, slow rate of  well as month - not to BETH). Pt named 8 animals in 1 min. Accurate addition, difficulty with subtraction. Pt able to recall 2/5 words following a 5 min delay. Continue goals above.     Tx session 2: SLUMS completed this session with total score of 8/30 obtained indicating severe cognitive impairment. Pt demonstrated weaknesses with calculations, divergent naming, immediate and delayed recall, attention, clock drawing, and auditory comprehension. Pt strengths included orientation. Following completion of SLUMS, SLP facilitated 4-step visual sequencing task with picture cards- pt completed with mod cues. Pt unable to recall diet recs or FFWP protocol, vaguely recalled 2/3 swallow exercises. SLP facilitated FFWP as pt requesting sips of water. No overt s/s aspiration demonstrated on 3 sips during session. Pt completed effortful swallow exercise with min cues and macy with mod cues.  Recommend 24 hour supervision and assistance at D/C. Continue goals above.    Plan: Continue as per plan of care.       Additional Information: Dr. Chambers provided nursing communication order on 03/04/24 to ok FFWP with ice chips + thins with staff / wife, as well as to ok vital stim.     Cognitive Linguistic Quick Test (CLQT)   Domain  Severity Rating  Comments   Attention:   severe    Memory:   mild    Executive Functions: moderate    Language:   mild    Visuospatial skills:   moderate    Clock Drawing:   Severe       Composite Severity Range: moderate deficits - score of 2.2 (WFL falls between 3.5 and 4.0)      Barriers toward progress: Cognitive deficit and Limited insight into deficits  Discharge recommendations:  [] Home independently  [] Home with assistance [x]  24 hour supervision  [] ECF [] Other:  Continued Tx Upon Discharge: ? [x] Yes [] No [] TBD based on progress while on ARU [] Vital Stim indicated [] Other:   Estimated discharge date: TBD    Interventions used this date:  [] Speech/Language Treatment  [] Instruction in  HEP [] Group [x] Dysphagia Treatment [x] Cognitive Treatment   [] Other:      Total Time Breakdown / Charges    Time in Time out Total Time / units   Cognitive Tx 1045  1400 1100  1415 15 min / 1 unit  15 min/ 1 unit    Speech Tx      Dysphagia Tx 1030  1415 1045  1430 15 min/ 1 unit   15 min/ 1 unit        Electronically Signed by     Session 1:   Bettye Funez MA CCC-SLP #[id number]  Speech Language Pathologist     Session 2:   Josefina Moseley M.A. CCC-SLP   Speech-Language Pathologist

## 2024-03-06 NOTE — PROGRESS NOTES
Occupational Therapy  Facility/Department: [facility]  Rehabilitation Occupational Therapy Daily Treatment Note    Date: 3/6/24  Patient Name: Stone Hoskins       Room: 0166/0166-01  MRN: [id number]  Account: [id number]   : 1935  (88 y.o.) Gender: male                    Past Medical History:  has a past medical history of Arthritis, Hyperlipidemia, Influenza A, and Right-sided low back pain with right-sided sciatica.  Past Surgical History:   has a past surgical history that includes Tonsillectomy; Tooth Extraction; Inguinal hernia repair (Bilateral, 11); and Cataract removal.    Restrictions  Restrictions/Precautions: General Precautions, Fall Risk, Swallowing - Thickened Liquids, Modified Diet  Other position/activity restrictions: Up with assist  Required Braces or Orthoses?: No    Subjective  Subjective: Pt seated in recliner and agreeable to OT treatment. Wife present. /61, , SPO2 95%  Restrictions/Precautions: General Precautions;Fall Risk;Swallowing - Thickened Liquids;Modified Diet             Objective     Cognition  Overall Cognitive Status: Exceptions  Arousal/Alertness: Delayed responses to stimuli  Following Commands: Follows one step commands consistently;Follows multistep commands with increased time  Attention Span: Attends with cues to redirect  Memory: Decreased short term memory;Decreased recall of recent events  Safety Judgement: Decreased awareness of need for assistance;Decreased awareness of need for safety  Problem Solving: Assistance required to implement solutions;Assistance required to identify errors made;Assistance required to correct errors made;Decreased awareness of errors  Insights: Decreased awareness of deficits  Initiation: Requires cues for some  Sequencing: Requires cues for some  Orientation  Overall Orientation Status: Within Functional Limits  Orientation Level: Oriented X4         ADL  Lower Extremity Dressing  Assistance Level: Moderate  transfer. Pt continent of bowel and bladder x2. Pt CGA for sit to stands and amb with RW, provided pt with vbc's for safe hand placement when transferring from . Pt completed BUE ex with demo and vb cues for technique. Pt completed coordination/dynamic standing and reaching task with Blazepods to assess coordination and reaction time. Pt stood for 1:15 and 1:30 with CGA to complete task. Continue OT POC.  Activity Tolerance: Patient tolerated treatment well;Patient limited by fatigue  Discharge Recommendations: Continue to assess pending progress;24 hour supervision or assist;Home with Home health OT  OT Equipment Recommendations  Equipment Needed: Yes  Mobility Devices: ADL Assistive Devices  ADL Assistive Devices: Transfer Tub Bench  Safety Devices  Safety Devices in place: Yes  Type of devices: Call light within reach;All fall risk precautions in place;Left in chair;Patient at risk for falls;Gait belt;Chair alarm in place;Nurse notified  Restraints  Initially in place: No    Patient Education  Education  Education Given To: Patient  Education Provided: Role of Therapy;Plan of Care;ADL Function;Safety;Transfer Training;Fall Prevention Strategies;Energy Conservation;Mobility Training  Education Provided Comments: role of OT, hand placement with transfers, appropriate posture when standing for safety  Education Method: Verbal  Barriers to Learning: Cognition;Hearing  Education Outcome: Continued education needed;Verbalized understanding    Plan  Occupational Therapy Plan  Times Per Week: 5-7x/week  Current Treatment Recommendations: Strengthening;Balance training;ROM;Functional mobility training;Endurance training;Safety education & training;Positioning;Equipment evaluation, education, & procurement;Self-Care / ADL    Goals  Patient Goals   Patient goals : \"get stronger and go home\"  Short Term Goals  Time Frame for Short Term Goals: 7 days (3/08/2024): unless otherwise noted - goals ongoing 3/06  Short Term Goal

## 2024-03-06 NOTE — PROGRESS NOTES
[facility] Inpatient Rehabilitation Progress Note    Stone Hoskins  3/6/2024  [id number]    Chief Complaint: Debility    Subjective:   No acute events overnight. Today Stone is seen in his room with therapy. He reports that he is feeling well and denies acute complaints at this time.     ROS: denies f/c, n/v, cp, sob    Objective:  Patient Vitals for the past 24 hrs:   BP Temp Temp src Pulse Resp SpO2   03/06/24 0803 (!) 152/74 98.6 °F (37 °C) Oral 90 18 95 %   03/05/24 1945 128/60 98.3 °F (36.8 °C) Oral 84 18 95 %     Gen: No distress, pleasant.   HEENT: Normocephalic, atraumatic.   CV: extremities well perfused  Resp: No respiratory distress.   Abd: Soft, nondistended  Ext: No edema.   Neuro: Alert, oriented, appropriately interactive.   Psych: appropriate mood and affect    Wt Readings from Last 3 Encounters:   03/01/24 84.9 kg (187 lb 2.7 oz)   02/21/24 89.5 kg (197 lb 6.4 oz)   08/31/23 88 kg (194 lb)       Laboratory data:   Lab Results   Component Value Date    WBC 11.3 (H) 03/06/2024    HGB 14.6 03/06/2024    HCT 44.3 03/06/2024    MCV 86.7 03/06/2024     03/06/2024       Lab Results   Component Value Date/Time     03/06/2024 07:55 AM    K 4.2 03/06/2024 07:55 AM     03/06/2024 07:55 AM    CO2 30 03/06/2024 07:55 AM    BUN 17 03/06/2024 07:55 AM    CREATININE 0.7 03/06/2024 07:55 AM    GLUCOSE 85 03/06/2024 07:55 AM    CALCIUM 8.6 03/06/2024 07:55 AM        Therapy progress:       PT    Supine to Sit: Supervision or touching assistance  Sit to Supine: Supervision or touching assistance   Sit to Stand: Partial/moderate assistance  Chair/Bed to Chair Transfer: Partial/moderate assistance  Car Transfer: Substantial/maximal assistance  Ambulation 10 ft: Partial/moderate assistance  Ambulation 50 ft:    Ambulation 150 ft:    Stairs - 1 Step: Partial/moderate assistance  Stairs - 4 Step: Partial/moderate assistance  Stairs - 12 Step:      OT    Eating: Setup or clean-up assistance  Oral  Hygiene: Supervision or touching assistance  Bathing: Substantial/maximal assistance  Upper Body Dressing: Setup or clean-up assistance  Lower Body Dressing: Substantial/maximal assistance  Toilet Transfer: Partial/moderate assistance  Toilet Hygiene: Supervision or touching assistance    Speech Therapy         ADULT ORAL NUTRITION SUPPLEMENT; Lunch, Dinner; Frozen Oral Supplement  ADULT DIET; Dysphagia - Minced and Moist; Mildly Thick (Nectar)  ADULT ORAL NUTRITION SUPPLEMENT; Breakfast, Dinner; Standard 4 oz Oral Supplement    Body mass index is 26.86 kg/m².    Assessment and Plan:    Debility   - due to Sepsis 2/2 acute cholelithiasis  - completed 10 day course of abx, discontinued augmentin  - follow up with General Surgery outpatient  - PT, OT     AMS-Resolved  - CTM     Oropharyngeal dysphagia  - on modified diet  - SLP following     Bladder   - High risk retention   - Monitor PVRs, SC prn >300cc     Venous stasis  - PRN Lasix for swelling, give if patient symptomatic  - daily weights     Bowel   - High risk constipation   - adjust medications as needed for regular bowel movements     Safety   - fall precautions     Pain control  - tylenol PRN     PPx  -DVT: lovenox  -GI: pantoprazole     FULL CODE    Therapy progress: ambulates up to 150 ft with RW and CGA-SBA   Services: OP PT, OT, ST  EDOD: 3/15    Interdisciplinary team conference was held today with entire rehab treatment team including PT, OT, SLP (if applicable), Dietician, RN, and SW. Discussion focused on progress toward rehab goals and discharge planning. Making progress. Barriers include level of assistance, endurance, comorbidities. We as a medical team, and I as the physician , made a plan to work on these barriers to facilitate safe discharge. Plan will be presented to patient/family (if available).     Ayala Chambers MD   3/6/2024, 2:51 PM

## 2024-03-06 NOTE — CONSULTS
Comprehensive Nutrition Assessment    Type and Reason for Visit:  Initial, Consult    Nutrition Recommendations/Plan:   Continue minced and moist diet and encourage po intakes  Mildly thickened liquids  Initiate ensure compact chocolate BID - mildly thick compliant  Magic cups BID   Monitor nutrition adequacy, pertinent labs, bowel habits, wt changes, and clinical progress     Malnutrition Assessment:  Malnutrition Status:  At risk for malnutrition (Comment) (03/02/24 2127)    Context:  Acute Illness     Findings of the 6 clinical characteristics of malnutrition:  Fluid Accumulation:  Mild Extremities    Nutrition Assessment:    New ARU pt admitted with debility. Pt 89 yo M with hx HLD, HTN. Currently on minced and moist diet with mildly thickened liquids. Magic cups on order BID. PO intakes % per EMR. SLP consulted. Pt sleeping soundly at time of visit. Limited weight history per EMR, difficult to assess recent weight changes. Recommend adding ensure compact to promote po intakes, chocolate is mildly thick compliant. Encourage po intakes as tolerated. Will monitor.    Nutrition Related Findings:    +2 pitting BLE edema. Wound Type: None       Current Nutrition Intake & Therapies:    Average Meal Intake: 51-75%, %  Average Supplements Intake: Unable to assess  ADULT ORAL NUTRITION SUPPLEMENT; Lunch, Dinner; Frozen Oral Supplement  ADULT DIET; Dysphagia - Minced and Moist; Mildly Thick (Nectar)  ADULT ORAL NUTRITION SUPPLEMENT; Breakfast, Dinner; Standard 4 oz Oral Supplement    Anthropometric Measures:  Height: 177.8 cm (5' 10\")  Ideal Body Weight (IBW): 166 lbs (75 kg)       Current Body Weight: 84.9 kg (187 lb 2.7 oz),   IBW. Weight Source: Bed Scale  Current BMI (kg/m2): 26.9    BMI Categories: Overweight (BMI 25.0-29.9)    Estimated Daily Nutrient Needs:  Energy Requirements Based On: Kcal/kg (25-30)  Weight Used for Energy Requirements: Ideal  Energy (kcal/day): 2306-4957 kcals  Weight Used for  Protein Requirements: Ideal (1-1.2)  Protein (g/day): 75-90 g  Method Used for Fluid Requirements: 1 ml/kcal    Nutrition Diagnosis:   Inadequate oral intake related to inadequate protein-energy intake, swallowing difficulty as evidenced by swallow study results, intake 51-75%    Nutrition Interventions:   Food and/or Nutrient Delivery: Continue Current Diet, Modify Oral Nutrition Supplement  Nutrition Education/Counseling: No recommendation at this time  Coordination of Nutrition Care: Continue to monitor while inpatient     Goals:  Previous Goal Met: Progressing toward Goal(s)  Goals: PO intake 75% or greater, prior to discharge     Nutrition Monitoring and Evaluation:   Behavioral-Environmental Outcomes: None Identified  Food/Nutrient Intake Outcomes: Food and Nutrient Intake, Diet Advancement/Tolerance, Supplement Intake  Physical Signs/Symptoms Outcomes: Biochemical Data, Meal Time Behavior, Nutrition Focused Physical Findings, Weight, Chewing or Swallowing    Discharge Planning:    Too soon to determine     Sarah Harkins RD  Contact: Office: [phone]; [place]: [phone]

## 2024-03-06 NOTE — PROGRESS NOTES
Physical Therapy  Facility/Department: [facility]  Rehabilitation Physical Therapy Treatment Note    NAME: Stone Hoskins  : 1935 (88 y.o.)  MRN: [id number]  CODE STATUS: Full Code    Date of Service: 3/6/24       Restrictions:  Restrictions/Precautions: General Precautions, Fall Risk, Swallowing - Thickened Liquids, Modified Diet  Position Activity Restriction  Other position/activity restrictions: Up with assist     SUBJECTIVE  Subjective  Subjective: Pt in recliner, agreeable to therapy  Pain: pt denies pain          OBJECTIVE  116/69, 95%, 94 bpm    Cognition  Overall Cognitive Status: Exceptions  Arousal/Alertness: Delayed responses to stimuli  Following Commands: Follows one step commands consistently;Follows multistep commands with increased time  Attention Span: Attends with cues to redirect  Memory: Decreased short term memory;Decreased recall of recent events  Safety Judgement: Decreased awareness of need for assistance;Decreased awareness of need for safety  Problem Solving: Assistance required to implement solutions;Assistance required to identify errors made;Assistance required to correct errors made;Decreased awareness of errors  Insights: Decreased awareness of deficits  Initiation: Requires cues for some  Sequencing: Requires cues for some  Orientation  Overall Orientation Status: Within Functional Limits  Orientation Level: Oriented X4;Oriented to place;Oriented to time;Oriented to situation;Oriented to person    Functional Mobility  Roll Left  Assistance Level: Stand by assist  Skilled Clinical Factors: bed flat, no rails  Roll Right  Assistance Level: Stand by assist  Skilled Clinical Factors: bed flat, no rails  Sit to Supine  Assistance Level: Stand by assist  Skilled Clinical Factors: bed flat, no rails  Supine to Sit  Assistance Level: Stand by assist  Skilled Clinical Factors: bed flat, no rails  Sit to Stand  Assistance Level: Contact guard assist;Minimal assistance  Skilled Clinical

## 2024-03-07 PROCEDURE — 6360000002 HC RX W HCPCS: Performed by: STUDENT IN AN ORGANIZED HEALTH CARE EDUCATION/TRAINING PROGRAM

## 2024-03-07 PROCEDURE — 92526 ORAL FUNCTION THERAPY: CPT

## 2024-03-07 PROCEDURE — 97530 THERAPEUTIC ACTIVITIES: CPT

## 2024-03-07 PROCEDURE — 97130 THER IVNTJ EA ADDL 15 MIN: CPT

## 2024-03-07 PROCEDURE — 97116 GAIT TRAINING THERAPY: CPT

## 2024-03-07 PROCEDURE — 6370000000 HC RX 637 (ALT 250 FOR IP): Performed by: STUDENT IN AN ORGANIZED HEALTH CARE EDUCATION/TRAINING PROGRAM

## 2024-03-07 PROCEDURE — 1280000000 HC REHAB R&B

## 2024-03-07 PROCEDURE — 97535 SELF CARE MNGMENT TRAINING: CPT

## 2024-03-07 PROCEDURE — 97129 THER IVNTJ 1ST 15 MIN: CPT

## 2024-03-07 PROCEDURE — 97110 THERAPEUTIC EXERCISES: CPT

## 2024-03-07 RX ADMIN — GUAIFENESIN 600 MG: 600 TABLET ORAL at 21:45

## 2024-03-07 RX ADMIN — Medication 1 TABLET: at 08:40

## 2024-03-07 RX ADMIN — ENOXAPARIN SODIUM 40 MG: 100 INJECTION SUBCUTANEOUS at 08:40

## 2024-03-07 RX ADMIN — METOPROLOL SUCCINATE 25 MG: 25 TABLET, FILM COATED, EXTENDED RELEASE ORAL at 08:40

## 2024-03-07 RX ADMIN — GUAIFENESIN 600 MG: 600 TABLET ORAL at 08:40

## 2024-03-07 ASSESSMENT — PAIN SCALES - GENERAL
PAINLEVEL_OUTOF10: 0

## 2024-03-07 NOTE — PROGRESS NOTES
[facility]: ACUTE REHAB UNIT  SPEECH-LANGUAGE PATHOLOGY      [x] Daily  [] Weekly Care Conference Note  [] Discharge    Patient:Stone Hoskins      :1935  MRN:[id number]  Rehab Dx/Hx: Debility [R53.81]    Precautions: falls and aspirations  Home situation: lives with wife. Wife manages finances, medications, and all household tasks. Pt is an active .   ST Dx: [] Aphasia  [] Dysarthria  [] Apraxia   [x] Oropharyngeal dysphagia [x] Cognitive Impairment  [] Other:   Date of Admit: 3/1/2024  Room #: 0166/0166-01    Current functional status (updated daily):         Pt being seen for : [] Speech/Language Treatment  [x] Dysphagia Treatment [x] Cognitive Treatment  [] Other:  Communication: [x]WFL  [] Aphasia  [] Dysarthria  [] Apraxia  [] Pragmatic Impairment [] Non-verbal  [] Hearing Loss  [] Other:   Cognition: [] WFL  [] Mild  [x] Moderate  [x] Severe [] Unable to Assess  [] Other:  Memory: [] WFL  [] Mild  [x] Moderate  [] Severe [] Unable to Assess  [] Other:  Behavior: [x] Alert  [x] Cooperative  [x]  Pleasant  [] Confused  [] Agitated  [] Uncooperative  [] Distractible [] Motivated  [] Self-Limiting [] Anxious  [] Other:  Endurance:  [x] Adequate for participation in SLP sessions  [] Reduced overall  [] Lethargic  [] Other:  Safety: [] No concerns at this time  [x] Reduced insight into deficits  [x]  Reduced safety awareness [] Not following call light procedures  [] Unable to Assess  [] Other:    Current Diet Order:ADULT ORAL NUTRITION SUPPLEMENT; Lunch, Dinner; Frozen Oral Supplement  ADULT DIET; Dysphagia - Minced and Moist; Mildly Thick (Nectar)  ADULT ORAL NUTRITION SUPPLEMENT; Breakfast, Dinner; Standard 4 oz Oral Supplement  Swallowing Precautions: upright for all intake, stay upright for at least 30 mins after intake, small bites/sips, close supervision, oral care 2-3x/day to reduce adverse affects in the event of aspiration, increase physical mobility as able, alternate bites/sips, slow rate of

## 2024-03-07 NOTE — PROGRESS NOTES
Occupational Therapy  Facility/Department: [facility]  Rehabilitation Occupational Therapy Daily Treatment Note    Date: 3/7/24  Patient Name: Stone Hoskins       Room: 0166/0166-01  MRN: [id number]  Account: [id number]   : 1935  (88 y.o.) Gender: male                  Past Medical History:  has a past medical history of Arthritis, Hyperlipidemia, Influenza A, and Right-sided low back pain with right-sided sciatica.  Past Surgical History:   has a past surgical history that includes Tonsillectomy; Tooth Extraction; Inguinal hernia repair (Bilateral, 11); and Cataract removal.    Restrictions  Restrictions/Precautions: General Precautions, Fall Risk, Swallowing - Thickened Liquids, Modified Diet  Other position/activity restrictions: Up with assist  Required Braces or Orthoses?: No    Subjective  Subjective: Pt seated in w/cand agreeable to OT treatment. /60, HR 95, SPO2 94%  Restrictions/Precautions: General Precautions;Fall Risk;Swallowing - Thickened Liquids;Modified Diet             Objective     Cognition  Overall Cognitive Status: Exceptions  Arousal/Alertness: Delayed responses to stimuli  Following Commands: Follows one step commands consistently;Follows multistep commands with increased time  Attention Span: Attends with cues to redirect  Memory: Decreased short term memory;Decreased recall of recent events  Safety Judgement: Decreased awareness of need for assistance;Decreased awareness of need for safety  Problem Solving: Assistance required to implement solutions;Assistance required to identify errors made;Assistance required to correct errors made;Decreased awareness of errors  Insights: Decreased awareness of deficits  Initiation: Requires cues for some  Sequencing: Requires cues for some  Orientation  Overall Orientation Status: Within Functional Limits  Orientation Level: Oriented X4         ADL  Grooming/Oral Hygiene  Assistance Level: Contact guard assist;Increased time to  education needed;Verbalized understanding    Plan  Occupational Therapy Plan  Times Per Week: 5-7x/week  Current Treatment Recommendations: Strengthening;Balance training;ROM;Functional mobility training;Endurance training;Safety education & training;Positioning;Equipment evaluation, education, & procurement;Self-Care / ADL    Goals  Patient Goals   Patient goals : \"get stronger and go home\"  Short Term Goals  Time Frame for Short Term Goals: 7 days (3/08/2024): unless otherwise noted  Short Term Goal 1: Pt will complete toilet transfer w/ LRD and SBA. - ongoing 3/7  Short Term Goal 2: Pt will complete FB bathing w/ Aletha. - GOAL: MET 3/7 set-up for UB bathing, Aletha for LB bathing  Short Term Goal 3: Pt will complete LB dressing w/ SBA and AE PRN. - ongoing 3/7  Short Term Goal 4: Pt stand at sink to complete grooming ADLs for ~4mins w/ SBA. - ongoing 3/7 pt requires CGA  Long Term Goals  Time Frame for Long Term Goals : 3/15/24- progressing 3/07  Long Term Goal 1: Pt will complete toilet transfer w/ LRD and mod. I.  Long Term Goal 2: Pt will complete FB bathing w/ AE PRN and mod. I.  Long Term Goal 3: Pt will complete LB dressing w/ mod. I. and AE PRN.  Long Term Goal 4: Pt stand at sink to complete grooming ADLs for ~4mins w/ SPV.      Therapy Time   Individual Concurrent Group Co-treatment   Time In 1230         Time Out 1330         Minutes 60         Timed Code Treatment Minutes: 60 Minutes       Oumou Chase OT      no

## 2024-03-07 NOTE — PROGRESS NOTES
Physical Therapy  Facility/Department: [facility]  Rehabilitation Physical Therapy Treatment Note    NAME: Stone Hoskins  : 1935 (88 y.o.)  MRN: [id number]  CODE STATUS: Full Code    Date of Service: 3/7/24       Restrictions:  Restrictions/Precautions: General Precautions, Fall Risk, Swallowing - Thickened Liquids, Modified Diet  Position Activity Restriction  Other position/activity restrictions: Up with assist     SUBJECTIVE  Subjective  Subjective: Pt in recliner, agreeable to therapy  Pain: pt denies pain     OBJECTIVE  131/76, 98 bpm, 94%    Cognition  Overall Cognitive Status: Exceptions  Arousal/Alertness: Delayed responses to stimuli  Following Commands: Follows one step commands consistently;Follows multistep commands with increased time  Attention Span: Attends with cues to redirect  Memory: Decreased short term memory;Decreased recall of recent events  Safety Judgement: Decreased awareness of need for assistance;Decreased awareness of need for safety  Problem Solving: Assistance required to implement solutions;Assistance required to identify errors made;Assistance required to correct errors made;Decreased awareness of errors  Insights: Decreased awareness of deficits  Initiation: Requires cues for some  Sequencing: Requires cues for some  Orientation  Overall Orientation Status: Within Functional Limits  Orientation Level: Oriented X4    Functional Mobility  Sit to Stand  Assistance Level: Stand by assist  Skilled Clinical Factors: from recliner, w/c, EOB, and toilet, cues for safe hand placement, uses RW  Stand to Sit  Assistance Level: Stand by assist  Skilled Clinical Factors: with RW to toilet with grab bar with cues, to recliner and EOB with RW      Environmental Mobility  Ambulation  Surface: Level surface  Device: Rolling walker  Distance: 130 ft, 50 ft, 120 ft  Activity: Within Unit  Activity Comments: Pt ambulates with slow mati, short step length, minimal clearance sofy, with [place]  Education;Energy Conservation  Education Provided Comments: safe hand placement with transfers, safety, progressing endurance  Education Method: Verbal;Demonstration  Barriers to Learning: Hearing;Cognition  Education Outcome: Verbalized understanding;Continued education needed        Therapy Time   Individual Concurrent Group Co-treatment   Time In 1400         Time Out 1500         Minutes 60           Timed Code Treatment Minutes: 60 Minutes       Alexa Parra, PT, 03/07/24 at 3:23 PM

## 2024-03-07 NOTE — PROGRESS NOTES
[facility] Inpatient Rehabilitation Progress Note    Stone Hoskins  3/7/2024  [id number]    Chief Complaint: Debility    Subjective:   No acute events overnight. Today Stone is seen without family present. He reports that he is feeling well. He denies any swelling in his legs or shortness of breath.     ROS: denies f/c, n/v, cp, sob    Objective:  Patient Vitals for the past 24 hrs:   BP Temp Temp src Pulse Resp SpO2   03/07/24 0830 (!) 123/93 97.6 °F (36.4 °C) Oral 95 18 94 %   03/06/24 2117 112/73 97.9 °F (36.6 °C) Oral 90 18 96 %     Gen: No distress, pleasant.   HEENT: Normocephalic, atraumatic.   CV: extremities well perfused  Resp: No respiratory distress.   Abd: Soft, nondistended  Ext: No edema BLE  Neuro: Alert, oriented, appropriately interactive.   Psych: appropriate mood and affect    Wt Readings from Last 3 Encounters:   03/01/24 84.9 kg (187 lb 2.7 oz)   02/21/24 89.5 kg (197 lb 6.4 oz)   08/31/23 88 kg (194 lb)       Laboratory data:   Lab Results   Component Value Date    WBC 11.3 (H) 03/06/2024    HGB 14.6 03/06/2024    HCT 44.3 03/06/2024    MCV 86.7 03/06/2024     03/06/2024       Lab Results   Component Value Date/Time     03/06/2024 07:55 AM    K 4.2 03/06/2024 07:55 AM     03/06/2024 07:55 AM    CO2 30 03/06/2024 07:55 AM    BUN 17 03/06/2024 07:55 AM    CREATININE 0.7 03/06/2024 07:55 AM    GLUCOSE 85 03/06/2024 07:55 AM    CALCIUM 8.6 03/06/2024 07:55 AM        Therapy progress:       PT    Supine to Sit: Supervision or touching assistance  Sit to Supine: Supervision or touching assistance   Sit to Stand: Partial/moderate assistance  Chair/Bed to Chair Transfer: Partial/moderate assistance  Car Transfer: Substantial/maximal assistance  Ambulation 10 ft: Partial/moderate assistance  Ambulation 50 ft:    Ambulation 150 ft:    Stairs - 1 Step: Partial/moderate assistance  Stairs - 4 Step: Partial/moderate assistance  Stairs - 12 Step:      OT    Eating: Setup or clean-up  assistance  Oral Hygiene: Supervision or touching assistance  Bathing: Substantial/maximal assistance  Upper Body Dressing: Setup or clean-up assistance  Lower Body Dressing: Substantial/maximal assistance  Toilet Transfer: Partial/moderate assistance  Toilet Hygiene: Supervision or touching assistance    Speech Therapy         ADULT ORAL NUTRITION SUPPLEMENT; Lunch, Dinner; Frozen Oral Supplement  ADULT DIET; Dysphagia - Minced and Moist; Mildly Thick (Nectar)  ADULT ORAL NUTRITION SUPPLEMENT; Breakfast, Dinner; Standard 4 oz Oral Supplement    Body mass index is 26.86 kg/m².    Assessment and Plan:    Debility   - due to Sepsis 2/2 acute cholelithiasis  - completed 10 day course of abx, discontinued augmentin  - follow up with General Surgery outpatient  - PT, OT     AMS-Resolved  - CTM     Oropharyngeal dysphagia  - on modified diet  - SLP following     Bladder   - High risk retention   - Monitor PVRs, SC prn >300cc     Venous stasis  - PRN Lasix for swelling, give if patient symptomatic  - daily weights     Bowel   - High risk constipation   - adjust medications as needed for regular bowel movements     Safety   - fall precautions     Pain control  - tylenol PRN     PPx  -DVT: lovenox  -GI: pantoprazole     FULL CODE    Therapy progress: CGA for toileting and toilet transfer   Services: OP PT, OT, ST  EDOD: 3/15    Ayala Chambers MD   3/7/2024, 12:21 PM

## 2024-03-08 LAB
ANION GAP SERPL CALCULATED.3IONS-SCNC: 9 MMOL/L (ref 3–16)
BASOPHILS # BLD: 0.1 K/UL (ref 0–0.2)
BASOPHILS NFR BLD: 0.7 %
BUN SERPL-MCNC: 18 MG/DL (ref 7–20)
CALCIUM SERPL-MCNC: 8.5 MG/DL (ref 8.3–10.6)
CHLORIDE SERPL-SCNC: 106 MMOL/L (ref 99–110)
CO2 SERPL-SCNC: 27 MMOL/L (ref 21–32)
CREAT SERPL-MCNC: 0.8 MG/DL (ref 0.8–1.3)
DEPRECATED RDW RBC AUTO: 14.7 % (ref 12.4–15.4)
EOSINOPHIL # BLD: 0.2 K/UL (ref 0–0.6)
EOSINOPHIL NFR BLD: 2.1 %
GFR SERPLBLD CREATININE-BSD FMLA CKD-EPI: >60 ML/MIN/{1.73_M2}
GLUCOSE SERPL-MCNC: 90 MG/DL (ref 70–99)
HCT VFR BLD AUTO: 43.6 % (ref 40.5–52.5)
HGB BLD-MCNC: 14.4 G/DL (ref 13.5–17.5)
LYMPHOCYTES # BLD: 1.9 K/UL (ref 1–5.1)
LYMPHOCYTES NFR BLD: 19.4 %
MCH RBC QN AUTO: 28.8 PG (ref 26–34)
MCHC RBC AUTO-ENTMCNC: 33.1 G/DL (ref 31–36)
MCV RBC AUTO: 87.3 FL (ref 80–100)
MONOCYTES # BLD: 1 K/UL (ref 0–1.3)
MONOCYTES NFR BLD: 10.2 %
NEUTROPHILS # BLD: 6.7 K/UL (ref 1.7–7.7)
NEUTROPHILS NFR BLD: 67.6 %
PLATELET # BLD AUTO: 320 K/UL (ref 135–450)
PMV BLD AUTO: 7.7 FL (ref 5–10.5)
POTASSIUM SERPL-SCNC: 4.2 MMOL/L (ref 3.5–5.1)
RBC # BLD AUTO: 4.99 M/UL (ref 4.2–5.9)
SODIUM SERPL-SCNC: 142 MMOL/L (ref 136–145)
WBC # BLD AUTO: 9.8 K/UL (ref 4–11)

## 2024-03-08 PROCEDURE — 97530 THERAPEUTIC ACTIVITIES: CPT

## 2024-03-08 PROCEDURE — 6360000002 HC RX W HCPCS: Performed by: STUDENT IN AN ORGANIZED HEALTH CARE EDUCATION/TRAINING PROGRAM

## 2024-03-08 PROCEDURE — 97129 THER IVNTJ 1ST 15 MIN: CPT

## 2024-03-08 PROCEDURE — 6370000000 HC RX 637 (ALT 250 FOR IP): Performed by: STUDENT IN AN ORGANIZED HEALTH CARE EDUCATION/TRAINING PROGRAM

## 2024-03-08 PROCEDURE — 97116 GAIT TRAINING THERAPY: CPT

## 2024-03-08 PROCEDURE — 97130 THER IVNTJ EA ADDL 15 MIN: CPT

## 2024-03-08 PROCEDURE — 1280000000 HC REHAB R&B

## 2024-03-08 PROCEDURE — 80048 BASIC METABOLIC PNL TOTAL CA: CPT

## 2024-03-08 PROCEDURE — 36415 COLL VENOUS BLD VENIPUNCTURE: CPT

## 2024-03-08 PROCEDURE — 85025 COMPLETE CBC W/AUTO DIFF WBC: CPT

## 2024-03-08 PROCEDURE — 92526 ORAL FUNCTION THERAPY: CPT

## 2024-03-08 RX ADMIN — METOPROLOL SUCCINATE 25 MG: 25 TABLET, FILM COATED, EXTENDED RELEASE ORAL at 08:17

## 2024-03-08 RX ADMIN — ENOXAPARIN SODIUM 40 MG: 100 INJECTION SUBCUTANEOUS at 08:17

## 2024-03-08 RX ADMIN — PRAVASTATIN SODIUM 20 MG: 20 TABLET ORAL at 08:17

## 2024-03-08 RX ADMIN — GUAIFENESIN 600 MG: 600 TABLET ORAL at 20:01

## 2024-03-08 RX ADMIN — Medication 1 TABLET: at 08:16

## 2024-03-08 RX ADMIN — GUAIFENESIN 600 MG: 600 TABLET ORAL at 08:16

## 2024-03-08 ASSESSMENT — PAIN SCALES - GENERAL: PAINLEVEL_OUTOF10: 0

## 2024-03-08 NOTE — PLAN OF CARE
Problem: Nutrition Deficit:  Goal: Optimize nutritional status  Outcome: Progressing     Problem: Safety - Adult  Goal: Free from fall injury  Outcome: Progressing

## 2024-03-08 NOTE — PROGRESS NOTES
Physical Therapy  Facility/Department: [facility]  Rehabilitation Physical Therapy Treatment Note    NAME: Stone Hoskins  : 1935 (88 y.o.)  MRN: [id number]  CODE STATUS: Full Code    Date of Service: 3/8/24       Restrictions:  Restrictions/Precautions: General Precautions, Fall Risk, Swallowing - Thickened Liquids, Modified Diet  Position Activity Restriction  Other position/activity restrictions: Up with assist     SUBJECTIVE  Subjective  Subjective: Pt seated in relincer on approach, pleasant and agreeable to PT tx  Pain: pt denies pain               OBJECTIVE  Orientation  Orientation Level: Oriented to person;Disoriented to situation;Oriented to time;Disoriented to place    Functional Mobility  Balance  Sitting Balance: Supervision  Standing Balance: Stand by assistance  Standing Balance  Activity: SBA with RW    Transfers  Surface: Wheelchair;To chair with arms;From chair with arms  Additional Factors: Verbal cues;Hand placement cues;Increased time to complete  Device: Walker (RW)  Sit to Stand  Assistance Level: Stand by assist  Skilled Clinical Factors: Pt completes multiple t/f throughout session with RW and SBA, cues for hand placement  Stand to Sit  Assistance Level: Stand by assist  Skilled Clinical Factors: Pt completes multiple t/f throughout session with RW and SBA, cues for hand placement  X 5 reps repeated STS with RW SBA to improve strength and performance with mobility    Environmental Mobility  Ambulation  Surface: Level surface  Device: Rolling walker  Distance: 120' + *250' + 100' + 50' + 15'  Activity: Within Unit;Within Room  Activity Comments: Pt limited by reports of fatigue  Additional Factors: Verbal cues;Increased time to complete  Assistance Level: Stand by assist  Gait Deviations: Slow mati;Decreased step length bilateral;Narrow base of support;Decreased heel strike right;Decreased heel strike left;Decreased trunk rotation  Skilled Clinical Factors: Partial step through  place;Gait belt;Nurse notified;Left in chair;Patient at risk for falls    EDUCATION  Education  Education Given To: Patient  Education Provided: Role of Therapy;Plan of Care;Mobility Training;Transfer Training;Safety;Fall Prevention Strategies;Precautions;Family Education;Energy Conservation  Education Provided Comments: safe hand placement with transfers, safety, progressing endurance  Education Method: Verbal;Demonstration  Barriers to Learning: Hearing;Cognition  Education Outcome: Verbalized understanding;Continued education needed        Therapy Time   Individual Concurrent Group Co-treatment   Time In 0830         Time Out 0930         Minutes 60           Timed Code Treatment Minutes: 60 Minutes       Bhavana Alvarado PT, DPT 03/08/24 at 12:00 PM

## 2024-03-08 NOTE — PLAN OF CARE
Problem: Safety - Adult  Goal: Free from fall injury  3/7/2024 2252 by Yoly Shaffer, RN  Outcome: Progressing

## 2024-03-08 NOTE — PROGRESS NOTES
[facility] Inpatient Rehabilitation Progress Note    Stone Hoskins  3/8/2024  [id number]    Chief Complaint: Debility    Subjective:   No acute events overnight. Today Stone is seen with Speech. He reports feeling well and denies acute complaints at this time.     ROS: denies f/c, n/v, cp, sob    Objective:  Patient Vitals for the past 24 hrs:   BP Temp Temp src Pulse Resp SpO2   03/08/24 0851 (!) 114/59 -- -- 93 -- 93 %   03/08/24 0813 (!) 111/56 98 °F (36.7 °C) Oral (!) 105 18 93 %   03/07/24 2130 126/64 98.6 °F (37 °C) Oral (!) 103 18 94 %     Gen: No distress, pleasant.   HEENT: Normocephalic, atraumatic.   CV: extremities well perfused  Resp: No respiratory distress. On room air  Abd: Soft, nondistended  Ext: No edema BLE  Neuro: Alert, oriented, appropriately interactive.   Psych: appropriate mood and affect    Wt Readings from Last 3 Encounters:   03/01/24 84.9 kg (187 lb 2.7 oz)   02/21/24 89.5 kg (197 lb 6.4 oz)   08/31/23 88 kg (194 lb)       Laboratory data:   Lab Results   Component Value Date    WBC 9.8 03/08/2024    HGB 14.4 03/08/2024    HCT 43.6 03/08/2024    MCV 87.3 03/08/2024     03/08/2024       Lab Results   Component Value Date/Time     03/08/2024 05:19 AM    K 4.2 03/08/2024 05:19 AM     03/08/2024 05:19 AM    CO2 27 03/08/2024 05:19 AM    BUN 18 03/08/2024 05:19 AM    CREATININE 0.8 03/08/2024 05:19 AM    GLUCOSE 90 03/08/2024 05:19 AM    CALCIUM 8.5 03/08/2024 05:19 AM        Therapy progress:       PT    Supine to Sit: Supervision or touching assistance  Sit to Supine: Supervision or touching assistance   Sit to Stand: Partial/moderate assistance  Chair/Bed to Chair Transfer: Partial/moderate assistance  Car Transfer: Substantial/maximal assistance  Ambulation 10 ft: Partial/moderate assistance  Ambulation 50 ft:    Ambulation 150 ft:    Stairs - 1 Step: Partial/moderate assistance  Stairs - 4 Step: Partial/moderate assistance  Stairs - 12 Step:      OT    Eating: Setup

## 2024-03-08 NOTE — PROGRESS NOTES
Occupational Therapy  Facility/Department: [facility]  Rehabilitation Occupational Therapy Daily Treatment Note    Date: 3/8/24  Patient Name: Stone Hoskins       Room: 0166/0166-01  MRN: [id number]  Account: [id number]   : 1935  (88 y.o.) Gender: male                    Past Medical History:  has a past medical history of Arthritis, Hyperlipidemia, Influenza A, and Right-sided low back pain with right-sided sciatica.  Past Surgical History:   has a past surgical history that includes Tonsillectomy; Tooth Extraction; Inguinal hernia repair (Bilateral, 11); and Cataract removal.    Restrictions  Restrictions/Precautions: General Precautions, Fall Risk, Swallowing - Thickened Liquids, Modified Diet  Other position/activity restrictions: Up with assist  Required Braces or Orthoses?: No    Subjective  Subjective: Pt in chair at OT arrival. /73, HR 99, Spo2 94% on RA. Denies pain  Restrictions/Precautions: General Precautions;Fall Risk;Swallowing - Thickened Liquids;Modified Diet             Objective     Cognition  Overall Cognitive Status: Exceptions  Arousal/Alertness: Delayed responses to stimuli  Following Commands: Follows one step commands consistently;Follows multistep commands with increased time  Attention Span: Attends with cues to redirect  Memory: Decreased short term memory;Decreased recall of recent events  Safety Judgement: Decreased awareness of need for assistance;Decreased awareness of need for safety  Problem Solving: Assistance required to implement solutions;Assistance required to identify errors made;Assistance required to correct errors made;Decreased awareness of errors  Insights: Decreased awareness of deficits  Initiation: Requires cues for some  Sequencing: Requires cues for some  Orientation  Overall Orientation Status: Within Functional Limits  Orientation Level: Oriented to person;Disoriented to situation;Oriented to time;Disoriented to place         Functional  3/8  Short Term Goal 2: Pt will complete FB bathing w/ Aletha. - GOAL: MET 3/7 set-up for UB bathing, Aletha for LB bathing  Short Term Goal 3: Pt will complete LB dressing w/ SBA and AE PRN. - goal not met 3/8  Short Term Goal 4: Pt stand at sink to complete grooming ADLs for ~4mins w/ SBA. - goal not met 3/8  Long Term Goals  Time Frame for Long Term Goals : 3/15/24- progressing 3/08  Long Term Goal 1: Pt will complete toilet transfer w/ LRD and mod. I.  Long Term Goal 2: Pt will complete FB bathing w/ AE PRN and mod. I.  Long Term Goal 3: Pt will complete LB dressing w/ mod. I. and AE PRN.  Long Term Goal 4: Pt stand at sink to complete grooming ADLs for ~4mins w/ SPV.    Therapy Time   Individual Concurrent Group Co-treatment   Time In 1230         Time Out 1330         Minutes 60         Timed Code Treatment Minutes: 60 Minutes       Mathieu Zelaya, OT

## 2024-03-08 NOTE — PROGRESS NOTES
[facility]: ACUTE REHAB UNIT  SPEECH-LANGUAGE PATHOLOGY      [x] Daily  [] Weekly Care Conference Note  [] Discharge    Patient:Stone Hoskins      :1935  MRN:[id number]  Rehab Dx/Hx: Debility [R53.81]    Precautions: falls and aspirations  Home situation: lives with wife. Wife manages finances, medications, and all household tasks. Pt is an active .   ST Dx: [] Aphasia  [] Dysarthria  [] Apraxia   [x] Oropharyngeal dysphagia [x] Cognitive Impairment  [] Other:   Date of Admit: 3/1/2024  Room #: 0166/0166-01    Current functional status (updated daily):         Pt being seen for : [] Speech/Language Treatment  [x] Dysphagia Treatment [x] Cognitive Treatment  [] Other:  Communication: [x]WFL  [] Aphasia  [] Dysarthria  [] Apraxia  [] Pragmatic Impairment [] Non-verbal  [] Hearing Loss  [] Other:   Cognition: [] WFL  [] Mild  [x] Moderate  [x] Severe [] Unable to Assess  [] Other:  Memory: [] WFL  [] Mild  [x] Moderate  [] Severe [] Unable to Assess  [] Other:  Behavior: [x] Alert  [x] Cooperative  [x]  Pleasant  [] Confused  [] Agitated  [] Uncooperative  [] Distractible [] Motivated  [] Self-Limiting [] Anxious  [] Other:  Endurance:  [x] Adequate for participation in SLP sessions  [] Reduced overall  [] Lethargic  [] Other:  Safety: [] No concerns at this time  [x] Reduced insight into deficits  [x]  Reduced safety awareness [] Not following call light procedures  [] Unable to Assess  [] Other:    Current Diet Order:ADULT ORAL NUTRITION SUPPLEMENT; Lunch, Dinner; Frozen Oral Supplement  ADULT DIET; Dysphagia - Minced and Moist; Mildly Thick (Nectar)  ADULT ORAL NUTRITION SUPPLEMENT; Breakfast, Dinner; Standard 4 oz Oral Supplement  Swallowing Precautions: upright for all intake, stay upright for at least 30 mins after intake, small bites/sips, close supervision, oral care 2-3x/day to reduce adverse affects in the event of aspiration, increase physical mobility as able, alternate bites/sips, slow rate of  s/s of aspiration - no coughing, throat clearing, wet vocal quality.       Goal 5: The patient will tolerate therapeutic diet upgrade trials with no clinical s/s of aspiration 5/5    Dominic Cracker PO Trials  -pt declined placing dentures in oral cavity for dominic crackers, thus mastication slow and prolonged  -with increased time, pt with adequate A-P transit and oral clearance  -no pocketing  -no s/s of aspiration    Edu re: taking small bites, going slow, alternating liquids and solids. Pt verbalized understanding, benefited from min cues to implement.     SLP recommends upgrade to soft and bite sized solids. Continue NTL. Diet orders updated on pt's whiteboard and in EMR. RN made aware. Downgrade back to minced and moist if pt is not tolerating.      Cognitive-linguistic Goals:  Short Term Goals  Time Frame for Short Term Goals:Extend goals through 03/14/24    Goal 1: Pt will demonstrate task initiation without need for repetition of directions with minimal cues on 80% of tasks.     Indirectly targeted - Pt required increased time and frequent repetition of stimuli to complete swallowing exercises above, but pt overall with increased initiation throughout session.     Goal 2: Pt will complete immediate and delayed recall tasks with use of external memory strategies as needed to reach 80% accuracy on tasks.   Spaced retrieval with 4 item grocery list recall task   -immediate recall: 2/4 items indep  -30 sec delay: 0/4 items indep; +3 given category cue  -60 sec delay: 1/4 items indep; +3 given category cue  -90 sec delay: 1/4 items indep; +2 given category cue  -2 min delay: 0/4 items indep; +3 given category cue  -5 min delay: 0/4 items indep; +3 given category cue  -10 min delay: 0/4 items indep; +1 given category cue      Goal 3:   Goal updated 03/05/24    Goal 3: Pt will complete graded problem-solving and thought organization tasks with min cues to reach 80% accuracy.   Divergent Naming Task   -pt given a  increase acc with a divergent naming task. SLP recommending 24 hr supervision at d/c. Continue goals above.     Plan: Continue as per plan of care.       Additional Information: Dr. Chambers provided nursing communication order on 03/04/24 to ok FFWP with ice chips + thins with staff / wife, as well as to ok vital stim.     Cognitive Linguistic Quick Test (CLQT)   Domain  Severity Rating  Comments   Attention:   severe    Memory:   mild    Executive Functions: moderate    Language:   mild    Visuospatial skills:   moderate    Clock Drawing:   Severe       Composite Severity Range: moderate deficits - score of 2.2 (WFL falls between 3.5 and 4.0)      Barriers toward progress: Cognitive deficit and Limited insight into deficits  Discharge recommendations:  [] Home independently  [] Home with assistance [x]  24 hour supervision  [] ECF [] Other:  Continued Tx Upon Discharge: ? [x] Yes [] No [] TBD based on progress while on ARU [] Vital Stim indicated [] Other:   Estimated discharge date: 03/15/24    Interventions used this date:  [] Speech/Language Treatment  [] Instruction in HEP [] Group [x] Dysphagia Treatment [x] Cognitive Treatment   [] Other:      Total Time Breakdown / Charges    Time in Time out Total Time / units   Cognitive Tx 1000 1030 30 min / 2 units    Speech Tx      Dysphagia Tx 0930 1000 30 min / 1 unit        Electronically Signed by     Bettye Funez MA CCC-SLP #[id number]  Speech Language Pathologist       abdomen

## 2024-03-09 PROCEDURE — 6370000000 HC RX 637 (ALT 250 FOR IP): Performed by: STUDENT IN AN ORGANIZED HEALTH CARE EDUCATION/TRAINING PROGRAM

## 2024-03-09 PROCEDURE — 6360000002 HC RX W HCPCS: Performed by: STUDENT IN AN ORGANIZED HEALTH CARE EDUCATION/TRAINING PROGRAM

## 2024-03-09 PROCEDURE — 1280000000 HC REHAB R&B

## 2024-03-09 RX ADMIN — GUAIFENESIN 600 MG: 600 TABLET ORAL at 20:34

## 2024-03-09 RX ADMIN — ENOXAPARIN SODIUM 40 MG: 100 INJECTION SUBCUTANEOUS at 08:14

## 2024-03-09 RX ADMIN — METOPROLOL SUCCINATE 25 MG: 25 TABLET, FILM COATED, EXTENDED RELEASE ORAL at 08:15

## 2024-03-09 RX ADMIN — Medication 1 TABLET: at 08:15

## 2024-03-09 RX ADMIN — GUAIFENESIN 600 MG: 600 TABLET ORAL at 08:14

## 2024-03-09 ASSESSMENT — PAIN SCALES - GENERAL
PAINLEVEL_OUTOF10: 0
PAINLEVEL_OUTOF10: 0

## 2024-03-09 NOTE — PLAN OF CARE
Problem: Safety - Adult  Goal: Free from fall injury  3/8/2024 1959 by Yoly Shaffer, RN  Outcome: Progressing  3/8/2024 1855 by Viky Crowell, RN  Outcome: Progressing

## 2024-03-10 VITALS
WEIGHT: 189 LBS | TEMPERATURE: 98.2 F | OXYGEN SATURATION: 92 % | SYSTOLIC BLOOD PRESSURE: 131 MMHG | HEIGHT: 70 IN | HEART RATE: 101 BPM | DIASTOLIC BLOOD PRESSURE: 76 MMHG | BODY MASS INDEX: 27.06 KG/M2 | RESPIRATION RATE: 18 BRPM

## 2024-03-10 PROCEDURE — 6360000002 HC RX W HCPCS: Performed by: STUDENT IN AN ORGANIZED HEALTH CARE EDUCATION/TRAINING PROGRAM

## 2024-03-10 PROCEDURE — 1280000000 HC REHAB R&B

## 2024-03-10 PROCEDURE — 6370000000 HC RX 637 (ALT 250 FOR IP): Performed by: STUDENT IN AN ORGANIZED HEALTH CARE EDUCATION/TRAINING PROGRAM

## 2024-03-10 RX ADMIN — METOPROLOL SUCCINATE 25 MG: 25 TABLET, FILM COATED, EXTENDED RELEASE ORAL at 09:10

## 2024-03-10 RX ADMIN — PRAVASTATIN SODIUM 20 MG: 20 TABLET ORAL at 09:10

## 2024-03-10 RX ADMIN — GUAIFENESIN 600 MG: 600 TABLET ORAL at 20:25

## 2024-03-10 RX ADMIN — ENOXAPARIN SODIUM 40 MG: 100 INJECTION SUBCUTANEOUS at 09:10

## 2024-03-10 RX ADMIN — GUAIFENESIN 600 MG: 600 TABLET ORAL at 09:10

## 2024-03-10 RX ADMIN — Medication 1 TABLET: at 09:10

## 2024-03-10 ASSESSMENT — PAIN SCALES - WONG BAKER
WONGBAKER_NUMERICALRESPONSE: 0
WONGBAKER_NUMERICALRESPONSE: NO HURT
WONGBAKER_NUMERICALRESPONSE: 0
WONGBAKER_NUMERICALRESPONSE: NO HURT

## 2024-03-10 ASSESSMENT — PAIN SCALES - GENERAL
PAINLEVEL_OUTOF10: 0
PAINLEVEL_OUTOF10: 0

## 2024-03-10 NOTE — PROGRESS NOTES
Patient ambulated from room to end of the hallway with gait belt, walker, and assist x1. Tolerated activity fairly well. Patient was fatigued on the way back, assisted to room via wheelchair. Continent of bowel and bladder, liz-care performed, and patient assisted back to bed. Call light and personal belongings within reach. Fall precautions remain in place.

## 2024-03-10 NOTE — PROGRESS NOTES
[facility] WEEKEND WALKER PROGRAM  [facility]      Patient:Stone Hoskins     Rehab Dx/Hx: Debility [R53.81]   :1935  MRN:[id number]  Date of Admit: 3/1/2024  Room #: 0166/0166-01    Stone Hoskins is on Physical Therapy's weekend walker list.     Patient participated in Weekend Walker Program?:   [x] Yes; see information below.   [] No; patient refused participation due to      Scheduled Therapies this weekend?:  [] Yes, patient received scheduled therapies in addition to weekend walker activity  [x] No, weekend therapy was not regularly scheduled for this patient    Activity Completed:   [x] Walking: around the unit and hallways  [] Wheelchair mobility: 0 feet  [x] Sat up in chair:   [x] Sat up in chair for meals  [] Other:     Assistance needed:   [] No assist / independent  [x] Supervision / Minimal assist  [] Max assistance  [] Other:     Device used:   [x] Walker:  type of walker  [] Cane  [] Wheelchair  [] None  [] Other    Patient tolerated activity well.  Will monitor.     Signature: Jessika SALINAS RN

## 2024-03-10 NOTE — PLAN OF CARE
Problem: Discharge Planning  Goal: Discharge to home or other facility with appropriate resources  Outcome: Progressing     Problem: Safety - Adult  Goal: Free from fall injury  Outcome: Progressing     Problem: Neurosensory - Adult  Goal: Achieves stable or improved neurological status  Outcome: Progressing     Problem: Neurosensory - Adult  Goal: Achieves maximal functionality and self care  Outcome: Progressing     Problem: Skin/Tissue Integrity - Adult  Goal: Skin integrity remains intact  Outcome: Progressing     Problem: Skin/Tissue Integrity  Goal: Absence of new skin breakdown  Description: 1.  Monitor for areas of redness and/or skin breakdown  2.  Assess vascular access sites hourly  3.  Every 4-6 hours minimum:  Change oxygen saturation probe site  4.  Every 4-6 hours:  If on nasal continuous positive airway pressure, respiratory therapy assess nares and determine need for appliance change or resting period.  Outcome: Progressing     Problem: ABCDS Injury Assessment  Goal: Absence of physical injury  Outcome: Progressing     Problem: Nutrition Deficit:  Goal: Optimize nutritional status  Outcome: Progressing     Continue with plan of care.

## 2024-03-10 NOTE — PLAN OF CARE
Problem: Safety - Adult  Goal: Free from fall injury  Outcome: Progressing  Flowsheets (Taken 3/10/2024 1354)  Free From Fall Injury:   Instruct family/caregiver on patient safety   Based on caregiver fall risk screen, instruct family/caregiver to ask for assistance with transferring infant if caregiver noted to have fall risk factors     Problem: Skin/Tissue Integrity - Adult  Goal: Skin integrity remains intact  Outcome: Progressing  Flowsheets (Taken 3/10/2024 1358)  Skin Integrity Remains Intact:   Monitor for areas of redness and/or skin breakdown   Assess vascular access sites hourly   Every 4-6 hours minimum: Change oxygen saturation probe site   Every 4-6 hours: If on nasal continuous positive airway pressure, respiratory therapy assesses nares and determine need for appliance change or resting period

## 2024-03-11 LAB
ANION GAP SERPL CALCULATED.3IONS-SCNC: 9 MMOL/L (ref 3–16)
BASOPHILS # BLD: 0.1 K/UL (ref 0–0.2)
BASOPHILS NFR BLD: 1.1 %
BUN SERPL-MCNC: 19 MG/DL (ref 7–20)
CALCIUM SERPL-MCNC: 8.5 MG/DL (ref 8.3–10.6)
CHLORIDE SERPL-SCNC: 104 MMOL/L (ref 99–110)
CO2 SERPL-SCNC: 25 MMOL/L (ref 21–32)
CREAT SERPL-MCNC: 0.8 MG/DL (ref 0.8–1.3)
DEPRECATED RDW RBC AUTO: 14.5 % (ref 12.4–15.4)
EOSINOPHIL # BLD: 0.2 K/UL (ref 0–0.6)
EOSINOPHIL NFR BLD: 2.4 %
GFR SERPLBLD CREATININE-BSD FMLA CKD-EPI: >60 ML/MIN/{1.73_M2}
GLUCOSE SERPL-MCNC: 87 MG/DL (ref 70–99)
HCT VFR BLD AUTO: 43.2 % (ref 40.5–52.5)
HGB BLD-MCNC: 14.3 G/DL (ref 13.5–17.5)
LYMPHOCYTES # BLD: 1.7 K/UL (ref 1–5.1)
LYMPHOCYTES NFR BLD: 17.6 %
MCH RBC QN AUTO: 29.1 PG (ref 26–34)
MCHC RBC AUTO-ENTMCNC: 33.2 G/DL (ref 31–36)
MCV RBC AUTO: 87.6 FL (ref 80–100)
MONOCYTES # BLD: 1 K/UL (ref 0–1.3)
MONOCYTES NFR BLD: 10.3 %
NEUTROPHILS # BLD: 6.5 K/UL (ref 1.7–7.7)
NEUTROPHILS NFR BLD: 68.6 %
PLATELET # BLD AUTO: 287 K/UL (ref 135–450)
PMV BLD AUTO: 7.9 FL (ref 5–10.5)
POTASSIUM SERPL-SCNC: 4 MMOL/L (ref 3.5–5.1)
RBC # BLD AUTO: 4.92 M/UL (ref 4.2–5.9)
SODIUM SERPL-SCNC: 138 MMOL/L (ref 136–145)
WBC # BLD AUTO: 9.4 K/UL (ref 4–11)

## 2024-03-11 PROCEDURE — 1280000000 HC REHAB R&B

## 2024-03-11 PROCEDURE — 97130 THER IVNTJ EA ADDL 15 MIN: CPT

## 2024-03-11 PROCEDURE — 85025 COMPLETE CBC W/AUTO DIFF WBC: CPT

## 2024-03-11 PROCEDURE — 97530 THERAPEUTIC ACTIVITIES: CPT

## 2024-03-11 PROCEDURE — 97535 SELF CARE MNGMENT TRAINING: CPT

## 2024-03-11 PROCEDURE — 97129 THER IVNTJ 1ST 15 MIN: CPT

## 2024-03-11 PROCEDURE — 97116 GAIT TRAINING THERAPY: CPT

## 2024-03-11 PROCEDURE — 92526 ORAL FUNCTION THERAPY: CPT

## 2024-03-11 PROCEDURE — 80048 BASIC METABOLIC PNL TOTAL CA: CPT

## 2024-03-11 PROCEDURE — 36415 COLL VENOUS BLD VENIPUNCTURE: CPT

## 2024-03-11 PROCEDURE — 6370000000 HC RX 637 (ALT 250 FOR IP): Performed by: STUDENT IN AN ORGANIZED HEALTH CARE EDUCATION/TRAINING PROGRAM

## 2024-03-11 PROCEDURE — 6360000002 HC RX W HCPCS: Performed by: STUDENT IN AN ORGANIZED HEALTH CARE EDUCATION/TRAINING PROGRAM

## 2024-03-11 RX ADMIN — ENOXAPARIN SODIUM 40 MG: 100 INJECTION SUBCUTANEOUS at 09:16

## 2024-03-11 RX ADMIN — GUAIFENESIN 600 MG: 600 TABLET ORAL at 20:15

## 2024-03-11 RX ADMIN — METOPROLOL SUCCINATE 25 MG: 25 TABLET, FILM COATED, EXTENDED RELEASE ORAL at 09:16

## 2024-03-11 RX ADMIN — Medication 1 TABLET: at 09:16

## 2024-03-11 RX ADMIN — GUAIFENESIN 600 MG: 600 TABLET ORAL at 09:16

## 2024-03-11 ASSESSMENT — PAIN SCALES - GENERAL
PAINLEVEL_OUTOF10: 0
PAINLEVEL_OUTOF10: 0

## 2024-03-11 NOTE — PROGRESS NOTES
ARU IN ROOM ADVANCEMENT of INDEPENDENCE with MOBILITY  [facility]    Patient Name: Stone Hoskins        MRN: [id number]    : 1935  (88 y.o.)  Date: 3/11/2024     The recommended mobility for Stone Hoskins has been updated as of 3/11/2024 to reflect the following changes:  [x]  Room independence   []  BED<>CHAIR independence   []  BED<>Bedside Commode independence   [] Bathroom Privileges   For the duration:  [] 24 hours   [x]  Daytime only  May ambulate in hallway with wife and w/f follow      With ____RW and WIFE ONLY______(assistive device/equipment)    Physical Therapy: Bhavana Alvarado PT, DPT endorses the above recommendations based on safety and independence demonstrated during treatment session on 3/11    Occupational Therapy:  CELY Hemphill/L endorses the above recommendations based on safety and independence demonstrated during treatment session on 3/11    Speech Therapy: Bettye Sanon M.S. CCC-SLP endorses the above recommendations based on safety and independence demonstrated during treatment session on 3/11/24    Nursing : Ubaldo Hargrove RN endorses the above recommendations based on safety and independence demonstrated during care on 3/11/2024    Patient, MD, and treatment team all aware and in agreement.      Signature:  Bhavana Alvarado PT, BRIANT  CELY Hemphill/L    Physician Signature: Electronically signed by Ayala Chambers MD on 3/11/2024 at 4:29 PM

## 2024-03-11 NOTE — PROGRESS NOTES
[facility] Inpatient Rehabilitation Progress Note    Stone Hoskins  3/11/2024  [id number]    Chief Complaint: Debility    Subjective:   No acute events overnight. Today Stone is seen with his wife present. He reports that he is feeling well and denies acute complaints at this time. He takes water pill as needed at home for swelling. His legs do not currently appear swollen.     ROS: denies f/c, n/v, cp, sob    Objective:  Patient Vitals for the past 24 hrs:   BP Temp Temp src Pulse Resp SpO2 Weight   03/11/24 0916 123/62 98 °F (36.7 °C) Oral 90 18 92 % --   03/11/24 0645 -- -- -- -- -- -- 85 kg (187 lb 6.3 oz)   03/11/24 0600 -- -- -- -- -- -- 85.8 kg (189 lb 3.2 oz)   03/10/24 2015 131/76 98.2 °F (36.8 °C) Oral (!) 101 18 92 % --     Gen: No distress, pleasant.   HEENT: Normocephalic, atraumatic.   CV: RRR  Resp: No respiratory distress. On room air  Abd: Soft, nondistended  Ext: No edema BLE  Neuro: Alert, oriented, appropriately interactive.   Psych: appropriate mood and affect    Wt Readings from Last 3 Encounters:   03/11/24 85 kg (187 lb 6.3 oz)   02/21/24 89.5 kg (197 lb 6.4 oz)   08/31/23 88 kg (194 lb)       Laboratory data:   Lab Results   Component Value Date    WBC 9.4 03/11/2024    HGB 14.3 03/11/2024    HCT 43.2 03/11/2024    MCV 87.6 03/11/2024     03/11/2024       Lab Results   Component Value Date/Time     03/11/2024 05:54 AM    K 4.0 03/11/2024 05:54 AM     03/11/2024 05:54 AM    CO2 25 03/11/2024 05:54 AM    BUN 19 03/11/2024 05:54 AM    CREATININE 0.8 03/11/2024 05:54 AM    GLUCOSE 87 03/11/2024 05:54 AM    CALCIUM 8.5 03/11/2024 05:54 AM        Therapy progress:       PT    Supine to Sit: Supervision or touching assistance  Sit to Supine: Supervision or touching assistance   Sit to Stand: Partial/moderate assistance  Chair/Bed to Chair Transfer: Partial/moderate assistance  Car Transfer: Substantial/maximal assistance  Ambulation 10 ft: Partial/moderate  assistance  Ambulation 50 ft:    Ambulation 150 ft:    Stairs - 1 Step: Partial/moderate assistance  Stairs - 4 Step: Partial/moderate assistance  Stairs - 12 Step:      OT    Eating: Setup or clean-up assistance  Oral Hygiene: Supervision or touching assistance  Bathing: Substantial/maximal assistance  Upper Body Dressing: Setup or clean-up assistance  Lower Body Dressing: Substantial/maximal assistance  Toilet Transfer: Partial/moderate assistance  Toilet Hygiene: Supervision or touching assistance    Speech Therapy         ADULT ORAL NUTRITION SUPPLEMENT; Lunch, Dinner; Frozen Oral Supplement  ADULT ORAL NUTRITION SUPPLEMENT; Breakfast, Dinner; Standard 4 oz Oral Supplement  ADULT DIET; Dysphagia - Soft and Bite Sized; Mildly Thick (Nectar)    Body mass index is 26.89 kg/m².    Assessment and Plan:    Debility   - due to Sepsis 2/2 acute cholelithiasis  - completed 10 day course of abx, discontinued augmentin  - follow up with General Surgery outpatient  - PT, OT     AMS-Resolved  - continue to monitor     Oropharyngeal dysphagia  - on modified diet  - SLP following     Bladder   - High risk retention   - Monitor PVRs, SC prn >300cc     Venous stasis  - PRN Lasix for swelling, give if patient symptomatic  - daily weights     Bowel   - High risk constipation   - adjust medications as needed for regular bowel movements     Safety   - fall precautions     Pain control  - tylenol PRN     PPx  -DVT: lovenox  -GI: pantoprazole     FULL CODE    Therapy progress: pending therapies today  Services: OP PT, OT, ST  EDOD: 3/15    Ayala Chambers MD   3/11/2024, 2:32 PM

## 2024-03-11 NOTE — PLAN OF CARE
Problem: Skin/Tissue Integrity - Adult  Goal: Skin integrity remains intact  3/10/2024 1354 by Jessika Carter RN  Outcome: Progressing  Flowsheets (Taken 3/10/2024 1354)  Skin Integrity Remains Intact:   Monitor for areas of redness and/or skin breakdown   Assess vascular access sites hourly   Every 4-6 hours minimum: Change oxygen saturation probe site   Every 4-6 hours: If on nasal continuous positive airway pressure, respiratory therapy assesses nares and determine need for appliance change or resting period

## 2024-03-11 NOTE — CONSULTS
Comprehensive Nutrition Assessment    Type and Reason for Visit: Follow up    Continue soft and bite sized diet, encourage intake with nectar liquids  Magic cup and Ensure compact chocolate ordered, monitor tolerance.   Malnutrition Assessment:  Malnutrition Status:  At risk for malnutrition (Comment) (03/02/24 1237)    Context:  Acute Illness     Findings of the 6 clinical characteristics of malnutrition:  Fluid Accumulation:  Mild Extremities    Nutrition Assessment:    New ARU pt admitted with debility. Pt 89 yo M with hx HLD, HTN. Currently on minced and moist diet with mildly thickened liquids. Magic cups on order BID. PO intakes % per EMR. SLP consulted. Pt sleeping soundly at time of visit. Limited weight history per EMR, difficult to assess recent weight changes. Recommend adding ensure compact to promote po intakes, chocolate is mildly thick compliant. Encourage po intakes as tolerated. Will monitor.    Nutrition Related Findings:    + , labs reviewed Wound Type: None       Current Nutrition Intake & Therapies:    Average Meal Intake: %  Average Supplements Intake: 1-25%, 26-50%, 51-75%  ADULT ORAL NUTRITION SUPPLEMENT; Lunch, Dinner; Frozen Oral Supplement  ADULT ORAL NUTRITION SUPPLEMENT; Breakfast, Dinner; Standard 4 oz Oral Supplement  ADULT DIET; Dysphagia - Soft and Bite Sized; Mildly Thick (Nectar)    Anthropometric Measures:  Height: 177.8 cm (5' 10\")  Ideal Body Weight (IBW): 166 lbs (75 kg)       Current Body Weight: 85 kg (187 lb 6.3 oz),   IBW. Weight Source: Bed Scale  Current BMI (kg/m2): 26.9    BMI Categories: Overweight (BMI 25.0-29.9)    Estimated Daily Nutrient Needs:  Energy Requirements Based On: Kcal/kg (25-30)  Weight Used for Energy Requirements: Ideal  Energy (kcal/day): 0036-2016 kcals  Weight Used for Protein Requirements: Ideal (1-1.2)  Protein (g/day): 75-90 g  Method Used for Fluid Requirements: 1 ml/kcal    Nutrition Diagnosis:   Inadequate oral intake

## 2024-03-11 NOTE — PROGRESS NOTES
[facility]: ACUTE REHAB UNIT  SPEECH-LANGUAGE PATHOLOGY      [x] Daily  [] Weekly Care Conference Note  [] Discharge    Patient:Stone Hoskins      :1935  MRN:[id number]  Rehab Dx/Hx: Debility [R53.81]    Precautions: falls and aspirations  Home situation: lives with wife. Wife manages finances, medications, and all household tasks. Pt is an active .   ST Dx: [] Aphasia  [] Dysarthria  [] Apraxia   [x] Oropharyngeal dysphagia [x] Cognitive Impairment  [] Other:   Date of Admit: 3/1/2024  Room #: 0166/0166-01    Current functional status (updated daily):         Pt being seen for : [] Speech/Language Treatment  [x] Dysphagia Treatment [x] Cognitive Treatment  [] Other:  Communication: [x]WFL  [] Aphasia  [] Dysarthria  [] Apraxia  [] Pragmatic Impairment [] Non-verbal  [] Hearing Loss  [] Other:   Cognition: [] WFL  [] Mild  [x] Moderate  [x] Severe [] Unable to Assess  [] Other:  Memory: [] WFL  [] Mild  [x] Moderate  [] Severe [] Unable to Assess  [] Other:  Behavior: [x] Alert  [x] Cooperative  [x]  Pleasant  [] Confused  [] Agitated  [] Uncooperative  [] Distractible [] Motivated  [] Self-Limiting [] Anxious  [] Other:  Endurance:  [x] Adequate for participation in SLP sessions  [] Reduced overall  [] Lethargic  [] Other:  Safety: [] No concerns at this time  [x] Reduced insight into deficits  [x]  Reduced safety awareness [] Not following call light procedures  [] Unable to Assess  [] Other:    Current Diet Order:ADULT ORAL NUTRITION SUPPLEMENT; Lunch, Dinner; Frozen Oral Supplement  ADULT ORAL NUTRITION SUPPLEMENT; Breakfast, Dinner; Standard 4 oz Oral Supplement  ADULT DIET; Dysphagia - Soft and Bite Sized; Mildly Thick (Nectar)  Swallowing Precautions: upright for all intake, stay upright for at least 30 mins after intake, small bites/sips, close supervision, oral care 2-3x/day to reduce adverse affects in the event of aspiration, increase physical mobility as able, alternate bites/sips, slow rate of    Plan: Continue as per plan of care.       Additional Information: Dr. Chambers provided nursing communication order on 03/04/24 to ok FFWP with ice chips + thins with staff / wife, as well as to ok vital stim.     Cognitive Linguistic Quick Test (CLQT)   Domain  Severity Rating  Comments   Attention:   severe    Memory:   mild    Executive Functions: moderate    Language:   mild    Visuospatial skills:   moderate    Clock Drawing:   Severe       Composite Severity Range: moderate deficits - score of 2.2 (WFL falls between 3.5 and 4.0)      Barriers toward progress: Cognitive deficit and Limited insight into deficits  Discharge recommendations:  [] Home independently  [] Home with assistance [x]  24 hour supervision  [] ECF [] Other:  Continued Tx Upon Discharge: ? [x] Yes [] No [] TBD based on progress while on ARU [] Vital Stim indicated [] Other:   Estimated discharge date: 03/15/24    Interventions used this date:  [] Speech/Language Treatment  [] Instruction in HEP [] Group [x] Dysphagia Treatment [x] Cognitive Treatment   [] Other:      Total Time Breakdown / Charges    Time in Time out Total Time / units   Cognitive Tx 0830 0900 30 min / 2 units   Speech Tx      Dysphagia Tx 0800 0830 30 min / 1 unit       Electronically Signed by     Bettye Sanon M.S. CCC-SLP  Speech-language pathologist  [id number]

## 2024-03-11 NOTE — PLAN OF CARE
Problem: Safety - Adult  Goal: Free from fall injury  3/11/2024 0716 by Ubaldo Hargrove, RN  Outcome: Progressing  3/10/2024 2215 by Yoly Shaffer, RN  Outcome: Progressing     Problem: Skin/Tissue Integrity - Adult  Goal: Skin integrity remains intact  Outcome: Progressing

## 2024-03-11 NOTE — PROGRESS NOTES
Occupational Therapy  Facility/Department: [facility]  Rehabilitation Occupational Therapy Daily Treatment Note    Date: 3/11/24  Patient Name: Stone Hoskins       Room: 0166/0166-01  MRN: [id number]  Account: [id number]   : 1935  (88 y.o.) Gender: male                    Past Medical History:  has a past medical history of Arthritis, Hyperlipidemia, Influenza A, and Right-sided low back pain with right-sided sciatica.  Past Surgical History:   has a past surgical history that includes Tonsillectomy; Tooth Extraction; Inguinal hernia repair (Bilateral, 11); and Cataract removal.    Restrictions  Restrictions/Precautions: General Precautions, Fall Risk, Swallowing - Thickened Liquids, Modified Diet  Other position/activity restrictions: Up with assist  Required Braces or Orthoses?: No    Subjective  Subjective: Pt in chair at OT arrival. /74, , Spo2 94% on RA. Denies pain at rest  Restrictions/Precautions: General Precautions;Fall Risk;Swallowing - Thickened Liquids;Modified Diet             Objective     Cognition  Overall Cognitive Status: Exceptions  Arousal/Alertness: Delayed responses to stimuli  Following Commands: Follows one step commands consistently;Follows multistep commands with increased time  Attention Span: Attends with cues to redirect  Memory: Decreased short term memory;Decreased recall of recent events  Safety Judgement: Decreased awareness of need for assistance;Decreased awareness of need for safety  Problem Solving: Assistance required to implement solutions;Assistance required to identify errors made;Assistance required to correct errors made;Decreased awareness of errors  Insights: Decreased awareness of deficits  Initiation: Requires cues for some  Sequencing: Requires cues for some  Orientation  Overall Orientation Status: Within Functional Limits  Orientation Level: Oriented to person;Disoriented to situation;Oriented to time;Disoriented to place      Goals  Time Frame for Short Term Goals: 7 days (3/08/2024): unless otherwise noted  Short Term Goal 1: Pt will complete toilet transfer w/ LRD and SBA. - goal not met 3/8  Short Term Goal 2: Pt will complete FB bathing w/ Aletha. - GOAL: MET 3/7 set-up for UB bathing, Aletha for LB bathing  Short Term Goal 3: Pt will complete LB dressing w/ SBA and AE PRN. - goal not met 3/8  Short Term Goal 4: Pt stand at sink to complete grooming ADLs for ~4mins w/ SBA. - goal not met 3/8  Long Term Goals  Time Frame for Long Term Goals : 3/15/24- progressing 3/11  Long Term Goal 1: Pt will complete toilet transfer w/ LRD and mod. I.  Long Term Goal 2: Pt will complete FB bathing w/ AE PRN and mod. I.  Long Term Goal 3: Pt will complete LB dressing w/ mod. I. and AE PRN.  Long Term Goal 4: Pt stand at sink to complete grooming ADLs for ~4mins w/ SPV.      Therapy Time   Individual Concurrent Group Co-treatment   Time In 1230         Time Out 1330         Minutes 60         Timed Code Treatment Minutes: 60 Minutes       Mathieu Zelaya, OT

## 2024-03-11 NOTE — CARE COORDINATION
[facility]    Referral received from  to follow for home care services.   I will follow for needs, and speak with patient to verify demos.  [facility]    [facility]Ref[facility] and I will follow up on auth request    CM to call P[facility] at [facility] Request order for [facility] care   Fax: order; facesheet; h&p; avs; dc summary to triage nurse/[facility] pcp  Let them know patient requesting [facility] for home care    Written auth needs faxed to [facility] [phone]     [facility] will call [facility] care coordinator to follow up on auth request  [phone] Viri Tate (patient last name A-M)   [phone] Manning (patient last name N-Z)    For additional assistance can reach out to [facility] Supervisor Awais Pettit [phone]     Yuliya Curtis RN, BSN [phone]  [facility]   [phone] fax [phone]  [facility] -[phone]  [facility] -[phone]

## 2024-03-11 NOTE — PROGRESS NOTES
Physician Progress Note      PATIENT:               SATISH MILLS  CSN #:                  [id number]  :                       1935  ADMIT DATE:       2024 12:37 PM  DISCH DATE:        3/1/2024 3:49 PM  RESPONDING  PROVIDER #:        JASON PROCTOR          QUERY TEXT:    Pt admitted with sepsis due to acute cholecystitis. Pt noted to have pulmonary   edema on initial CXR treated w/ IV lasix, CHF ruled out. If possible, please   document in the progress notes and discharge summary if you are evaluating   and/or treating any of the following:    The medical record reflects the following:  Risk Factors: sepsis, acute cholecystitis, pmhx HTN  Clinical Indicators: pt reports cough and respiratory symptoms per ED HPI.    CXR  \"pulmonary edema\"  Treatment: IV Lasix, repeat imaging, monitoring and supportive care    Thank you,  Josefina Cedillo RN CDS  [email or web address]  Options provided:  -- Noncardiogenic acute pulmonary edema  -- Other - I will add my own diagnosis  -- Disagree - Not applicable / Not valid  -- Disagree - Clinically unable to determine / Unknown  -- Refer to Clinical Documentation Reviewer    PROVIDER RESPONSE TEXT:    This patient was treated for noncardiogenic acute pulmonary edema.    Query created by: Josefina Cedillo on 3/8/2024 9:51 AM      Electronically signed by:  JASON PROCTOR 3/11/2024 8:50 AM

## 2024-03-11 NOTE — PROGRESS NOTES
Physical Therapy  Facility/Department: [facility]  Rehabilitation Physical Therapy Treatment Note    NAME: Stone Hoskins  : 1935 (88 y.o.)  MRN: [id number]  CODE STATUS: Full Code    Date of Service: 3/11/24       Restrictions:  Restrictions/Precautions: General Precautions, Fall Risk, Swallowing - Thickened Liquids, Modified Diet  Position Activity Restriction  Other position/activity restrictions: Up with assist     SUBJECTIVE  Subjective  Subjective: Pt seated in relincer on approach, pleasant and agreeable to PT tx, wife present for family training  Pain: pt denies pain              OBJECTIVE  Cognition  Overall Cognitive Status: Exceptions  Arousal/Alertness: Delayed responses to stimuli  Following Commands: Follows one step commands consistently;Follows multistep commands with increased time  Attention Span: Attends with cues to redirect  Memory: Decreased short term memory;Decreased recall of recent events  Safety Judgement: Decreased awareness of need for assistance;Decreased awareness of need for safety  Problem Solving: Assistance required to implement solutions;Assistance required to identify errors made;Assistance required to correct errors made;Decreased awareness of errors  Insights: Decreased awareness of deficits  Initiation: Requires cues for some  Sequencing: Requires cues for some  Orientation  Overall Orientation Status: Within Functional Limits  Orientation Level: Oriented to person;Disoriented to situation;Oriented to time;Disoriented to place    Functional Mobility  Balance  Sitting Balance: Supervision  Standing Balance: Stand by assistance  Standing Balance  Activity: SBA with RW    Transfers  Surface: To chair with arms;From chair with arms  Additional Factors: Verbal cues;Hand placement cues;Increased time to complete  Device: Walker (RW)  Sit to Stand  Assistance Level: Stand by assist;Supervision  Skilled Clinical Factors: Pt completes multiple t/f throughout session with RW and  Frame for Long Term Goals : 14 days 3/14/24  Long Term Goal 1: Pt will complete bed mobility with Ind  Long Term Goal 2: Pt will complete functional t/f with Sup with LRAD  Long Term Goal 3: Pt will ambulate >150' with RW with LRAD with SUP without LOB  Long Term Goal 4: Pt will ascend/descend 4 steps with BHR with SBA  Long Term Goal 5: Pt will demonstrate improved gait speed to > .8 m/sec to demonstrate improved performance with community mobility and decrease risk of falls    PLAN OF CARE/SAFETY  Physical Therapy Plan  General Plan: 5-7 times per week  Therapy Duration: 2 Weeks  Specific Instructions for Next Treatment: Progress mobility as tolerated  Current Treatment Recommendations: Strengthening;Wheelchair mobility training;ROM;Balance training;Gait training;Home exercise program;Therapeutic activities;Safety education & training;Stair training;Functional mobility training;Transfer training;Neuromuscular re-education;Manual;Patient/Caregiver education & training;Endurance training;Equipment evaluation, education, & procurement;Positioning  Safety Devices  Type of Devices: All fall risk precautions in place;Call light within reach;Chair alarm in place;Gait belt;Nurse notified;Left in chair;Patient at risk for falls    EDUCATION  Education  Education Given To: Patient;Family  Education Provided: Role of Therapy;Plan of Care;Mobility Training;Transfer Training;Safety;Fall Prevention Strategies;Precautions;Family Education;Energy Conservation  Education Provided Comments: safe hand placement with transfers, safety, progressing endurance. Pt's wife present for family training. Educated on use of gait belt, progress and current KEN, safe guard techniques with mobility  Education Method: Verbal;Demonstration  Barriers to Learning: Hearing;Cognition  Education Outcome: Verbalized understanding;Continued education needed        Therapy Time   Individual Concurrent Group Co-treatment   Time In 1330         Time Out

## 2024-03-12 PROCEDURE — 6360000002 HC RX W HCPCS: Performed by: STUDENT IN AN ORGANIZED HEALTH CARE EDUCATION/TRAINING PROGRAM

## 2024-03-12 PROCEDURE — 97129 THER IVNTJ 1ST 15 MIN: CPT

## 2024-03-12 PROCEDURE — 97116 GAIT TRAINING THERAPY: CPT

## 2024-03-12 PROCEDURE — 6370000000 HC RX 637 (ALT 250 FOR IP): Performed by: STUDENT IN AN ORGANIZED HEALTH CARE EDUCATION/TRAINING PROGRAM

## 2024-03-12 PROCEDURE — 97530 THERAPEUTIC ACTIVITIES: CPT

## 2024-03-12 PROCEDURE — 97110 THERAPEUTIC EXERCISES: CPT

## 2024-03-12 PROCEDURE — 1280000000 HC REHAB R&B

## 2024-03-12 PROCEDURE — 92526 ORAL FUNCTION THERAPY: CPT

## 2024-03-12 PROCEDURE — 97130 THER IVNTJ EA ADDL 15 MIN: CPT

## 2024-03-12 RX ADMIN — PRAVASTATIN SODIUM 20 MG: 20 TABLET ORAL at 08:30

## 2024-03-12 RX ADMIN — GUAIFENESIN 600 MG: 600 TABLET ORAL at 20:38

## 2024-03-12 RX ADMIN — METOPROLOL SUCCINATE 25 MG: 25 TABLET, FILM COATED, EXTENDED RELEASE ORAL at 08:30

## 2024-03-12 RX ADMIN — ENOXAPARIN SODIUM 40 MG: 100 INJECTION SUBCUTANEOUS at 08:30

## 2024-03-12 RX ADMIN — GUAIFENESIN 600 MG: 600 TABLET ORAL at 08:30

## 2024-03-12 RX ADMIN — Medication 1 TABLET: at 08:30

## 2024-03-12 ASSESSMENT — PAIN SCALES - GENERAL
PAINLEVEL_OUTOF10: 0
PAINLEVEL_OUTOF10: 0

## 2024-03-12 NOTE — PROGRESS NOTES
[facility]: ACUTE REHAB UNIT  SPEECH-LANGUAGE PATHOLOGY      [x] Daily  [] Weekly Care Conference Note  [] Discharge    Patient:Stone Hoskins      :1935  MRN:[id number]  Rehab Dx/Hx: Debility [R53.81]    Precautions: falls and aspirations  Home situation: lives with wife. Wife manages finances, medications, and all household tasks. Pt is an active .   ST Dx: [] Aphasia  [] Dysarthria  [] Apraxia   [x] Oropharyngeal dysphagia [x] Cognitive Impairment  [] Other:   Date of Admit: 3/1/2024  Room #: 0166/0166-01    Current functional status (updated daily):         Pt being seen for : [] Speech/Language Treatment  [x] Dysphagia Treatment [x] Cognitive Treatment  [] Other:  Communication: [x]WFL  [] Aphasia  [] Dysarthria  [] Apraxia  [] Pragmatic Impairment [] Non-verbal  [] Hearing Loss  [] Other:   Cognition: [] WFL  [] Mild  [x] Moderate  [x] Severe [] Unable to Assess  [] Other:  Memory: [] WFL  [] Mild  [x] Moderate  [] Severe [] Unable to Assess  [] Other:  Behavior: [x] Alert  [x] Cooperative  [x]  Pleasant  [] Confused  [] Agitated  [] Uncooperative  [] Distractible [] Motivated  [] Self-Limiting [] Anxious  [] Other:  Endurance:  [x] Adequate for participation in SLP sessions  [] Reduced overall  [] Lethargic  [] Other:  Safety: [] No concerns at this time  [x] Reduced insight into deficits  [x]  Reduced safety awareness [] Not following call light procedures  [] Unable to Assess  [] Other:    Current Diet Order:ADULT ORAL NUTRITION SUPPLEMENT; Lunch, Dinner; Frozen Oral Supplement  ADULT ORAL NUTRITION SUPPLEMENT; Breakfast, Dinner; Standard 4 oz Oral Supplement  ADULT DIET; Dysphagia - Soft and Bite Sized; Mildly Thick (Nectar)  Swallowing Precautions: upright for all intake, stay upright for at least 30 mins after intake, small bites/sips, close supervision, oral care 2-3x/day to reduce adverse affects in the event of aspiration, increase physical mobility as able, alternate bites/sips, slow rate of  S, and T  -pt completed task with 65% acc given min cues, improved to 75% acc given mod cues     Call Light - pt able to verbalize how to call nurse, and locate red call light   Goal not directly targeted    Goal 4: Pt will complete remaining sections of CLQT during one treatment session.   Goal met 03/04/24. See below for details.    Goal met 03/04/24. See below for details.    Other areas targeted: N/a    Education:   SLP edu pt re: thought org and processing strategies, recall strategies. No evidence of learning.  SLP edu pt and family re: rationale of vital stim, repeat instrumental    Safety Devices: [x] Call light within reach  [] Chair alarm activated  [x] Bed alarm activated  [] Other:  [x] Call light within reach  [x] Chair alarm activated  [] Bed alarm activated  [x] Other: son at bedside    Assessment: Tx session 1: Pt alert and cooperative, continues to be pleasantly confused. Pt is able to recall orientation concepts, but difficulty with utilizing visual aids to improve recall if needed. Pt overall with improved initiation to complete both structured and conversational tasks. Pt's need for repetition and cueing is more for deficits with thought organization and processing vs initiation. Thus, verbal initiation goal met. Pt required min-mod cues to complete a structured thought org task this date. Pt will require 24 hr supervision at d/c. Continue goals above.     Tx session 2: Pt alert and cooperative, agreeable to tx. Pt agreeable to complete effortful swallows and macy maneuvers with vital stim / NMES in place - pt completed x20 reps of each exercises. Pt with no immediate coughing or throat clearing with thin trials, but intermittent delayed coughing noted. SLP recommends repeat instrumental prior to diet advancement. SLP discussed with pt, wife, and son. Pt and wife agreeable to repeat MBSS (concerns with pt not tolerating FEES), which we will plan to complete 3/14. Continue goals above.    Plan:

## 2024-03-12 NOTE — PLAN OF CARE
Problem: Safety - Adult  Goal: Free from fall injury  Outcome: Progressing     Problem: Skin/Tissue Integrity - Adult  Goal: Skin integrity remains intact  Outcome: Progressing

## 2024-03-12 NOTE — CARE COORDINATION
CM faxed HHC and DME orders and supporting documents w/RFS form to Agustin RAMIREZ w[facility] (f) [phone].     Paige Kim RN

## 2024-03-12 NOTE — PROGRESS NOTES
Physical Therapy  Facility/Department: [facility]  Rehabilitation Physical Therapy Treatment Note    NAME: Stone Hoskins  : 1935 (88 y.o.)  MRN: [id number]  CODE STATUS: Full Code    Date of Service: 3/12/24       Restrictions:  Restrictions/Precautions: General Precautions, Fall Risk, Swallowing - Thickened Liquids, Modified Diet  Position Activity Restriction  Other position/activity restrictions: Up with assist     SUBJECTIVE  Subjective  Subjective: Pt in bed, agreeable to therapy  Pain: pt denies pain     OBJECTIVE  111/70, 86 bpm, 94%    Cognition  Overall Cognitive Status: Exceptions  Arousal/Alertness: Delayed responses to stimuli  Following Commands: Follows one step commands consistently;Follows multistep commands with increased time  Attention Span: Attends with cues to redirect  Memory: Decreased short term memory;Decreased recall of recent events  Safety Judgement: Decreased awareness of need for assistance;Decreased awareness of need for safety  Problem Solving: Assistance required to implement solutions;Assistance required to identify errors made;Assistance required to correct errors made;Decreased awareness of errors  Insights: Decreased awareness of deficits  Initiation: Requires cues for some  Sequencing: Requires cues for some  Orientation  Overall Orientation Status: Within Functional Limits  Orientation Level: Oriented to person;Disoriented to situation;Oriented to time;Disoriented to place    Functional Mobility  Supine to Sit  Assistance Level: Stand by assist  Skilled Clinical Factors: HOB elevated, increased time, cues to scoot to EOB, uses bedrail  Sit to Stand  Assistance Level: Supervision  Skilled Clinical Factors: from EOB, toilet, and EOM, cues for safe hand placement and upright posture upon standing  Stand to Sit  Assistance Level: Supervision  Skilled Clinical Factors: to EOM, recliner, and toilet with cues for aligning self with surfaces and safe hand

## 2024-03-12 NOTE — PROGRESS NOTES
Occupational Therapy  Facility/Department: [facility]  Rehabilitation Occupational Therapy Daily Treatment Note    Date: 3/12/24  Patient Name: Stone Hoskins       Room: 0166/0166-01  MRN: [id number]  Account: [id number]   : 1935  (88 y.o.) Gender: male                    Past Medical History:  has a past medical history of Arthritis, Hyperlipidemia, Influenza A, and Right-sided low back pain with right-sided sciatica.  Past Surgical History:   has a past surgical history that includes Tonsillectomy; Tooth Extraction; Inguinal hernia repair (Bilateral, 11); and Cataract removal.    Restrictions  Restrictions/Precautions: General Precautions, Fall Risk, Swallowing - Thickened Liquids, Modified Diet  Other position/activity restrictions: Up with assist  Required Braces or Orthoses?: No    Subjective  Subjective: Pt in chair at OT arrival. /66, HR 89, SpO2 92% on RA. Denies pain  Restrictions/Precautions: General Precautions;Fall Risk;Swallowing - Thickened Liquids;Modified Diet             Objective     Cognition  Overall Cognitive Status: Exceptions  Arousal/Alertness: Delayed responses to stimuli  Following Commands: Follows one step commands consistently;Follows multistep commands with increased time  Attention Span: Attends with cues to redirect  Memory: Decreased short term memory;Decreased recall of recent events  Safety Judgement: Decreased awareness of need for assistance;Decreased awareness of need for safety  Problem Solving: Assistance required to implement solutions;Assistance required to identify errors made;Assistance required to correct errors made;Decreased awareness of errors  Insights: Decreased awareness of deficits  Initiation: Requires cues for some  Sequencing: Requires cues for some  Orientation  Overall Orientation Status: Within Functional Limits  Orientation Level: Oriented to person;Disoriented to situation;Oriented to time;Disoriented to place         ADL  Grooming/Oral

## 2024-03-12 NOTE — PROGRESS NOTES
[facility] Inpatient Rehabilitation Progress Note    Stone Hoskins  3/12/2024  [id number]    Chief Complaint: Debility    Subjective:   No acute events overnight. Today Stone is seen in his room, resting in bed. He reports that he is feeling well and denies acute complaints at this time.     ROS: denies f/c, n/v, cp, sob    Objective:  Patient Vitals for the past 24 hrs:   BP Temp Temp src Pulse Resp SpO2 Weight   03/12/24 1100 -- -- -- -- -- -- 87 kg (191 lb 14.4 oz)   03/12/24 0815 132/66 97.6 °F (36.4 °C) Oral 89 18 92 % --   03/12/24 0530 -- -- -- -- -- -- 87 kg (191 lb 12.8 oz)   03/11/24 2014 121/68 98 °F (36.7 °C) Oral (!) 103 18 96 % --     Gen: No distress, pleasant.   HEENT: Normocephalic, atraumatic.   CV: extremities well perfused  Resp: No respiratory distress. On room air  Abd: Soft, nondistended  Ext: No edema BLE  Neuro: Alert, oriented, appropriately interactive.   Psych: appropriate mood and affect    Wt Readings from Last 3 Encounters:   03/12/24 87 kg (191 lb 14.4 oz)   02/21/24 89.5 kg (197 lb 6.4 oz)   08/31/23 88 kg (194 lb)       Laboratory data:   Lab Results   Component Value Date    WBC 9.4 03/11/2024    HGB 14.3 03/11/2024    HCT 43.2 03/11/2024    MCV 87.6 03/11/2024     03/11/2024       Lab Results   Component Value Date/Time     03/11/2024 05:54 AM    K 4.0 03/11/2024 05:54 AM     03/11/2024 05:54 AM    CO2 25 03/11/2024 05:54 AM    BUN 19 03/11/2024 05:54 AM    CREATININE 0.8 03/11/2024 05:54 AM    GLUCOSE 87 03/11/2024 05:54 AM    CALCIUM 8.5 03/11/2024 05:54 AM        Therapy progress:       PT    Supine to Sit: Supervision or touching assistance  Sit to Supine: Supervision or touching assistance   Sit to Stand: Partial/moderate assistance  Chair/Bed to Chair Transfer: Partial/moderate assistance  Car Transfer: Substantial/maximal assistance  Ambulation 10 ft: Partial/moderate assistance  Ambulation 50 ft:    Ambulation 150 ft:    Stairs - 1 Step:  Partial/moderate assistance  Stairs - 4 Step: Partial/moderate assistance  Stairs - 12 Step:      OT    Eating: Setup or clean-up assistance  Oral Hygiene: Supervision or touching assistance  Bathing: Substantial/maximal assistance  Upper Body Dressing: Setup or clean-up assistance  Lower Body Dressing: Substantial/maximal assistance  Toilet Transfer: Partial/moderate assistance  Toilet Hygiene: Supervision or touching assistance    Speech Therapy         ADULT ORAL NUTRITION SUPPLEMENT; Lunch, Dinner; Frozen Oral Supplement  ADULT ORAL NUTRITION SUPPLEMENT; Breakfast, Dinner; Standard 4 oz Oral Supplement  ADULT DIET; Dysphagia - Soft and Bite Sized; Mildly Thick (Nectar)    Body mass index is 27.53 kg/m².    Assessment and Plan:    Debility   - due to Sepsis 2/2 acute cholelithiasis  - completed 10 day course of abx, discontinued augmentin  - follow up with General Surgery outpatient  - PT, OT     AMS-Resolved  - continue to monitor     Oropharyngeal dysphagia  - on modified diet  - SLP following     Bladder   - High risk retention   - Monitor PVRs, SC prn >300cc     Venous stasis  - PRN Lasix for swelling, give if patient symptomatic  - daily weights     Bowel   - High risk constipation   - adjust medications as needed for regular bowel movements     Safety   - fall precautions     Pain control  - tylenol PRN     PPx  -DVT: lovenox  -GI: pantoprazole     FULL CODE    Therapy progress: completed family training   Services: OP PT, OT, ST  EDOD: 3/15    Ayala Chambers MD   3/12/2024, 11:36 AM

## 2024-03-12 NOTE — CARE COORDINATION
[facility] Auth  Call from Viri Tate  States she is faxing [place] auth for sn/pt/ot    Yuliya Curtis RN, BSN -[phone]  [facility]   [phone] fax [phone]  [facility] [phone]  [facility] -[phone]

## 2024-03-12 NOTE — PATIENT CARE CONFERENCE
met 03/12  Goal 2: Pt will complete immediate and delayed recall tasks with use of external memory strategies as needed to reach 80% accuracy on tasks. - 03/12 - ongoing, progressing   Goal 3: Pt will complete graded problem-solving tasks with min cues to reach 80% accuracy. - 03/12 - ongoing, progressing   Goal 4: Pt will complete remaining sections of CLQT during one treatment session. - goal met 03/04 ST Assessment:  Tx session 1: Pt alert and cooperative, continues to be pleasantly confused. Pt is able to recall orientation concepts, but difficulty with utilizing visual aids to improve recall if needed. Pt overall with improved initiation to complete both structured and conversational tasks. Pt's need for repetition and cueing is more for deficits with thought organization and processing vs initiation. Thus, verbal initiation goal met. Pt required min-mod cues to complete a structured thought org task this date. Pt will require 24 hr supervision at d/c. Continue goals above.     Tx session 2: Pt alert and cooperative, agreeable to tx. Pt agreeable to complete effortful swallows and macy maneuvers with vital stim / NMES in place - pt completed x20 reps of each exercises. Pt with no immediate coughing or throat clearing with thin trials, but intermittent delayed coughing noted. SLP recommends repeat instrumental prior to diet advancement. SLP discussed with pt, wife, and son. Pt and wife agreeable to repeat MBSS (concerns with pt not tolerating FEES), which we will plan to complete 3/14. Continue goals above.     NUTRITION  Weight - Scale: 88.9 kg (195 lb 15.8 oz) / Body mass index is 28.12 kg/m².    Diet Order: ADULT ORAL NUTRITION SUPPLEMENT; Lunch, Dinner; Frozen Oral Supplement  ADULT ORAL NUTRITION SUPPLEMENT; Breakfast, Dinner; Standard 4 oz Oral Supplement  ADULT DIET; Dysphagia - Soft and Bite Sized; Mildly Thick (Nectar)  PO Meals Eaten (%): 76 - 100%  Malnutrition Status: At risk for malnutrition  (Comment)  Nutrition Education/Counseling: No recommendation at this time     Patient with good PO intake, accepting ONS, SLP following. Bowels moving, labs reviewed. RD will continue to monitor nutritional status.       CASE MANAGEMENT  Assessment: 88 yr old male. Dx:Debility. Independent PLOF walker level.Lives with spouse in a one level home with level entry. Patient owns walker, hurry cane and M WC. Therapy recommendations are 24 hour supervision or assist w/[facility]HC>[facility] following.. No TTB.  has faxed all orders, supporting documents with RFS form to Agustin RAMIREZ w/[facility]) [phone].           Interdisciplinary Goals:   1.)Pt will ambulate >150' with RW with LRAD with SUP without LOB   2.) Pt will perform toilet transfer with SPV  3.) pt will tolerate recommended diet with no overt s/s of aspiration   4.)  5.)      [x]  Family Training discussed at conference and completed.      Discharge Plan   Estimated discharge date: 3/15/24  Destination: Home Health  Pass:No  Services at Discharge: Home Health Physical Therapy, Occupational Therapy, and Speech Therapy   Equipment at Discharge: Pt owns needed equipment    Team Members Present at Conference:  : Paige Kim, ASHLEY (Attended virtually; unable to be physically present due to car difficulties)   Occupational Therapist: Mathieu Zelaya, OTR/L  Physical Therapist: Bhavana Alvarado, PT, DPT  Speech Therapist: Bettye Funez MA CCC-SLP   Nurse:Viky Crowell RN  Dietician: Sarah Harkins RD,LD  : Kimo Reyna, OTR/L  Psychiatry: N/A    Family members present at conference: No      I led this team conference and I approve the established interdisciplinary plan of care as documented within the medical record of Stone Lunsford MD: Electronically signed by Ayala Chambers MD on 3/13/2024 at 12:46 PM

## 2024-03-13 PROCEDURE — 97130 THER IVNTJ EA ADDL 15 MIN: CPT

## 2024-03-13 PROCEDURE — 97116 GAIT TRAINING THERAPY: CPT

## 2024-03-13 PROCEDURE — 92526 ORAL FUNCTION THERAPY: CPT

## 2024-03-13 PROCEDURE — 1280000000 HC REHAB R&B

## 2024-03-13 PROCEDURE — 6360000002 HC RX W HCPCS: Performed by: STUDENT IN AN ORGANIZED HEALTH CARE EDUCATION/TRAINING PROGRAM

## 2024-03-13 PROCEDURE — 97530 THERAPEUTIC ACTIVITIES: CPT

## 2024-03-13 PROCEDURE — 6370000000 HC RX 637 (ALT 250 FOR IP): Performed by: STUDENT IN AN ORGANIZED HEALTH CARE EDUCATION/TRAINING PROGRAM

## 2024-03-13 PROCEDURE — 97110 THERAPEUTIC EXERCISES: CPT

## 2024-03-13 PROCEDURE — 97129 THER IVNTJ 1ST 15 MIN: CPT

## 2024-03-13 RX ADMIN — GUAIFENESIN 600 MG: 600 TABLET ORAL at 08:36

## 2024-03-13 RX ADMIN — ENOXAPARIN SODIUM 40 MG: 100 INJECTION SUBCUTANEOUS at 08:36

## 2024-03-13 RX ADMIN — METOPROLOL SUCCINATE 25 MG: 25 TABLET, FILM COATED, EXTENDED RELEASE ORAL at 08:36

## 2024-03-13 RX ADMIN — GUAIFENESIN 600 MG: 600 TABLET ORAL at 20:59

## 2024-03-13 RX ADMIN — Medication 1 TABLET: at 08:36

## 2024-03-13 ASSESSMENT — PAIN SCALES - GENERAL
PAINLEVEL_OUTOF10: 0
PAINLEVEL_OUTOF10: 0

## 2024-03-13 NOTE — PROGRESS NOTES
heightened table and engages in game of tic tac toe to improve activity tolerance and strength for standing ADLs. Pt stands x 2 min and x 3 min with SBA and 1 UE on table for support. Pt required to reach outside [place] to retreive game pieces. Pt requires therapeutic rest break between standing bouts     Therapeutic Activity:   Tub transfer: Pt performs tub transfer via TTB with incr time and SBA & RW. Good hand placement without verbal cues     Assessment  Assessment  Assessment: Pt tolerates session well. Pt performs tub transfer simulation with SBA. Pt performs BUE ex to incr strength for ADLs and functional transfers, requires verbal cues for correct technique. Provided with written HEP. Pt ambulates to/from therapy gym and in therapy gym with SBA & RW. Requires verbal cues for body placement in RW. Pt will cont to benefit from skilled OT in ARU. Cont OT POC  Activity Tolerance: Patient tolerated treatment well;Patient limited by fatigue  Discharge Recommendations: 24 hour supervision or assist;Home with Home health OT  OT Equipment Recommendations  Equipment Needed: No  Mobility Devices:  (has TTB)  Safety Devices  Safety Devices in place: Yes  Type of devices: Call light within reach;All fall risk precautions in place;Left in chair;Patient at risk for falls;Gait belt;Chair alarm in place;Nurse notified  Restraints  Initially in place: No    Patient Education  Education  Education Given To: Patient  Education Provided: Role of Therapy;Plan of Care;ADL Function;Safety;Transfer Training;Fall Prevention Strategies;Energy Conservation;Mobility Training;Home Exercise Program  Education Provided Comments: role of OT, hand placement with transfers, use of call light, POC  Education Method: Verbal  Barriers to Learning: Cognition;Hearing  Education Outcome: Continued education needed;Verbalized understanding    Plan  Occupational Therapy Plan  Times Per Week: 5-7x/week  Current Treatment Recommendations:  Strengthening;Balance training;ROM;Functional mobility training;Endurance training;Safety education & training;Positioning;Equipment evaluation, education, & procurement;Self-Care / ADL    Goals  Patient Goals   Patient goals : \"get stronger and go home\"  Short Term Goals  Time Frame for Short Term Goals: 7 days (3/08/2024): unless otherwise noted  Short Term Goal 1: Pt will complete toilet transfer w/ LRD and SBA. - goal not met 3/8  Short Term Goal 2: Pt will complete FB bathing w/ Aletha. - GOAL: MET 3/7 set-up for UB bathing, Aletha for LB bathing  Short Term Goal 3: Pt will complete LB dressing w/ SBA and AE PRN. - goal not met 3/8  Short Term Goal 4: Pt stand at sink to complete grooming ADLs for ~4mins w/ SBA. - goal not met 3/8  Long Term Goals  Time Frame for Long Term Goals : 3/15/24- progressing 3/13  Long Term Goal 1: Pt will complete toilet transfer w/ LRD and mod. I.  Long Term Goal 2: Pt will complete FB bathing w/ AE PRN and mod. I.  Long Term Goal 3: Pt will complete LB dressing w/ mod. I. and AE PRN.  Long Term Goal 4: Pt stand at sink to complete grooming ADLs for ~4mins w/ SPV.      Therapy Time   Individual Concurrent Group Co-treatment   Time In 1230         Time Out 1330         Minutes 60         Timed Code Treatment Minutes: 60 Minutes       Mathieu Zelaya, OT

## 2024-03-13 NOTE — PROGRESS NOTES
[facility]: ACUTE REHAB UNIT  SPEECH-LANGUAGE PATHOLOGY      [x] Daily  [] Weekly Care Conference Note  [] Discharge    Patient:Stone Hoskins      :1935  MRN:[id number]  Rehab Dx/Hx: Debility [R53.81]    Precautions: falls and aspirations  Home situation: lives with wife. Wife manages finances, medications, and all household tasks. Pt is an active .   ST Dx: [] Aphasia  [] Dysarthria  [] Apraxia   [x] Oropharyngeal dysphagia [x] Cognitive Impairment  [] Other:   Date of Admit: 3/1/2024  Room #: 0166/0166-01    Current functional status (updated daily):         Pt being seen for : [] Speech/Language Treatment  [x] Dysphagia Treatment [x] Cognitive Treatment  [] Other:  Communication: [x]WFL  [] Aphasia  [] Dysarthria  [] Apraxia  [] Pragmatic Impairment [] Non-verbal  [] Hearing Loss  [] Other:   Cognition: [] WFL  [] Mild  [x] Moderate  [x] Severe [] Unable to Assess  [] Other:  Memory: [] WFL  [] Mild  [x] Moderate  [] Severe [] Unable to Assess  [] Other:  Behavior: [x] Alert  [x] Cooperative  [x]  Pleasant  [] Confused  [] Agitated  [] Uncooperative  [] Distractible [] Motivated  [] Self-Limiting [] Anxious  [] Other:  Endurance:  [x] Adequate for participation in SLP sessions  [] Reduced overall  [] Lethargic  [] Other:  Safety: [] No concerns at this time  [x] Reduced insight into deficits  [x]  Reduced safety awareness [] Not following call light procedures  [] Unable to Assess  [] Other:    Current Diet Order:ADULT ORAL NUTRITION SUPPLEMENT; Lunch, Dinner; Frozen Oral Supplement  ADULT ORAL NUTRITION SUPPLEMENT; Breakfast, Dinner; Standard 4 oz Oral Supplement  ADULT DIET; Dysphagia - Soft and Bite Sized; Mildly Thick (Nectar)  Swallowing Precautions: upright for all intake, stay upright for at least 30 mins after intake, small bites/sips, close supervision, oral care 2-3x/day to reduce adverse affects in the event of aspiration, increase physical mobility as able, alternate bites/sips, slow rate of

## 2024-03-13 NOTE — PROGRESS NOTES
[facility] Inpatient Rehabilitation Progress Note    Stone Hoskins  3/13/2024  [id number]    Chief Complaint: Debility    Subjective:   No acute events overnight. Today Stone is seen in the gym with therapies. He reports that he is feeling well and denies acute complaints at this time.     ROS: denies f/c, n/v, cp, sob    Objective:  Patient Vitals for the past 24 hrs:   BP Temp Temp src Pulse Resp SpO2 Weight   03/13/24 1127 135/64 -- -- 96 -- 94 % --   03/13/24 0825 116/75 98 °F (36.7 °C) Oral 97 18 95 % --   03/13/24 0515 -- -- -- -- -- -- 88.9 kg (195 lb 15.8 oz)   03/12/24 2037 123/79 97.7 °F (36.5 °C) Oral (!) 108 18 93 % --     Gen: No distress, pleasant.   HEENT: Normocephalic, atraumatic.   CV: RRR  Resp: No respiratory distress. Lungs CTAB  Abd: Soft, nondistended  Ext: No edema BLE  Neuro: Alert, oriented, appropriately interactive.   Psych: appropriate mood and affect    Wt Readings from Last 3 Encounters:   03/13/24 88.9 kg (195 lb 15.8 oz)   02/21/24 89.5 kg (197 lb 6.4 oz)   08/31/23 88 kg (194 lb)       Laboratory data:   Lab Results   Component Value Date    WBC 9.4 03/11/2024    HGB 14.3 03/11/2024    HCT 43.2 03/11/2024    MCV 87.6 03/11/2024     03/11/2024       Lab Results   Component Value Date/Time     03/11/2024 05:54 AM    K 4.0 03/11/2024 05:54 AM     03/11/2024 05:54 AM    CO2 25 03/11/2024 05:54 AM    BUN 19 03/11/2024 05:54 AM    CREATININE 0.8 03/11/2024 05:54 AM    GLUCOSE 87 03/11/2024 05:54 AM    CALCIUM 8.5 03/11/2024 05:54 AM        Therapy progress:       PT    Supine to Sit: Supervision or touching assistance  Sit to Supine: Supervision or touching assistance   Sit to Stand: Partial/moderate assistance  Chair/Bed to Chair Transfer: Partial/moderate assistance  Car Transfer: Substantial/maximal assistance  Ambulation 10 ft: Partial/moderate assistance  Ambulation 50 ft:    Ambulation 150 ft:    Stairs - 1 Step: Partial/moderate assistance  Stairs - 4 Step:  these barriers to facilitate safe discharge. Plan will be presented to patient/family (if available).     Ayala Chambers MD   3/13/2024, 1:49 PM

## 2024-03-13 NOTE — PROGRESS NOTES
Physical Therapy  Facility/Department: [facility]  Rehabilitation Physical Therapy Treatment Note    NAME: Stone Hoskins  : 1935 (88 y.o.)  MRN: [id number]  CODE STATUS: Full Code    Date of Service: 3/13/24       Restrictions:  Restrictions/Precautions: General Precautions, Fall Risk, Swallowing - Thickened Liquids, Modified Diet  Position Activity Restriction  Other position/activity restrictions: Up with assist     SUBJECTIVE  Subjective  Subjective: Pt seated in recliner on approach, pelasant and agreeable to PT tx  Pain: Pt denies c/o pain            OBJECTIVE  Orientation  Overall Orientation Status: Within Functional Limits    Functional Mobility  Roll Left  Assistance Level: Modified independent  Skilled Clinical Factors: flat mat table, no BR, increased time to complete  Roll Right  Assistance Level: Modified independent  Skilled Clinical Factors: flat mat table, no BR, increased time to complete  Sit to Supine  Assistance Level: Modified independent  Skilled Clinical Factors: flat mat table, no BR, increased time to complete  Supine to Sit  Assistance Level: Modified independent  Skilled Clinical Factors: flat mat table, no BR, increased time to complete    Balance  Sitting Balance: Supervision  Standing Balance: Supervision  Standing Balance  Activity: Sup with RW; able to stoop to retrieve item from ground with RW with Sup    Transfers  Surface: To chair with arms;From chair with arms  Additional Factors: Verbal cues;Hand placement cues;Increased time to complete  Device: Walker (RW)  Sit to Stand  Assistance Level: Supervision  Skilled Clinical Factors: Pt completes multiple t/f throughout session from recliner, EOM, chairs with/without arm rests, requires intermittent cues for hand placement  Stand to Sit  Assistance Level: Supervision  Skilled Clinical Factors: Pt completes multiple t/f throughout session from recliner, EOM, chairs with/without arm rests, requires intermittent cues for hand  placement  Car Transfer  Assistance Level: Supervision  Skilled Clinical Factors: With RW, increased time, min cues for sequence and technique      Environmental Mobility  Ambulation  Surface: Level surface  Device: Rolling walker  Distance: 150' + 10' (cobblefoam SBA) + 50' + 30' + 150' + 100'  Activity: Within Unit;Within Room  Activity Comments: Distances limited by fatigue and weakness with intermittent rest breaks.  Additional Factors: Verbal cues;Increased time to complete  Assistance Level: Stand by assist;Supervision  Gait Deviations: Slow mati;Decreased step length bilateral;Narrow base of support;Decreased heel strike right;Decreased heel strike left;Decreased trunk rotation  Skilled Clinical Factors: Partial step through pattern, B decreased toe clearance, B knee and hip flexion with crouched gait, forward flexed trunk. Cues for positioning, upright posture and extension and safety with use of RW    Stairs  Stair Height: 6'';4''  Device: Bilateral handrails  Number of Stairs: 2 (6\") + 2(6\") + 3 (4\")  Additional Factors: Increased time to complete;Non-reciprocal going down;Non-reciprocal going up  Assistance Level: Stand by assist;Contact guard assist  Skilled Clinical Factors: increased time, cues for safety, intermittent CGA d/t poor HR positioning                    ASSESSMENT/PROGRESS TOWARDS GOALS  Vital Signs  Pulse: 96  Heart Rate Source: Monitor  BP: 135/64  BP Location: Right upper arm  MAP (Calculated): 88  SpO2: 94 %  O2 Device: None (Room air)    Assessment  Assessment: Pt seen in am for PT tx, pt pleasant and agreeable and demonstrates good participation session. Pt does require intermittent rest breaks with gait activities d/t fatigue. Pt demonstrates bed mobility with Michael, t/f with RW with Sup, gait with RW with SBA to Sup up to 150', completes stairs with CGA to SBA. Pt is limited by decreased balance and activity tolerance. Pt will benefit from continued skilled PT in ARU to address

## 2024-03-14 ENCOUNTER — APPOINTMENT (OUTPATIENT)
Dept: GENERAL RADIOLOGY | Age: 89
DRG: 948 | End: 2024-03-14
Attending: STUDENT IN AN ORGANIZED HEALTH CARE EDUCATION/TRAINING PROGRAM
Payer: OTHER GOVERNMENT

## 2024-03-14 LAB
ANION GAP SERPL CALCULATED.3IONS-SCNC: 8 MMOL/L (ref 3–16)
BASOPHILS # BLD: 0.1 K/UL (ref 0–0.2)
BASOPHILS NFR BLD: 1 %
BUN SERPL-MCNC: 17 MG/DL (ref 7–20)
CALCIUM SERPL-MCNC: 8.6 MG/DL (ref 8.3–10.6)
CHLORIDE SERPL-SCNC: 107 MMOL/L (ref 99–110)
CO2 SERPL-SCNC: 27 MMOL/L (ref 21–32)
CREAT SERPL-MCNC: 0.7 MG/DL (ref 0.8–1.3)
DEPRECATED RDW RBC AUTO: 14.7 % (ref 12.4–15.4)
EOSINOPHIL # BLD: 0.2 K/UL (ref 0–0.6)
EOSINOPHIL NFR BLD: 2.7 %
GFR SERPLBLD CREATININE-BSD FMLA CKD-EPI: >60 ML/MIN/{1.73_M2}
GLUCOSE SERPL-MCNC: 85 MG/DL (ref 70–99)
HCT VFR BLD AUTO: 42.7 % (ref 40.5–52.5)
HGB BLD-MCNC: 14.4 G/DL (ref 13.5–17.5)
LYMPHOCYTES # BLD: 1.8 K/UL (ref 1–5.1)
LYMPHOCYTES NFR BLD: 19 %
MCH RBC QN AUTO: 29.2 PG (ref 26–34)
MCHC RBC AUTO-ENTMCNC: 33.7 G/DL (ref 31–36)
MCV RBC AUTO: 86.7 FL (ref 80–100)
MONOCYTES # BLD: 0.9 K/UL (ref 0–1.3)
MONOCYTES NFR BLD: 9.6 %
NEUTROPHILS # BLD: 6.3 K/UL (ref 1.7–7.7)
NEUTROPHILS NFR BLD: 67.7 %
PLATELET # BLD AUTO: 268 K/UL (ref 135–450)
PMV BLD AUTO: 8.1 FL (ref 5–10.5)
POTASSIUM SERPL-SCNC: 3.9 MMOL/L (ref 3.5–5.1)
RBC # BLD AUTO: 4.92 M/UL (ref 4.2–5.9)
SODIUM SERPL-SCNC: 142 MMOL/L (ref 136–145)
WBC # BLD AUTO: 9.3 K/UL (ref 4–11)

## 2024-03-14 PROCEDURE — 97535 SELF CARE MNGMENT TRAINING: CPT

## 2024-03-14 PROCEDURE — 80048 BASIC METABOLIC PNL TOTAL CA: CPT

## 2024-03-14 PROCEDURE — 36415 COLL VENOUS BLD VENIPUNCTURE: CPT

## 2024-03-14 PROCEDURE — 6360000002 HC RX W HCPCS: Performed by: STUDENT IN AN ORGANIZED HEALTH CARE EDUCATION/TRAINING PROGRAM

## 2024-03-14 PROCEDURE — 92526 ORAL FUNCTION THERAPY: CPT

## 2024-03-14 PROCEDURE — 97530 THERAPEUTIC ACTIVITIES: CPT

## 2024-03-14 PROCEDURE — 6370000000 HC RX 637 (ALT 250 FOR IP): Performed by: STUDENT IN AN ORGANIZED HEALTH CARE EDUCATION/TRAINING PROGRAM

## 2024-03-14 PROCEDURE — 1280000000 HC REHAB R&B

## 2024-03-14 PROCEDURE — 85025 COMPLETE CBC W/AUTO DIFF WBC: CPT

## 2024-03-14 PROCEDURE — 92611 MOTION FLUOROSCOPY/SWALLOW: CPT

## 2024-03-14 PROCEDURE — 74230 X-RAY XM SWLNG FUNCJ C+: CPT

## 2024-03-14 PROCEDURE — 97116 GAIT TRAINING THERAPY: CPT

## 2024-03-14 PROCEDURE — 97110 THERAPEUTIC EXERCISES: CPT

## 2024-03-14 RX ADMIN — ENOXAPARIN SODIUM 40 MG: 100 INJECTION SUBCUTANEOUS at 08:07

## 2024-03-14 RX ADMIN — Medication 1 TABLET: at 08:06

## 2024-03-14 RX ADMIN — PRAVASTATIN SODIUM 20 MG: 20 TABLET ORAL at 08:06

## 2024-03-14 RX ADMIN — GUAIFENESIN 600 MG: 600 TABLET ORAL at 08:06

## 2024-03-14 RX ADMIN — METOPROLOL SUCCINATE 25 MG: 25 TABLET, FILM COATED, EXTENDED RELEASE ORAL at 08:07

## 2024-03-14 RX ADMIN — GUAIFENESIN 600 MG: 600 TABLET ORAL at 20:39

## 2024-03-14 ASSESSMENT — PAIN SCALES - GENERAL
PAINLEVEL_OUTOF10: 0
PAINLEVEL_OUTOF10: 0

## 2024-03-14 NOTE — DISCHARGE INSTR - DIET

## 2024-03-14 NOTE — PLAN OF CARE
Problem: Safety - Adult  Goal: Free from fall injury  3/13/2024 2318 by Yoly Shaffer, RN  Outcome: Progressing  3/13/2024 1825 by Viky Crowell, RN  Outcome: Progressing

## 2024-03-14 NOTE — PROGRESS NOTES
[facility]: ACUTE REHAB UNIT  SPEECH-LANGUAGE PATHOLOGY      [x] Daily  [] Weekly Care Conference Note  [x] Discharge    Patient:Stone Hosikns      :1935  MRN:[id number]  Rehab Dx/Hx: Debility [R53.81]    Precautions: falls and aspirations  Home situation: lives with wife. Wife manages finances, medications, and all household tasks. Pt is an active .   ST Dx: [] Aphasia  [] Dysarthria  [] Apraxia   [x] Oropharyngeal dysphagia [x] Cognitive Impairment  [] Other:   Date of Admit: 3/1/2024  Room #: 0166/0166-01    Current functional status (updated daily):         Pt being seen for : [] Speech/Language Treatment  [x] Dysphagia Treatment [x] Cognitive Treatment  [] Other:  Communication: [x]WFL  [] Aphasia  [] Dysarthria  [] Apraxia  [] Pragmatic Impairment [] Non-verbal  [] Hearing Loss  [] Other:   Cognition: [] WFL  [] Mild  [x] Moderate  [x] Severe [] Unable to Assess  [] Other:  Memory: [] WFL  [] Mild  [x] Moderate  [] Severe [] Unable to Assess  [] Other:  Behavior: [x] Alert  [x] Cooperative  [x]  Pleasant  [] Confused  [] Agitated  [] Uncooperative  [] Distractible [] Motivated  [] Self-Limiting [] Anxious  [] Other:  Endurance:  [x] Adequate for participation in SLP sessions  [] Reduced overall  [] Lethargic  [] Other:  Safety: [] No concerns at this time  [x] Reduced insight into deficits  [x]  Reduced safety awareness [] Not following call light procedures  [] Unable to Assess  [] Other:    Current Diet Order:ADULT ORAL NUTRITION SUPPLEMENT; Lunch, Dinner; Frozen Oral Supplement  ADULT ORAL NUTRITION SUPPLEMENT; Breakfast, Dinner; Standard 4 oz Oral Supplement  ADULT DIET; Dysphagia - Soft and Bite Sized; Mildly Thick (Nectar)  Swallowing Precautions: upright for all intake, stay upright for at least 30 mins after intake, small bites/sips, close supervision, oral care 2-3x/day to reduce adverse affects in the event of aspiration, increase physical mobility as able, alternate bites/sips, slow rate of  solids. Recommend upgrade to thin liquids. SLP reviewed results and recs with pt and wife - verbalized understanding.      Discharge Summary: SLP recommends soft and bite sized solids, thin liquids, and meds PO as LRD at d/c. Pt just upgraded to thin liquids this date following MBSS. Pt previously on NTL. Pt with intermittent flash penetration with thins via straw on MBSS, but no aspiration observed. Pt with cough during MBSS that was not correlated with material in airway. SLP edu pt and wife re: diet recs and safe swallow strategies. Both verbalized understanding. Pt did meet swallowing goals - did well completing pharyngeal / laryngeal strengthening exercises with and without vital sitm / NMES in place. Pt previously met verbal initiation goal. Pt did not meet goals for recall and problem solving / thought organization. Pt continues to present with severe cognitive deficits. SLP recommends 24 hr supervision, assist with meds / finances, and ongoing speech therapy services at d/c.   Plan: Continue as per plan of care.       Additional Information: Dr. Chambers provided nursing communication order on 03/04/24 to ok FFWP with ice chips + thins with staff / wife, as well as to ok vital stim.     Cognitive Linguistic Quick Test (CLQT)   Domain  Severity Rating  Comments   Attention:   severe    Memory:   mild    Executive Functions: moderate    Language:   mild    Visuospatial skills:   moderate    Clock Drawing:   Severe       Composite Severity Range: moderate deficits - score of 2.2 (WFL falls between 3.5 and 4.0)      Barriers toward progress: Cognitive deficit and Limited insight into deficits  Discharge recommendations:  [] Home independently  [] Home with assistance [x]  24 hour supervision  [] ECF [] Other:  Continued Tx Upon Discharge: ? [x] Yes [] No [] TBD based on progress while on ARU [] Vital Stim indicated [] Other:   Estimated discharge date: 03/15/24    Interventions used this date:  []

## 2024-03-14 NOTE — CARE COORDINATION
Case Management Discharge Summary  [facility] - Acute Rehab Unit    Patient:Stone Hoskins     Rehab Dx/Hx: Debility [R53.81]       Today's Date: 3/14/2024    Discharge to:  Home w/spouse and outpatient therapies   Pre-certification completed:  [x] No       [] Yes     [] N/A   Hospital Exemption Notification (HENS) completed:  [x] No       [] Yes     [] N/A   IMM given:  3/14/24       New Durable Medical Equipment ordered/agency:  DME TTB sent order and RFS form to [place] w[facility] will be delivered to home.   Transportation:  Private:spouse       Confirmed discharge plan with:  Patient: [] No       [x] Yes  Family:  [] No       [x] Yes      Name:Georgia Hoskins (Spouse)  Contact number: [phone]     Facility/Agency, name:  [facility]  [address] [place] [id number]  [phone]   HELLEN/AVS faxed    RN, name: Viky       Has lack of transportation kept you from medical appointments, meetings, work, or ADL's? [] Yes, it has kept me from medical appointments or from getting my meds  [] Yes, It has kept me from non-medical meetings, appointments, work, or getting things I need  [x] No  [] Pt unable to respond  [] Pt declines to respond     How often do you need to have someone help you when you read instructions, pamphlets, or other written material from your doctor or pharmacy? [] Never                             [] Always  [] Rarely                            [] Patient declines to respond  [x] Sometimes                    [] Patient unable to respond  [] Often       Note: Discharging nurse to complete HELLEN, reconcile AVS, and place final copy with patient's discharge packet. RN to ensure that written prescriptions for  Level II medications are sent with patient to the facility as per protocol.          Signature:  Paige Kim RN

## 2024-03-14 NOTE — PROGRESS NOTES
[facility] Inpatient Rehabilitation Progress Note    Stone Hoskins  3/14/2024  [id number]    Chief Complaint: Debility    Subjective:   No acute events overnight.   Patient seen this afternoon sitting up in room. He reports feeling well and looking forward to discharge home tomorrow. We discussed plans for home care, medications, and follow-ups.  D/w SLP, upgraded to thin liquids.   Labs reviewed.     ROS: denies f/c, n/v, cp, sob    Objective:  Patient Vitals for the past 24 hrs:   BP Temp Temp src Pulse Resp SpO2   03/14/24 0930 132/65 -- -- 77 -- 95 %   03/14/24 0805 120/65 98 °F (36.7 °C) Oral 86 18 93 %   03/13/24 2045 132/61 98.8 °F (37.1 °C) Oral 86 18 97 %     Gen: No distress, pleasant.   HEENT: Normocephalic, atraumatic.   CV: RRR  Resp: No respiratory distress. Lungs CTAB  Abd: Soft, nondistended  Ext: Trace BLE edema  Neuro: Alert, oriented, appropriately interactive.   Psych: appropriate mood and affect    Wt Readings from Last 3 Encounters:   03/13/24 88.9 kg (195 lb 15.8 oz)   02/21/24 89.5 kg (197 lb 6.4 oz)   08/31/23 88 kg (194 lb)       Laboratory data:   Lab Results   Component Value Date    WBC 9.3 03/14/2024    HGB 14.4 03/14/2024    HCT 42.7 03/14/2024    MCV 86.7 03/14/2024     03/14/2024       Lab Results   Component Value Date/Time     03/14/2024 05:22 AM    K 3.9 03/14/2024 05:22 AM     03/14/2024 05:22 AM    CO2 27 03/14/2024 05:22 AM    BUN 17 03/14/2024 05:22 AM    CREATININE 0.7 03/14/2024 05:22 AM    GLUCOSE 85 03/14/2024 05:22 AM    CALCIUM 8.6 03/14/2024 05:22 AM        Therapy progress:       PT    Supine to Sit: Independent  Sit to Supine: Independent   Sit to Stand: Supervision or touching assistance  Chair/Bed to Chair Transfer: Supervision or touching assistance  Car Transfer: Supervision or touching assistance  Ambulation 10 ft: Supervision or touching assistance  Ambulation 50 ft: Supervision or touching assistance  Ambulation 150 ft: Supervision or

## 2024-03-14 NOTE — CARE COORDINATION
CM spoke to spouse and patient face to face in patients room. Spouse would like outpatient therapies instead of home health care. CM acknowledged and will provide script.    Paige Kim RN

## 2024-03-14 NOTE — PROCEDURES
Speech Language Pathology  Modified Barium Swallow Study  Facility/Department: [facility]        Recommendations:  Diet recommendation: IDDSI 6 Soft and Bite Sized Solids; IDDSI 0 Thin Liquids; Meds whole in puree  Risk management: upright for all intake, stay upright for at least 30 mins after intake, small bites/sips, downgrade to mildly thick if s/s of aspiration develop, close supervision, oral care 2-3x/day to reduce adverse affects in the event of aspiration, increase physical mobility as able, alternate bites/sips, slow rate of intake, general GERD precautions, and general aspiration precautions. Occ double swallow.       NAME:Stone Hoskins  : 1935 (88 y.o.)   MRN: [id number]  ROOM: [address] [place] [address]  ADMISSION DATE: 3/1/2024  PATIENT DIAGNOSIS(ES): Debility [R53.81]  No chief complaint on file.    Patient Active Problem List    Diagnosis Date Noted    Primary hypertension     Debility 2024    Elevated LFTs 2024    Cholecystitis 2024    Altered mental status 2024    Generalized weakness 2024    Acute hypoxemic respiratory failure (HCC)     Tracheobronchitis     Acute encephalopathy     Bronchospasm     Sepsis (HCC) 2019    Subconjunctival hemorrhage of left eye 2018    ARDS (adult respiratory distress syndrome) (HCC)     Acute respiratory failure with hypoxia (HCC)     SVT (supraventricular tachycardia) 2017    Bilateral edema of lower extremity 2015    Right-sided low back pain with right-sided sciatica 10/16/2015    Dermatitis 2015    Mixed hyperlipidemia 2015    Vitamin D deficiency 2015     Past Medical History:   Diagnosis Date    Arthritis     Hyperlipidemia     Influenza A 2017    Right-sided low back pain with right-sided sciatica      Past Surgical History:   Procedure Laterality Date    CATARACT REMOVAL      INGUINAL HERNIA REPAIR Bilateral 11    [id number]-PhmdyHussain Deleon MD    TONSILLECTOMY       distress. Pt returned to room. Pt left in bed - call light in reach, chair alarm on       Goals:    Refer to daily treatment note for update on goals / POC.       Therapy Time:   Individual   Time In 1300   Time Out 1400   Minutes 60 min / 2 units (MBSS/DT)       Signature:  Bettye Funez MA CCC-SLP #[id number]  Speech Language Pathologist

## 2024-03-14 NOTE — PROGRESS NOTES
Occupational Therapy  Occupational Therapy  Discharge Summary     Name:Stone Hoskins  MRN:[id number]  :1935  Treatment Diagnosis: Impaired balance and gait  Diagnosis: Sepsis    Restrictions/Precautions  Restrictions/Precautions: General Precautions, Fall Risk, Swallowing - Thickened Liquids, Modified Diet  Required Braces or Orthoses?: No           Position Activity Restriction  Other position/activity restrictions: Up with assist     Goals:   Patient Goals   Patient goals : \"get stronger and go home\"  Short Term Goals  Time Frame for Short Term Goals: 7 days (3/08/2024): unless otherwise noted  Short Term Goal 1: Pt will complete toilet transfer w/ LRD and SBA. - goal met 3/14  Short Term Goal 2: Pt will complete FB bathing w/ Aletha. - GOAL: MET 3/7 set-up for UB bathing, Aletha for LB bathing  Short Term Goal 3: Pt will complete LB dressing w/ SBA and AE PRN. - goal met 3/14  Short Term Goal 4: Pt stand at sink to complete grooming ADLs for ~4mins w/ SBA. - goal not met 3/14, pt stands for 2 min to complete grooming ADLs  Long Term Goals  Time Frame for Long Term Goals : 3/15/24- progressing 3/13  Long Term Goal 1: Pt will complete toilet transfer w/ LRD and mod. I. - goal not met 3/14  Long Term Goal 2: Pt will complete FB bathing w/ AE PRN and mod. I. - goal not met 3/14  Long Term Goal 3: Pt will complete LB dressing w/ mod. I. and AE PRN. - goal not met 3/14  Long Term Goal 4: Pt stand at sink to complete grooming ADLs for ~4mins w/ SPV. - goal not met 3/14, pt able to stand at sink with SPV, stands for 2 min    Pt. Met 3/4 short term goals and 0/4 long term goals.     ADL:  Feeding  Assistance Level: Independent  Skilled Clinical Factors: able to open thickened orange juice, IND to eat breakfast at end of session  Grooming/Oral Hygiene  Assistance Level: Supervision  Skilled Clinical Factors: SPV standing at sink to brush teeth and comb hair for 2 min  Upper Extremity Bathing  Assistance Level:  Increased time to complete;Modified independent  Skilled Clinical Factors: seated on TTB  Lower Extremity Bathing  Assistance Level: Stand by assist  Skilled Clinical Factors: SBA in standng to wash buttocks; seated on TTB to wash liz area & BLE  Upper Extremity Dressing  Assistance Level: Set-up  Skilled Clinical Factors: able to doff/don tshirt and sweatshirt while seated with LEWIS  Lower Extremity Dressing  Assistance Level: Supervision;Verbal cues;Increased time to complete  Skilled Clinical Factors: incr time to doff LB clothing, requires verbal cues for technique; seated to unthread/thread BLE and standign to manage to/from waist  Putting On/Taking Off Footwear  Assistance Level: Maximum assistance  Skilled Clinical Factors: able to doff B socks with incr time, requires assist to don SIMA hose and B socks  Toileting  Assistance Level: Supervision  Skilled Clinical Factors: standing to manage pants, use of grab bars  Toilet Transfers  Equipment: Standard toilet  Additional Factors: Increased time to complete;With handrails;Cues for hand placement  Assistance Level: Supervision  Tub/Shower Transfers  Type: Shower  Transfer From: Rolling walker  Transfer To: Tub transfer bench  Assistance Level: Supervision          Functional Mobility  Device: Rolling walker  Activity: To/From bathroom  Assistance Level: Stand by assist  Scooting  Assistance Level: Supervision  Skilled Clinical Factors: chair  Transfers  Surface: To chair with arms;From chair with arms;Standard toilet  Additional Factors: Set-up;Verbal cues;Hand placement cues;Increased time to complete  Device: Walker  Sit to Stand  Assistance Level: Supervision  Skilled Clinical Factors: verbal cues for hand placement, multiple throughout session  Stand to Sit  Assistance Level: Supervision  Skilled Clinical Factors: verbal cues for hand placement; mult throughout session           Assessment:   Assessment  Assessment: Pt tolerates session well. Pt IND for

## 2024-03-14 NOTE — PROGRESS NOTES
Occupational Therapy  Facility/Department: [facility]  Rehabilitation Occupational Therapy Daily Treatment Note    Date: 3/14/24  Patient Name: Stone Hoskins       Room: 0166/0166-01  MRN: [id number]  Account: [id number]   : 1935  (88 y.o.) Gender: male                    Past Medical History:  has a past medical history of Arthritis, Hyperlipidemia, Influenza A, and Right-sided low back pain with right-sided sciatica.  Past Surgical History:   has a past surgical history that includes Tonsillectomy; Tooth Extraction; Inguinal hernia repair (Bilateral, 11); and Cataract removal.    Restrictions  Restrictions/Precautions: General Precautions, Fall Risk, Swallowing - Thickened Liquids, Modified Diet  Other position/activity restrictions: Up with assist  Required Braces or Orthoses?: No    Subjective  Subjective: Pt in bed at OT arrival. No c/o pain. /65, HR 87, Spo2 93% on RA  Restrictions/Precautions: General Precautions;Fall Risk;Swallowing - Thickened Liquids;Modified Diet             Objective     Cognition  Overall Cognitive Status: Exceptions  Arousal/Alertness: Appropriate responses to stimuli  Following Commands: Follows one step commands consistently;Follows multistep commands with increased time  Attention Span: Attends with cues to redirect  Memory: Decreased short term memory;Decreased recall of recent events  Safety Judgement: Decreased awareness of need for assistance;Decreased awareness of need for safety  Problem Solving: Assistance required to implement solutions;Assistance required to identify errors made;Assistance required to correct errors made;Decreased awareness of errors  Insights: Decreased awareness of deficits  Initiation: Requires cues for some  Sequencing: Requires cues for some  Orientation  Overall Orientation Status: Within Functional Limits  Orientation Level: Oriented to person;Disoriented to situation;Oriented to time;Disoriented to place

## 2024-03-14 NOTE — PROGRESS NOTES
Physical Therapy  Facility/Department: [facility]  Rehabilitation Physical Therapy Treatment Note    NAME: Stone Hoskins  : 1935 (88 y.o.)  MRN: [id number]  CODE STATUS: Full Code    Date of Service: 3/14/24       Restrictions:  Restrictions/Precautions: General Precautions, Fall Risk, Swallowing - Thickened Liquids, Modified Diet  Position Activity Restriction  Other position/activity restrictions: Up with assist     SUBJECTIVE  Subjective  Subjective: Pt seated in recliner on approach, pelasant and agreeable to PT tx  Pain: Pt denies c/o pain               OBJECTIVE  Orientation  Overall Orientation Status: Within Functional Limits    Functional Mobility  Roll Left  Assistance Level: Modified independent  Skilled Clinical Factors: Flat bed without BR, increased time  Roll Right  Assistance Level: Modified independent  Skilled Clinical Factors: Flat bed without BR, increased time  Sit to Supine  Assistance Level: Modified independent  Skilled Clinical Factors: Flat bed without BR, increased time  Supine to Sit  Assistance Level: Modified independent  Skilled Clinical Factors: Flat bed without BR, increased time    Balance  Sitting Balance: Independent  Standing Balance: Supervision  Standing Balance  Activity: Sup with RW, able to stoop to retrieve item from ground with RW and sup    Transfers  Surface: To chair with arms;From chair with arms  Additional Factors: Verbal cues;Hand placement cues;Increased time to complete  Device: Walker (RW)  Sit to Stand  Assistance Level: Supervision  Skilled Clinical Factors: Pt completes multiple t/f throughout session from recliner, EOM, chairs with/without arm rests, requires intermittent cues for hand placement  Stand to Sit  Assistance Level: Supervision  Skilled Clinical Factors: Pt completes multiple t/f throughout session from recliner, EOM, chairs with/without arm rests, requires intermittent cues for hand placement  Bed To/From Chair  Technique: Stand  (Calculated): 87  SpO2: 95 %  O2 Device: None (Room air)    Assessment  Assessment: Pt seen in am for PT tx. Pt reports no concerns regarding upcoming d/c. Pt demonstrates good progress with all mobility. Pt demonstrates bed mobility with Michael, t/f with Sup, gait with RW with Sup and 12 steps with HR with SBA. Pt provided with written HEP and instructed in completion of exercises. Recommend pt d/c home with 24hrA and OP PT, No DME needs.  Activity Tolerance: Patient tolerated treatment well;Patient limited by fatigue  Discharge Recommendations: 24 hour supervision or assist;Outpatient PT  PT Equipment Recommendations  Equipment Needed: No  Other: anticipate no needs, owns RW    Goals  Patient Goals   Patient Goals : \"Get stronger and be able to walk and go home\"  Short Term Goals  Time Frame for Short Term Goals: 1 week 03/07/24  Short Term Goal 1: Pt will complete bed mobility with Sup; not met 3/6 - pt completes supine<>sit with SBA with bed setup per PLOf  Short Term Goal 2: Pt will complete functional t/f with CGA with LRAD; goal met 3/5 - pt completes transfers with CGA  Short Term Goal 3: Pt will ambulate >50' with LRAD with CGA without LOB; goal met 3/5 - pt ambulates >50 ft with RW and CGA-SBA, no LOB  Short Term Goal 4: Pt will ascend/descend 4 steps with BHR with CGA; goal met 3/5 - pt climbs 4 stairs with BHR and CGA  Long Term Goals  Time Frame for Long Term Goals : 14 days 3/14/24  Long Term Goal 1: Pt will complete bed mobility with Ind -- 3/14: GOAL MET Michael  Long Term Goal 2: Pt will complete functional t/f with Sup with LRAD -- 3/14: GOAL MET SUP with RW  Long Term Goal 3: Pt will ambulate >150' with RW with LRAD with SUP without LOB -- 3/14: GOAL MET >150' with RW with Sup  Long Term Goal 4: Pt will ascend/descend 4 steps with BHR with SBA -- 3/14: GOAL MET 12 steps HR SBA  Long Term Goal 5: Pt will demonstrate improved gait speed to > .8 m/sec to demonstrate improved performance with community

## 2024-03-14 NOTE — PROGRESS NOTES
Physical Therapy  Discharge Summary    Name:Stone Hoskins  MRN:[id number]  :1935  Treatment Diagnosis: Impaired balance and gait  Diagnosis: Sepsis    Restrictions/Precautions  Restrictions/Precautions: General Precautions, Fall Risk, Swallowing - Thickened Liquids, Modified Diet  Required Braces or Orthoses?: No           Position Activity Restriction  Other position/activity restrictions: Up with assist     Goals:                  Short Term Goals  Time Frame for Short Term Goals: 1 week 24  Short Term Goal 1: Pt will complete bed mobility with Sup; not met 3/6 - pt completes supine<>sit with SBA with bed setup per PLOf  Short Term Goal 2: Pt will complete functional t/f with CGA with LRAD; goal met 3/5 - pt completes transfers with CGA  Short Term Goal 3: Pt will ambulate >50' with LRAD with CGA without LOB; goal met 3/5 - pt ambulates >50 ft with RW and CGA-SBA, no LOB  Short Term Goal 4: Pt will ascend/descend 4 steps with BHR with CGA; goal met 3/5 - pt climbs 4 stairs with BHR and CGA            Long Term Goals  Time Frame for Long Term Goals : 14 days 3/14/24  Long Term Goal 1: Pt will complete bed mobility with Ind -- 3/14: GOAL MET Michael  Long Term Goal 2: Pt will complete functional t/f with Sup with LRAD -- 3/14: GOAL MET SUP with RW  Long Term Goal 3: Pt will ambulate >150' with RW with LRAD with SUP without LOB -- 3/14: GOAL MET >150' with RW with Sup  Long Term Goal 4: Pt will ascend/descend 4 steps with BHR with SBA -- 3/14: GOAL MET 12 steps HR SBA  Long Term Goal 5: Pt will demonstrate improved gait speed to > .8 m/sec to demonstrate improved performance with community mobility and decrease risk of falls -- 3/14: GOAL NOT MET .4 m/sec    Pt. Met 3/4 short term goals and 4/5 long term goals. LTG for gait speed not met with pt ambulating at  .4m/sec demonstrating limited community ambulator.    Pt. Currently ambulates 150 feet with RW and Sup  Up/down 12 steps with BHR with SBA  Up/down

## 2024-03-15 ENCOUNTER — TELEPHONE (OUTPATIENT)
Dept: FAMILY MEDICINE CLINIC | Age: 89
End: 2024-03-15

## 2024-03-15 VITALS
HEIGHT: 70 IN | TEMPERATURE: 97.8 F | SYSTOLIC BLOOD PRESSURE: 122 MMHG | WEIGHT: 195.99 LBS | RESPIRATION RATE: 16 BRPM | BODY MASS INDEX: 28.06 KG/M2 | OXYGEN SATURATION: 95 % | DIASTOLIC BLOOD PRESSURE: 75 MMHG | HEART RATE: 99 BPM

## 2024-03-15 PROCEDURE — 6360000002 HC RX W HCPCS: Performed by: STUDENT IN AN ORGANIZED HEALTH CARE EDUCATION/TRAINING PROGRAM

## 2024-03-15 PROCEDURE — 6370000000 HC RX 637 (ALT 250 FOR IP): Performed by: STUDENT IN AN ORGANIZED HEALTH CARE EDUCATION/TRAINING PROGRAM

## 2024-03-15 RX ADMIN — ENOXAPARIN SODIUM 40 MG: 100 INJECTION SUBCUTANEOUS at 09:16

## 2024-03-15 RX ADMIN — METOPROLOL SUCCINATE 25 MG: 25 TABLET, FILM COATED, EXTENDED RELEASE ORAL at 09:17

## 2024-03-15 RX ADMIN — Medication 1 TABLET: at 09:17

## 2024-03-15 RX ADMIN — GUAIFENESIN 600 MG: 600 TABLET ORAL at 09:17

## 2024-03-15 ASSESSMENT — PAIN SCALES - GENERAL: PAINLEVEL_OUTOF10: 0

## 2024-03-15 NOTE — PROGRESS NOTES
ACUTE REHAB UNIT: DISCHARGE  [facility]    Patient:Stone Hoskins     Rehab Dx/Hx: Debility [R53.81]   :1935  MRN:[id number]  Date of Admit: 3/1/2024   Date of Discharge: 3/15/2024    Subjective:   Order for patient discharge.  Reviewed discharge summary (AVS) with patient and wife including medications, physical instructions (safety) and diet. Pt in stable condition.     Reconciled Medication List - Patient:   Medications were reviewed by RN at time of discharge with patient and/or family:  []  Via EMR   []  Via health information exchange  [x]  Verbal (e.g. in person, telephone, video conferencing)  [x]  Paper-based (e.g. fax, copies, printouts)   []  Other Methods (e.g. texting, email, CDs)    Reconciled Medication List - Subsequent provider:   [] No, current reconciled medication list not provided to the subsequent provider.  [x] Yes, current reconciled medication list provided to the subsequent provider.   [x] Via EMR    [] Via health information exchange  [] Verbal (e.g. in person, telephone, video conferencing)  [] Paper-based (e.g. fax, copies, printouts)   [] Other Methods (e.g. texting, email, CDs)    Discharge disposition:  Pt was discharged via:  [x]  Wheelchair  []  Stretcher  []  Safe ambulation and use of assistive device  []  Other:     Pt was discharged to:  [x]  Private car  []  Transportation service to next destination  []  Other:     Discharge destination:  [x]  Home  []  Skilled Nursing Facility  []  Long Term Care  []  Other:        Discharge BIMS:  Number of word repeated after first attempt:  []  0. None []  1. One []  2. Two [x]  3. Three    Able to report correct year:  []  0. Missed by >5 years, or no answer  []  1. Missed by 2-5 years  []  2. Missed by 1 year  [x]  3. Correct    Able to report correct month:   []  0. Missed by >1 month, or no answer  []  1. Missed by 6 days to 1 month  [x]  2. Accurate within 5 days    Able to report correct day of the  week:  []  0. Incorrect or no answer  [x]  1. Correct    Recall:   Able to recall \"sock\":  []  0. No could not recall  []  1. Yes, after cueing  [x]  2. Yes, no cue required  Able to recall \"blue\":  []  0. No could not recall  []  1. Yes, after cueing  [x]  2. Yes, no cue required  Able to recall \"bed\":  []  0. No could not recall  [x]  1. Yes, after cueing  []  2. Yes, no cue required      Patient Mood Interview    Symptom Presence  0. No (enter 0 in column 2)  1. Yes (enter 0-3 in column 2)  9. No response (leave column 2 blank  Symptom Frequency  0. Never or 1 day  1. 2-6 days (several days)  2. 7-11 days (half or more days)  3. 12-14 days (nearly everyday) 1. Symptom Presence 2. Symptom Frequency    Enter scores in boxes   Little interest or pleasure in doing things   0 0   Feeling down, depressed, or hopeless   0 0   If either A or B is coded 2 or 3, CONTINUE asking the questions below.  If not, END the interview.     Trouble falling or staying asleep, or sleeping too much       Feeling tired or having little energy       Poor appetite or overeating       Feeling bad about yourself - or that you are a failure or have let yourself or your family down       Trouble concentrating on things, such as reading the newspaper or watching television       Moving or speaking so slowly that other people could have noticed.  Or the opposite- being so fidgety or restless that you have been moving around a lot more than usual.       Thoughts that you would be better off dead, or of hurting yourself in some way.       Total Severity: Add scores for all frequency responses in column 2    0   Social isolation (from [employer])  How often do you feel lonely or isolated from those around you?  [x] 0. Never  [] 1. Rarely  [] 2. Sometimes  [] 3. Often  [] 4. Always  [] 7. Patient declines to respond  [] 8. Patient unable to respond.           Discharging Nurse Signature: Nadeem Avelar RN

## 2024-03-15 NOTE — PROGRESS NOTES
IRF ALVINO Discharge Assessment  [facility] Acute Rehab Unit    Patient:Stone Hoskins     Rehab Dx/Hx: Debility [R53.81]   :1935  MRN:[id number]  Date of Admit: 3/1/2024     Pain Effect on Sleep:  Over the past 5 days, how much of the time has pain made it hard for you to sleep at night?  []  0. Does not apply - I have not had any pain or hurting in the past 5 days  [x]  1. Rarely or not at all  []  2. Occasionally  []  3. Frequently  []  4. Almost constantly  []  8. Unable to answer    Over the past 5 days, how often have you limited your participation in rehabilitation therapy sessions due to pain?  []  0. Does not apply - I have not received rehabilitation therapy in the past 5 days  [x]  1. Rarely or not at all  []  2. Occasionally  []  3. Frequently  []  4. Almost constantly  []  8. Unable to answer    Pain Interference with Day-to-Day Activities:  Over the past 5 days, how often have you limited your day-to-day activities (excluding rehabilitation therapy session)?  [x]  1. Rarely or not at all  []  2. Occasionally  []  3. Frequently  []  4. Almost constantly  []  8. Unable to answer      High-Risk Drug Classes: Use and Indication   Is taking: Check if the pt is taking any medications by pharmacological classification  Indication noted: If column 1 is checked, check if there is an indication noted for all meds in the drug class Is taking  (check all that apply) Indication noted (check all that apply)   Antipsychotic [] []   Anticoagulant [x] [x]   Antibiotic [] []   Opioid [] []   Antiplatelet [] []   Hypoglycemic (including insulin) [] []   None of the above [] []     Special Treatments, Procedures, and Programs   Check all of the following treatments, procedures, and programs that apply on discharge.  On discharge (check all that apply)   Cancer Treatments    A1. Chemotherapy []   A2. IV []   A3. Oral []   A10. Other []   B1. Radiation []   Respiratory Therapies    C1. Oxygen Therapy []   C2.  Continuous []   C3. Intermittent []   C4. High-concentration []   D1. Suctioning []   D2. Scheduled []   D3. As needed []   E1. Tracheostomy Care []   F1. Invasive Mechanical Ventilator (ventilator or respirator) []   G1. Non-invasive Mechanical Ventilator []   G2. BiPAP []   G3. CPAP []   Other    H1. IV Medications []   H2. Vasoactive medications []   H3. Antibiotics []   H4. Anticoagulation []   H10. Other []   I1. Transfusions []   J1. Dialysis []   J2. Hemodialysis []   J3. Peritoneal dialysis []   O1. IV access []   O2. Peripheral []   O3. Midline []   O4. Central (PICC, tunneled, port) []   None of the above [x]     Signature: Nadeem Avelar RN

## 2024-03-15 NOTE — DISCHARGE SUMMARY
Physical Medicine & Rehabilitation  Discharge Summary     Patient Identification:  Stone Hoskins  : 1935  Admit date: 3/1/2024  Discharge date: 3/15/2024  Attending provider: Ayala Chambers MD        Primary care provider: Yesenia Fatima DO     Discharge Diagnoses:   Patient Active Problem List   Diagnosis    Dermatitis    Mixed hyperlipidemia    Vitamin D deficiency    Right-sided low back pain with right-sided sciatica    Bilateral edema of lower extremity    SVT (supraventricular tachycardia)    Primary hypertension    ARDS (adult respiratory distress syndrome) (HCC)    Acute respiratory failure with hypoxia (HCC)    Subconjunctival hemorrhage of left eye    Sepsis (HCC)    Acute hypoxemic respiratory failure (HCC)    Tracheobronchitis    Acute encephalopathy    Bronchospasm    Cholecystitis    Altered mental status    Generalized weakness    Elevated LFTs    Debility       History of Present Illness/Acute Hospital Course:  Stone Hoskins is an 89 yo M with a PMHx of Arthritis, Hyperlipidemia, Influenza A, and Right-sided low back pain with right-sided sciatica who presented to ED for generalized weakness, found to have sepsis 2/2 acute cholecystitis. Patient originally presented to [facility] ED on 24 with malaise and several days of abdominal pain. CT abdomen showed acute cholecystitis, confirmed on RUQ US, CBC showing leukocytosis. MRCP showing no choledocholithiasis, possible chronic liver dz. Patient was started on Zosyn and General surgery was consulted, who recommended conservative care with outpatient follow up for cholecystectomy. Attempted to advance diet, however patient didn't tolerate, MBS showing tracheal aspiration. Transitioned to Augmentin at time of discharge. Course was complicated by altered mental status/encephalopathy which improved with treatment of infection. He continued to stabilize medically, was evaluated by therapy services and found to be an appropriate candidate  chloride 10 MEQ extended release tablet  Commonly known as: KLOR-CON M  TAKE 1 TABLET BY MOUTH TWICE A DAY WITH LASIX     pravastatin 20 MG tablet  Commonly known as: PRAVACHOL  Take 1 tablet by mouth every other day     therapeutic multivitamin-minerals tablet     vitamin D 50 MCG (2000 UT) Caps capsule                I spent over 35 minutes on this discharge encounter between counseling, coordination of care, and medication reconciliation.    Discharge order placed in advance to facilitate patient’s discharge needs.      Ayala Webster MD

## 2024-03-15 NOTE — TELEPHONE ENCOUNTER
Care Transitions Initial Follow Up Call    Outreach made within 2 business days of discharge: Yes    Patient: Stone Hoskins Patient : 1935   MRN: [id number]  Reason for Admission: There are no discharge diagnoses documented for the most recent discharge.  Discharge Date: 3/15/24       Spoke with: marlo Waite    Discharge department/facility: [facility] Acute Rehab    TCM Interactive Patient Contact:  Was patient able to fill all prescriptions: Yes  Was patient instructed to bring all medications to the follow-up visit: Yes  Is patient taking all medications as directed in the discharge summary? Yes  Does patient understand their discharge instructions: Yes  Does patient have questions or concerns that need addressed prior to 7-14 day follow up office visit: no    Scheduled appointment with PCP within 7-14 days    Follow Up  Future Appointments   Date Time Provider Department Center   3/25/2024 11:00 AM Yesenia Fatima DO [facility]  [place] - [facility]   2024 10:20 AM Shilpa Solorio MD [facility] Neu[facility] -       Maye Jimenez LPN

## 2024-03-25 ENCOUNTER — OFFICE VISIT (OUTPATIENT)
Dept: FAMILY MEDICINE CLINIC | Age: 89
End: 2024-03-25

## 2024-03-25 VITALS
HEART RATE: 98 BPM | DIASTOLIC BLOOD PRESSURE: 68 MMHG | SYSTOLIC BLOOD PRESSURE: 134 MMHG | BODY MASS INDEX: 27.55 KG/M2 | WEIGHT: 192 LBS | OXYGEN SATURATION: 98 %

## 2024-03-25 DIAGNOSIS — Z09 HOSPITAL DISCHARGE FOLLOW-UP: Primary | ICD-10-CM

## 2024-03-25 DIAGNOSIS — A41.9 SEPSIS, DUE TO UNSPECIFIED ORGANISM, UNSPECIFIED WHETHER ACUTE ORGAN DYSFUNCTION PRESENT (HCC): ICD-10-CM

## 2024-03-25 DIAGNOSIS — G93.40 ACUTE ENCEPHALOPATHY: ICD-10-CM

## 2024-03-25 DIAGNOSIS — R13.12 OROPHARYNGEAL DYSPHAGIA: ICD-10-CM

## 2024-03-25 DIAGNOSIS — R53.81 DEBILITY: ICD-10-CM

## 2024-03-25 SDOH — ECONOMIC STABILITY: INCOME INSECURITY: HOW HARD IS IT FOR YOU TO PAY FOR THE VERY BASICS LIKE FOOD, HOUSING, MEDICAL CARE, AND HEATING?: NOT VERY HARD

## 2024-03-25 SDOH — ECONOMIC STABILITY: HOUSING INSECURITY
IN THE LAST 12 MONTHS, WAS THERE A TIME WHEN YOU DID NOT HAVE A STEADY PLACE TO SLEEP OR SLEPT IN A SHELTER (INCLUDING NOW)?: NO

## 2024-03-25 SDOH — ECONOMIC STABILITY: FOOD INSECURITY: WITHIN THE PAST 12 MONTHS, YOU WORRIED THAT YOUR FOOD WOULD RUN OUT BEFORE YOU GOT MONEY TO BUY MORE.: NEVER TRUE

## 2024-03-25 SDOH — ECONOMIC STABILITY: FOOD INSECURITY: WITHIN THE PAST 12 MONTHS, THE FOOD YOU BOUGHT JUST DIDN'T LAST AND YOU DIDN'T HAVE MONEY TO GET MORE.: NEVER TRUE

## 2024-03-25 ASSESSMENT — PATIENT HEALTH QUESTIONNAIRE - PHQ9
9. THOUGHTS THAT YOU WOULD BE BETTER OFF DEAD, OR OF HURTING YOURSELF: NOT AT ALL
SUM OF ALL RESPONSES TO PHQ QUESTIONS 1-9: 7
3. TROUBLE FALLING OR STAYING ASLEEP: NOT AT ALL
4. FEELING TIRED OR HAVING LITTLE ENERGY: MORE THAN HALF THE DAYS
8. MOVING OR SPEAKING SO SLOWLY THAT OTHER PEOPLE COULD HAVE NOTICED. OR THE OPPOSITE, BEING SO FIGETY OR RESTLESS THAT YOU HAVE BEEN MOVING AROUND A LOT MORE THAN USUAL: MORE THAN HALF THE DAYS
7. TROUBLE CONCENTRATING ON THINGS, SUCH AS READING THE NEWSPAPER OR WATCHING TELEVISION: NOT AT ALL
2. FEELING DOWN, DEPRESSED OR HOPELESS: NOT AT ALL
SUM OF ALL RESPONSES TO PHQ QUESTIONS 1-9: 7
5. POOR APPETITE OR OVEREATING: SEVERAL DAYS
10. IF YOU CHECKED OFF ANY PROBLEMS, HOW DIFFICULT HAVE THESE PROBLEMS MADE IT FOR YOU TO DO YOUR WORK, TAKE CARE OF THINGS AT HOME, OR GET ALONG WITH OTHER PEOPLE: SOMEWHAT DIFFICULT
1. LITTLE INTEREST OR PLEASURE IN DOING THINGS: MORE THAN HALF THE DAYS
SUM OF ALL RESPONSES TO PHQ QUESTIONS 1-9: 7
SUM OF ALL RESPONSES TO PHQ QUESTIONS 1-9: 7
SUM OF ALL RESPONSES TO PHQ9 QUESTIONS 1 & 2: 2
6. FEELING BAD ABOUT YOURSELF - OR THAT YOU ARE A FAILURE OR HAVE LET YOURSELF OR YOUR FAMILY DOWN: NOT AT ALL

## 2024-03-25 NOTE — PROGRESS NOTES
stasis  - PRN Lasix for swelling, give if patient symptomatic  - daily weights    Inpatient course: Discharge summary reviewed- see chart.    Interval history/Current status: improved    Patient Active Problem List   Diagnosis    Dermatitis    Mixed hyperlipidemia    Vitamin D deficiency    Right-sided low back pain with right-sided sciatica    Bilateral edema of lower extremity    SVT (supraventricular tachycardia)    Primary hypertension    ARDS (adult respiratory distress syndrome) (HCC)    Acute respiratory failure with hypoxia (HCC)    Subconjunctival hemorrhage of left eye    Sepsis (HCC)    Acute hypoxemic respiratory failure (HCC)    Tracheobronchitis    Acute encephalopathy    Bronchospasm    Cholecystitis    Altered mental status    Generalized weakness    Elevated LFTs    Debility       Medications listed as ordered at the time of discharge from hospital     Medication List            Accurate as of March 25, 2024 11:30 AM. If you have any questions, ask your nurse or doctor.                CONTINUE taking these medications      furosemide 40 MG tablet  Commonly known as: LASIX  TAKE ONE TABLET BY MOUTH TWICE A DAY AS NEEDED     Handicap Placard Misc  by Does not apply route Length: 5 years     metoprolol succinate 25 MG extended release tablet  Commonly known as: TOPROL XL  TAKE ONE TABLET BY MOUTH DAILYTAKE ONE TABLET BY MOUTH DAILY     potassium chloride 10 MEQ extended release tablet  Commonly known as: KLOR-CON M  TAKE 1 TABLET BY MOUTH TWICE A DAY WITH LASIX     pravastatin 20 MG tablet  Commonly known as: PRAVACHOL  Take 1 tablet by mouth every other day     therapeutic multivitamin-minerals tablet     vitamin D 50 MCG (2000 UT) Caps capsule                Medications marked \"taking\" at this time  Outpatient Medications Marked as Taking for the 3/25/24 encounter (Office Visit) with Yesenia Fatima, DO   Medication Sig Dispense Refill    potassium chloride (KLOR-CON M) 10 MEQ extended release tablet

## 2024-03-29 PROBLEM — M62.81 MUSCLE WEAKNESS (GENERALIZED): Status: ACTIVE | Noted: 2024-03-29

## 2024-03-29 PROBLEM — R54 AGE-RELATED PHYSICAL DEBILITY: Status: ACTIVE | Noted: 2024-03-01

## 2024-03-29 PROBLEM — R60.9 EDEMA: Status: ACTIVE | Noted: 2024-03-29

## 2024-04-01 ENCOUNTER — INITIAL CONSULT (OUTPATIENT)
Dept: SURGERY | Age: 89
End: 2024-04-01
Payer: COMMERCIAL

## 2024-04-01 VITALS
WEIGHT: 198 LBS | BODY MASS INDEX: 28.35 KG/M2 | DIASTOLIC BLOOD PRESSURE: 70 MMHG | SYSTOLIC BLOOD PRESSURE: 130 MMHG | HEIGHT: 70 IN

## 2024-04-01 DIAGNOSIS — K81.9 CHOLECYSTITIS: Primary | ICD-10-CM

## 2024-04-01 PROCEDURE — 99213 OFFICE O/P EST LOW 20 MIN: CPT | Performed by: SURGERY

## 2024-04-01 PROCEDURE — 1123F ACP DISCUSS/DSCN MKR DOCD: CPT | Performed by: SURGERY

## 2024-04-01 NOTE — PROGRESS NOTES
[facility] SURGERY    CHIEF COMPLAINT: None, feels well    SUBJECTIVE:   Patient presents for follow up of his cholecystitis.  He reports doing very well.  He has not had any abdominal pain since he left the hospital.  He is not having issues with dysphagia.    Allergies   Allergen Reactions    Iodides      Other Reaction(s): Tachycardia    Simvastatin Nausea Only and Nausea And Vomiting     GI    Other reaction(s): GI Upset   GI     Outpatient Medications Marked as Taking for the 4/1/24 encounter (Initial consult) with King Stokes MD   Medication Sig Dispense Refill    potassium chloride (KLOR-CON M) 10 MEQ extended release tablet TAKE 1 TABLET BY MOUTH TWICE A DAY WITH LASIX 90 tablet 1    pravastatin (PRAVACHOL) 20 MG tablet Take 1 tablet by mouth every other day 90 tablet 1    metoprolol succinate (TOPROL XL) 25 MG extended release tablet TAKE ONE TABLET BY MOUTH DAILYTAKE ONE TABLET BY MOUTH DAILY 90 tablet 1    furosemide (LASIX) 40 MG tablet TAKE ONE TABLET BY MOUTH TWICE A DAY AS NEEDED 180 tablet 0    Cholecalciferol (VITAMIN D) 50 MCG (2000 UT) CAPS capsule Take 50 Units by mouth daily      Handicap Placard MISC by Does not apply route Length: 5 years 1 each 0    Multiple Vitamins-Minerals (THERAPEUTIC MULTIVITAMIN-MINERALS) tablet Take 1 tablet by mouth daily         Past Medical History:   Diagnosis Date    Arthritis     Hyperlipidemia     Influenza A 12/25/2017    Right-sided low back pain with right-sided sciatica      Past Surgical History:   Procedure Laterality Date    CATARACT REMOVAL      INGUINAL HERNIA REPAIR Bilateral 2-21-11    [id number]-HRACV-DlfqtHussain Deleon MD    TONSILLECTOMY      TOOTH EXTRACTION       No family history on file.  Social History     Socioeconomic History    Marital status:      Spouse name: Georgia    Number of children: 3    Years of education: 12    Highest education level: Not on file   Occupational History    Occupation: retired   Tobacco Use

## 2024-04-15 DIAGNOSIS — E78.2 MIXED HYPERLIPIDEMIA: ICD-10-CM

## 2024-04-15 RX ORDER — PRAVASTATIN SODIUM 20 MG
20 TABLET ORAL EVERY OTHER DAY
Qty: 45 TABLET | Refills: 1 | Status: SHIPPED | OUTPATIENT
Start: 2024-04-15

## 2024-04-15 NOTE — TELEPHONE ENCOUNTER
Refill Request     CONFIRM preferred pharmacy with the patient.    If Mail Order Rx - Pend for 90 day refill.      Last Seen: Last Seen Department: 3/25/2024  Last Seen by PCP: 3/25/2024    Last Written: 8/31/23 90 with 1 refill     If no future appointment scheduled:  Review the last OV with PCP and review information for follow-up visit,  Route STAFF MESSAGE with patient name to the  Pool for scheduling with the following information:            -  Timing of next visit           -  Visit type ie Physical, OV, etc           -  Diagnoses/Reason ie. COPD, HTN - Do not use MEDICATION, Follow-up or CHECK UP - Give reason for visit      Next Appointment:   Future Appointments   Date Time Provider Department Center   5/13/2024 10:00 AM Shilpa Solorio MD [facility] Neurology -        Message sent to  to schedule appt with patient?  YES Return in about 6 months (around 2/29/2024) for Hypertension, Hyperlipidemia, edema.       Requested Prescriptions     Pending Prescriptions Disp Refills    pravastatin (PRAVACHOL) 20 MG tablet [Pharmacy Med Name: PRAVASTATIN SODIUM 20 MG TAB] 45 tablet      Sig: TAKE 1 TABLET BY MOUTH EVERY OTHER DAY

## 2024-05-13 ENCOUNTER — OFFICE VISIT (OUTPATIENT)
Dept: NEUROLOGY | Age: 89
End: 2024-05-13
Payer: COMMERCIAL

## 2024-05-13 VITALS
HEIGHT: 70 IN | OXYGEN SATURATION: 96 % | DIASTOLIC BLOOD PRESSURE: 64 MMHG | SYSTOLIC BLOOD PRESSURE: 122 MMHG | BODY MASS INDEX: 27.2 KG/M2 | WEIGHT: 190 LBS | HEART RATE: 84 BPM

## 2024-05-13 DIAGNOSIS — G20.C PARKINSONISM, UNSPECIFIED PARKINSONISM TYPE (HCC): ICD-10-CM

## 2024-05-13 DIAGNOSIS — R41.3 MEMORY LOSS: Primary | ICD-10-CM

## 2024-05-13 PROCEDURE — 1123F ACP DISCUSS/DSCN MKR DOCD: CPT | Performed by: PSYCHIATRY & NEUROLOGY

## 2024-05-13 PROCEDURE — 99204 OFFICE O/P NEW MOD 45 MIN: CPT | Performed by: PSYCHIATRY & NEUROLOGY

## 2024-05-13 NOTE — PATIENT INSTRUCTIONS
YOU MUST CONFIRM YOUR APPOINTMENT 1 DAY PRIOR OR IT WILL BE CANCELLED!!   Our office will call you 3 times the day prior to your appointment in an attempt to confirm.  Please return our call ASAP or confirm your appt through [employer] no later than 3 pm the day before your appointment.  If we do not hear back from you by 3 pm to confirm, your appointment will be cancelled & someone will be added into that slot from our wait list.       EEG PREP:  1. Patient should take seizure medicine, as prescribed, on the day of the test  2. Patient must NOT have more than 5 hours of sleep prior to the EEG  3. Patient should have CLEAN hair, no hairspray, gel, cream rinse, hair pins, or chemical treatments within 48 hours prior to EEG  4. NO caffeine, pain medications or sedatives on the day of the test (TAKE SEIZURE MEDS!)  5. Arrive 30 minutes prior to appointment time (check in at Registration Desk on 1st floor of hospital main entrance - by the [employer])  6. Procedure will last 60-90 minutes.

## 2024-05-13 NOTE — PROGRESS NOTES
Neurology outpatient new visit    Patient name: Stone Hoskins      Chief Complaint:  Memory loss.    History of present illness:  This is an 88 years old right-handed male.  The patient is here for evaluation of memory loss.  The onset was couple years ago.  The course is slowly progressive.  The family also reports gait difficulty with frequent falls.  The patient is able to maintain his daily activity.  But the patient cannot manage his financial.  There is no significant depression or sleep disorder at this time.  The patient has previous CT brain back in 2022.  At that time it showed significant brain atrophy.  The patient denies history of seizure disorder or CVA.    Education: High school.    Past medical history:    Past Medical History:   Diagnosis Date    Arthritis     Hyperlipidemia     Influenza A 12/25/2017    Right-sided low back pain with right-sided sciatica        Past surgical history:    Past Surgical History:   Procedure Laterality Date    CATARACT REMOVAL      INGUINAL HERNIA REPAIR Bilateral 2-21-11    JipMqnpau-ASLMW-HauihHussain Deleon MD    TONSILLECTOMY      TOOTH EXTRACTION          Medication:    Current Outpatient Medications   Medication Sig Dispense Refill    pravastatin (PRAVACHOL) 20 MG tablet TAKE 1 TABLET BY MOUTH EVERY OTHER DAY 45 tablet 1    potassium chloride (KLOR-CON M) 10 MEQ extended release tablet TAKE 1 TABLET BY MOUTH TWICE A DAY WITH LASIX 90 tablet 1    metoprolol succinate (TOPROL XL) 25 MG extended release tablet TAKE ONE TABLET BY MOUTH DAILYTAKE ONE TABLET BY MOUTH DAILY 90 tablet 1    furosemide (LASIX) 40 MG tablet TAKE ONE TABLET BY MOUTH TWICE A DAY AS NEEDED 180 tablet 0    Cholecalciferol (VITAMIN D) 50 MCG (2000 UT) CAPS capsule Take 50 Units by mouth daily      Handicap Placard MISC by Does not apply route Length: 5 years 1 each 0    Multiple Vitamins-Minerals (THERAPEUTIC MULTIVITAMIN-MINERALS) tablet Take 1 tablet by mouth daily       No current

## 2024-05-15 DIAGNOSIS — R41.3 MEMORY LOSS: ICD-10-CM

## 2024-05-15 DIAGNOSIS — G20.C PARKINSONISM, UNSPECIFIED PARKINSONISM TYPE (HCC): ICD-10-CM

## 2024-05-15 LAB
FOLATE SERPL-MCNC: >20 NG/ML (ref 4.78–24.2)
VIT B12 SERPL-MCNC: 808 PG/ML (ref 211–911)

## 2024-05-17 ENCOUNTER — CLINICAL DOCUMENTATION (OUTPATIENT)
Dept: SPIRITUAL SERVICES | Age: 89
End: 2024-05-17

## 2024-05-17 NOTE — ACP (ADVANCE CARE PLANNING)
Advance Care Planning   Advance Care Planning Note  Ambulatory Spiritual Care Services    Date:  5/17/2024    Received request from IDT member.    Consultation conversation participants:   Healthcare Decision Maker  Spouse  Patient's child(melissa)     Goals of ACP Conversation:  Discuss advance care planning documents  Facilitate a discussion related to patient's goals of care as they align with the patient's values and beliefs.    Health Care Decision Makers:      Primary Decision Maker: Georgia Hoskins - Spouse - [phone]    Secondary Decision Maker: Viri Pollard - Child - [phone]    Supplemental (Other) Decision Maker: Lauren Lazaro - Child - [phone]    Supplemental (Other) Decision Maker: Roni Hoskins - Child - [phone] Third Alternate    Summary:  Completed New Documents  Verified Healthcare Decision Maker  Updated Healthcare Decision Maker    Advance Care Planning Documents (Patient Wishes)  Currently on file:   Healthcare Power of /Advance Directive Appointment of Health Care Agent  Living Will/Advance Directive    Electronically signed by Chaplain Serenity on 5/17/2024 at 2:33 PM.     Thank you for consulting [facility]    If you would like a 's presence for emotional, spiritual, grief or comfort care,   please dial \"0\" and ask for the  on-call to be paged.    For help with Advanced Care Planning, Power of  for Healthcare or Living Will forms, you may also call us directly:    [phone]) Onel  [phone] ([phone]) Jovanny  [phone]) Outpatient    [facility]

## 2024-05-24 ENCOUNTER — HOSPITAL ENCOUNTER (OUTPATIENT)
Dept: NEUROLOGY | Age: 89
Discharge: HOME | End: 2024-05-24
Payer: COMMERCIAL

## 2024-05-24 DIAGNOSIS — R41.3 MEMORY LOSS: ICD-10-CM

## 2024-05-24 DIAGNOSIS — G20.C PARKINSONISM, UNSPECIFIED PARKINSONISM TYPE (HCC): ICD-10-CM

## 2024-05-24 PROCEDURE — 95813 EEG EXTND MNTR 61-119 MIN: CPT

## 2024-06-10 ENCOUNTER — OFFICE VISIT (OUTPATIENT)
Dept: NEUROLOGY | Age: 89
End: 2024-06-10
Payer: COMMERCIAL

## 2024-06-10 VITALS
DIASTOLIC BLOOD PRESSURE: 70 MMHG | HEART RATE: 88 BPM | SYSTOLIC BLOOD PRESSURE: 120 MMHG | HEIGHT: 70 IN | OXYGEN SATURATION: 96 % | BODY MASS INDEX: 27.2 KG/M2 | WEIGHT: 190 LBS

## 2024-06-10 DIAGNOSIS — G20.C PARKINSONISM, UNSPECIFIED PARKINSONISM TYPE (HCC): Primary | ICD-10-CM

## 2024-06-10 DIAGNOSIS — F03.B0 MODERATE DEMENTIA WITHOUT BEHAVIORAL DISTURBANCE, PSYCHOTIC DISTURBANCE, MOOD DISTURBANCE, OR ANXIETY, UNSPECIFIED DEMENTIA TYPE (HCC): ICD-10-CM

## 2024-06-10 PROCEDURE — 99214 OFFICE O/P EST MOD 30 MIN: CPT | Performed by: PSYCHIATRY & NEUROLOGY

## 2024-06-10 PROCEDURE — 1123F ACP DISCUSS/DSCN MKR DOCD: CPT | Performed by: PSYCHIATRY & NEUROLOGY

## 2024-06-10 NOTE — PROGRESS NOTES
Neurology outpatient F/U visit    Patient name: Stone Hoskins      Chief Complaint:  Memory loss.    History of present illness:  This is an 88 years old right-handed male.  The patient is here for evaluation of memory loss.  The onset was couple years ago.  The course is slowly progressive.  The family also reports gait difficulty with frequent falls.  The patient is able to maintain his daily activity.  But the patient cannot manage his financial.  There is no significant depression or sleep disorder at this time.  The patient has previous CT brain back in 2022.  At that time it showed significant brain atrophy.  The patient denies history of seizure disorder or CVA.    MMSE 15/30.  Orientation to time 2/5, place 2/5, registration 3/3, recall 0/3, drawing 0/1, and attention 1/5.    06/10/24: The patient is here for follow-up after the EEG and MRI brain.  The EEG show moderate degree of encephalopathy.  The MRI brain showed mild to moderate white matter changes and generalized brain atrophy.  The patient remains to have memory difficulty and gait difficulty.    Education: High school.    Past medical history:    Past Medical History:   Diagnosis Date    Arthritis     Hyperlipidemia     Influenza A 12/25/2017    Right-sided low back pain with right-sided sciatica        Past surgical history:    Past Surgical History:   Procedure Laterality Date    CATARACT REMOVAL      INGUINAL HERNIA REPAIR Bilateral 2-21-11    BjvWquovo-WUWLE-LacvyHussain Deleon MD    TONSILLECTOMY      TOOTH EXTRACTION          Medication:    Current Outpatient Medications   Medication Sig Dispense Refill    carbidopa-levodopa (SINEMET)  MG per tablet Take 1 tablet by mouth 2 times daily 60 tablet 1    pravastatin (PRAVACHOL) 20 MG tablet TAKE 1 TABLET BY MOUTH EVERY OTHER DAY 45 tablet 1    potassium chloride (KLOR-CON M) 10 MEQ extended release tablet TAKE 1 TABLET BY MOUTH TWICE A DAY WITH LASIX 90 tablet 1    metoprolol succinate

## 2024-06-10 NOTE — PATIENT INSTRUCTIONS

## 2024-07-17 NOTE — PROGRESS NOTES
Pt awake in bed.  Denies needs. Call light within reach.  Bilateral lower limb kept elevated with pillow.   Yes

## 2024-07-23 ENCOUNTER — TELEPHONE (OUTPATIENT)
Age: 89
End: 2024-07-23

## 2024-07-23 NOTE — TELEPHONE ENCOUNTER
Per Dr. Ventura: This is from his parkinson not medication.  He needs to increase medication in the future.    I spoke with pt's wife and relayed the message to her.

## 2024-07-23 NOTE — TELEPHONE ENCOUNTER
Wife phoned stating pt was on the side of the bed and fell off yesterday.  She feels like he is getting worse.  He is doing Physical therapy and he is not able to walk he is very unsteady on his feet.  His BP and temp is fine.  She is wanting to know if she should continue to give him the sinemet or if this is a side effect to the Sinemet.  Please call wife back.

## 2024-07-28 DIAGNOSIS — I10 ESSENTIAL HYPERTENSION: ICD-10-CM

## 2024-07-28 DIAGNOSIS — G20.C PARKINSONISM, UNSPECIFIED PARKINSONISM TYPE (HCC): Primary | ICD-10-CM

## 2024-07-29 RX ORDER — METOPROLOL SUCCINATE 25 MG/1
TABLET, EXTENDED RELEASE ORAL
Qty: 90 TABLET | Refills: 1 | Status: SHIPPED | OUTPATIENT
Start: 2024-07-29

## 2024-07-29 NOTE — TELEPHONE ENCOUNTER
Refill Request     CONFIRM preferred pharmacy with the patient.    If Mail Order Rx - Pend for 90 day refill.      Last Seen: Last Seen Department: 3/25/2024  Last Seen by PCP: 3/25/2024    Last Written: 8/31/23 90 tab 1 refills    If no future appointment scheduled:  Review the last OV with PCP and review information for follow-up visit,  Route STAFF MESSAGE with patient name to the  Pool for scheduling with the following information:            -  Timing of next visit           -  Visit type ie Physical, OV, etc           -  Diagnoses/Reason ie. COPD, HTN - Do not use MEDICATION, Follow-up or CHECK UP - Give reason for visit      Next Appointment:   Future Appointments   Date Time Provider Department Center   8/19/2024 10:30 AM Shilpa Solorio MD [facility] Neurology -       Message sent to  to schedule appt with patient?  NO      Requested Prescriptions     Pending Prescriptions Disp Refills    metoprolol succinate (TOPROL XL) 25 MG extended release tablet [Pharmacy Med Name: METOPROLOL SUCC ER 25 MG TAB] 90 tablet 1     Sig: TAKE 1 TABLET BY MOUTH DAILY

## 2024-08-19 ENCOUNTER — OFFICE VISIT (OUTPATIENT)
Dept: NEUROLOGY | Age: 89
End: 2024-08-19
Payer: COMMERCIAL

## 2024-08-19 VITALS
OXYGEN SATURATION: 97 % | HEART RATE: 95 BPM | WEIGHT: 185 LBS | SYSTOLIC BLOOD PRESSURE: 122 MMHG | HEIGHT: 70 IN | DIASTOLIC BLOOD PRESSURE: 64 MMHG | BODY MASS INDEX: 26.48 KG/M2

## 2024-08-19 DIAGNOSIS — F03.B0 MODERATE DEMENTIA WITHOUT BEHAVIORAL DISTURBANCE, PSYCHOTIC DISTURBANCE, MOOD DISTURBANCE, OR ANXIETY, UNSPECIFIED DEMENTIA TYPE (HCC): Primary | ICD-10-CM

## 2024-08-19 DIAGNOSIS — G20.C PARKINSONISM, UNSPECIFIED PARKINSONISM TYPE (HCC): ICD-10-CM

## 2024-08-19 PROCEDURE — 99214 OFFICE O/P EST MOD 30 MIN: CPT | Performed by: PSYCHIATRY & NEUROLOGY

## 2024-08-19 PROCEDURE — 1123F ACP DISCUSS/DSCN MKR DOCD: CPT | Performed by: PSYCHIATRY & NEUROLOGY

## 2024-08-19 RX ORDER — CARBIDOPA AND LEVODOPA 25; 100 MG/1; MG/1
1 TABLET, EXTENDED RELEASE ORAL NIGHTLY
Qty: 30 TABLET | Refills: 2 | Status: SHIPPED | OUTPATIENT
Start: 2024-08-19

## 2024-08-19 NOTE — PROGRESS NOTES
Medication:    Current Outpatient Medications   Medication Sig Dispense Refill    carbidopa-levodopa (SINEMET)  MG per tablet Take 1 tablet by mouth 3 times daily With empty stomach 90 tablet 2    carbidopa-levodopa (SINEMET CR)  MG per extended release tablet Take 1 tablet by mouth nightly 30 tablet 2    metoprolol succinate (TOPROL XL) 25 MG extended release tablet TAKE 1 TABLET BY MOUTH DAILY 90 tablet 1    pravastatin (PRAVACHOL) 20 MG tablet TAKE 1 TABLET BY MOUTH EVERY OTHER DAY 45 tablet 1    potassium chloride (KLOR-CON M) 10 MEQ extended release tablet TAKE 1 TABLET BY MOUTH TWICE A DAY WITH LASIX 90 tablet 1    furosemide (LASIX) 40 MG tablet TAKE ONE TABLET BY MOUTH TWICE A DAY AS NEEDED 180 tablet 0    Cholecalciferol (VITAMIN D) 50 MCG (2000 UT) CAPS capsule Take 50 Units by mouth daily      Handicap Placard MISC by Does not apply route Length: 5 years 1 each 0    Multiple Vitamins-Minerals (THERAPEUTIC MULTIVITAMIN-MINERALS) tablet Take 1 tablet by mouth daily       No current facility-administered medications for this visit.       Allergies:    Allergies as of 08/19/2024 - Fully Reviewed 08/19/2024   Allergen Reaction Noted    Iodides  08/10/2023    Simvastatin Nausea Only and Nausea And Vomiting 09/30/2013        Social history:     reports that he has never smoked. He has never used smokeless tobacco. He reports that he does not drink alcohol and does not use drugs.     Family history:    No family history on file.     Review of system:  No chest pain, shortness of breath, palpitation, cough, fever, abdominal pain, vomiting, diarrhea, dysuria, vertigo, joint pain, change in speech/vision or new onset of weakness/numbness. Remaining as per HPI.      /64 (Site: Right Upper Arm)   Pulse 95   Ht 1.778 m (5' 10\")   Wt 83.9 kg (185 lb)   SpO2 97% Comment: RA  BMI 26.54 kg/m²     Neurological examination:  MENTAL STATUS: AAOx3  LANG/SPEECH: Fluent, intact naming, repetition

## 2024-08-19 NOTE — PATIENT INSTRUCTIONS
YOU MUST CONFIRM YOUR APPOINTMENT 1 DAY PRIOR OR IT WILL BE CANCELLED!!   Our office will call you 3 times the day prior to your appointment in an attempt to confirm.  Please return our call ASAP or confirm your appt through [employer] no later than 3 pm the day before your appointment.  If we do not hear back from you by 3 pm to confirm, your appointment will be cancelled & someone will be added into that slot from our wait list.

## 2024-10-03 ENCOUNTER — OFFICE VISIT (OUTPATIENT)
Dept: FAMILY MEDICINE CLINIC | Age: 89
End: 2024-10-03
Payer: COMMERCIAL

## 2024-10-03 VITALS — SYSTOLIC BLOOD PRESSURE: 124 MMHG | WEIGHT: 180 LBS | DIASTOLIC BLOOD PRESSURE: 78 MMHG | BODY MASS INDEX: 25.83 KG/M2

## 2024-10-03 DIAGNOSIS — E78.2 MIXED HYPERLIPIDEMIA: ICD-10-CM

## 2024-10-03 DIAGNOSIS — E55.9 VITAMIN D DEFICIENCY: ICD-10-CM

## 2024-10-03 DIAGNOSIS — R60.0 BILATERAL EDEMA OF LOWER EXTREMITY: ICD-10-CM

## 2024-10-03 DIAGNOSIS — Z00.00 MEDICARE ANNUAL WELLNESS VISIT, SUBSEQUENT: Primary | ICD-10-CM

## 2024-10-03 DIAGNOSIS — R09.82 POSTNASAL DRIP: ICD-10-CM

## 2024-10-03 DIAGNOSIS — I10 ESSENTIAL HYPERTENSION: ICD-10-CM

## 2024-10-03 DIAGNOSIS — Z23 NEED FOR INFLUENZA VACCINATION: ICD-10-CM

## 2024-10-03 LAB
BASOPHILS # BLD: 0.1 K/UL (ref 0–0.2)
BASOPHILS NFR BLD: 0.7 %
DEPRECATED RDW RBC AUTO: 15.7 % (ref 12.4–15.4)
EOSINOPHIL # BLD: 0.2 K/UL (ref 0–0.6)
EOSINOPHIL NFR BLD: 2 %
HCT VFR BLD AUTO: 50.1 % (ref 40.5–52.5)
HGB BLD-MCNC: 16.2 G/DL (ref 13.5–17.5)
LYMPHOCYTES # BLD: 1.5 K/UL (ref 1–5.1)
LYMPHOCYTES NFR BLD: 17.2 %
MCH RBC QN AUTO: 28.4 PG (ref 26–34)
MCHC RBC AUTO-ENTMCNC: 32.4 G/DL (ref 31–36)
MCV RBC AUTO: 87.5 FL (ref 80–100)
MONOCYTES # BLD: 0.8 K/UL (ref 0–1.3)
MONOCYTES NFR BLD: 8.8 %
NEUTROPHILS # BLD: 6.3 K/UL (ref 1.7–7.7)
NEUTROPHILS NFR BLD: 71.3 %
PLATELET # BLD AUTO: 263 K/UL (ref 135–450)
PMV BLD AUTO: 8.9 FL (ref 5–10.5)
RBC # BLD AUTO: 5.73 M/UL (ref 4.2–5.9)
WBC # BLD AUTO: 8.8 K/UL (ref 4–11)

## 2024-10-03 PROCEDURE — 99214 OFFICE O/P EST MOD 30 MIN: CPT | Performed by: FAMILY MEDICINE

## 2024-10-03 PROCEDURE — G0008 ADMIN INFLUENZA VIRUS VAC: HCPCS | Performed by: FAMILY MEDICINE

## 2024-10-03 PROCEDURE — 1123F ACP DISCUSS/DSCN MKR DOCD: CPT | Performed by: FAMILY MEDICINE

## 2024-10-03 PROCEDURE — G0439 PPPS, SUBSEQ VISIT: HCPCS | Performed by: FAMILY MEDICINE

## 2024-10-03 PROCEDURE — 90653 IIV ADJUVANT VACCINE IM: CPT | Performed by: FAMILY MEDICINE

## 2024-10-03 RX ORDER — PRAVASTATIN SODIUM 20 MG
20 TABLET ORAL EVERY OTHER DAY
Qty: 45 TABLET | Refills: 1 | Status: SHIPPED | OUTPATIENT
Start: 2024-10-03

## 2024-10-03 RX ORDER — FLUTICASONE PROPIONATE 50 MCG
2 SPRAY, SUSPENSION (ML) NASAL DAILY
Qty: 16 G | Refills: 5 | Status: SHIPPED | OUTPATIENT
Start: 2024-10-03

## 2024-10-03 ASSESSMENT — PATIENT HEALTH QUESTIONNAIRE - PHQ9
2. FEELING DOWN, DEPRESSED OR HOPELESS: NOT AT ALL
SUM OF ALL RESPONSES TO PHQ QUESTIONS 1-9: 0
2. FEELING DOWN, DEPRESSED OR HOPELESS: NOT AT ALL
SUM OF ALL RESPONSES TO PHQ QUESTIONS 1-9: 0
1. LITTLE INTEREST OR PLEASURE IN DOING THINGS: NOT AT ALL
SUM OF ALL RESPONSES TO PHQ9 QUESTIONS 1 & 2: 0
1. LITTLE INTEREST OR PLEASURE IN DOING THINGS: NOT AT ALL
SUM OF ALL RESPONSES TO PHQ QUESTIONS 1-9: 0
SUM OF ALL RESPONSES TO PHQ QUESTIONS 1-9: 0
SUM OF ALL RESPONSES TO PHQ9 QUESTIONS 1 & 2: 0

## 2024-10-03 NOTE — PROGRESS NOTES
were reviewed today and where indicated follow up appointments were made and/or referrals ordered.    Positive Risk Factor Screenings with Interventions:    Fall Risk:  Do you feel unsteady or are you worried about falling? : (!) yes (wife says patient unsteady, going to PT)  2 or more falls in past year?: no  Fall with injury in past year?: no     Interventions:    Reviewed medications, home hazards, visual acuity, and co-morbidities that can increase risk for falls                  Vision Screen:  Do you have difficulty driving, watching TV, or doing any of your daily activities because of your eyesight?: (!) Yes (blurry vision)  Have you had an eye exam within the past year?: Appointment is scheduled  Interventions:   Patient encouraged to make appointment with their eye specialist                  Objective   Vitals:    10/03/24 0913   BP: 124/78   Site: Right Upper Arm   Position: Sitting   Cuff Size: Medium Adult   Weight: 81.6 kg (180 lb)      Body mass index is 25.83 kg/m².                Allergies   Allergen Reactions    Iodides      Other Reaction(s): Tachycardia    Simvastatin Nausea Only and Nausea And Vomiting     GI    Other reaction(s): GI Upset   GI     Prior to Visit Medications    Medication Sig Taking? Authorizing Provider   pravastatin (PRAVACHOL) 20 MG tablet Take 1 tablet by mouth every other day Yes Yesenia Fatima, DO   fluticasone (FLONASE) 50 MCG/ACT nasal spray 2 sprays by Each Nostril route daily Yes Yesenia Fatima DO   carbidopa-levodopa (SINEMET)  MG per tablet Take 1 tablet by mouth 3 times daily With empty stomach Yes Shilpa Solorio MD   carbidopa-levodopa (SINEMET CR)  MG per extended release tablet Take 1 tablet by mouth nightly Yes Shilpa Solorio MD   metoprolol succinate (TOPROL XL) 25 MG extended release tablet TAKE 1 TABLET BY MOUTH DAILY Yes Yesenia Fatima DO   Cholecalciferol (VITAMIN D) 50 MCG (2000 UT) CAPS capsule Take 50 Units by

## 2024-10-04 LAB
25(OH)D3 SERPL-MCNC: 35.9 NG/ML
ALBUMIN SERPL-MCNC: 4.2 G/DL (ref 3.4–5)
ALBUMIN/GLOB SERPL: 1.7 {RATIO} (ref 1.1–2.2)
ALP SERPL-CCNC: 81 U/L (ref 40–129)
ALT SERPL-CCNC: 8 U/L (ref 10–40)
ANION GAP SERPL CALCULATED.3IONS-SCNC: 11 MMOL/L (ref 3–16)
AST SERPL-CCNC: 24 U/L (ref 15–37)
BILIRUB SERPL-MCNC: 0.7 MG/DL (ref 0–1)
BUN SERPL-MCNC: 15 MG/DL (ref 7–20)
CALCIUM SERPL-MCNC: 9.2 MG/DL (ref 8.3–10.6)
CHLORIDE SERPL-SCNC: 105 MMOL/L (ref 99–110)
CHOLEST SERPL-MCNC: 179 MG/DL (ref 0–199)
CO2 SERPL-SCNC: 26 MMOL/L (ref 21–32)
CREAT SERPL-MCNC: 1 MG/DL (ref 0.8–1.3)
GFR SERPLBLD CREATININE-BSD FMLA CKD-EPI: 73 ML/MIN/{1.73_M2}
GLUCOSE SERPL-MCNC: 81 MG/DL (ref 70–99)
HDLC SERPL-MCNC: 44 MG/DL (ref 40–60)
LDLC SERPL CALC-MCNC: 105 MG/DL
POTASSIUM SERPL-SCNC: 4.3 MMOL/L (ref 3.5–5.1)
PROT SERPL-MCNC: 6.7 G/DL (ref 6.4–8.2)
SODIUM SERPL-SCNC: 142 MMOL/L (ref 136–145)
TRIGL SERPL-MCNC: 150 MG/DL (ref 0–150)
TSH SERPL DL<=0.005 MIU/L-ACNC: 2.55 UIU/ML (ref 0.27–4.2)
VLDLC SERPL CALC-MCNC: 30 MG/DL

## 2024-10-13 DIAGNOSIS — E78.2 MIXED HYPERLIPIDEMIA: ICD-10-CM

## 2024-10-14 RX ORDER — PRAVASTATIN SODIUM 20 MG
20 TABLET ORAL EVERY OTHER DAY
Qty: 45 TABLET | Refills: 1 | OUTPATIENT
Start: 2024-10-14

## 2024-10-14 NOTE — TELEPHONE ENCOUNTER
Spoke with pharmacy, patient picked this medication up on 10/5/24. Going to \"refuse\" this request.

## 2024-11-21 DIAGNOSIS — G20.C PARKINSONISM, UNSPECIFIED PARKINSONISM TYPE (HCC): ICD-10-CM

## 2024-11-21 NOTE — TELEPHONE ENCOUNTER
Last seen: 8/19/24    Next appt: Mon 11/25/24    Last filled: both meds last filled 8/19/24 for 30-day supply with 2 refills

## 2024-11-25 ENCOUNTER — OFFICE VISIT (OUTPATIENT)
Dept: NEUROLOGY | Age: 89
End: 2024-11-25
Payer: COMMERCIAL

## 2024-11-25 VITALS
DIASTOLIC BLOOD PRESSURE: 62 MMHG | OXYGEN SATURATION: 97 % | WEIGHT: 180 LBS | BODY MASS INDEX: 25.77 KG/M2 | HEART RATE: 99 BPM | HEIGHT: 70 IN | SYSTOLIC BLOOD PRESSURE: 120 MMHG

## 2024-11-25 DIAGNOSIS — F03.B0 MODERATE DEMENTIA WITHOUT BEHAVIORAL DISTURBANCE, PSYCHOTIC DISTURBANCE, MOOD DISTURBANCE, OR ANXIETY, UNSPECIFIED DEMENTIA TYPE (HCC): ICD-10-CM

## 2024-11-25 DIAGNOSIS — G20.C PARKINSONISM, UNSPECIFIED PARKINSONISM TYPE (HCC): Primary | ICD-10-CM

## 2024-11-25 PROCEDURE — 1160F RVW MEDS BY RX/DR IN RCRD: CPT | Performed by: PSYCHIATRY & NEUROLOGY

## 2024-11-25 PROCEDURE — 99214 OFFICE O/P EST MOD 30 MIN: CPT | Performed by: PSYCHIATRY & NEUROLOGY

## 2024-11-25 PROCEDURE — 1123F ACP DISCUSS/DSCN MKR DOCD: CPT | Performed by: PSYCHIATRY & NEUROLOGY

## 2024-11-25 PROCEDURE — 1159F MED LIST DOCD IN RCRD: CPT | Performed by: PSYCHIATRY & NEUROLOGY

## 2024-11-25 RX ORDER — CARBIDOPA AND LEVODOPA 25; 100 MG/1; MG/1
1 TABLET, EXTENDED RELEASE ORAL 2 TIMES DAILY
Qty: 60 TABLET | Refills: 2 | Status: SHIPPED | OUTPATIENT
Start: 2024-11-25

## 2024-11-25 RX ORDER — CARBIDOPA AND LEVODOPA 25; 100 MG/1; MG/1
1 TABLET ORAL 4 TIMES DAILY
Qty: 120 TABLET | Refills: 2 | Status: SHIPPED | OUTPATIENT
Start: 2024-11-25

## 2024-11-25 RX ORDER — CARBIDOPA AND LEVODOPA 25; 100 MG/1; MG/1
1 TABLET, EXTENDED RELEASE ORAL NIGHTLY
Qty: 30 TABLET | Refills: 2 | OUTPATIENT
Start: 2024-11-25

## 2024-11-25 RX ORDER — CARBIDOPA AND LEVODOPA 25; 100 MG/1; MG/1
TABLET ORAL
Qty: 90 TABLET | Refills: 2 | OUTPATIENT
Start: 2024-11-25

## 2024-11-25 RX ORDER — ERYTHROMYCIN 5 MG/G
OINTMENT OPHTHALMIC
COMMUNITY
Start: 2024-11-17

## 2024-11-25 NOTE — PATIENT INSTRUCTIONS

## 2024-11-25 NOTE — PROGRESS NOTES
Portions of this note were created using voice recognition software, please excuse any typos.      Shilpa Solorio MD

## 2024-12-28 DIAGNOSIS — E78.2 MIXED HYPERLIPIDEMIA: ICD-10-CM

## 2024-12-28 NOTE — TELEPHONE ENCOUNTER
Refill Request     CONFIRM preferred pharmacy with the patient.    If Mail Order Rx - Pend for 90 day refill.      Last Seen: Last Seen Department: 10/3/2024  Last Seen by PCP: 10/3/2024    Last Written: 10/3/2024 Pravastatin #45 1 refill     If no future appointment scheduled:  Review the last OV with PCP and review information for follow-up visit,  Route STAFF MESSAGE with patient name to the  Pool for scheduling with the following information:            -  Timing of next visit           -  Visit type ie Physical, OV, etc           -  Diagnoses/Reason ie. COPD, HTN - Do not use MEDICATION, Follow-up or CHECK UP - Give reason for visit      Next Appointment:   Future Appointments   Date Time Provider Department Center   3/3/2025 11:00 AM Shilpa Solorio MD [facility] -   4/3/2025 10:30 AM Yesenia Fatima DO [facility]       Message sent to  to schedule appt with patient?  NO      Requested Prescriptions     Pending Prescriptions Disp Refills    pravastatin (PRAVACHOL) 20 MG tablet [Pharmacy Med Name: PRAVASTATIN SODIUM 20 MG TAB] 45 tablet 1     Sig: TAKE 1 TABLET BY MOUTH EVERY OTHER DAY

## 2024-12-30 RX ORDER — PRAVASTATIN SODIUM 20 MG
20 TABLET ORAL EVERY OTHER DAY
Qty: 45 TABLET | Refills: 1 | Status: SHIPPED | OUTPATIENT
Start: 2024-12-30

## 2025-01-22 DIAGNOSIS — I10 ESSENTIAL HYPERTENSION: ICD-10-CM

## 2025-01-22 NOTE — TELEPHONE ENCOUNTER
Refill Request     CONFIRM preferred pharmacy with the patient.    If Mail Order Rx - Pend for 90 day refill.      Last Seen: Last Seen Department: 10/3/2024  Last Seen by PCP: 10/3/2024    Last Written: 7/29/24 90 with 1 refill     If no future appointment scheduled:  Review the last OV with PCP and review information for follow-up visit,  Route STAFF MESSAGE with patient name to the  Pool for scheduling with the following information:            -  Timing of next visit           -  Visit type ie Physical, OV, etc           -  Diagnoses/Reason ie. COPD, HTN - Do not use MEDICATION, Follow-up or CHECK UP - Give reason for visit      Next Appointment:   Future Appointments   Date Time Provider Department Center   3/3/2025 11:00 AM Shilpa Solorio MD [facility] Neu[facility] -   4/3/2025 10:30 AM Yesenia Fatima DO [facility] DE[facility]       Message sent to  to schedule appt with patient?  NO      Requested Prescriptions     Pending Prescriptions Disp Refills    metoprolol succinate (TOPROL XL) 25 MG extended release tablet [Pharmacy Med Name: METOPROLOL SUCC ER 25 MG TAB] 90 tablet 1     Sig: TAKE 1 TABLET BY MOUTH DAILY

## 2025-01-23 RX ORDER — METOPROLOL SUCCINATE 25 MG/1
TABLET, EXTENDED RELEASE ORAL
Qty: 90 TABLET | Refills: 1 | Status: SHIPPED | OUTPATIENT
Start: 2025-01-23

## 2025-02-24 RX ORDER — CARBIDOPA AND LEVODOPA 25; 100 MG/1; MG/1
TABLET, EXTENDED RELEASE ORAL
Qty: 60 TABLET | Refills: 1 | Status: SHIPPED | OUTPATIENT
Start: 2025-02-24

## 2025-02-28 ENCOUNTER — TELEPHONE (OUTPATIENT)
Dept: FAMILY MEDICINE CLINIC | Age: 89
End: 2025-02-28

## 2025-02-28 NOTE — TELEPHONE ENCOUNTER
Wife calling to find out how patient can prevent getting measles, stating patient doesn't know if he was vaccinated or if he had measles as a child.    Please advise.

## 2025-02-28 NOTE — TELEPHONE ENCOUNTER
Typically people get measles if they have never had it before and/or have never had the vaccine.  To find out if he is vaccinated against measles, we can check antibodies but this is rarely done for people his age.  If he is concerned (which I am not) we could see him on Monday and check those lab titers.

## 2025-03-04 NOTE — TELEPHONE ENCOUNTER
The patient has been notified of this information and all questions answered.  Patient is will wait until his next appt.

## 2025-03-12 ENCOUNTER — TELEPHONE (OUTPATIENT)
Age: 89
End: 2025-03-12

## 2025-03-12 NOTE — TELEPHONE ENCOUNTER
Pt's wife called to report that she received conflicting info on dosage from pharmacy for pt's Carbidopa-Levopa     Questions:    Unsure if pt should take meds at night or morning? Should he have an empty stomach? Is it a problem if he goes back to sleep after taking meds.     Pls CB to confirm dosing instructions

## 2025-04-04 ENCOUNTER — OFFICE VISIT (OUTPATIENT)
Dept: FAMILY MEDICINE CLINIC | Age: 89
End: 2025-04-04

## 2025-04-04 VITALS
OXYGEN SATURATION: 99 % | HEART RATE: 67 BPM | SYSTOLIC BLOOD PRESSURE: 123 MMHG | DIASTOLIC BLOOD PRESSURE: 70 MMHG | HEIGHT: 70 IN | BODY MASS INDEX: 25.77 KG/M2 | WEIGHT: 180 LBS

## 2025-04-04 DIAGNOSIS — I10 ESSENTIAL HYPERTENSION: ICD-10-CM

## 2025-04-04 DIAGNOSIS — E78.2 MIXED HYPERLIPIDEMIA: ICD-10-CM

## 2025-04-04 DIAGNOSIS — Z78.9 MEASLES, MUMPS, RUBELLA (MMR) VACCINATION STATUS UNKNOWN: ICD-10-CM

## 2025-04-04 DIAGNOSIS — Z00.00 PREVENTATIVE HEALTH CARE: Primary | ICD-10-CM

## 2025-04-04 DIAGNOSIS — G20.C PARKINSONISM, UNSPECIFIED PARKINSONISM TYPE (HCC): ICD-10-CM

## 2025-04-04 DIAGNOSIS — E55.9 VITAMIN D DEFICIENCY: ICD-10-CM

## 2025-04-04 DIAGNOSIS — R60.0 BILATERAL EDEMA OF LOWER EXTREMITY: ICD-10-CM

## 2025-04-04 LAB
ALBUMIN SERPL-MCNC: 4.4 G/DL (ref 3.4–5)
ALBUMIN/GLOB SERPL: 1.8 {RATIO} (ref 1.1–2.2)
ALP SERPL-CCNC: 95 U/L (ref 40–129)
ALT SERPL-CCNC: 16 U/L (ref 10–40)
ANION GAP SERPL CALCULATED.3IONS-SCNC: 11 MMOL/L (ref 3–16)
AST SERPL-CCNC: 29 U/L (ref 15–37)
BILIRUB SERPL-MCNC: 0.4 MG/DL (ref 0–1)
BUN SERPL-MCNC: 14 MG/DL (ref 7–20)
CALCIUM SERPL-MCNC: 9.3 MG/DL (ref 8.3–10.6)
CHLORIDE SERPL-SCNC: 102 MMOL/L (ref 99–110)
CO2 SERPL-SCNC: 28 MMOL/L (ref 21–32)
CREAT SERPL-MCNC: 1 MG/DL (ref 0.8–1.3)
GFR SERPLBLD CREATININE-BSD FMLA CKD-EPI: 73 ML/MIN/{1.73_M2}
GLUCOSE SERPL-MCNC: 86 MG/DL (ref 70–99)
POTASSIUM SERPL-SCNC: 4.8 MMOL/L (ref 3.5–5.1)
PROT SERPL-MCNC: 6.8 G/DL (ref 6.4–8.2)
SODIUM SERPL-SCNC: 141 MMOL/L (ref 136–145)

## 2025-04-04 RX ORDER — POTASSIUM CHLORIDE 1500 MG/1
20 TABLET, EXTENDED RELEASE ORAL DAILY
Qty: 30 TABLET | Refills: 5 | Status: SHIPPED | OUTPATIENT
Start: 2025-04-04

## 2025-04-04 RX ORDER — PRAVASTATIN SODIUM 20 MG
20 TABLET ORAL EVERY OTHER DAY
Qty: 45 TABLET | Refills: 1 | Status: SHIPPED | OUTPATIENT
Start: 2025-04-04

## 2025-04-04 RX ORDER — METOPROLOL SUCCINATE 25 MG/1
TABLET, EXTENDED RELEASE ORAL
Qty: 90 TABLET | Refills: 1 | Status: SHIPPED | OUTPATIENT
Start: 2025-04-04

## 2025-04-04 RX ORDER — FUROSEMIDE 20 MG/1
20 TABLET ORAL DAILY PRN
Qty: 30 TABLET | Refills: 5 | Status: SHIPPED | OUTPATIENT
Start: 2025-04-04

## 2025-04-04 SDOH — ECONOMIC STABILITY: FOOD INSECURITY: WITHIN THE PAST 12 MONTHS, YOU WORRIED THAT YOUR FOOD WOULD RUN OUT BEFORE YOU GOT MONEY TO BUY MORE.: NEVER TRUE

## 2025-04-04 SDOH — ECONOMIC STABILITY: FOOD INSECURITY: WITHIN THE PAST 12 MONTHS, THE FOOD YOU BOUGHT JUST DIDN'T LAST AND YOU DIDN'T HAVE MONEY TO GET MORE.: NEVER TRUE

## 2025-04-04 ASSESSMENT — PATIENT HEALTH QUESTIONNAIRE - PHQ9
1. LITTLE INTEREST OR PLEASURE IN DOING THINGS: NOT AT ALL
2. FEELING DOWN, DEPRESSED OR HOPELESS: NOT AT ALL
SUM OF ALL RESPONSES TO PHQ QUESTIONS 1-9: 0
4. FEELING TIRED OR HAVING LITTLE ENERGY: NOT AT ALL
6. FEELING BAD ABOUT YOURSELF - OR THAT YOU ARE A FAILURE OR HAVE LET YOURSELF OR YOUR FAMILY DOWN: NOT AT ALL
SUM OF ALL RESPONSES TO PHQ QUESTIONS 1-9: 0
8. MOVING OR SPEAKING SO SLOWLY THAT OTHER PEOPLE COULD HAVE NOTICED. OR THE OPPOSITE, BEING SO FIGETY OR RESTLESS THAT YOU HAVE BEEN MOVING AROUND A LOT MORE THAN USUAL: NOT AT ALL
5. POOR APPETITE OR OVEREATING: NOT AT ALL
10. IF YOU CHECKED OFF ANY PROBLEMS, HOW DIFFICULT HAVE THESE PROBLEMS MADE IT FOR YOU TO DO YOUR WORK, TAKE CARE OF THINGS AT HOME, OR GET ALONG WITH OTHER PEOPLE: NOT DIFFICULT AT ALL
3. TROUBLE FALLING OR STAYING ASLEEP: NOT AT ALL
9. THOUGHTS THAT YOU WOULD BE BETTER OFF DEAD, OR OF HURTING YOURSELF: NOT AT ALL
7. TROUBLE CONCENTRATING ON THINGS, SUCH AS READING THE NEWSPAPER OR WATCHING TELEVISION: NOT AT ALL

## 2025-04-04 NOTE — PROGRESS NOTES
Stone Hoskins is a 89 y.o. male    Chief Complaint   Patient presents with    Hypertension    Hyperlipidemia    Other     Medication questions    Annual Exam         HPI:    Annual exam  Up-to-date with Tdap  Up-to-date with shingles vaccination  Up-to-date with pneumococcal vaccination  Up-to-date with RSV vaccination  He will think about the COVID-vaccine    Hypertension  This is a chronic problem. The current episode started more than 1 year ago. The problem is unchanged. The problem is controlled. Past treatments include diuretics and beta blockers. The current treatment provides significant improvement. There are no compliance problems.  There is no history of CAD/MI or CVA.   Hyperlipidemia  This is a chronic problem. The current episode started more than 1 year ago. The problem is uncontrolled. Recent lipid tests were reviewed and are low. He has no history of diabetes. Pertinent negatives include no myalgias. Current antihyperlipidemic treatment includes statins. The current treatment provides moderate improvement of lipids. There are no compliance problems.  Risk factors for coronary artery disease include male sex.     Lower extremity edema.  This is a chronic condition.  The patient takes Lasix 40 mg 2x/day prn which does help but causes frequent urination.  He takes potassium along with it.  Recent potassium was normal.  No heart failure.  His echo did not show any heart failure in 2021. No history of DVT.    Vitamin D deficiency.  This is a chronic issue.  He is taking over 2000 IU daily.  His muscle aches have improved.    ROS:    Review of Systems   Cardiovascular:  Positive for leg swelling.   Musculoskeletal:  Negative for myalgias.       /70 (BP Site: Right Upper Arm, Patient Position: Sitting, BP Cuff Size: Medium Adult)   Pulse 67   Ht 1.778 m (5' 10\")   Wt 81.6 kg (180 lb)   SpO2 99%   BMI 25.83 kg/m²     Physical Exam:    Physical Exam  Constitutional:       General: He is not in

## 2025-04-05 LAB
MEV IGG SER QL IA: NORMAL
MUV IGG SER QL IA: NORMAL
RUBV IGG SERPL QL IA: NORMAL
VZV IGG SER QL IA: NORMAL

## 2025-04-07 ENCOUNTER — RESULTS FOLLOW-UP (OUTPATIENT)
Dept: FAMILY MEDICINE CLINIC | Age: 89
End: 2025-04-07

## 2025-04-07 DIAGNOSIS — G20.C PARKINSONISM, UNSPECIFIED PARKINSONISM TYPE (HCC): ICD-10-CM

## 2025-04-07 NOTE — TELEPHONE ENCOUNTER
Patient had appt on 4/15/25 which was cancelled and they are asking for a 6 month supply of refills for his medication.  States PCP told them to ask for 6 month supply    Please review

## 2025-04-08 RX ORDER — CARBIDOPA AND LEVODOPA 25; 100 MG/1; MG/1
1 TABLET ORAL 4 TIMES DAILY
Qty: 120 TABLET | Refills: 5 | Status: SHIPPED | OUTPATIENT
Start: 2025-04-08

## 2025-04-08 RX ORDER — CARBIDOPA AND LEVODOPA 25; 100 MG/1; MG/1
1 TABLET, EXTENDED RELEASE ORAL 2 TIMES DAILY
Qty: 60 TABLET | Refills: 5 | Status: SHIPPED | OUTPATIENT
Start: 2025-04-08

## 2025-04-09 NOTE — TELEPHONE ENCOUNTER
Pt's wife called to check on refill status    She also wants to know if he can skip one dose a week (12pm dose on Fridays) to participate in senior activities    CB [place]: [phone]

## 2025-04-09 NOTE — TELEPHONE ENCOUNTER
Spoke with pt's spouse - informed her that Dr. Ventura signed the prescriptions.  Advised her that while it's not ideal for him to miss a dose, if that's what he needs to do to be able to participate in \"senior activities\" then so be it.

## 2025-05-28 ENCOUNTER — TELEPHONE (OUTPATIENT)
Dept: NEUROLOGY | Age: 89
End: 2025-05-28

## 2025-05-28 NOTE — TELEPHONE ENCOUNTER
Pt spouse called wanted to know who has taken over  pts and wants to schedule with a Neuro provider in [place].   Pt would like a call back today.

## 2025-06-11 NOTE — PLAN OF CARE
Bandage removed, wound site red with some serous fluid draining. Dr. Bonner looked at site and recommended ER precautions. Normal device function and no changes made. See flowsheet.   Achieves intake and output within specified parameters  Description  Achieves intake and output within specified parameters  Outcome: Ongoing     Problem: Hyperthermia:  Goal: Ability to maintain a body temperature in the normal range will improve  Description  Ability to maintain a body temperature in the normal range will improve  Outcome: Ongoing     Problem: Tobacco Use:  Goal: Will participate in inpatient tobacco-use cessation counseling  Description  Will participate in inpatient tobacco-use cessation counseling  Outcome: Ongoing     Problem: Confusion - Acute:  Goal: Absence of continued neurological deterioration signs and symptoms  Description  Absence of continued neurological deterioration signs and symptoms  Outcome: Ongoing  Goal: Mental status will be restored to baseline  Description  Mental status will be restored to baseline  Outcome: Ongoing     Problem: Injury - Risk of, Physical Injury:  Goal: Will remain free from falls  Description  Will remain free from falls  Outcome: Ongoing  Goal: Absence of physical injury  Description  Absence of physical injury  Outcome: Ongoing     Problem: Mood - Altered:  Goal: Mood stable  Description  Mood stable  Outcome: Ongoing  Goal: Absence of abusive behavior  Description  Absence of abusive behavior  Outcome: Ongoing  Goal: Verbalizations of feeling emotionally comfortable while being cared for will increase  Description  Verbalizations of feeling emotionally comfortable while being cared for will increase  Outcome: Ongoing     Problem: Psychomotor Activity - Altered:  Goal: Absence of psychomotor disturbance signs and symptoms  Description  Absence of psychomotor disturbance signs and symptoms  Outcome: Ongoing     Problem: Sensory Perception - Impaired:  Goal: Demonstrations of improved sensory functioning will increase  Description  Demonstrations of improved sensory functioning will increase  Outcome: Ongoing  Goal: Decrease in sensory misperception

## 2025-07-08 ENCOUNTER — OFFICE VISIT (OUTPATIENT)
Dept: NEUROLOGY | Age: 89
End: 2025-07-08
Payer: MEDICARE

## 2025-07-08 VITALS
SYSTOLIC BLOOD PRESSURE: 124 MMHG | OXYGEN SATURATION: 96 % | HEIGHT: 70 IN | BODY MASS INDEX: 26.48 KG/M2 | DIASTOLIC BLOOD PRESSURE: 93 MMHG | HEART RATE: 48 BPM | WEIGHT: 185 LBS

## 2025-07-08 DIAGNOSIS — F03.B0 MODERATE DEMENTIA WITHOUT BEHAVIORAL DISTURBANCE, PSYCHOTIC DISTURBANCE, MOOD DISTURBANCE, OR ANXIETY, UNSPECIFIED DEMENTIA TYPE (HCC): ICD-10-CM

## 2025-07-08 DIAGNOSIS — G20.C PRIMARY PARKINSONISM (HCC): Primary | ICD-10-CM

## 2025-07-08 PROCEDURE — 1123F ACP DISCUSS/DSCN MKR DOCD: CPT | Performed by: STUDENT IN AN ORGANIZED HEALTH CARE EDUCATION/TRAINING PROGRAM

## 2025-07-08 PROCEDURE — 99214 OFFICE O/P EST MOD 30 MIN: CPT | Performed by: STUDENT IN AN ORGANIZED HEALTH CARE EDUCATION/TRAINING PROGRAM

## 2025-07-08 RX ORDER — CARBIDOPA AND LEVODOPA 25; 100 MG/1; MG/1
1 TABLET, EXTENDED RELEASE ORAL 2 TIMES DAILY
Qty: 60 TABLET | Refills: 11 | Status: SHIPPED | OUTPATIENT
Start: 2025-07-08 | End: 2026-07-03

## 2025-07-08 RX ORDER — MEMANTINE HYDROCHLORIDE 10 MG/1
10 TABLET ORAL 2 TIMES DAILY
Qty: 60 TABLET | Refills: 11 | Status: SHIPPED | OUTPATIENT
Start: 2025-07-08 | End: 2026-07-03

## 2025-07-08 RX ORDER — MEMANTINE HYDROCHLORIDE 5 MG/1
5 TABLET ORAL 2 TIMES DAILY
Qty: 28 TABLET | Refills: 0 | Status: SHIPPED | OUTPATIENT
Start: 2025-07-08 | End: 2025-07-22

## 2025-07-08 RX ORDER — CARBIDOPA AND LEVODOPA 25; 100 MG/1; MG/1
1 TABLET ORAL 4 TIMES DAILY
Qty: 120 TABLET | Refills: 11 | Status: SHIPPED | OUTPATIENT
Start: 2025-07-08 | End: 2026-07-03

## 2025-07-08 NOTE — PATIENT INSTRUCTIONS
Notify me after he has been using memantine at the goal dose for at least 2 weeks. If he is tolerating this medication, then the next medication change I would consider is to simplify Sinemet from combination of IR and CR to only IR and increase the dose gradually. We can consider making this change after the trial of memantine.

## 2025-07-08 NOTE — PROGRESS NOTES
History of Present Illness  Follow-up visit.  Accompanied by his wife.    He was previously under the care of Dr. Núñez for Parkinson's disease, which was diagnosed about a year ago when he began experiencing memory loss and occasional falls. The onset of these symptoms is unclear as they were not closely monitored. He does not report acting out his dreams at night. He has been taking carbidopa-levodopa, which has not resulted in any noticeable improvement, but his condition has not deteriorated either. He attends a senior center twice a week for activities and exercises. He has not experienced any visual hallucinations, although there were a few instances where he believed someone was in the house when there was no one present. He has not experienced lightheadedness or fainting spells. Initially, he reported headaches after starting the medication, but these have since resolved. He also reports no nausea. His main concern is his memory issues, which he believes are affecting his walking ability. He has been doing hand exercises at home and uses some machines at [employer] for arm and leg movements.    He is very sensitive about his feet, even to touch. A few years ago, a horse stepped on his foot, possibly breaking a toe, which may have affected his walking. He typically uses a walker for mobility and has previously undergone physical therapy.    BP (!) 124/93 (BP Site: Left Upper Arm)   Pulse (!) 48   Ht 1.778 m (5' 10\")   Wt 83.9 kg (185 lb)   SpO2 96%   BMI 26.54 kg/m²       Neurological Exam  Mental Status  Awake and alert. no dysarthria present. Language: Reduced speech fluency. Fund of knowledge is appropriate for level of education.    Cranial Nerves  CN III, IV, VI: Extraocular movements intact bilaterally. No nystagmus. Hypometric saccades. Normal velocity. Diminished smooth pursuit. Normal lids and orbits bilaterally. Oculomotor apraxia.  CN VII:  Right: There is no facial weakness.  Left: There is

## 2025-07-20 DIAGNOSIS — I10 ESSENTIAL HYPERTENSION: ICD-10-CM

## 2025-07-21 RX ORDER — METOPROLOL SUCCINATE 25 MG/1
25 TABLET, EXTENDED RELEASE ORAL DAILY
Qty: 90 TABLET | Refills: 1 | OUTPATIENT
Start: 2025-07-21

## 2025-07-21 NOTE — TELEPHONE ENCOUNTER
Refill Request     CONFIRM preferred pharmacy with the patient.    If Mail Order Rx - Pend for 90 day refill.      Last Seen: Last Seen Department: 4/4/2025  Last Seen by PCP: 4/4/2025    Last Written: 4/4/25 90 with 1 refill     If no future appointment scheduled:  Review the last OV with PCP and review information for follow-up visit,  Route STAFF MESSAGE with patient name to the  Pool for scheduling with the following information:            -  Timing of next visit           -  Visit type ie Physical, OV, etc           -  Diagnoses/Reason ie. COPD, HTN - Do not use MEDICATION, Follow-up or CHECK UP - Give reason for visit      Next Appointment:   Future Appointments   Date Time Provider Department Center   10/6/2025 10:00 AM Yesenia Fatima DO [facility]   10/6/2025 10:30 AM SCHEDULE, [facility] LPN [facility]   10/22/2025 10:45 AM Kingsley Rob MD [facility] Neurology -       Message sent to  to schedule appt with patient?  NO      Requested Prescriptions     Pending Prescriptions Disp Refills    metoprolol succinate (TOPROL XL) 25 MG extended release tablet [Pharmacy Med Name: METOPROLOL SUCC ER 25 MG TAB] 90 tablet 1     Sig: TAKE 1 TABLET BY MOUTH DAILY